# Patient Record
Sex: MALE | Race: WHITE | NOT HISPANIC OR LATINO | Employment: FULL TIME | URBAN - METROPOLITAN AREA
[De-identification: names, ages, dates, MRNs, and addresses within clinical notes are randomized per-mention and may not be internally consistent; named-entity substitution may affect disease eponyms.]

---

## 2018-03-08 ENCOUNTER — HOSPITAL ENCOUNTER (INPATIENT)
Facility: HOSPITAL | Age: 58
LOS: 18 days | Discharge: HOME HEALTH | End: 2018-03-26
Attending: EMERGENCY MEDICINE | Admitting: HOSPITALIST

## 2018-03-08 ENCOUNTER — APPOINTMENT (OUTPATIENT)
Dept: GENERAL RADIOLOGY | Facility: HOSPITAL | Age: 58
End: 2018-03-08

## 2018-03-08 DIAGNOSIS — E10.10 DIABETIC KETOACIDOSIS WITHOUT COMA ASSOCIATED WITH TYPE 1 DIABETES MELLITUS (HCC): Primary | ICD-10-CM

## 2018-03-08 DIAGNOSIS — I35.0 NONRHEUMATIC AORTIC VALVE STENOSIS: ICD-10-CM

## 2018-03-08 DIAGNOSIS — Z74.09 IMPAIRED FUNCTIONAL MOBILITY AND ACTIVITY TOLERANCE: ICD-10-CM

## 2018-03-08 PROBLEM — E11.9 DIABETES MELLITUS (HCC): Status: ACTIVE | Noted: 2018-03-08

## 2018-03-08 LAB
ALBUMIN SERPL-MCNC: 4.3 G/DL (ref 3.5–5.2)
ALBUMIN/GLOB SERPL: 1.5 G/DL
ALP SERPL-CCNC: 84 U/L (ref 39–117)
ALT SERPL W P-5'-P-CCNC: 18 U/L (ref 1–41)
AMPHET+METHAMPHET UR QL: NEGATIVE
ANION GAP SERPL CALCULATED.3IONS-SCNC: 41.4 MMOL/L
ANION GAP SERPL CALCULATED.3IONS-SCNC: 43 MMOL/L
ARTERIAL PATENCY WRIST A: POSITIVE
AST SERPL-CCNC: 15 U/L (ref 1–40)
ATMOSPHERIC PRESS: 752.8 MMHG
BACTERIA UR QL AUTO: ABNORMAL /HPF
BARBITURATES UR QL SCN: NEGATIVE
BASE EXCESS BLDA CALC-SCNC: -13 MMOL/L (ref 0–2)
BASOPHILS # BLD AUTO: 0.02 10*3/MM3 (ref 0–0.2)
BASOPHILS NFR BLD AUTO: 0.2 % (ref 0–1.5)
BDY SITE: ABNORMAL
BENZODIAZ UR QL SCN: NEGATIVE
BILIRUB SERPL-MCNC: 1.7 MG/DL (ref 0.1–1.2)
BILIRUB UR QL STRIP: NEGATIVE
BUN BLD-MCNC: 19 MG/DL (ref 6–20)
BUN BLD-MCNC: 19 MG/DL (ref 6–20)
BUN/CREAT SERPL: 13.7 (ref 7–25)
BUN/CREAT SERPL: 14.1 (ref 7–25)
BURR CELLS BLD QL SMEAR: NORMAL
C3 FRG RBC-MCNC: NORMAL
CA-I BLD-MCNC: 5.2 MG/DL (ref 4.6–5.4)
CA-I SERPL ISE-MCNC: 1.29 MMOL/L (ref 1.15–1.35)
CALCIUM SPEC-SCNC: 9.7 MG/DL (ref 8.6–10.5)
CALCIUM SPEC-SCNC: 9.8 MG/DL (ref 8.6–10.5)
CANNABINOIDS SERPL QL: NEGATIVE
CHLORIDE SERPL-SCNC: 82 MMOL/L (ref 98–107)
CHLORIDE SERPL-SCNC: 82 MMOL/L (ref 98–107)
CLARITY UR: ABNORMAL
CO2 SERPL-SCNC: 12.6 MMOL/L (ref 22–29)
CO2 SERPL-SCNC: 13 MMOL/L (ref 22–29)
COCAINE UR QL: NEGATIVE
COLOR UR: YELLOW
CREAT BLD-MCNC: 1.35 MG/DL (ref 0.76–1.27)
CREAT BLD-MCNC: 1.39 MG/DL (ref 0.76–1.27)
D-LACTATE SERPL-SCNC: 1.5 MMOL/L (ref 0.5–2)
DEPRECATED RDW RBC AUTO: 52.2 FL (ref 37–54)
EOSINOPHIL # BLD AUTO: 0 10*3/MM3 (ref 0–0.7)
EOSINOPHIL NFR BLD AUTO: 0 % (ref 0.3–6.2)
ERYTHROCYTE [DISTWIDTH] IN BLOOD BY AUTOMATED COUNT: 13.1 % (ref 11.5–14.5)
ETHANOL BLD-MCNC: <10 MG/DL (ref 0–10)
ETHANOL UR QL: <0.01 %
GFR SERPL CREATININE-BSD FRML MDRD: 52 ML/MIN/1.73
GFR SERPL CREATININE-BSD FRML MDRD: 54 ML/MIN/1.73
GLOBULIN UR ELPH-MCNC: 2.8 GM/DL
GLUCOSE BLD-MCNC: 325 MG/DL (ref 65–99)
GLUCOSE BLD-MCNC: 328 MG/DL (ref 65–99)
GLUCOSE BLDC GLUCOMTR-MCNC: 187 MG/DL (ref 70–130)
GLUCOSE BLDC GLUCOMTR-MCNC: 266 MG/DL (ref 70–130)
GLUCOSE BLDC GLUCOMTR-MCNC: 360 MG/DL (ref 70–130)
GLUCOSE BLDC GLUCOMTR-MCNC: 405 MG/DL (ref 70–130)
GLUCOSE UR STRIP-MCNC: ABNORMAL MG/DL
HCO3 BLDA-SCNC: 10.2 MMOL/L (ref 22–28)
HCT VFR BLD AUTO: 40.1 % (ref 40.4–52.2)
HGB BLD-MCNC: 13.5 G/DL (ref 13.7–17.6)
HGB UR QL STRIP.AUTO: NEGATIVE
HOLD SPECIMEN: NORMAL
HOLD SPECIMEN: NORMAL
HYALINE CASTS UR QL AUTO: ABNORMAL /LPF
IMM GRANULOCYTES # BLD: 0.05 10*3/MM3 (ref 0–0.03)
IMM GRANULOCYTES NFR BLD: 0.5 % (ref 0–0.5)
KETONES UR QL STRIP: ABNORMAL
LEUKOCYTE ESTERASE UR QL STRIP.AUTO: NEGATIVE
LIPASE SERPL-CCNC: 64 U/L (ref 13–60)
LYMPHOCYTES # BLD AUTO: 1.85 10*3/MM3 (ref 0.9–4.8)
LYMPHOCYTES NFR BLD AUTO: 17.1 % (ref 19.6–45.3)
MAGNESIUM SERPL-MCNC: 2.6 MG/DL (ref 1.6–2.6)
MCH RBC QN AUTO: 36.8 PG (ref 27–32.7)
MCHC RBC AUTO-ENTMCNC: 33.7 G/DL (ref 32.6–36.4)
MCV RBC AUTO: 109.3 FL (ref 79.8–96.2)
METHADONE UR QL SCN: NEGATIVE
MICROCYTES BLD QL: NORMAL
MODALITY: ABNORMAL
MONOCYTES # BLD AUTO: 1.08 10*3/MM3 (ref 0.2–1.2)
MONOCYTES NFR BLD AUTO: 10 % (ref 5–12)
NEUTROPHILS # BLD AUTO: 7.85 10*3/MM3 (ref 1.9–8.1)
NEUTROPHILS NFR BLD AUTO: 72.2 % (ref 42.7–76)
NITRITE UR QL STRIP: NEGATIVE
OPIATES UR QL: NEGATIVE
OXYCODONE UR QL SCN: NEGATIVE
PCO2 BLDA: 18.2 MM HG (ref 35–45)
PH BLDA: 7.36 PH UNITS (ref 7.35–7.45)
PH UR STRIP.AUTO: 5.5 [PH] (ref 5–8)
PHOSPHATE SERPL-MCNC: 4 MG/DL (ref 2.5–4.5)
PLAT MORPH BLD: NORMAL
PLATELET # BLD AUTO: 304 10*3/MM3 (ref 140–500)
PMV BLD AUTO: 11.2 FL (ref 6–12)
PO2 BLDA: 102.9 MM HG (ref 80–100)
POTASSIUM BLD-SCNC: 3.3 MMOL/L (ref 3.5–5.2)
POTASSIUM BLD-SCNC: 3.8 MMOL/L (ref 3.5–5.2)
PROT SERPL-MCNC: 7.1 G/DL (ref 6–8.5)
PROT UR QL STRIP: ABNORMAL
RBC # BLD AUTO: 3.67 10*6/MM3 (ref 4.6–6)
RBC # UR: ABNORMAL /HPF
REF LAB TEST METHOD: ABNORMAL
SAO2 % BLDCOA: 97.9 % (ref 92–99)
SODIUM BLD-SCNC: 136 MMOL/L (ref 136–145)
SODIUM BLD-SCNC: 138 MMOL/L (ref 136–145)
SP GR UR STRIP: 1.02 (ref 1–1.03)
SQUAMOUS #/AREA URNS HPF: ABNORMAL /HPF
TOTAL RATE: 16 BREATHS/MINUTE
TROPONIN T SERPL-MCNC: 0.02 NG/ML (ref 0–0.03)
UROBILINOGEN UR QL STRIP: ABNORMAL
WBC MORPH BLD: NORMAL
WBC NRBC COR # BLD: 10.85 10*3/MM3 (ref 4.5–10.7)
WBC UR QL AUTO: ABNORMAL /HPF
WHOLE BLOOD HOLD SPECIMEN: NORMAL
WHOLE BLOOD HOLD SPECIMEN: NORMAL

## 2018-03-08 PROCEDURE — 93010 ELECTROCARDIOGRAM REPORT: CPT | Performed by: INTERNAL MEDICINE

## 2018-03-08 PROCEDURE — 36600 WITHDRAWAL OF ARTERIAL BLOOD: CPT

## 2018-03-08 PROCEDURE — 85025 COMPLETE CBC W/AUTO DIFF WBC: CPT | Performed by: EMERGENCY MEDICINE

## 2018-03-08 PROCEDURE — 80307 DRUG TEST PRSMV CHEM ANLYZR: CPT | Performed by: EMERGENCY MEDICINE

## 2018-03-08 PROCEDURE — 99285 EMERGENCY DEPT VISIT HI MDM: CPT

## 2018-03-08 PROCEDURE — 81001 URINALYSIS AUTO W/SCOPE: CPT | Performed by: EMERGENCY MEDICINE

## 2018-03-08 PROCEDURE — 83605 ASSAY OF LACTIC ACID: CPT | Performed by: EMERGENCY MEDICINE

## 2018-03-08 PROCEDURE — 80048 BASIC METABOLIC PNL TOTAL CA: CPT | Performed by: PHYSICIAN ASSISTANT

## 2018-03-08 PROCEDURE — 82803 BLOOD GASES ANY COMBINATION: CPT

## 2018-03-08 PROCEDURE — 82330 ASSAY OF CALCIUM: CPT | Performed by: PHYSICIAN ASSISTANT

## 2018-03-08 PROCEDURE — 81003 URINALYSIS AUTO W/O SCOPE: CPT | Performed by: EMERGENCY MEDICINE

## 2018-03-08 PROCEDURE — 82043 UR ALBUMIN QUANTITATIVE: CPT | Performed by: INTERNAL MEDICINE

## 2018-03-08 PROCEDURE — 83735 ASSAY OF MAGNESIUM: CPT | Performed by: PHYSICIAN ASSISTANT

## 2018-03-08 PROCEDURE — 63710000001 INSULIN REGULAR HUMAN PER 5 UNITS: Performed by: PHYSICIAN ASSISTANT

## 2018-03-08 PROCEDURE — 85007 BL SMEAR W/DIFF WBC COUNT: CPT | Performed by: EMERGENCY MEDICINE

## 2018-03-08 PROCEDURE — 71045 X-RAY EXAM CHEST 1 VIEW: CPT

## 2018-03-08 PROCEDURE — 80307 DRUG TEST PRSMV CHEM ANLYZR: CPT | Performed by: PHYSICIAN ASSISTANT

## 2018-03-08 PROCEDURE — 82962 GLUCOSE BLOOD TEST: CPT

## 2018-03-08 PROCEDURE — 84100 ASSAY OF PHOSPHORUS: CPT | Performed by: PHYSICIAN ASSISTANT

## 2018-03-08 PROCEDURE — 80053 COMPREHEN METABOLIC PANEL: CPT | Performed by: EMERGENCY MEDICINE

## 2018-03-08 PROCEDURE — 93005 ELECTROCARDIOGRAM TRACING: CPT | Performed by: PHYSICIAN ASSISTANT

## 2018-03-08 PROCEDURE — 83690 ASSAY OF LIPASE: CPT | Performed by: EMERGENCY MEDICINE

## 2018-03-08 PROCEDURE — 36415 COLL VENOUS BLD VENIPUNCTURE: CPT | Performed by: EMERGENCY MEDICINE

## 2018-03-08 PROCEDURE — 82570 ASSAY OF URINE CREATININE: CPT | Performed by: INTERNAL MEDICINE

## 2018-03-08 PROCEDURE — 84484 ASSAY OF TROPONIN QUANT: CPT | Performed by: PHYSICIAN ASSISTANT

## 2018-03-08 RX ORDER — POTASSIUM CHLORIDE 1.5 G/1.77G
40 POWDER, FOR SOLUTION ORAL AS NEEDED
Status: DISCONTINUED | OUTPATIENT
Start: 2018-03-08 | End: 2018-03-11

## 2018-03-08 RX ORDER — SODIUM CHLORIDE 450 MG/100ML
250 INJECTION, SOLUTION INTRAVENOUS CONTINUOUS
Status: DISCONTINUED | OUTPATIENT
Start: 2018-03-08 | End: 2018-03-11

## 2018-03-08 RX ORDER — POTASSIUM CHLORIDE 750 MG/1
10 CAPSULE, EXTENDED RELEASE ORAL AS NEEDED
Status: DISCONTINUED | OUTPATIENT
Start: 2018-03-08 | End: 2018-03-11

## 2018-03-08 RX ORDER — SODIUM CHLORIDE 0.9 % (FLUSH) 0.9 %
1-10 SYRINGE (ML) INJECTION AS NEEDED
Status: DISCONTINUED | OUTPATIENT
Start: 2018-03-08 | End: 2018-03-21

## 2018-03-08 RX ORDER — ONDANSETRON 4 MG/1
4 TABLET, FILM COATED ORAL EVERY 6 HOURS PRN
Status: DISCONTINUED | OUTPATIENT
Start: 2018-03-08 | End: 2018-03-21

## 2018-03-08 RX ORDER — POTASSIUM CHLORIDE 7.46 G/1000ML
10 INJECTION, SOLUTION INTRAVENOUS
Status: DISCONTINUED | OUTPATIENT
Start: 2018-03-08 | End: 2018-03-11

## 2018-03-08 RX ORDER — DEXTROSE AND SODIUM CHLORIDE 5; .45 G/100ML; G/100ML
150 INJECTION, SOLUTION INTRAVENOUS CONTINUOUS PRN
Status: DISCONTINUED | OUTPATIENT
Start: 2018-03-08 | End: 2018-03-11

## 2018-03-08 RX ORDER — SODIUM CHLORIDE 0.9 % (FLUSH) 0.9 %
10 SYRINGE (ML) INJECTION AS NEEDED
Status: DISCONTINUED | OUTPATIENT
Start: 2018-03-08 | End: 2018-03-21

## 2018-03-08 RX ORDER — ACETAMINOPHEN 325 MG/1
650 TABLET ORAL EVERY 4 HOURS PRN
Status: DISCONTINUED | OUTPATIENT
Start: 2018-03-08 | End: 2018-03-21

## 2018-03-08 RX ORDER — ONDANSETRON 2 MG/ML
4 INJECTION INTRAMUSCULAR; INTRAVENOUS EVERY 6 HOURS PRN
Status: DISCONTINUED | OUTPATIENT
Start: 2018-03-08 | End: 2018-03-21

## 2018-03-08 RX ORDER — ONDANSETRON 4 MG/1
4 TABLET, ORALLY DISINTEGRATING ORAL EVERY 6 HOURS PRN
Status: DISCONTINUED | OUTPATIENT
Start: 2018-03-08 | End: 2018-03-21

## 2018-03-08 RX ORDER — DEXTROSE, SODIUM CHLORIDE, AND POTASSIUM CHLORIDE 5; .45; .15 G/100ML; G/100ML; G/100ML
150 INJECTION INTRAVENOUS CONTINUOUS PRN
Status: DISCONTINUED | OUTPATIENT
Start: 2018-03-08 | End: 2018-03-11

## 2018-03-08 RX ORDER — DEXTROSE MONOHYDRATE 25 G/50ML
12.5 INJECTION, SOLUTION INTRAVENOUS
Status: DISCONTINUED | OUTPATIENT
Start: 2018-03-08 | End: 2018-03-11

## 2018-03-08 RX ORDER — POTASSIUM CHLORIDE 1.5 G/1.77G
20 POWDER, FOR SOLUTION ORAL AS NEEDED
Status: DISCONTINUED | OUTPATIENT
Start: 2018-03-08 | End: 2018-03-11

## 2018-03-08 RX ORDER — POTASSIUM CHLORIDE 1.5 G/1.77G
10 POWDER, FOR SOLUTION ORAL AS NEEDED
Status: DISCONTINUED | OUTPATIENT
Start: 2018-03-08 | End: 2018-03-11

## 2018-03-08 RX ORDER — INSULIN GLARGINE 100 [IU]/ML
15 INJECTION, SOLUTION SUBCUTANEOUS 2 TIMES DAILY
COMMUNITY
End: 2018-03-26 | Stop reason: HOSPADM

## 2018-03-08 RX ORDER — POTASSIUM CHLORIDE 750 MG/1
40 CAPSULE, EXTENDED RELEASE ORAL AS NEEDED
Status: DISCONTINUED | OUTPATIENT
Start: 2018-03-08 | End: 2018-03-11

## 2018-03-08 RX ORDER — SODIUM CHLORIDE AND POTASSIUM CHLORIDE 150; 450 MG/100ML; MG/100ML
250 INJECTION, SOLUTION INTRAVENOUS CONTINUOUS PRN
Status: DISCONTINUED | OUTPATIENT
Start: 2018-03-08 | End: 2018-03-11

## 2018-03-08 RX ORDER — POTASSIUM CHLORIDE 750 MG/1
20 CAPSULE, EXTENDED RELEASE ORAL AS NEEDED
Status: DISCONTINUED | OUTPATIENT
Start: 2018-03-08 | End: 2018-03-11

## 2018-03-08 RX ADMIN — HUMAN INSULIN 5 UNITS: 100 INJECTION, SOLUTION SUBCUTANEOUS at 19:44

## 2018-03-08 RX ADMIN — SODIUM CHLORIDE 1000 ML: 9 INJECTION, SOLUTION INTRAVENOUS at 19:13

## 2018-03-08 RX ADMIN — SODIUM CHLORIDE 0.1 UNITS/KG/HR: 9 INJECTION, SOLUTION INTRAVENOUS at 19:46

## 2018-03-09 PROBLEM — Z59.89 INSURANCE COVERAGE PROBLEMS: Status: ACTIVE | Noted: 2018-03-09

## 2018-03-09 PROBLEM — F10.10 ALCOHOL ABUSE: Status: ACTIVE | Noted: 2018-03-09

## 2018-03-09 PROBLEM — E10.9 TYPE 1 DIABETES MELLITUS (HCC): Status: ACTIVE | Noted: 2018-03-08

## 2018-03-09 PROBLEM — Z72.0 TOBACCO ABUSE: Status: ACTIVE | Noted: 2018-03-09

## 2018-03-09 PROBLEM — G56.03 BILATERAL CARPAL TUNNEL SYNDROME: Status: ACTIVE | Noted: 2018-03-09

## 2018-03-09 LAB
ALBUMIN UR-MCNC: 6.2 MG/L
ANION GAP SERPL CALCULATED.3IONS-SCNC: 13.8 MMOL/L
ANION GAP SERPL CALCULATED.3IONS-SCNC: 16.6 MMOL/L
ANION GAP SERPL CALCULATED.3IONS-SCNC: 26.7 MMOL/L
BASOPHILS # BLD AUTO: 0.01 10*3/MM3 (ref 0–0.2)
BASOPHILS NFR BLD AUTO: 0.1 % (ref 0–1.5)
BUN BLD-MCNC: 19 MG/DL (ref 6–20)
BUN BLD-MCNC: 20 MG/DL (ref 6–20)
BUN BLD-MCNC: 22 MG/DL (ref 6–20)
BUN/CREAT SERPL: 15.5 (ref 7–25)
BUN/CREAT SERPL: 15.5 (ref 7–25)
BUN/CREAT SERPL: 17.3 (ref 7–25)
CA-I BLD-MCNC: 5.1 MG/DL (ref 4.6–5.4)
CA-I BLD-MCNC: 5.2 MG/DL (ref 4.6–5.4)
CA-I BLD-MCNC: 5.3 MG/DL (ref 4.6–5.4)
CA-I SERPL ISE-MCNC: 1.27 MMOL/L (ref 1.15–1.35)
CA-I SERPL ISE-MCNC: 1.29 MMOL/L (ref 1.15–1.35)
CA-I SERPL ISE-MCNC: 1.32 MMOL/L (ref 1.15–1.35)
CALCIUM SPEC-SCNC: 8.6 MG/DL (ref 8.6–10.5)
CALCIUM SPEC-SCNC: 8.9 MG/DL (ref 8.6–10.5)
CALCIUM SPEC-SCNC: 9.1 MG/DL (ref 8.6–10.5)
CHLORIDE SERPL-SCNC: 90 MMOL/L (ref 98–107)
CHLORIDE SERPL-SCNC: 92 MMOL/L (ref 98–107)
CHLORIDE SERPL-SCNC: 96 MMOL/L (ref 98–107)
CO2 SERPL-SCNC: 19.3 MMOL/L (ref 22–29)
CO2 SERPL-SCNC: 25.4 MMOL/L (ref 22–29)
CO2 SERPL-SCNC: 26.2 MMOL/L (ref 22–29)
CREAT BLD-MCNC: 1.1 MG/DL (ref 0.76–1.27)
CREAT BLD-MCNC: 1.29 MG/DL (ref 0.76–1.27)
CREAT BLD-MCNC: 1.42 MG/DL (ref 0.76–1.27)
CREAT UR-MCNC: 57.7 MG/DL
DEPRECATED RDW RBC AUTO: 49.9 FL (ref 37–54)
EOSINOPHIL # BLD AUTO: 0 10*3/MM3 (ref 0–0.7)
EOSINOPHIL NFR BLD AUTO: 0 % (ref 0.3–6.2)
ERYTHROCYTE [DISTWIDTH] IN BLOOD BY AUTOMATED COUNT: 12.8 % (ref 11.5–14.5)
GFR SERPL CREATININE-BSD FRML MDRD: 51 ML/MIN/1.73
GFR SERPL CREATININE-BSD FRML MDRD: 57 ML/MIN/1.73
GFR SERPL CREATININE-BSD FRML MDRD: 69 ML/MIN/1.73
GLUCOSE BLD-MCNC: 110 MG/DL (ref 65–99)
GLUCOSE BLD-MCNC: 159 MG/DL (ref 65–99)
GLUCOSE BLD-MCNC: 161 MG/DL (ref 65–99)
GLUCOSE BLDC GLUCOMTR-MCNC: 104 MG/DL (ref 70–130)
GLUCOSE BLDC GLUCOMTR-MCNC: 115 MG/DL (ref 70–130)
GLUCOSE BLDC GLUCOMTR-MCNC: 116 MG/DL (ref 70–130)
GLUCOSE BLDC GLUCOMTR-MCNC: 128 MG/DL (ref 70–130)
GLUCOSE BLDC GLUCOMTR-MCNC: 129 MG/DL (ref 70–130)
GLUCOSE BLDC GLUCOMTR-MCNC: 138 MG/DL (ref 70–130)
GLUCOSE BLDC GLUCOMTR-MCNC: 145 MG/DL (ref 70–130)
GLUCOSE BLDC GLUCOMTR-MCNC: 148 MG/DL (ref 70–130)
GLUCOSE BLDC GLUCOMTR-MCNC: 154 MG/DL (ref 70–130)
GLUCOSE BLDC GLUCOMTR-MCNC: 155 MG/DL (ref 70–130)
GLUCOSE BLDC GLUCOMTR-MCNC: 157 MG/DL (ref 70–130)
GLUCOSE BLDC GLUCOMTR-MCNC: 158 MG/DL (ref 70–130)
GLUCOSE BLDC GLUCOMTR-MCNC: 161 MG/DL (ref 70–130)
GLUCOSE BLDC GLUCOMTR-MCNC: 208 MG/DL (ref 70–130)
GLUCOSE BLDC GLUCOMTR-MCNC: 216 MG/DL (ref 70–130)
GLUCOSE BLDC GLUCOMTR-MCNC: 249 MG/DL (ref 70–130)
GLUCOSE BLDC GLUCOMTR-MCNC: 263 MG/DL (ref 70–130)
GLUCOSE BLDC GLUCOMTR-MCNC: 280 MG/DL (ref 70–130)
GLUCOSE BLDC GLUCOMTR-MCNC: 317 MG/DL (ref 70–130)
GLUCOSE BLDC GLUCOMTR-MCNC: 380 MG/DL (ref 70–130)
HBA1C MFR BLD: 8.02 % (ref 4.8–5.6)
HCT VFR BLD AUTO: 35.1 % (ref 40.4–52.2)
HGB BLD-MCNC: 11.7 G/DL (ref 13.7–17.6)
IMM GRANULOCYTES # BLD: 0.04 10*3/MM3 (ref 0–0.03)
IMM GRANULOCYTES NFR BLD: 0.4 % (ref 0–0.5)
LYMPHOCYTES # BLD AUTO: 1.64 10*3/MM3 (ref 0.9–4.8)
LYMPHOCYTES NFR BLD AUTO: 17.3 % (ref 19.6–45.3)
MAGNESIUM SERPL-MCNC: 2 MG/DL (ref 1.6–2.6)
MAGNESIUM SERPL-MCNC: 2 MG/DL (ref 1.6–2.6)
MAGNESIUM SERPL-MCNC: 2.1 MG/DL (ref 1.6–2.6)
MCH RBC QN AUTO: 35.9 PG (ref 27–32.7)
MCHC RBC AUTO-ENTMCNC: 33.3 G/DL (ref 32.6–36.4)
MCV RBC AUTO: 107.7 FL (ref 79.8–96.2)
MICROALBUMIN/CREAT UR: 107.5 MG/G
MONOCYTES # BLD AUTO: 1.3 10*3/MM3 (ref 0.2–1.2)
MONOCYTES NFR BLD AUTO: 13.7 % (ref 5–12)
NEUTROPHILS # BLD AUTO: 6.48 10*3/MM3 (ref 1.9–8.1)
NEUTROPHILS NFR BLD AUTO: 68.5 % (ref 42.7–76)
PHOSPHATE SERPL-MCNC: 1.1 MG/DL (ref 2.5–4.5)
PHOSPHATE SERPL-MCNC: 1.2 MG/DL (ref 2.5–4.5)
PHOSPHATE SERPL-MCNC: 1.4 MG/DL (ref 2.5–4.5)
PHOSPHATE SERPL-MCNC: 1.6 MG/DL (ref 2.5–4.5)
PHOSPHATE SERPL-MCNC: 1.8 MG/DL (ref 2.5–4.5)
PLATELET # BLD AUTO: 266 10*3/MM3 (ref 140–500)
PMV BLD AUTO: 10.1 FL (ref 6–12)
POTASSIUM BLD-SCNC: 2.7 MMOL/L (ref 3.5–5.2)
POTASSIUM BLD-SCNC: 2.9 MMOL/L (ref 3.5–5.2)
POTASSIUM BLD-SCNC: 2.9 MMOL/L (ref 3.5–5.2)
POTASSIUM BLD-SCNC: 3.4 MMOL/L (ref 3.5–5.2)
POTASSIUM BLD-SCNC: 3.4 MMOL/L (ref 3.5–5.2)
POTASSIUM BLD-SCNC: 4 MMOL/L (ref 3.5–5.2)
POTASSIUM BLD-SCNC: 4.3 MMOL/L (ref 3.5–5.2)
RBC # BLD AUTO: 3.26 10*6/MM3 (ref 4.6–6)
SODIUM BLD-SCNC: 134 MMOL/L (ref 136–145)
SODIUM BLD-SCNC: 136 MMOL/L (ref 136–145)
SODIUM BLD-SCNC: 136 MMOL/L (ref 136–145)
T4 FREE SERPL-MCNC: 1.03 NG/DL (ref 0.93–1.7)
TSH SERPL DL<=0.05 MIU/L-ACNC: 0.53 MIU/ML (ref 0.27–4.2)
WBC NRBC COR # BLD: 9.47 10*3/MM3 (ref 4.5–10.7)

## 2018-03-09 PROCEDURE — 63710000001 INSULIN ASPART PER 5 UNITS: Performed by: INTERNAL MEDICINE

## 2018-03-09 PROCEDURE — 25010000002 ENOXAPARIN PER 10 MG: Performed by: INTERNAL MEDICINE

## 2018-03-09 PROCEDURE — 82330 ASSAY OF CALCIUM: CPT | Performed by: PHYSICIAN ASSISTANT

## 2018-03-09 PROCEDURE — 84439 ASSAY OF FREE THYROXINE: CPT | Performed by: INTERNAL MEDICINE

## 2018-03-09 PROCEDURE — 84443 ASSAY THYROID STIM HORMONE: CPT | Performed by: INTERNAL MEDICINE

## 2018-03-09 PROCEDURE — 84100 ASSAY OF PHOSPHORUS: CPT | Performed by: PHYSICIAN ASSISTANT

## 2018-03-09 PROCEDURE — 84132 ASSAY OF SERUM POTASSIUM: CPT | Performed by: HOSPITALIST

## 2018-03-09 PROCEDURE — 82962 GLUCOSE BLOOD TEST: CPT

## 2018-03-09 PROCEDURE — 84100 ASSAY OF PHOSPHORUS: CPT | Performed by: INTERNAL MEDICINE

## 2018-03-09 PROCEDURE — 83036 HEMOGLOBIN GLYCOSYLATED A1C: CPT | Performed by: HOSPITALIST

## 2018-03-09 PROCEDURE — 63710000001 INSULIN DETEMER PER 5 UNITS: Performed by: INTERNAL MEDICINE

## 2018-03-09 PROCEDURE — 80048 BASIC METABOLIC PNL TOTAL CA: CPT | Performed by: PHYSICIAN ASSISTANT

## 2018-03-09 PROCEDURE — 85025 COMPLETE CBC W/AUTO DIFF WBC: CPT | Performed by: HOSPITALIST

## 2018-03-09 PROCEDURE — 83735 ASSAY OF MAGNESIUM: CPT | Performed by: PHYSICIAN ASSISTANT

## 2018-03-09 PROCEDURE — 25010000002 ONDANSETRON PER 1 MG: Performed by: HOSPITALIST

## 2018-03-09 PROCEDURE — 99254 IP/OBS CNSLTJ NEW/EST MOD 60: CPT | Performed by: INTERNAL MEDICINE

## 2018-03-09 PROCEDURE — 63710000001 INSULIN REGULAR HUMAN PER 5 UNITS: Performed by: PHYSICIAN ASSISTANT

## 2018-03-09 PROCEDURE — 25010000002 THIAMINE PER 100 MG: Performed by: HOSPITALIST

## 2018-03-09 PROCEDURE — 84132 ASSAY OF SERUM POTASSIUM: CPT | Performed by: INTERNAL MEDICINE

## 2018-03-09 RX ORDER — LORAZEPAM 1 MG/1
1 TABLET ORAL EVERY 6 HOURS PRN
Status: ACTIVE | OUTPATIENT
Start: 2018-03-09 | End: 2018-03-19

## 2018-03-09 RX ORDER — DIPHENOXYLATE HYDROCHLORIDE AND ATROPINE SULFATE 2.5; .025 MG/1; MG/1
1 TABLET ORAL DAILY
Status: DISCONTINUED | OUTPATIENT
Start: 2018-03-09 | End: 2018-03-21

## 2018-03-09 RX ORDER — FOLIC ACID 1 MG/1
1 TABLET ORAL DAILY
Status: COMPLETED | OUTPATIENT
Start: 2018-03-09 | End: 2018-03-13

## 2018-03-09 RX ORDER — LORAZEPAM 2 MG/ML
1 INJECTION INTRAMUSCULAR EVERY 6 HOURS PRN
Status: ACTIVE | OUTPATIENT
Start: 2018-03-09 | End: 2018-03-19

## 2018-03-09 RX ADMIN — POTASSIUM CHLORIDE, DEXTROSE MONOHYDRATE AND SODIUM CHLORIDE 150 ML/HR: 150; 5; 450 INJECTION, SOLUTION INTRAVENOUS at 01:05

## 2018-03-09 RX ADMIN — FOLIC ACID 1 MG: 1 TABLET ORAL at 15:45

## 2018-03-09 RX ADMIN — POTASSIUM & SODIUM PHOSPHATES POWDER PACK 280-160-250 MG 2 PACKET: 280-160-250 PACK at 12:32

## 2018-03-09 RX ADMIN — SODIUM CHLORIDE 0.01 UNITS/KG/HR: 9 INJECTION, SOLUTION INTRAVENOUS at 08:34

## 2018-03-09 RX ADMIN — POTASSIUM CHLORIDE, DEXTROSE MONOHYDRATE AND SODIUM CHLORIDE 150 ML/HR: 150; 5; 450 INJECTION, SOLUTION INTRAVENOUS at 07:52

## 2018-03-09 RX ADMIN — POTASSIUM CHLORIDE, DEXTROSE MONOHYDRATE AND SODIUM CHLORIDE 150 ML/HR: 150; 5; 450 INJECTION, SOLUTION INTRAVENOUS at 15:45

## 2018-03-09 RX ADMIN — POTASSIUM CHLORIDE 20 MEQ: 750 CAPSULE, EXTENDED RELEASE ORAL at 07:30

## 2018-03-09 RX ADMIN — INSULIN ASPART 4 UNITS: 100 INJECTION, SOLUTION INTRAVENOUS; SUBCUTANEOUS at 17:33

## 2018-03-09 RX ADMIN — ONDANSETRON 4 MG: 2 INJECTION INTRAMUSCULAR; INTRAVENOUS at 22:14

## 2018-03-09 RX ADMIN — POTASSIUM CHLORIDE 20 MEQ: 750 CAPSULE, EXTENDED RELEASE ORAL at 09:07

## 2018-03-09 RX ADMIN — INSULIN DETEMIR 12 UNITS: 100 INJECTION, SOLUTION SUBCUTANEOUS at 11:22

## 2018-03-09 RX ADMIN — POTASSIUM CHLORIDE 20 MEQ: 750 CAPSULE, EXTENDED RELEASE ORAL at 09:43

## 2018-03-09 RX ADMIN — ENOXAPARIN SODIUM 40 MG: 40 INJECTION SUBCUTANEOUS at 20:10

## 2018-03-09 RX ADMIN — POTASSIUM CHLORIDE 40 MEQ: 750 CAPSULE, EXTENDED RELEASE ORAL at 04:51

## 2018-03-09 RX ADMIN — INSULIN DETEMIR 10 UNITS: 100 INJECTION, SOLUTION SUBCUTANEOUS at 13:39

## 2018-03-09 RX ADMIN — Medication 1 TABLET: at 10:59

## 2018-03-09 RX ADMIN — POTASSIUM CHLORIDE 40 MEQ: 750 CAPSULE, EXTENDED RELEASE ORAL at 01:30

## 2018-03-09 RX ADMIN — THIAMINE HYDROCHLORIDE 100 MG: 100 INJECTION, SOLUTION INTRAMUSCULAR; INTRAVENOUS at 10:58

## 2018-03-09 RX ADMIN — SODIUM CHLORIDE 0.03 UNITS/KG/HR: 9 INJECTION, SOLUTION INTRAVENOUS at 09:41

## 2018-03-09 NOTE — PROGRESS NOTES
Name: Juan Payne ADMIT: 3/8/2018   : 1960  PCP: No Known Provider    MRN: [id number] LOS: 1 days   AGE/SEX: 57 y.o. male  ROOM: [facility]/   Subjective   Subjective  Feeling improved. Reports that had insurance issues so has been out of long acting insulin for about 1 week. Had nausea and vomiting which have improved. Abdominal pain with vomiting but none since (BLQ). No fevers or chills. No dysuria. No rash or swelling.    No CP palpitations or syncopal episodes. Aware of murmur.    Discussed with nursing. AG and acidosis improved but BG was increasing so just increased his insulin gtt rate.    Objective   Vital Signs  Temp:  [98.7 °F (37.1 °C)-99 °F (37.2 °C)] 99 °F (37.2 °C)  Heart Rate:  [] 80  Resp:  [16-18] 18  BP: (111-139)/(68-81) 111/81  SpO2:  [97 %-100 %] 97 %  on   ;   O2 Device: room air  Body mass index is 21.29 kg/(m^2).    Physical Exam   Constitutional: He appears well-developed. No distress.   HENT:   Head: Normocephalic and atraumatic.   Eyes: EOM are normal. Pupils are equal, round, and reactive to light.   Neck: Normal range of motion. Neck supple.   Cardiovascular: Normal rate, regular rhythm and intact distal pulses.    Murmur (4/6 LIZANDRO) heard.  Pulmonary/Chest: Effort normal and breath sounds normal. He has no wheezes.   Abdominal: Soft. He exhibits no distension. There is no tenderness. There is no rebound and no guarding.   Musculoskeletal: Normal range of motion. He exhibits no edema.   Neurological: He is alert. No cranial nerve deficit.   Skin: Skin is warm and dry. He is not diaphoretic.   Psychiatric: He has a normal mood and affect. His behavior is normal.   Nursing note and vitals reviewed.      Results Review:       I reviewed the patient's new clinical results. Reviewed imaging, agree with interpretation. Reviewed EKG, lateral st depression, LVH, sinus rhythm, no prior EKG to compare. Reviewed prior records.    Results from last 7 days  Lab Units 18  0028  03/08/18  1646   WBC 10*3/mm3 9.47 10.85*   HEMOGLOBIN g/dL 11.7* 13.5*   PLATELETS 10*3/mm3 266 304     Results from last 7 days  Lab Units 03/09/18  0801 03/09/18  0553 03/09/18 0333 03/09/18 0028 03/08/18 2013   SODIUM mmol/L 136  --  134* 136 138   POTASSIUM mmol/L 3.4* 3.4* 2.9*  2.9* 2.7* 3.8   CHLORIDE mmol/L 96*  --  92* 90* 82*   CO2 mmol/L 26.2  --  25.4 19.3* 13.0*   BUN mg/dL 19  --  20 22* 19   CREATININE mg/dL 1.10  --  1.29* 1.42* 1.35*   GLUCOSE mg/dL 159*  --  110* 161* 325*   Estimated Creatinine Clearance: 64.9 mL/min (by C-G formula based on Cr of 1.1).  Results from last 7 days  Lab Units 03/09/18  0801 03/09/18 0333 03/09/18 0028 03/08/18 2013 03/08/18  1646   CALCIUM mg/dL 8.6 8.9 9.1 9.8 9.7   ALBUMIN g/dL  --   --   --   --  4.30   MAGNESIUM mg/dL 2.0 2.0 2.1 2.6  --    PHOSPHORUS mg/dL 1.2* 1.1* 1.4* 4.0  --          insulin detemir 12 Units Subcutaneous QAM   multivitamin 1 tablet Oral Daily   thiamine (VITAMIN B1) IVPB 100 mg Intravenous Daily       dextrose 5 % and sodium chloride 0.45 % 150 mL/hr    dextrose 5 % and sodium chloride 0.45 % with KCl 20 mEq/L 150 mL/hr Last Rate: 150 mL/hr (03/09/18 0752)   insulin regular infusion 1 unit/mL 0.1 Units/kg/hr Last Rate: 0.031 Units/kg/hr (03/09/18 0941)   sodium chloride 250 mL/hr Last Rate: Stopped (03/08/18 2221)   sodium chloride 0.45 % with KCl 20 mEq 250 mL/hr    Diet Regular; Consistent Carbohydrate      Assessment/Plan   Active Hospital Problems (** Indicates Principal Problem)    Diagnosis Date Noted   • **Diabetic ketoacidosis without coma associated with type 1 diabetes mellitus [E10.10] 03/08/2018   • Tobacco abuse [Z72.0] 03/09/2018   • Alcohol abuse [F10.10] 03/09/2018   • Insurance coverage problems [Z59.8] 03/09/2018   • Type 1 diabetes mellitus [E10.9] 03/08/2018      Resolved Hospital Problems    Diagnosis Date Noted Date Resolved   No resolved problems to display.     - DKA: 2/2 running out of insulin. Continue gtt  and lab monitoring. Replace potassium and phosphorus. Continue IVF. Acidosis resolved and BG improved. Hopefully can get off of drip today. Endocrinology consulted.  - DM1: A1c 8.0. Consult DM educator and CCP.  - Hypokalemia  - Hypophosphatemia  - Abnormal EKG: No CP and troponin negative. Will repeat EKG to monitor.  - Alcohol Abuse: MVI folate thiamine. Ativan PRN. No active WD currently.  - Disposition: TBD  Rodney Diaz MD  [facility]  03/09/18  10:52 AM

## 2018-03-09 NOTE — ED PROVIDER NOTES
EMERGENCY DEPARTMENT ENCOUNTER    CHIEF COMPLAINT  Chief Complaint: generalized weakness   History given by: pt, family   History limited by: none  Room Number: 18/18  PMD: No Known Provider      HPI:  Pt is a 57 y.o. male who presents complaining of increasing generalized weakness for the past week. Pt also c/o N/V, lowered PO intake for several days, and productive cough. Pt's family reports slurred speech. Pt denies diarrhea. Pt states a Hx of diabetes, but denies a prior Hx of DKA. Per the pt's family, the pt's blood sugar has been high recently. Pt's family also state a Hx of EtOH use, but that the pt has not had a drink in 6 days.     Duration:  1 week   Onset: gradual   Timing: constant   Location: generalized   Quality: weakness  Intensity/Severity: moderate   Progression: increasing   Associated Symptoms: N/V, lowered PO intake, productive cough, slurred speech   Aggravating Factors: none stated   Alleviating Factors: none stated  Previous Episodes: none   Treatment before arrival: none    PAST MEDICAL HISTORY  Active Ambulatory Problems     Diagnosis Date Noted   • No Active Ambulatory Problems     Resolved Ambulatory Problems     Diagnosis Date Noted   • No Resolved Ambulatory Problems     Past Medical History:   Diagnosis Date   • Diabetes mellitus        PAST SURGICAL HISTORY  Past Surgical History:   Procedure Laterality Date   • TESTICLE SURGERY         FAMILY HISTORY  History reviewed. No pertinent family history.    SOCIAL HISTORY  Social History     Social History   • Marital status: Single     Spouse name: N/A   • Number of children: N/A   • Years of education: N/A     Occupational History   • Not on file.     Social History Main Topics   • Smoking status: Current Every Day Smoker     Packs/day: 1.50   • Smokeless tobacco: Not on file   • Alcohol use 3.6 oz/week     6 Shots of liquor per week      Comment: per DAY    • Drug use: No   • Sexual activity: Not on file     Other Topics Concern   • Not  on file     Social History Narrative   • No narrative on file       ALLERGIES  Review of patient's allergies indicates no known allergies.    REVIEW OF SYSTEMS  Review of Systems   Constitutional: Positive for appetite change (decreased). Negative for activity change and fever.   HENT: Negative for congestion and sore throat.    Respiratory: Positive for cough (productive). Negative for shortness of breath.    Cardiovascular: Negative for chest pain and leg swelling.   Gastrointestinal: Positive for nausea and vomiting. Negative for abdominal pain and diarrhea.   Genitourinary: Negative for decreased urine volume and dysuria.   Musculoskeletal: Negative for neck pain.   Skin: Negative for rash and wound.   Neurological: Positive for speech difficulty (slurred) and weakness (generalized). Negative for numbness and headaches.   Psychiatric/Behavioral: Negative.    All other systems reviewed and are negative.      PHYSICAL EXAM  ED Triage Vitals   Temp Heart Rate Resp BP SpO2   03/08/18 1637 03/08/18 1628 03/08/18 1628 03/08/18 1628 03/08/18 1628   98.7 °F (37.1 °C) 90 16 118/70 100 %      Temp src Heart Rate Source Patient Position BP Location FiO2 (%)   03/08/18 1637 -- -- -- --   Tympanic           Physical Exam   Constitutional: He is oriented to person, place, and time and well-developed, well-nourished, and in no distress.   HENT:   Head: Normocephalic and atraumatic.   Eyes: EOM are normal. Pupils are equal, round, and reactive to light.   Neck: Normal range of motion. Neck supple.   Cardiovascular: Regular rhythm.  Tachycardia present.    Murmur heard.   Systolic murmur is present with a grade of 3/6   Pulmonary/Chest: Effort normal and breath sounds normal. No respiratory distress.   Abdominal: Soft. There is no tenderness. There is no rebound and no guarding.   Musculoskeletal: Normal range of motion. He exhibits no edema.   Neurological: He is alert and oriented to person, place, and time. He has normal  sensation and normal strength. He displays abnormal speech (slurred, slow to respond).   Skin: Skin is warm and dry.   Psychiatric: Mood and affect normal.   Nursing note and vitals reviewed.      LAB RESULTS  Lab Results (last 24 hours)     Procedure Component Value Units Date/Time    CBC & Differential [[id number]] Collected:  03/08/18 1646    Specimen:  Blood Updated:  03/08/18 1750    Narrative:       The following orders were created for panel order CBC & Differential.  Procedure                               Abnormality         Status                     ---------                               -----------         ------                     Scan Slide[[id number]]                                       Final result               CBC Auto Differential[[id number]]        Abnormal            Final result                 Please view results for these tests on the individual orders.    Comprehensive Metabolic Panel [[id number]]  (Abnormal) Collected:  03/08/18 1646    Specimen:  Blood Updated:  03/08/18 1730     Glucose 328 (H) mg/dL      BUN 19 mg/dL      Creatinine 1.39 (H) mg/dL      Sodium 136 mmol/L      Potassium 3.3 (L) mmol/L      Chloride 82 (L) mmol/L      CO2 12.6 (L) mmol/L      Calcium 9.7 mg/dL      Total Protein 7.1 g/dL      Albumin 4.30 g/dL      ALT (SGPT) 18 U/L      AST (SGOT) 15 U/L      Alkaline Phosphatase 84 U/L      Total Bilirubin 1.7 (H) mg/dL      eGFR Non African Amer 52 (L) mL/min/1.73      Globulin 2.8 gm/dL      A/G Ratio 1.5 g/dL      BUN/Creatinine Ratio 13.7     Anion Gap 41.4 mmol/L     Lipase [[id number]]  (Abnormal) Collected:  03/08/18 1646    Specimen:  Blood Updated:  03/08/18 1730     Lipase 64 (H) U/L     Lactic Acid, Plasma [[id number]]  (Normal) Collected:  03/08/18 1646    Specimen:  Blood Updated:  03/08/18 1709     Lactate 1.5 mmol/L     CBC Auto Differential [[id number]]  (Abnormal) Collected:  03/08/18 1646    Specimen:  Blood Updated:  03/08/18 1750     WBC 10.85 (H)  10*3/mm3      RBC 3.67 (L) 10*6/mm3      Hemoglobin 13.5 (L) g/dL      Hematocrit 40.1 (L) %      .3 (H) fL      MCH 36.8 (H) pg      MCHC 33.7 g/dL      RDW 13.1 %      RDW-SD 52.2 fl      MPV 11.2 fL      Platelets 304 10*3/mm3      Neutrophil % 72.2 %      Lymphocyte % 17.1 (L) %      Monocyte % 10.0 %      Eosinophil % 0.0 (L) %      Basophil % 0.2 %      Immature Grans % 0.5 %      Neutrophils, Absolute 7.85 10*3/mm3      Lymphocytes, Absolute 1.85 10*3/mm3      Monocytes, Absolute 1.08 10*3/mm3      Eosinophils, Absolute 0.00 10*3/mm3      Basophils, Absolute 0.02 10*3/mm3      Immature Grans, Absolute 0.05 (H) 10*3/mm3     Scan Slide [[id number]] Collected:  03/08/18 1646    Specimen:  Blood Updated:  03/08/18 1750     Crenated RBC's Slight/1+     Microcytes Mod/2+     RBC Fragments Slight/1+     WBC Morphology Normal     Platelet Morphology Normal    Ethanol [[id number]] Collected:  03/08/18 1646    Specimen:  Blood Updated:  03/08/18 1920     Ethanol <10 mg/dL      Ethanol % <0.010 %     POC Glucose Once [[id number]]  (Abnormal) Collected:  03/08/18 1922    Specimen:  Blood Updated:  03/08/18 1925     Glucose 405 (H) mg/dL     Narrative:       Treated Patient Meter: [id number] : [id number] Vasquez Pate RN    Phosphorus [[id number]]  (Normal) Collected:  03/08/18 2013    Specimen:  Blood from Arm, Left Updated:  03/08/18 2042     Phosphorus 4.0 mg/dL     Basic Metabolic Panel [[id number]]  (Abnormal) Collected:  03/08/18 2013    Specimen:  Blood from Arm, Left Updated:  03/08/18 2042     Glucose 325 (H) mg/dL      BUN 19 mg/dL      Creatinine 1.35 (H) mg/dL      Sodium 138 mmol/L      Potassium 3.8 mmol/L      Chloride 82 (L) mmol/L      CO2 13.0 (L) mmol/L      Calcium 9.8 mg/dL      eGFR Non African Amer 54 (L) mL/min/1.73      BUN/Creatinine Ratio 14.1     Anion Gap 43.0 mmol/L     Narrative:       GFR Normal >60  Chronic Kidney Disease <60  Kidney Failure <15    Magnesium [[id number]]  (Normal)  Collected:  03/08/18 2013    Specimen:  Blood from Arm, Left Updated:  03/08/18 2042     Magnesium 2.6 mg/dL     Calcium, Ionized [[id number]]  (Normal) Collected:  03/08/18 2013    Specimen:  Blood from Arm, Left Updated:  03/08/18 2044     Ionized Calcium 1.29 mmol/L      Ionized Calcium 5.2 mg/dL     Troponin [[id number]]  (Normal) Collected:  03/08/18 2013    Specimen:  Blood from Arm, Left Updated:  03/08/18 2042     Troponin T 0.016 ng/mL     Narrative:       Troponin T Reference Ranges:  Less than 0.03 ng/mL:    Negative for AMI  0.03 to 0.09 ng/mL:      Indeterminant for AMI  Greater than 0.09 ng/mL: Positive for AMI    POC Glucose Once [[id number]]  (Abnormal) Collected:  03/08/18 2057    Specimen:  Blood Updated:  03/08/18 2059     Glucose 360 (H) mg/dL     Narrative:       Meter: [id number] : [id number] Fredy Ventura    Urinalysis With / Culture If Indicated - Urine, Clean Catch [[id number]]  (Abnormal) Collected:  03/08/18 2107    Specimen:  Urine from Urine, Clean Catch Updated:  03/08/18 2123     Color, UA Yellow     Appearance, UA Cloudy (A)     pH, UA 5.5     Specific Gravity, UA 1.021     Glucose, UA >=1000 mg/dL (3+) (A)     Ketones, UA 80 mg/dL (3+) (A)     Bilirubin, UA Negative     Blood, UA Negative     Protein, UA 30 mg/dL (1+) (A)     Leuk Esterase, UA Negative     Nitrite, UA Negative     Urobilinogen, UA 1.0 E.U./dL    Urinalysis, Microscopic Only - Urine, Clean Catch [[id number]]  (Abnormal) Collected:  03/08/18 2107    Specimen:  Urine from Urine, Clean Catch Updated:  03/08/18 2123     RBC, UA 3-5 (A) /HPF      WBC, UA 0-2 /HPF      Bacteria, UA None Seen /HPF      Squamous Epithelial Cells, UA 0-2 /HPF      Hyaline Casts, UA 7-12 /LPF      Methodology Automated Microscopy    Urine Drug Screen - Urine, Clean Catch [[id number]]  (Normal) Collected:  03/08/18 2107    Specimen:  Urine from Urine, Clean Catch Updated:  03/08/18 2209     Amphet/Methamphet, Screen Negative      Barbiturates Screen, Urine Negative     Benzodiazepine Screen, Urine Negative     Cocaine Screen, Urine Negative     Opiate Screen Negative     THC, Screen, Urine Negative     Methadone Screen, Urine Negative     Oxycodone Screen, Urine Negative    Narrative:       Negative Thresholds For Drugs Screened:     Amphetamines               500 ng/ml   Barbiturates               200 ng/ml   Benzodiazepines            100 ng/ml   Cocaine                    300 ng/ml   Methadone                  300 ng/ml   Opiates                    300 ng/ml   Oxycodone                  100 ng/ml   THC                        50 ng/ml    The Normal Value for all drugs tested is negative. This report includes final unconfirmed screening results to be used for medical treatment purposes only. Unconfirmed results must not be used for non-medical purposes such as employment or legal testing. Clinical consideration should be applied to any drug of abuse test, particulary when unconfirmed results are used.    Blood Gas, Arterial [[id number]]  (Abnormal) Collected:  03/08/18 2145    Specimen:  Arterial Blood Updated:  03/08/18 2149     Site Arterial: right radial     Howard's Test Positive     pH, Arterial 7.357 pH units      pCO2, Arterial 18.2 (C) mm Hg       Critical:Notify Dr SHANNAN TINSLEY MD (08-Mar-18 21:48:23)Read back ok        pO2, Arterial 102.9 (H) mm Hg      HCO3, Arterial 10.2 (L) mmol/L      Base Excess, Arterial -13.0 (L) mmol/L      O2 Saturation Calculated 97.9 %      Barometric Pressure for Blood Gas 752.8 mmHg      Modality Room Air     Rate 16 Breaths/minute     Narrative:        Meter: [id number] : [id number] Miladys Corona    POC Glucose Once [[id number]]  (Abnormal) Collected:  03/08/18 2202    Specimen:  Blood Updated:  03/08/18 2203     Glucose 266 (H) mg/dL     Narrative:       Meter: [id number] : [id number] Fredy Ventura I ordered the above labs and reviewed the results    RADIOLOGY  XR  Chest 1 View   Final Result       1. Negative acute portable chest, no evidence of an active intrathoracic  process nor other significant abnormality.    I ordered the above noted radiological studies. Interpreted by radiologist. Reviewed by me in PACS.        PROCEDURES  Critical Care  Performed by: GAURANG MERRILL  Authorized by: SHANNAN TINSLEY     Critical care provider statement:     Critical care time (minutes):  30    Critical care was necessary to treat or prevent imminent or life-threatening deterioration of the following conditions:  Endocrine crisis    Critical care was time spent personally by me on the following activities:  Blood draw for specimens, development of treatment plan with patient or surrogate, evaluation of patient's response to treatment, examination of patient, obtaining history from patient or surrogate, re-evaluation of patient's condition, pulse oximetry, ordering and review of radiographic studies, ordering and review of laboratory studies and ordering and performing treatments and interventions        EKG           EKG time: 1935  Rhythm/Rate: sinus tachycardia 101  P waves and RI: normal   QRS, axis: LVH   ST and T waves: ST depression in lateral leads     Interpreted Contemporaneously by me, independently viewed  No prior EKG      PROGRESS AND CONSULTS  ED Course   1904  Ordered DKA protocol.  1911  Ordered labs and CXR for further evaluation.   1914  Ordered EKG for further evaluation.   1915  Ordered ABG for further evaluation.   1949  Ordered labs for further evaluation.   2250  Received a call from Dr. Tolbert and discussed pt's case. Dr. Tolbert agreed to admit the pt.      MEDICAL DECISION MAKING  Results were reviewed/discussed with the patient and they were also made aware of online access. Pt also made aware that some labs, such as cultures, will not be resulted during ER visit and follow up with PMD is necessary.     MDM  Number of Diagnoses or Management Options  Diabetic  ketoacidosis without coma associated with type 1 diabetes mellitus:      Amount and/or Complexity of Data Reviewed  Clinical lab tests: reviewed and ordered (PCO2 ART 18.2, Ketones U 3+, Bacteria U: none, Creatinine 1.35, Anion Gap 43.0, Lactate 1.5, WBC 10.85)  Tests in the radiology section of CPT®: ordered and reviewed (CXR: negative acute)  Tests in the medicine section of CPT®: reviewed and ordered (See procedures section for EKG.)  Obtain history from someone other than the patient: yes (Pt's family)  Discuss the patient with other providers: yes (Dr. Tolbert)    Critical Care  Total time providing critical care: 30-74 minutes    Patient Progress  Patient progress: stable         DIAGNOSIS  Final diagnoses:   Diabetic ketoacidosis without coma associated with type 1 diabetes mellitus       DISPOSITION  ADMISSION by Dr. Tolbert    Discussed treatment plan and reason for admission with pt/family and admitting physician.  Pt/family voiced understanding of the plan for admission for further testing/treatment as needed.     Latest Documented Vital Signs:  As of 10:58 PM  BP- 139/72 HR- 94 Temp- 98.7 °F (37.1 °C) (Tympanic) O2 sat- 100%    --  Documentation assistance provided by norma Calderon for CLAUDIA Barrera.  Information recorded by the norma was done at my direction and has been verified and validated by me.       Cedrick Calderon  03/08/18 6400       CLAUDIA Armenta  03/09/18 0040

## 2018-03-09 NOTE — PLAN OF CARE
Problem: Patient Care Overview (Adult)  Goal: Plan of Care Review  Outcome: Ongoing (interventions implemented as appropriate)   03/09/18 0606   Coping/Psychosocial Response Interventions   Plan Of Care Reviewed With patient   Patient Care Overview   Progress improving   Outcome Evaluation   Outcome Summary/Follow up Plan pt on insulin drip, Q1 Accu checks. Potassium protocol. up with standby assist. no complaints of pain, resting well. will continue to monitor.      Goal: Adult Individualization and Mutuality  Outcome: Ongoing (interventions implemented as appropriate)    Goal: Discharge Needs Assessment  Outcome: Ongoing (interventions implemented as appropriate)      Problem: Fall Risk (Adult)  Goal: Identify Related Risk Factors and Signs and Symptoms  Outcome: Outcome(s) achieved Date Met: 03/09/18    Goal: Absence of Falls  Outcome: Ongoing (interventions implemented as appropriate)      Problem: Diabetes, Type 1 (Adult)  Goal: Signs and Symptoms of Listed Potential Problems Will be Absent or Manageable (Diabetes, Type 1)  Outcome: Ongoing (interventions implemented as appropriate)      Problem: Acute Alcohol Withdrawal Syndrome, Risk For/Actual (Adult)  Goal: Signs and Symptoms of Listed Potential Problems Will be Absent or Manageable (Acute Alcohol Withdrawal Syndrome, Risk For/Actual)  Outcome: Ongoing (interventions implemented as appropriate)

## 2018-03-09 NOTE — H&P
HISTORY AND PHYSICAL   [facility]        Patient Identification:  Name: Juan Payne  Age: 57 y.o.  Sex: male  :  1960  MRN: [id number]                     Primary Care Physician: No Known Provider    Chief Complaint:  Weakness with nausea and vomiting    History of Present Illness:           The patient is a 57-year-old white male with history of diabetes, alcohol abuse and tobacco abuse who is admitted with history of feeling bad for the last week with some nausea and vomiting.  His sugars been running 3 and 400 and he did not been able to eat anything for several days.  The patient was evaluated in the ER and appeared to be in DKA and was started on DKA protocol with some IV fluid hydration and insulin drip.  The patient denied having any fevers or chills.  He's not had any chest pain or shortness of air.  He been very weak and was admitted for further evaluation treatment of DKA and uncontrolled diabetes.    Past Medical History:  Past Medical History:   Diagnosis Date   • Diabetes mellitus      Past Surgical History:  Past Surgical History:   Procedure Laterality Date   • TESTICLE SURGERY        Home Meds:  No current facility-administered medications on file prior to encounter.      No current outpatient prescriptions on file prior to encounter.     Prescriptions Prior to Admission   Medication Sig Dispense Refill Last Dose   • insulin glargine (LANTUS) 100 UNIT/ML injection Inject 15 Units under the skin 2 (Two) Times a Day. Patient states he is taking 18-21 units TID      • insulin lispro (humaLOG) 100 UNIT/ML injection Inject 18-21 Units under the skin 3 (Three) Times a Day Before Meals.      • insulin regular (humuLIN R,novoLIN R) 100 UNIT/ML injection Inject  under the skin 2 (Two) Times a Day Before Meals.          Allergies:  No Known Allergies  Immunizations:    There is no immunization history on file for this patient.  Social History:   Social History     Social History Narrative  "    Social History     Social History   • Marital status: Single     Spouse name: N/A   • Number of children: N/A   • Years of education: N/A     Occupational History   • Not on file.     Social History Main Topics   • Smoking status: Current Every Day Smoker     Packs/day: 1.50   • Smokeless tobacco: Never Used   • Alcohol use 3.6 oz/week     6 Shots of liquor per week      Comment: pt states 1 pint/day   • Drug use: No   • Sexual activity: Defer     Other Topics Concern   • Not on file     Social History Narrative       Family History:  History reviewed. No pertinent family history.     Review of Systems  See history of present illness and past medical history.  Patient denies headache, dizziness, syncope, falls, trauma, change in vision, change in hearing, change in taste, changes in weight, changes in appetite, focal weakness, numbness, or paresthesia.  Patient denies chest pain, palpitations, dyspnea, orthopnea, PND, cough, sinus pressure, rhinorrhea, epistaxis, hemoptysis, hematemesis, diarrhea, constipation or hematchezia.  Denies cold or heat intolerance, polydipsia, polyuria, polyphagia. Denies hematuria, pyuria, dysuria, hesitancy, frequency or urgency.  Denies fever, chills, sweats, night sweats.   Remainder of ROS is negative.    Objective:  tMax 24 hrs: Temp (24hrs), Av.8 °F (37.1 °C), Min:98.7 °F (37.1 °C), Max:98.9 °F (37.2 °C)    Vitals Ranges:   Temp:  [98.7 °F (37.1 °C)-98.9 °F (37.2 °C)] 98.9 °F (37.2 °C)  Heart Rate:  [] 82  Resp:  [16-18] 16  BP: (114-139)/(68-75) 114/68      Exam:  /68 (BP Location: Left arm, Patient Position: Lying)  Pulse 82  Temp 98.9 °F (37.2 °C) (Oral)   Resp 16  Ht 172.7 cm (68\")  Wt 63.5 kg (140 lb)  SpO2 98%  BMI 21.29 kg/m2    General Appearance:    Alert, cooperative, no distress, appears stated age   Head:    Normocephalic, without obvious abnormality, atraumatic   Eyes:    PERRL, conjunctiva/corneas clear, EOM's intact, both eyes   Ears:    " Normal external ear canals, both ears   Nose:   Nares normal, septum midline, mucosa normal, no drainage    or sinus tenderness   Throat:   Lips, mucosa, and tongue normal   Neck:   Supple, symmetrical, trachea midline, no adenopathy;     thyroid:  no enlargement/tenderness/nodules; no carotid    bruit or JVD   Back:     Symmetric, no curvature, ROM normal, no CVA tenderness   Lungs:     Clear to auscultation bilaterally, respirations unlabored   Chest Wall:    No tenderness or deformity    Heart:    Regular rate and rhythm, S1 and S2 normal, no murmur, rub   or gallop   Abdomen:     Soft, non-tender, bowel sounds active all four quadrants,     no masses, no hepatomegaly, no splenomegaly   Extremities:   Extremities normal, atraumatic, no cyanosis or edema   Pulses:   2+ and symmetric all extremities   Skin:   Skin color, texture, turgor normal, no rashes or lesions   Lymph nodes:   Cervical, supraclavicular, and axillary nodes normal   Neurologic:   CNII-XII intact, normal strength, sensation intact throughout      .    Data Review:  Lab Results (last 72 hours)     Procedure Component Value Units Date/Time    Lactic Acid, Plasma [[id number]]  (Normal) Collected:  03/08/18 1646    Specimen:  Blood Updated:  03/08/18 1709     Lactate 1.5 mmol/L     Comprehensive Metabolic Panel [[id number]]  (Abnormal) Collected:  03/08/18 1646    Specimen:  Blood Updated:  03/08/18 1730     Glucose 328 (H) mg/dL      BUN 19 mg/dL      Creatinine 1.39 (H) mg/dL      Sodium 136 mmol/L      Potassium 3.3 (L) mmol/L      Chloride 82 (L) mmol/L      CO2 12.6 (L) mmol/L      Calcium 9.7 mg/dL      Total Protein 7.1 g/dL      Albumin 4.30 g/dL      ALT (SGPT) 18 U/L      AST (SGOT) 15 U/L      Alkaline Phosphatase 84 U/L      Total Bilirubin 1.7 (H) mg/dL      eGFR Non African Amer 52 (L) mL/min/1.73      Globulin 2.8 gm/dL      A/G Ratio 1.5 g/dL      BUN/Creatinine Ratio 13.7     Anion Gap 41.4 mmol/L     Lipase [[id number]]  (Abnormal)  Collected:  03/08/18 1646    Specimen:  Blood Updated:  03/08/18 1730     Lipase 64 (H) U/L     [place] Draw [[id number]] Collected:  03/08/18 1646    Specimen:  Blood Updated:  03/08/18 1746    Narrative:       The following orders were created for panel order [place] Draw.  Procedure                               Abnormality         Status                     ---------                               -----------         ------                     Light Blue Top[[id number]]                                   Final result               Green Top (Gel)[[id number]]                                  Final result               Lavender Top[[id number]]                                     Final result               Gold Top - SST[[id number]]                                   Final result                 Please view results for these tests on the individual orders.    Light Blue Top [[id number]] Collected:  03/08/18 1646    Specimen:  Blood Updated:  03/08/18 1746     Extra Tube hold for add-on      Auto resulted       Green Top (Gel) [[id number]] Collected:  03/08/18 1646    Specimen:  Blood Updated:  03/08/18 1746     Extra Tube Hold for add-ons.      Auto resulted.       Lavender Top [[id number]] Collected:  03/08/18 1646    Specimen:  Blood Updated:  03/08/18 1746     Extra Tube hold for add-on      Auto resulted       Gold Top - SST [[id number]] Collected:  03/08/18 1646    Specimen:  Blood Updated:  03/08/18 1746     Extra Tube Hold for add-ons.      Auto resulted.       CBC Auto Differential [[id number]]  (Abnormal) Collected:  03/08/18 1646    Specimen:  Blood Updated:  03/08/18 1750     WBC 10.85 (H) 10*3/mm3      RBC 3.67 (L) 10*6/mm3      Hemoglobin 13.5 (L) g/dL      Hematocrit 40.1 (L) %      .3 (H) fL      MCH 36.8 (H) pg      MCHC 33.7 g/dL      RDW 13.1 %      RDW-SD 52.2 fl      MPV 11.2 fL      Platelets 304 10*3/mm3      Neutrophil % 72.2 %      Lymphocyte % 17.1 (L) %      Monocyte % 10.0 %      Eosinophil %  0.0 (L) %      Basophil % 0.2 %      Immature Grans % 0.5 %      Neutrophils, Absolute 7.85 10*3/mm3      Lymphocytes, Absolute 1.85 10*3/mm3      Monocytes, Absolute 1.08 10*3/mm3      Eosinophils, Absolute 0.00 10*3/mm3      Basophils, Absolute 0.02 10*3/mm3      Immature Grans, Absolute 0.05 (H) 10*3/mm3     CBC & Differential [[id number]] Collected:  03/08/18 1646    Specimen:  Blood Updated:  03/08/18 1750    Narrative:       The following orders were created for panel order CBC & Differential.  Procedure                               Abnormality         Status                     ---------                               -----------         ------                     Scan Slide[[id number]]                                       Final result               CBC Auto Differential[[id number]]        Abnormal            Final result                 Please view results for these tests on the individual orders.    Scan Slide [[id number]] Collected:  03/08/18 1646    Specimen:  Blood Updated:  03/08/18 1750     Crenated RBC's Slight/1+     Microcytes Mod/2+     RBC Fragments Slight/1+     WBC Morphology Normal     Platelet Morphology Normal    Ethanol [[id number]] Collected:  03/08/18 1646    Specimen:  Blood Updated:  03/08/18 1920     Ethanol <10 mg/dL      Ethanol % <0.010 %     POC Glucose Once [[id number]]  (Abnormal) Collected:  03/08/18 1922    Specimen:  Blood Updated:  03/08/18 1925     Glucose 405 (H) mg/dL     Narrative:       Treated Patient Meter: [id number] : [id number] Vasquez Pate RN    Phosphorus [[id number]]  (Normal) Collected:  03/08/18 2013    Specimen:  Blood from Arm, Left Updated:  03/08/18 2042     Phosphorus 4.0 mg/dL     Magnesium [[id number]]  (Normal) Collected:  03/08/18 2013    Specimen:  Blood from Arm, Left Updated:  03/08/18 2042     Magnesium 2.6 mg/dL     Troponin [[id number]]  (Normal) Collected:  03/08/18 2013    Specimen:  Blood from Arm, Left Updated:  03/08/18 2042     Troponin T  0.016 ng/mL     Narrative:       Troponin T Reference Ranges:  Less than 0.03 ng/mL:    Negative for AMI  0.03 to 0.09 ng/mL:      Indeterminant for AMI  Greater than 0.09 ng/mL: Positive for AMI    Basic Metabolic Panel [[id number]]  (Abnormal) Collected:  03/08/18 2013    Specimen:  Blood from Arm, Left Updated:  03/08/18 2042     Glucose 325 (H) mg/dL      BUN 19 mg/dL      Creatinine 1.35 (H) mg/dL      Sodium 138 mmol/L      Potassium 3.8 mmol/L      Chloride 82 (L) mmol/L      CO2 13.0 (L) mmol/L      Calcium 9.8 mg/dL      eGFR Non African Amer 54 (L) mL/min/1.73      BUN/Creatinine Ratio 14.1     Anion Gap 43.0 mmol/L     Narrative:       GFR Normal >60  Chronic Kidney Disease <60  Kidney Failure <15    Calcium, Ionized [[id number]]  (Normal) Collected:  03/08/18 2013    Specimen:  Blood from Arm, Left Updated:  03/08/18 2044     Ionized Calcium 1.29 mmol/L      Ionized Calcium 5.2 mg/dL     POC Glucose Once [[id number]]  (Abnormal) Collected:  03/08/18 2057    Specimen:  Blood Updated:  03/08/18 2059     Glucose 360 (H) mg/dL     Narrative:       Meter: [id number] : [id number] Fredy Ventura    Urinalysis With / Culture If Indicated - Urine, Clean Catch [[id number]]  (Abnormal) Collected:  03/08/18 2107    Specimen:  Urine from Urine, Clean Catch Updated:  03/08/18 2123     Color, UA Yellow     Appearance, UA Cloudy (A)     pH, UA 5.5     Specific Gravity, UA 1.021     Glucose, UA >=1000 mg/dL (3+) (A)     Ketones, UA 80 mg/dL (3+) (A)     Bilirubin, UA Negative     Blood, UA Negative     Protein, UA 30 mg/dL (1+) (A)     Leuk Esterase, UA Negative     Nitrite, UA Negative     Urobilinogen, UA 1.0 E.U./dL    Urinalysis, Microscopic Only - Urine, Clean Catch [[id number]]  (Abnormal) Collected:  03/08/18 2107    Specimen:  Urine from Urine, Clean Catch Updated:  03/08/18 2123     RBC, UA 3-5 (A) /HPF      WBC, UA 0-2 /HPF      Bacteria, UA None Seen /HPF      Squamous Epithelial Cells, UA 0-2 /HPF       Hyaline Casts, UA 7-12 /LPF      Methodology Automated Microscopy    Blood Gas, Arterial [[id number]]  (Abnormal) Collected:  03/08/18 2145    Specimen:  Arterial Blood Updated:  03/08/18 2149     Site Arterial: right radial     Howard's Test Positive     pH, Arterial 7.357 pH units      pCO2, Arterial 18.2 (C) mm Hg       Critical:Notify Dr SHANNAN TINSLEY MD (08-Mar-18 21:48:23)Read back ok        pO2, Arterial 102.9 (H) mm Hg      HCO3, Arterial 10.2 (L) mmol/L      Base Excess, Arterial -13.0 (L) mmol/L      O2 Saturation Calculated 97.9 %      Barometric Pressure for Blood Gas 752.8 mmHg      Modality Room Air     Rate 16 Breaths/minute     Narrative:        Meter: [id number] : [id number] Miladys Corona    POC Glucose Once [[id number]]  (Abnormal) Collected:  03/08/18 2202    Specimen:  Blood Updated:  03/08/18 2203     Glucose 266 (H) mg/dL     Narrative:       Meter: [id number] : [id number] Fredy Ventura    Urine Drug Screen - Urine, Clean Catch [[id number]]  (Normal) Collected:  03/08/18 2107    Specimen:  Urine from Urine, Clean Catch Updated:  03/08/18 2209     Amphet/Methamphet, Screen Negative     Barbiturates Screen, Urine Negative     Benzodiazepine Screen, Urine Negative     Cocaine Screen, Urine Negative     Opiate Screen Negative     THC, Screen, Urine Negative     Methadone Screen, Urine Negative     Oxycodone Screen, Urine Negative    Narrative:       Negative Thresholds For Drugs Screened:     Amphetamines               500 ng/ml   Barbiturates               200 ng/ml   Benzodiazepines            100 ng/ml   Cocaine                    300 ng/ml   Methadone                  300 ng/ml   Opiates                    300 ng/ml   Oxycodone                  100 ng/ml   THC                        50 ng/ml    The Normal Value for all drugs tested is negative. This report includes final unconfirmed screening results to be used for medical treatment purposes only. Unconfirmed results must  not be used for non-medical purposes such as employment or legal testing. Clinical consideration should be applied to any drug of abuse test, particulary when unconfirmed results are used.    POC Glucose Once [[id number]]  (Abnormal) Collected:  03/08/18 2315    Specimen:  Blood Updated:  03/08/18 2317     Glucose 187 (H) mg/dL     Narrative:       Meter: [id number] : [id number] Vasquez Pate RN                   Imaging Results (all)     Procedure Component Value Units Date/Time    XR Chest 1 View [[id number]] Collected:  03/08/18 1938     Updated:  03/08/18 1942    Narrative:       EMERGENCY PORTABLE CHEST SINGLE VIEW 19:45     HISTORY: 57-year-old male with shortness of breath, tobacco abuse and  diabetes     COMPARISON: None available     FINDINGS:  1. Negative acute portable chest, no evidence of an active intrathoracic  process nor other significant abnormality.     This report was finalized on 3/8/2018 7:39 PM by Dr. Zeb Davis MD.           Patient Active Problem List   Diagnosis Code   • Diabetic ketoacidosis without coma associated with type 1 diabetes mellitus E10.10   • Diabetes mellitus E11.9   • Tobacco abuse Z72.0   • Alcohol abuse F10.10       Assessment:  Active Hospital Problems (** Indicates Principal Problem)    Diagnosis Date Noted   • **Diabetic ketoacidosis without coma associated with type 1 diabetes mellitus [E10.10] 03/08/2018   • Tobacco abuse [Z72.0] 03/09/2018   • Alcohol abuse [F10.10] 03/09/2018   • Diabetes mellitus [E11.9] 03/08/2018      Resolved Hospital Problems    Diagnosis Date Noted Date Resolved   No resolved problems to display.       Plan:  The patient's admitted to hospital will continue with DKA protocol with IV fluid hydration and insulin drip.  We'll consult endocrinology to help with his diabetes management.    Mahamed Tolbert MD  3/9/2018  3:19 AM

## 2018-03-09 NOTE — PROGRESS NOTES
"Adult Nutrition  Assessment/PES    Patient Name:  Juan Payne  YOB: 1960  MRN: [id number]  Admit Date:  3/8/2018    Assessment Date:  3/9/2018    Comments:  Nutrition assessment completed. Diet just advanced and patient getting ready to eat lunch at time of visit. Per patient's partner, he does not eat regularly at home, often skips meals and drinks ETOH instead. She reports a weight loss of about 40#. MSA completed for malnutrition.   Will follow for po intake and tolerance.           Reason for Assessment       03/09/18 1315    Reason for Assessment    Reason For Assessment/Visit identified at risk by screening criteria    Identified At Risk By Screening Criteria MST SCORE 2+    Diagnosis Diagnosis    Endocrine DM   DKA    Substance Use ETOH;Tobacco    Factors Affecting Nutrition Factors    Reported GI Symptoms N & V              Nutrition/Diet History       03/09/18 1315    Nutrition/Diet History    Typical Food/Fluid Intake per patients partner, the patient will eat if she cooks for him but if she is not there he will drink instead. He will often take his insulin without eating.  She states \"I have saved his life about 100 times\"  referring to low blood sugars.  Reports patient has lost about 40# in the last year            Anthropometrics       03/09/18 1317    Anthropometrics    Height 172.7 cm (67.99\")    RD Documented Current Weight  63.5 kg (140 lb)    Anthropometrics (Special Considerations)    RD Calculated     RD Calculated % IBW 90    RD Calculated BMI (kg/m2) 21.29    Ideal Body Weight (IBW)    Ideal Body Weight (IBW), Male (kg) 70.87    % Ideal Body Weight Malnutrition 80-90% - mild deficit    Usual Body Weight (UBW)    Usual Body Weight 74.8 kg (165 lb)    % Usual Body Weight Malnutrition 80-90% - mild deficit   85%    Weight Loss 11.3 kg (25 lb)    % Weight Loss  15 %    Weight Loss Time Frame 1 year    Body Mass Index (BMI)    BMI Grade 19.1 - 24.9 - normal          " "  Labs/Tests/Procedures/Meds       03/09/18 1324    Labs/Tests/Procedures/Meds    Diagnostic Test/Procedure Review reviewed    Labs/Tests Review Reviewed    Medication Review Insulin;Multivitamin;Reviewed, pertinent   thiamine, folate, insulin drip    Significant Vitals reviewed            Physical Findings       03/09/18 1325    Physical Findings/Assessment    Additional Documentation Physical Appearance (Group)    Physical Appearance    Overall Physical Appearance underweight;loss of muscle mass;loss of subcutaneous fat    Skin --   intact            Estimated/Assessed Needs       03/09/18 1325    Calculation Measurements    Weight Used For Calculations 63.5 kg (140 lb)    Height Used for Calculations 1.727 m (5' 7.99\")    Estimated/Assessed Energy Needs    Energy Need Method Kcal/kg    kcal/kg 30    30 Kcal/Kg (kcal) 1905.12    Estimated/Assessed Protein Needs    Weight Used for Protein Calculation 63.5 kg (140 lb)    Protein (gm/kg) 1.2    1.2 Gm Protein (gm) 76.2    Estimated/Assessed Fluid Needs    Fluid Need Method RDA method    RDA Method (mL)  1900            Nutrition Prescription Ordered       03/09/18 1326    Nutrition Prescription PO    Current PO Diet Regular    Common Modifiers Consistent Carbohydrate            Evaluation of Received Nutrient/Fluid Intake       03/09/18 1326    PO Evaluation    Number of Days PO Intake Evaluated Insufficient Data              Malnutrition Severity Assessment       03/09/18 1327    Malnutrition Severity Assessment    Malnutrition Type Social/Environmental Circumstance Malnutrition    Physical Signs of Malnutrition (Social/Environmental)    Muscle Wasting Severe   protrusing clavicle bone, shoulders with squared off appearance, acromion process prominent, patellar bone prominent, thin calves and thighs,    Fat Loss Severe   ribs apparent, iliac crest somewhat prominent    Weight Status (Social/Environmental)    %IBW MIld (<90%)    %UBW Mod (75-85%)    Weight Loss Mild " (>10% / 1 yr)    Energy Intake Status (Social/Environmental)    Energy Intake Severe (< or equal to 50% / > or equal to 1 mo)    Criteria Met (Must meet criteria for severity in at least 2 of these categories: M Wasting, Fat Loss, Fluid, Secondary Signs, Wt. Status, Intake)    Patient meets criteria for  Severe malnutrition        Problem/Interventions:        Problem 1       03/09/18 1332    Nutrition Diagnoses Problem 1    Problem 1 Malnutrition    Etiology (related to) Medical Diagnosis    Endocrine DM    Substance Use ETOH    Signs/Symptoms (evidenced by) % UBW;Unintended Weight Change;Report of Mnimal PO Intake    Percent (%) UBW 8 %    Unintended Weight Change Loss    Number of Pounds Lost 25    Weight loss time period 1 year                    Intervention Goal       03/09/18 1333    Intervention Goal    General Disease management/therapy    PO Tolerate PO;PO intake (%)    PO Intake % 75 %    Weight Maintain weight            Nutrition Intervention       03/09/18 1333    Nutrition Intervention    RD/Tech Action Follow Tx progress;Care plan reviewd;Encourage intake              Education/Evaluation       03/09/18 1334    Education    Education Will Instruct as appropriate    Monitor/Evaluation    Monitor Per protocol        Electronically signed by:  Maya Mancilla RD  03/09/18 1:37 PM

## 2018-03-09 NOTE — PROGRESS NOTES
Clinical Pharmacy Services: Medication History    Juan Payne is a 57 y.o. male presenting to [facility] for   Chief Complaint   Patient presents with   • Fatigue   • Vomiting   • Nausea       He  has a past medical history of Diabetes mellitus.    Allergies as of 03/08/2018   • (No Known Allergies)       Medication information was obtained from: Patient, pharmacy  Pharmacy and Phone Number: [facility] [phone]    Prior to Admission Medications     Prescriptions Last Dose Informant Patient Reported? Taking?    insulin lispro (humaLOG) 100 UNIT/ML injection  Self Yes Yes    Inject 18-21 Units under the skin 3 (Three) Times a Day Before Meals.    insulin glargine (LANTUS) 100 UNIT/ML injection  Pharmacy Yes Yes    Inject 15 Units under the skin 2 (Two) Times a Day. Patient states he is taking 18-21 units TID    insulin regular (humuLIN R,novoLIN R) 100 UNIT/ML injection   Yes Yes    Inject  under the skin 2 (Two) Times a Day Before Meals.            Medication notes: Dose for Lantus provided by the pharmacy is 15 units BID, patient states he is taking 18-21 units TID, same as Humalog    This medication list is complete to the best of my knowledge as of 3/8/2018    Please call if questions.    Carri Gomez, Medication History Technician  3/8/2018 8:41 PM

## 2018-03-09 NOTE — ED PROVIDER NOTES
Discussed pt's case with Bruce HUNG.  Pt presents to the ER with generalized weakness. Pt states he first felt ill approximately 5-6 days ago. Pt also c/o N/V, but denies diarrhea, cough, cold, fever, chills, urinary changes, or any other pertinent symptoms. Pt states he was only in DKA once, and that was when pt was first diagnosed with diabetes. Pt states he is compliant with his medications. Pt states he is a smoker and family states that pt is a heavy drinker. Family states that pt has been unable to consume alcohol in the past 6 days due to the N/V.  Pt has a hx of diabetes.     Exam: Appears older than stated age and smells of alcohol.  Tachycardiac -120  Lungs: Clear to auscultation bilaterally   Dry oral mucosa  Abdomen: soft and non-tender   Ill appearing, but not toxic appearing   Intact distal pulses     Reviewed all tests.  I believe the patient is suffering from some alcoholic ketoacidosis as well as some potentially mild diabetic ketoacidosis.  Vision is an alcoholic and drinks a pint a day and has had decreased by mouth intake with vomiting the past 5-6 days.  Will admit him for IV fluids and some insulin.  He does not need the intensive care unit this time we'll admit him to a monitor bed.  I do not suspect an acute bacterial infectious etiology as he has had no fever and does not have any cough or respiratory symptoms.    MDM:  Plan to admit pt for DKA protocol     I supervised care provided by the midlevel provider.    We have discussed this patient's history, physical exam, and treatment plan.   I have reviewed the note and personally saw and examined the patient and agree with the plan of care.    Documentation assistance provided by norma Lara for Dr. Vásquez.  Information recorded by the norma was done at my direction and has been verified and validated by me.       Tom Lara  03/08/18 2020       Jose Vásquez MD  03/09/18 0009

## 2018-03-09 NOTE — ED NOTES
Patient started feeling bad last Thursday with weakness, increased fatigue. On Saturday started nausea, vomiting, slurred speech, no appetite.      Herlinda Ovalle RN  03/08/18 1907       Herlinda Ovalle RN  03/08/18 1908

## 2018-03-09 NOTE — PAYOR COMM NOTE
"Tiffanie Gutierrez (57 y.o. Male)     ATTN: NURSE REVIEW   REF#[id number]  PLEASE CALL BACK TO PONCHO [email or web address]@[phone] OR [phone]  THANKS!   PONCHO      Date of Birth Social Security Number Address Home Phone MRN    1960  [id number] [facility] [address]  [id number]    Jehovah's witness Marital Status          None Single       Admission Date Admission Type Admitting Provider Attending Provider Department, Room/Bed    3/8/18 Emergency Mahamed Tolbert MD Baumann, Rodney CHAPPELL MD [address], 406/1    Discharge Date Discharge Disposition Discharge Destination                      Attending Provider: Rodney Diaz MD     Allergies:  No Known Allergies    Isolation:  None   Infection:  None   Code Status:  FULL    Ht:  172.7 cm (68\")   Wt:  63.5 kg (140 lb)    Admission Cmt:  None   Principal Problem:  Diabetic ketoacidosis without coma associated with type 1 diabetes mellitus [E10.10]                 Active Insurance as of 3/8/2018     Primary Coverage     Payor Plan Insurance Group Employer/Plan Group    [employer]HE[employer] BLUE CROSS AN[employer] BLUE CROSS BLUE [facility] PPO [id number]     Payor Plan Address Payor Plan Phone Number Effective From Effective To    [address] [phone] 2018     [place] [address]       Subscriber Name Subscriber Birth Date Member ID       TIFFANIE GUTIERREZ 1960 [id number]                 Emergency Contacts      (Rel.) Home Phone Work Phone Mobile Phone    Aurelia Briones (Significant Other) -- -- [phone]    Leonor Galindo (Daughter) -- -- [phone]               History & Physical      Mahamed Tolbert MD at 3/8/2018 11:34 PM          HISTORY AND PHYSICAL   [facility]        Patient Identification:  Name: Tiffanie Gutierrez  Age: 57 y.o.  Sex: male  :  1960  MRN: [id number]                     Primary Care Physician: No Known Provider    Chief Complaint:  Weakness with nausea and vomiting    History of Present Illness:          "  The patient is a 57-year-old white male with history of diabetes, alcohol abuse and tobacco abuse who is admitted with history of feeling bad for the last week with some nausea and vomiting.  His sugars been running 3 and 400 and he did not been able to eat anything for several days.  The patient was evaluated in the ER and appeared to be in DKA and was started on DKA protocol with some IV fluid hydration and insulin drip.  The patient denied having any fevers or chills.  He's not had any chest pain or shortness of air.  He been very weak and was admitted for further evaluation treatment of DKA and uncontrolled diabetes.    Past Medical History:  Past Medical History:   Diagnosis Date   • Diabetes mellitus      Past Surgical History:  Past Surgical History:   Procedure Laterality Date   • TESTICLE SURGERY        Home Meds:  No current facility-administered medications on file prior to encounter.      No current outpatient prescriptions on file prior to encounter.     Prescriptions Prior to Admission   Medication Sig Dispense Refill Last Dose   • insulin glargine (LANTUS) 100 UNIT/ML injection Inject 15 Units under the skin 2 (Two) Times a Day. Patient states he is taking 18-21 units TID      • insulin lispro (humaLOG) 100 UNIT/ML injection Inject 18-21 Units under the skin 3 (Three) Times a Day Before Meals.      • insulin regular (humuLIN R,novoLIN R) 100 UNIT/ML injection Inject  under the skin 2 (Two) Times a Day Before Meals.          Allergies:  No Known Allergies  Immunizations:    There is no immunization history on file for this patient.  Social History:   Social History     Social History Narrative     Social History     Social History   • Marital status: Single     Spouse name: N/A   • Number of children: N/A   • Years of education: N/A     Occupational History   • Not on file.     Social History Main Topics   • Smoking status: Current Every Day Smoker     Packs/day: 1.50   • Smokeless tobacco: Never Used  "  • Alcohol use 3.6 oz/week     6 Shots of liquor per week      Comment: pt states 1 pint/day   • Drug use: No   • Sexual activity: Defer     Other Topics Concern   • Not on file     Social History Narrative       Family History:  History reviewed. No pertinent family history.     Review of Systems  See history of present illness and past medical history.  Patient denies headache, dizziness, syncope, falls, trauma, change in vision, change in hearing, change in taste, changes in weight, changes in appetite, focal weakness, numbness, or paresthesia.  Patient denies chest pain, palpitations, dyspnea, orthopnea, PND, cough, sinus pressure, rhinorrhea, epistaxis, hemoptysis, hematemesis, diarrhea, constipation or hematchezia.  Denies cold or heat intolerance, polydipsia, polyuria, polyphagia. Denies hematuria, pyuria, dysuria, hesitancy, frequency or urgency.  Denies fever, chills, sweats, night sweats.   Remainder of ROS is negative.    Objective:  tMax 24 hrs: Temp (24hrs), Av.8 °F (37.1 °C), Min:98.7 °F (37.1 °C), Max:98.9 °F (37.2 °C)    Vitals Ranges:   Temp:  [98.7 °F (37.1 °C)-98.9 °F (37.2 °C)] 98.9 °F (37.2 °C)  Heart Rate:  [] 82  Resp:  [16-18] 16  BP: (114-139)/(68-75) 114/68      Exam:  /68 (BP Location: Left arm, Patient Position: Lying)  Pulse 82  Temp 98.9 °F (37.2 °C) (Oral)   Resp 16  Ht 172.7 cm (68\")  Wt 63.5 kg (140 lb)  SpO2 98%  BMI 21.29 kg/m2    General Appearance:    Alert, cooperative, no distress, appears stated age   Head:    Normocephalic, without obvious abnormality, atraumatic   Eyes:    PERRL, conjunctiva/corneas clear, EOM's intact, both eyes   Ears:    Normal external ear canals, both ears   Nose:   Nares normal, septum midline, mucosa normal, no drainage    or sinus tenderness   Throat:   Lips, mucosa, and tongue normal   Neck:   Supple, symmetrical, trachea midline, no adenopathy;     thyroid:  no enlargement/tenderness/nodules; no carotid    bruit or JVD "   Back:     Symmetric, no curvature, ROM normal, no CVA tenderness   Lungs:     Clear to auscultation bilaterally, respirations unlabored   Chest Wall:    No tenderness or deformity    Heart:    Regular rate and rhythm, S1 and S2 normal, no murmur, rub   or gallop   Abdomen:     Soft, non-tender, bowel sounds active all four quadrants,     no masses, no hepatomegaly, no splenomegaly   Extremities:   Extremities normal, atraumatic, no cyanosis or edema   Pulses:   2+ and symmetric all extremities   Skin:   Skin color, texture, turgor normal, no rashes or lesions   Lymph nodes:   Cervical, supraclavicular, and axillary nodes normal   Neurologic:   CNII-XII intact, normal strength, sensation intact throughout      .    Data Review:  Lab Results (last 72 hours)     Procedure Component Value Units Date/Time    Lactic Acid, Plasma [[id number]]  (Normal) Collected:  03/08/18 1646    Specimen:  Blood Updated:  03/08/18 1709     Lactate 1.5 mmol/L     Comprehensive Metabolic Panel [[id number]]  (Abnormal) Collected:  03/08/18 1646    Specimen:  Blood Updated:  03/08/18 1730     Glucose 328 (H) mg/dL      BUN 19 mg/dL      Creatinine 1.39 (H) mg/dL      Sodium 136 mmol/L      Potassium 3.3 (L) mmol/L      Chloride 82 (L) mmol/L      CO2 12.6 (L) mmol/L      Calcium 9.7 mg/dL      Total Protein 7.1 g/dL      Albumin 4.30 g/dL      ALT (SGPT) 18 U/L      AST (SGOT) 15 U/L      Alkaline Phosphatase 84 U/L      Total Bilirubin 1.7 (H) mg/dL      eGFR Non African Amer 52 (L) mL/min/1.73      Globulin 2.8 gm/dL      A/G Ratio 1.5 g/dL      BUN/Creatinine Ratio 13.7     Anion Gap 41.4 mmol/L     Lipase [[id number]]  (Abnormal) Collected:  03/08/18 1646    Specimen:  Blood Updated:  03/08/18 1730     Lipase 64 (H) U/L     [place] Draw [[id number]] Collected:  03/08/18 1646    Specimen:  Blood Updated:  03/08/18 1746    Narrative:       The following orders were created for panel order [place] Draw.  Procedure                                Abnormality         Status                     ---------                               -----------         ------                     Light Blue Top[[id number]]                                   Final result               Green Top (Gel)[[id number]]                                  Final result               Lavender Top[[id number]]                                     Final result               Gold Top - SST[[id number]]                                   Final result                 Please view results for these tests on the individual orders.    Light Blue Top [[id number]] Collected:  03/08/18 1646    Specimen:  Blood Updated:  03/08/18 1746     Extra Tube hold for add-on      Auto resulted       Green Top (Gel) [[id number]] Collected:  03/08/18 1646    Specimen:  Blood Updated:  03/08/18 1746     Extra Tube Hold for add-ons.      Auto resulted.       Lavender Top [[id number]] Collected:  03/08/18 1646    Specimen:  Blood Updated:  03/08/18 1746     Extra Tube hold for add-on      Auto resulted       Gold Top - SST [[id number]] Collected:  03/08/18 1646    Specimen:  Blood Updated:  03/08/18 1746     Extra Tube Hold for add-ons.      Auto resulted.       CBC Auto Differential [[id number]]  (Abnormal) Collected:  03/08/18 1646    Specimen:  Blood Updated:  03/08/18 1750     WBC 10.85 (H) 10*3/mm3      RBC 3.67 (L) 10*6/mm3      Hemoglobin 13.5 (L) g/dL      Hematocrit 40.1 (L) %      .3 (H) fL      MCH 36.8 (H) pg      MCHC 33.7 g/dL      RDW 13.1 %      RDW-SD 52.2 fl      MPV 11.2 fL      Platelets 304 10*3/mm3      Neutrophil % 72.2 %      Lymphocyte % 17.1 (L) %      Monocyte % 10.0 %      Eosinophil % 0.0 (L) %      Basophil % 0.2 %      Immature Grans % 0.5 %      Neutrophils, Absolute 7.85 10*3/mm3      Lymphocytes, Absolute 1.85 10*3/mm3      Monocytes, Absolute 1.08 10*3/mm3      Eosinophils, Absolute 0.00 10*3/mm3      Basophils, Absolute 0.02 10*3/mm3      Immature Grans, Absolute 0.05 (H) 10*3/mm3     CBC  & Differential [[id number]] Collected:  03/08/18 1646    Specimen:  Blood Updated:  03/08/18 1750    Narrative:       The following orders were created for panel order CBC & Differential.  Procedure                               Abnormality         Status                     ---------                               -----------         ------                     Scan Slide[[id number]]                                       Final result               CBC Auto Differential[[id number]]        Abnormal            Final result                 Please view results for these tests on the individual orders.    Scan Slide [[id number]] Collected:  03/08/18 1646    Specimen:  Blood Updated:  03/08/18 1750     Crenated RBC's Slight/1+     Microcytes Mod/2+     RBC Fragments Slight/1+     WBC Morphology Normal     Platelet Morphology Normal    Ethanol [[id number]] Collected:  03/08/18 1646    Specimen:  Blood Updated:  03/08/18 1920     Ethanol <10 mg/dL      Ethanol % <0.010 %     POC Glucose Once [[id number]]  (Abnormal) Collected:  03/08/18 1922    Specimen:  Blood Updated:  03/08/18 1925     Glucose 405 (H) mg/dL     Narrative:       Treated Patient Meter: [id number] : [id number] Vasquez Pate RN    Phosphorus [[id number]]  (Normal) Collected:  03/08/18 2013    Specimen:  Blood from Arm, Left Updated:  03/08/18 2042     Phosphorus 4.0 mg/dL     Magnesium [[id number]]  (Normal) Collected:  03/08/18 2013    Specimen:  Blood from Arm, Left Updated:  03/08/18 2042     Magnesium 2.6 mg/dL     Troponin [[id number]]  (Normal) Collected:  03/08/18 2013    Specimen:  Blood from Arm, Left Updated:  03/08/18 2042     Troponin T 0.016 ng/mL     Narrative:       Troponin T Reference Ranges:  Less than 0.03 ng/mL:    Negative for AMI  0.03 to 0.09 ng/mL:      Indeterminant for AMI  Greater than 0.09 ng/mL: Positive for AMI    Basic Metabolic Panel [[id number]]  (Abnormal) Collected:  03/08/18 2013    Specimen:  Blood from Arm, Left Updated:   03/08/18 2042     Glucose 325 (H) mg/dL      BUN 19 mg/dL      Creatinine 1.35 (H) mg/dL      Sodium 138 mmol/L      Potassium 3.8 mmol/L      Chloride 82 (L) mmol/L      CO2 13.0 (L) mmol/L      Calcium 9.8 mg/dL      eGFR Non African Amer 54 (L) mL/min/1.73      BUN/Creatinine Ratio 14.1     Anion Gap 43.0 mmol/L     Narrative:       GFR Normal >60  Chronic Kidney Disease <60  Kidney Failure <15    Calcium, Ionized [[id number]]  (Normal) Collected:  03/08/18 2013    Specimen:  Blood from Arm, Left Updated:  03/08/18 2044     Ionized Calcium 1.29 mmol/L      Ionized Calcium 5.2 mg/dL     POC Glucose Once [[id number]]  (Abnormal) Collected:  03/08/18 2057    Specimen:  Blood Updated:  03/08/18 2059     Glucose 360 (H) mg/dL     Narrative:       Meter: [id number] : [id number] Fredy Ventura    Urinalysis With / Culture If Indicated - Urine, Clean Catch [[id number]]  (Abnormal) Collected:  03/08/18 2107    Specimen:  Urine from Urine, Clean Catch Updated:  03/08/18 2123     Color, UA Yellow     Appearance, UA Cloudy (A)     pH, UA 5.5     Specific Gravity, UA 1.021     Glucose, UA >=1000 mg/dL (3+) (A)     Ketones, UA 80 mg/dL (3+) (A)     Bilirubin, UA Negative     Blood, UA Negative     Protein, UA 30 mg/dL (1+) (A)     Leuk Esterase, UA Negative     Nitrite, UA Negative     Urobilinogen, UA 1.0 E.U./dL    Urinalysis, Microscopic Only - Urine, Clean Catch [[id number]]  (Abnormal) Collected:  03/08/18 2107    Specimen:  Urine from Urine, Clean Catch Updated:  03/08/18 2123     RBC, UA 3-5 (A) /HPF      WBC, UA 0-2 /HPF      Bacteria, UA None Seen /HPF      Squamous Epithelial Cells, UA 0-2 /HPF      Hyaline Casts, UA 7-12 /LPF      Methodology Automated Microscopy    Blood Gas, Arterial [[id number]]  (Abnormal) Collected:  03/08/18 2145    Specimen:  Arterial Blood Updated:  03/08/18 2149     Site Arterial: right radial     Howard's Test Positive     pH, Arterial 7.357 pH units      pCO2, Arterial 18.2 (C) mm Hg        Critical:Notify Dr SHANNAN TINSLEY MD (08-Mar-18 21:48:23)Read back ok        pO2, Arterial 102.9 (H) mm Hg      HCO3, Arterial 10.2 (L) mmol/L      Base Excess, Arterial -13.0 (L) mmol/L      O2 Saturation Calculated 97.9 %      Barometric Pressure for Blood Gas 752.8 mmHg      Modality Room Air     Rate 16 Breaths/minute     Narrative:        Meter: [id number] : [id number] Miladys Cj    POC Glucose Once [[id number]]  (Abnormal) Collected:  03/08/18 2202    Specimen:  Blood Updated:  03/08/18 2203     Glucose 266 (H) mg/dL     Narrative:       Meter: [id number] : [id number] Fredy Ventura    Urine Drug Screen - Urine, Clean Catch [[id number]]  (Normal) Collected:  03/08/18 2107    Specimen:  Urine from Urine, Clean Catch Updated:  03/08/18 2209     Amphet/Methamphet, Screen Negative     Barbiturates Screen, Urine Negative     Benzodiazepine Screen, Urine Negative     Cocaine Screen, Urine Negative     Opiate Screen Negative     THC, Screen, Urine Negative     Methadone Screen, Urine Negative     Oxycodone Screen, Urine Negative    Narrative:       Negative Thresholds For Drugs Screened:     Amphetamines               500 ng/ml   Barbiturates               200 ng/ml   Benzodiazepines            100 ng/ml   Cocaine                    300 ng/ml   Methadone                  300 ng/ml   Opiates                    300 ng/ml   Oxycodone                  100 ng/ml   THC                        50 ng/ml    The Normal Value for all drugs tested is negative. This report includes final unconfirmed screening results to be used for medical treatment purposes only. Unconfirmed results must not be used for non-medical purposes such as employment or legal testing. Clinical consideration should be applied to any drug of abuse test, particulary when unconfirmed results are used.    POC Glucose Once [[id number]]  (Abnormal) Collected:  03/08/18 2315    Specimen:  Blood Updated:  03/08/18 2317     Glucose 187  (H) mg/dL     Narrative:       Meter: [id number] : [id number] Vasquez Pate RN                   Imaging Results (all)     Procedure Component Value Units Date/Time    XR Chest 1 View [[id number]] Collected:  03/08/18 1938     Updated:  03/08/18 1942    Narrative:       EMERGENCY PORTABLE CHEST SINGLE VIEW 19:45     HISTORY: 57-year-old male with shortness of breath, tobacco abuse and  diabetes     COMPARISON: None available     FINDINGS:  1. Negative acute portable chest, no evidence of an active intrathoracic  process nor other significant abnormality.     This report was finalized on 3/8/2018 7:39 PM by Dr. Zeb Davis MD.           Patient Active Problem List   Diagnosis Code   • Diabetic ketoacidosis without coma associated with type 1 diabetes mellitus E10.10   • Diabetes mellitus E11.9   • Tobacco abuse Z72.0   • Alcohol abuse F10.10       Assessment:  Active Hospital Problems (** Indicates Principal Problem)    Diagnosis Date Noted   • **Diabetic ketoacidosis without coma associated with type 1 diabetes mellitus [E10.10] 03/08/2018   • Tobacco abuse [Z72.0] 03/09/2018   • Alcohol abuse [F10.10] 03/09/2018   • Diabetes mellitus [E11.9] 03/08/2018      Resolved Hospital Problems    Diagnosis Date Noted Date Resolved   No resolved problems to display.       Plan:  The patient's admitted to hospital will continue with DKA protocol with IV fluid hydration and insulin drip.  We'll consult endocrinology to help with his diabetes management.    Mahamed Tolbert MD  3/9/2018  3:19 AM       Electronically signed by Mahamed Tolbert MD at 3/9/2018  3:20 AM           Emergency Department Notes      Ciro Blunt RN at 3/8/2018  4:49 PM          Patient to er with c/o nausea/ vomiting and weakness for over a week.      Electronically signed by Ciro Blunt RN at 3/8/2018  4:50 PM      CLAUDIA Armenta at 3/8/2018  7:00 PM      Procedure Orders:    1. Critical Care [[id number]] ordered by CLAUDIA Armenta at  03/08/18 2254                 EMERGENCY DEPARTMENT ENCOUNTER    CHIEF COMPLAINT  Chief Complaint: generalized weakness   History given by: pt, family   History limited by: none  Room Number: 18/18  PMD: No Known Provider      HPI:  Pt is a 57 y.o. male who presents complaining of increasing generalized weakness for the past week. Pt also c/o N/V, lowered PO intake for several days, and productive cough. Pt's family reports slurred speech. Pt denies diarrhea. Pt states a Hx of diabetes, but denies a prior Hx of DKA. Per the pt's family, the pt's blood sugar has been high recently. Pt's family also state a Hx of EtOH use, but that the pt has not had a drink in 6 days.     Duration:  1 week   Onset: gradual   Timing: constant   Location: generalized   Quality: weakness  Intensity/Severity: moderate   Progression: increasing   Associated Symptoms: N/V, lowered PO intake, productive cough, slurred speech   Aggravating Factors: none stated   Alleviating Factors: none stated  Previous Episodes: none   Treatment before arrival: none    PAST MEDICAL HISTORY  Active Ambulatory Problems     Diagnosis Date Noted   • No Active Ambulatory Problems     Resolved Ambulatory Problems     Diagnosis Date Noted   • No Resolved Ambulatory Problems     Past Medical History:   Diagnosis Date   • Diabetes mellitus        PAST SURGICAL HISTORY  Past Surgical History:   Procedure Laterality Date   • TESTICLE SURGERY         FAMILY HISTORY  History reviewed. No pertinent family history.    SOCIAL HISTORY  Social History     Social History   • Marital status: Single     Spouse name: N/A   • Number of children: N/A   • Years of education: N/A     Occupational History   • Not on file.     Social History Main Topics   • Smoking status: Current Every Day Smoker     Packs/day: 1.50   • Smokeless tobacco: Not on file   • Alcohol use 3.6 oz/week     6 Shots of liquor per week      Comment: per DAY    • Drug use: No   • Sexual activity: Not on file      Other Topics Concern   • Not on file     Social History Narrative   • No narrative on file       ALLERGIES  Review of patient's allergies indicates no known allergies.    REVIEW OF SYSTEMS  Review of Systems   Constitutional: Positive for appetite change (decreased). Negative for activity change and fever.   HENT: Negative for congestion and sore throat.    Respiratory: Positive for cough (productive). Negative for shortness of breath.    Cardiovascular: Negative for chest pain and leg swelling.   Gastrointestinal: Positive for nausea and vomiting. Negative for abdominal pain and diarrhea.   Genitourinary: Negative for decreased urine volume and dysuria.   Musculoskeletal: Negative for neck pain.   Skin: Negative for rash and wound.   Neurological: Positive for speech difficulty (slurred) and weakness (generalized). Negative for numbness and headaches.   Psychiatric/Behavioral: Negative.    All other systems reviewed and are negative.      PHYSICAL EXAM  ED Triage Vitals   Temp Heart Rate Resp BP SpO2   03/08/18 1637 03/08/18 1628 03/08/18 1628 03/08/18 1628 03/08/18 1628   98.7 °F (37.1 °C) 90 16 118/70 100 %      Temp src Heart Rate Source Patient Position BP Location FiO2 (%)   03/08/18 1637 -- -- -- --   Tympanic           Physical Exam   Constitutional: He is oriented to person, place, and time and well-developed, well-nourished, and in no distress.   HENT:   Head: Normocephalic and atraumatic.   Eyes: EOM are normal. Pupils are equal, round, and reactive to light.   Neck: Normal range of motion. Neck supple.   Cardiovascular: Regular rhythm.  Tachycardia present.    Murmur heard.   Systolic murmur is present with a grade of 3/6   Pulmonary/Chest: Effort normal and breath sounds normal. No respiratory distress.   Abdominal: Soft. There is no tenderness. There is no rebound and no guarding.   Musculoskeletal: Normal range of motion. He exhibits no edema.   Neurological: He is alert and oriented to person,  place, and time. He has normal sensation and normal strength. He displays abnormal speech (slurred, slow to respond).   Skin: Skin is warm and dry.   Psychiatric: Mood and affect normal.   Nursing note and vitals reviewed.      LAB RESULTS  Lab Results (last 24 hours)     Procedure Component Value Units Date/Time    CBC & Differential [[id number]] Collected:  03/08/18 1646    Specimen:  Blood Updated:  03/08/18 1750    Narrative:       The following orders were created for panel order CBC & Differential.  Procedure                               Abnormality         Status                     ---------                               -----------         ------                     Scan Slide[[id number]]                                       Final result               CBC Auto Differential[[id number]]        Abnormal            Final result                 Please view results for these tests on the individual orders.    Comprehensive Metabolic Panel [[id number]]  (Abnormal) Collected:  03/08/18 1646    Specimen:  Blood Updated:  03/08/18 1730     Glucose 328 (H) mg/dL      BUN 19 mg/dL      Creatinine 1.39 (H) mg/dL      Sodium 136 mmol/L      Potassium 3.3 (L) mmol/L      Chloride 82 (L) mmol/L      CO2 12.6 (L) mmol/L      Calcium 9.7 mg/dL      Total Protein 7.1 g/dL      Albumin 4.30 g/dL      ALT (SGPT) 18 U/L      AST (SGOT) 15 U/L      Alkaline Phosphatase 84 U/L      Total Bilirubin 1.7 (H) mg/dL      eGFR Non African Amer 52 (L) mL/min/1.73      Globulin 2.8 gm/dL      A/G Ratio 1.5 g/dL      BUN/Creatinine Ratio 13.7     Anion Gap 41.4 mmol/L     Lipase [[id number]]  (Abnormal) Collected:  03/08/18 1646    Specimen:  Blood Updated:  03/08/18 1730     Lipase 64 (H) U/L     Lactic Acid, Plasma [[id number]]  (Normal) Collected:  03/08/18 1646    Specimen:  Blood Updated:  03/08/18 1709     Lactate 1.5 mmol/L     CBC Auto Differential [[id number]]  (Abnormal) Collected:  03/08/18 1646    Specimen:  Blood Updated:  03/08/18  1750     WBC 10.85 (H) 10*3/mm3      RBC 3.67 (L) 10*6/mm3      Hemoglobin 13.5 (L) g/dL      Hematocrit 40.1 (L) %      .3 (H) fL      MCH 36.8 (H) pg      MCHC 33.7 g/dL      RDW 13.1 %      RDW-SD 52.2 fl      MPV 11.2 fL      Platelets 304 10*3/mm3      Neutrophil % 72.2 %      Lymphocyte % 17.1 (L) %      Monocyte % 10.0 %      Eosinophil % 0.0 (L) %      Basophil % 0.2 %      Immature Grans % 0.5 %      Neutrophils, Absolute 7.85 10*3/mm3      Lymphocytes, Absolute 1.85 10*3/mm3      Monocytes, Absolute 1.08 10*3/mm3      Eosinophils, Absolute 0.00 10*3/mm3      Basophils, Absolute 0.02 10*3/mm3      Immature Grans, Absolute 0.05 (H) 10*3/mm3     Scan Slide [[id number]] Collected:  03/08/18 1646    Specimen:  Blood Updated:  03/08/18 1750     Crenated RBC's Slight/1+     Microcytes Mod/2+     RBC Fragments Slight/1+     WBC Morphology Normal     Platelet Morphology Normal    Ethanol [[id number]] Collected:  03/08/18 1646    Specimen:  Blood Updated:  03/08/18 1920     Ethanol <10 mg/dL      Ethanol % <0.010 %     POC Glucose Once [[id number]]  (Abnormal) Collected:  03/08/18 1922    Specimen:  Blood Updated:  03/08/18 1925     Glucose 405 (H) mg/dL     Narrative:       Treated Patient Meter: [id number] : [id number] Vasquez Pate RN    Phosphorus [[id number]]  (Normal) Collected:  03/08/18 2013    Specimen:  Blood from Arm, Left Updated:  03/08/18 2042     Phosphorus 4.0 mg/dL     Basic Metabolic Panel [[id number]]  (Abnormal) Collected:  03/08/18 2013    Specimen:  Blood from Arm, Left Updated:  03/08/18 2042     Glucose 325 (H) mg/dL      BUN 19 mg/dL      Creatinine 1.35 (H) mg/dL      Sodium 138 mmol/L      Potassium 3.8 mmol/L      Chloride 82 (L) mmol/L      CO2 13.0 (L) mmol/L      Calcium 9.8 mg/dL      eGFR Non African Amer 54 (L) mL/min/1.73      BUN/Creatinine Ratio 14.1     Anion Gap 43.0 mmol/L     Narrative:       GFR Normal >60  Chronic Kidney Disease <60  Kidney Failure <15    Magnesium  [[id number]]  (Normal) Collected:  03/08/18 2013    Specimen:  Blood from Arm, Left Updated:  03/08/18 2042     Magnesium 2.6 mg/dL     Calcium, Ionized [[id number]]  (Normal) Collected:  03/08/18 2013    Specimen:  Blood from Arm, Left Updated:  03/08/18 2044     Ionized Calcium 1.29 mmol/L      Ionized Calcium 5.2 mg/dL     Troponin [[id number]]  (Normal) Collected:  03/08/18 2013    Specimen:  Blood from Arm, Left Updated:  03/08/18 2042     Troponin T 0.016 ng/mL     Narrative:       Troponin T Reference Ranges:  Less than 0.03 ng/mL:    Negative for AMI  0.03 to 0.09 ng/mL:      Indeterminant for AMI  Greater than 0.09 ng/mL: Positive for AMI    POC Glucose Once [[id number]]  (Abnormal) Collected:  03/08/18 2057    Specimen:  Blood Updated:  03/08/18 2059     Glucose 360 (H) mg/dL     Narrative:       Meter: [id number] : [id number] Fredy Ventura    Urinalysis With / Culture If Indicated - Urine, Clean Catch [[id number]]  (Abnormal) Collected:  03/08/18 2107    Specimen:  Urine from Urine, Clean Catch Updated:  03/08/18 2123     Color, UA Yellow     Appearance, UA Cloudy (A)     pH, UA 5.5     Specific Gravity, UA 1.021     Glucose, UA >=1000 mg/dL (3+) (A)     Ketones, UA 80 mg/dL (3+) (A)     Bilirubin, UA Negative     Blood, UA Negative     Protein, UA 30 mg/dL (1+) (A)     Leuk Esterase, UA Negative     Nitrite, UA Negative     Urobilinogen, UA 1.0 E.U./dL    Urinalysis, Microscopic Only - Urine, Clean Catch [[id number]]  (Abnormal) Collected:  03/08/18 2107    Specimen:  Urine from Urine, Clean Catch Updated:  03/08/18 2123     RBC, UA 3-5 (A) /HPF      WBC, UA 0-2 /HPF      Bacteria, UA None Seen /HPF      Squamous Epithelial Cells, UA 0-2 /HPF      Hyaline Casts, UA 7-12 /LPF      Methodology Automated Microscopy    Urine Drug Screen - Urine, Clean Catch [[id number]]  (Normal) Collected:  03/08/18 2107    Specimen:  Urine from Urine, Clean Catch Updated:  03/08/18 2209     Amphet/Methamphet, Screen  Negative     Barbiturates Screen, Urine Negative     Benzodiazepine Screen, Urine Negative     Cocaine Screen, Urine Negative     Opiate Screen Negative     THC, Screen, Urine Negative     Methadone Screen, Urine Negative     Oxycodone Screen, Urine Negative    Narrative:       Negative Thresholds For Drugs Screened:     Amphetamines               500 ng/ml   Barbiturates               200 ng/ml   Benzodiazepines            100 ng/ml   Cocaine                    300 ng/ml   Methadone                  300 ng/ml   Opiates                    300 ng/ml   Oxycodone                  100 ng/ml   THC                        50 ng/ml    The Normal Value for all drugs tested is negative. This report includes final unconfirmed screening results to be used for medical treatment purposes only. Unconfirmed results must not be used for non-medical purposes such as employment or legal testing. Clinical consideration should be applied to any drug of abuse test, particulary when unconfirmed results are used.    Blood Gas, Arterial [[id number]]  (Abnormal) Collected:  03/08/18 2145    Specimen:  Arterial Blood Updated:  03/08/18 2149     Site Arterial: right radial     Howard's Test Positive     pH, Arterial 7.357 pH units      pCO2, Arterial 18.2 (C) mm Hg       Critical:Notify Dr SHANNAN TINSLEY MD (08-Mar-18 21:48:23)Read back ok        pO2, Arterial 102.9 (H) mm Hg      HCO3, Arterial 10.2 (L) mmol/L      Base Excess, Arterial -13.0 (L) mmol/L      O2 Saturation Calculated 97.9 %      Barometric Pressure for Blood Gas 752.8 mmHg      Modality Room Air     Rate 16 Breaths/minute     Narrative:        Meter: [id number] : [id number] Miladys Corona    POC Glucose Once [[id number]]  (Abnormal) Collected:  03/08/18 2202    Specimen:  Blood Updated:  03/08/18 2203     Glucose 266 (H) mg/dL     Narrative:       Meter: [id number] : [id number] Fredy Ventura I ordered the above labs and reviewed the  results    RADIOLOGY  XR Chest 1 View   Final Result       1. Negative acute portable chest, no evidence of an active intrathoracic  process nor other significant abnormality.    I ordered the above noted radiological studies. Interpreted by radiologist. Reviewed by me in PACS.        PROCEDURES  Critical Care  Performed by: GAURANG MERRILL  Authorized by: SHANNAN TINSLEY     Critical care provider statement:     Critical care time (minutes):  30    Critical care was necessary to treat or prevent imminent or life-threatening deterioration of the following conditions:  Endocrine crisis    Critical care was time spent personally by me on the following activities:  Blood draw for specimens, development of treatment plan with patient or surrogate, evaluation of patient's response to treatment, examination of patient, obtaining history from patient or surrogate, re-evaluation of patient's condition, pulse oximetry, ordering and review of radiographic studies, ordering and review of laboratory studies and ordering and performing treatments and interventions        EKG           EKG time: 1935  Rhythm/Rate: sinus tachycardia 101  P waves and TN: normal   QRS, axis: LVH   ST and T waves: ST depression in lateral leads     Interpreted Contemporaneously by me, independently viewed  No prior EKG      PROGRESS AND CONSULTS  ED Course   1904  Ordered DKA protocol.  1911  Ordered labs and CXR for further evaluation.   1914  Ordered EKG for further evaluation.   1915  Ordered ABG for further evaluation.   1949  Ordered labs for further evaluation.   2250  Received a call from Dr. Tolbert and discussed pt's case. Dr. Tolbert agreed to admit the pt.      MEDICAL DECISION MAKING  Results were reviewed/discussed with the patient and they were also made aware of online access. Pt also made aware that some labs, such as cultures, will not be resulted during ER visit and follow up with PMD is necessary.     MDM  Number of Diagnoses or  Management Options  Diabetic ketoacidosis without coma associated with type 1 diabetes mellitus:      Amount and/or Complexity of Data Reviewed  Clinical lab tests: reviewed and ordered (PCO2 ART 18.2, Ketones U 3+, Bacteria U: none, Creatinine 1.35, Anion Gap 43.0, Lactate 1.5, WBC 10.85)  Tests in the radiology section of CPT®:  ordered and reviewed (CXR: negative acute)  Tests in the medicine section of CPT®:  reviewed and ordered (See procedures section for EKG.)  Obtain history from someone other than the patient: yes (Pt's family)  Discuss the patient with other providers: yes (Dr. Tolbert)    Critical Care  Total time providing critical care: 30-74 minutes    Patient Progress  Patient progress: stable         DIAGNOSIS  Final diagnoses:   Diabetic ketoacidosis without coma associated with type 1 diabetes mellitus       DISPOSITION  ADMISSION by Dr. Tolbert    Discussed treatment plan and reason for admission with pt/family and admitting physician.  Pt/family voiced understanding of the plan for admission for further testing/treatment as needed.     Latest Documented Vital Signs:  As of 10:58 PM  BP- 139/72 HR- 94 Temp- 98.7 °F (37.1 °C) (Tympanic) O2 sat- 100%    --  Documentation assistance provided by norma Calderon for CLAUDIA Barrera.  Information recorded by the scribaletha was done at my direction and has been verified and validated by me.       Cedrick Calderon  03/08/18 8474       CLAUDIA Armenta  03/09/18 0040       Electronically signed by CLAUDIA Armenta at 3/9/2018 12:40 AM      Herlinda Ovalle RN at 3/8/2018  7:06 PM          Patient started feeling bad last Thursday with weakness, increased fatigue. On Saturday started nausea, vomiting, slurred speech, no appetite.      Herlinda Ovalle RN  03/08/18 1907       Herlinda Ovalle RN  03/08/18 1908       Electronically signed by Herlinda Ovalle RN at 3/8/2018  7:08 PM      Herlinda Ovalle RN at 3/8/2018  7:17 PM          Last drink was on Saturday.       Herlinda Ovalle RN  03/08/18 1918       Electronically signed by Herlinda Ovalle RN at 3/8/2018  7:18 PM      Jose Vásquez MD at 3/8/2018  8:04 PM          Discussed pt's case with Bruce HUNG.  Pt presents to the ER with generalized weakness. Pt states he first felt ill approximately 5-6 days ago. Pt also c/o N/V, but denies diarrhea, cough, cold, fever, chills, urinary changes, or any other pertinent symptoms. Pt states he was only in DKA once, and that was when pt was first diagnosed with diabetes. Pt states he is compliant with his medications. Pt states he is a smoker and family states that pt is a heavy drinker. Family states that pt has been unable to consume alcohol in the past 6 days due to the N/V.  Pt has a hx of diabetes.     Exam: Appears older than stated age and smells of alcohol.  Tachycardiac -120  Lungs: Clear to auscultation bilaterally   Dry oral mucosa  Abdomen: soft and non-tender   Ill appearing, but not toxic appearing   Intact distal pulses     Reviewed all tests.  I believe the patient is suffering from some alcoholic ketoacidosis as well as some potentially mild diabetic ketoacidosis.  Vision is an alcoholic and drinks a pint a day and has had decreased by mouth intake with vomiting the past 5-6 days.  Will admit him for IV fluids and some insulin.  He does not need the intensive care unit this time we'll admit him to a monitor bed.  I do not suspect an acute bacterial infectious etiology as he has had no fever and does not have any cough or respiratory symptoms.    MDM:  Plan to admit pt for DKA protocol     I supervised care provided by the midlevel provider.    We have discussed this patient's history, physical exam, and treatment plan.   I have reviewed the note and personally saw and examined the patient and agree with the plan of care.    Documentation assistance provided by norma Lara for Dr. Vásquez.  Information recorded by the norma was done at my  direction and has been verified and validated by me.       Tom Lara  03/08/18 2020       Jose Vásquez MD  03/09/18 0009       Electronically signed by Jose Vásquez MD at 3/9/2018 12:09 AM      Jazmín Daniel at 3/8/2018  8:58 PM          Glucose fingerstick of 360 md/dL reported to RN.     Jazmín Daniel  03/08/18 2058       Electronically signed by Jazmín Daniel at 3/8/2018  8:58 PM      Jazmín Daniel at 3/8/2018 10:03 PM          Blood glucose of 266 mg/dL reported to RN.     Jazmín Daniel  03/08/18 2203       Electronically signed by Jazmín Daniel at 3/8/2018 10:03 PM        Hospital Medications (all)       Dose Frequency Start End    acetaminophen (TYLENOL) tablet 650 mg 650 mg Every 4 Hours PRN 3/8/2018     Sig - Route: Take 2 tablets by mouth Every 4 (Four) Hours As Needed for Mild Pain . - Oral    dextrose (D50W) solution 12.5 g 12.5 g Every 15 Minutes PRN 3/8/2018     Sig - Route: Infuse 25 mL into a venous catheter Every 15 (Fifteen) Minutes As Needed for Low Blood Sugar (for blood glucose < 100 mg/dL). - Intravenous    Cosign for Ordering: Accepted by Jose Vásquez MD on 3/9/2018 12:17 AM    dextrose 5 % and sodium chloride 0.45 % infusion 150 mL/hr Continuous PRN 3/8/2018     Sig - Route: Infuse 150 mL/hr into a venous catheter Continuous As Needed (Once Glucose Less Than or Equal to 200 mg/dL and Serum Potassium Greater Than 5). - Intravenous    Cosign for Ordering: Accepted by Jose Vásquez MD on 3/9/2018 12:17 AM    dextrose 5 % and sodium chloride 0.45 % with KCl 20 mEq/L infusion 150 mL/hr Continuous PRN 3/8/2018     Sig - Route: Infuse 150 mL/hr into a venous catheter Continuous As Needed (Once Glucose Less Than or Equal to 200 mg/dL and Serum Potassium Less Than or Equal to 5). - Intravenous    Cosign for Ordering: Accepted by Jose Vásquez MD on 3/9/2018 12:17 AM    enoxaparin (LOVENOX) syringe 40 mg 40 mg Nightly 3/9/2018     Sig - Route: Inject 0.4 mL under the skin Every Night. -  Subcutaneous    folic acid (FOLVITE) tablet 1 mg 1 mg Daily 3/9/2018 3/12/2018    Sig - Route: Take 1 tablet by mouth Daily. - Oral    insulin aspart (novoLOG) injection 4 Units 4 Units 3 Times Daily With Meals 3/9/2018     Sig - Route: Inject 4 Units under the skin 3 (Three) Times a Day With Meals. - Subcutaneous    insulin detemir (LEVEMIR) injection 12 Units 12 Units Every Morning 3/9/2018     Sig - Route: Inject 12 Units under the skin Every Morning. - Subcutaneous    insulin regular (humuLIN R,novoLIN R) 100 Units in sodium chloride 0.9 % 100 mL (1 Units/mL) infusion 0.1 Units/kg/hr × 63.5 kg Titrated 3/8/2018     Sig - Route: Infuse 6.4 Units/hr into a venous catheter Dose Adjusted By Provider As Needed. - Intravenous    Cosign for Ordering: Accepted by Jose Vásquez MD on 3/9/2018 12:17 AM    insulin regular (humuLIN R,novoLIN R) injection 5 Units 5 Units Once 3/8/2018 3/8/2018    Sig - Route: Infuse 5 Units into a venous catheter 1 (One) Time. - Intravenous    Cosign for Ordering: Accepted by Jose Vásquez MD on 3/9/2018 12:17 AM    insulin regular (humuLIN R,novoLIN R) injection 5 Units 5 Units Once As Needed 3/8/2018     Sig - Route: Infuse 5 Units into a venous catheter 1 (One) Time As Needed for High Blood Sugar (if the glucose does not decrease by 10% in the first hour after start of infusion). - Intravenous    Cosign for Ordering: Accepted by Jose Vásquez MD on 3/9/2018 12:17 AM    LORazepam (ATIVAN) injection 1 mg 1 mg Every 6 Hours PRN 3/9/2018 3/19/2018    Sig - Route: Infuse 0.5 mL into a venous catheter Every 6 (Six) Hours As Needed for Anxiety or Withdrawal. - Intravenous    LORazepam (ATIVAN) tablet 1 mg 1 mg Every 6 Hours PRN 3/9/2018 3/19/2018    Sig - Route: Take 1 tablet by mouth Every 6 (Six) Hours As Needed for Anxiety or Withdrawal. - Oral    multivitamin (THERAGRAN) tablet 1 tablet 1 tablet Daily 3/9/2018     Sig - Route: Take 1 tablet by mouth Daily. - Oral    ondansetron (ZOFRAN)  "injection 4 mg 4 mg Every 6 Hours PRN 3/8/2018     Sig - Route: Infuse 2 mL into a venous catheter Every 6 (Six) Hours As Needed for Nausea or Vomiting. - Intravenous    Linked Group 1:  \"Or\" Linked Group Details        ondansetron (ZOFRAN) tablet 4 mg 4 mg Every 6 Hours PRN 3/8/2018     Sig - Route: Take 1 tablet by mouth Every 6 (Six) Hours As Needed for Nausea or Vomiting. - Oral    Linked Group 1:  \"Or\" Linked Group Details        ondansetron ODT (ZOFRAN-ODT) disintegrating tablet 4 mg 4 mg Every 6 Hours PRN 3/8/2018     Sig - Route: Take 1 tablet by mouth Every 6 (Six) Hours As Needed for Nausea or Vomiting. - Oral    Linked Group 1:  \"Or\" Linked Group Details        potassium & sodium phosphates (PHOS-NAK) 280-160-250 MG packet - for Phosphorus 1.25 - 2.1 mg/dL 2 packet Once As Needed 3/9/2018     Sig - Route: Take 2 packets by mouth 1 (One) Time As Needed (Phosphorus 1.25 - 2.1 mg/dL). - Oral    Linked Group 2:  \"Or\" Linked Group Details        potassium & sodium phosphates (PHOS-NAK) 280-160-250 MG packet - for Phosphorus less than 1.25 mg/dL 2 packet Every 6 Hours PRN 3/9/2018     Sig - Route: Take 2 packets by mouth Every 6 (Six) Hours As Needed (Phosphorus less than 1.25 mg/dL). - Oral    Linked Group 2:  \"Or\" Linked Group Details        potassium chloride (KLOR-CON) packet 10 mEq 10 mEq As Needed 3/8/2018     Sig - Route: Take 10 mEq by mouth As Needed (Potassium replacement per admin instructions). - Oral    Cosign for Ordering: Accepted by Jose Vásquez MD on 3/9/2018 12:17 AM    Linked Group 3:  \"Or\" Linked Group Details        potassium chloride (KLOR-CON) packet 20 mEq 20 mEq As Needed 3/8/2018     Sig - Route: Take 20 mEq by mouth As Needed (Potassium replacement per admin instructions). - Oral    Cosign for Ordering: Accepted by Jose Vásquez MD on 3/9/2018 12:17 AM    Linked Group 4:  \"Or\" Linked Group Details        potassium chloride (KLOR-CON) packet 40 mEq 40 mEq As Needed 3/8/2018     Sig " "- Route: Take 40 mEq by mouth As Needed (Potassium replacement per admin instructions). - Oral    Cosign for Ordering: Accepted by Jose Vásquez MD on 3/9/2018 12:17 AM    Linked Group 5:  \"Or\" Linked Group Details        potassium chloride (MICRO-K) CR capsule 10 mEq 10 mEq As Needed 3/8/2018     Sig - Route: Take 1 capsule by mouth As Needed (Potassium replacement per admin instructions). - Oral    Cosign for Ordering: Accepted by Jose Vásquez MD on 3/9/2018 12:17 AM    Linked Group 3:  \"Or\" Linked Group Details        potassium chloride (MICRO-K) CR capsule 20 mEq 20 mEq As Needed 3/8/2018     Sig - Route: Take 2 capsules by mouth As Needed (Potassium replacement per admin instructions). - Oral    Cosign for Ordering: Accepted by Jose Vásquez MD on 3/9/2018 12:17 AM    Linked Group 4:  \"Or\" Linked Group Details        potassium chloride (MICRO-K) CR capsule 40 mEq 40 mEq As Needed 3/8/2018     Sig - Route: Take 4 capsules by mouth As Needed (Potassium replacement per admin instructions). - Oral    Cosign for Ordering: Accepted by Jose Vásquez MD on 3/9/2018 12:17 AM    Linked Group 5:  \"Or\" Linked Group Details        potassium chloride 10 mEq in 100 mL IVPB 10 mEq Every 1 Hour PRN 3/8/2018     Sig - Route: Infuse 100 mL into a venous catheter Every 1 (One) Hour As Needed (Potassium replacement per admin instructions). - Intravenous    Cosign for Ordering: Accepted by Jose Vásquez MD on 3/9/2018 12:17 AM    Linked Group 5:  \"Or\" Linked Group Details        potassium chloride 10 mEq in 100 mL IVPB 10 mEq Every 1 Hour PRN 3/8/2018     Sig - Route: Infuse 100 mL into a venous catheter Every 1 (One) Hour As Needed (Potassium replacement per admin instructions). - Intravenous    Cosign for Ordering: Accepted by Jose Vásquez MD on 3/9/2018 12:17 AM    Linked Group 4:  \"Or\" Linked Group Details        potassium chloride 10 mEq in 100 mL IVPB 10 mEq Every 1 Hour PRN 3/8/2018     Sig - Route: Infuse 100 mL into " "a venous catheter Every 1 (One) Hour As Needed (Potassium replacement per admin instructions). - Intravenous    Cosign for Ordering: Accepted by Jose Vásquez MD on 3/9/2018 12:17 AM    Linked Group 3:  \"Or\" Linked Group Details        sodium chloride 0.45 % infusion 250 mL/hr Continuous 3/8/2018     Sig - Route: Infuse 250 mL/hr into a venous catheter Continuous. - Intravenous    Cosign for Ordering: Accepted by Jose Vásquez MD on 3/9/2018 12:17 AM    sodium chloride 0.45 % with KCl 20 mEq/L infusion 250 mL/hr Continuous PRN 3/8/2018     Sig - Route: Infuse 250 mL/hr into a venous catheter Continuous As Needed (After Initial 2 Hours - If Potassium Level is Less Than or Equal to 5 (If Greater Than 5 use 0.45%)). - Intravenous    Cosign for Ordering: Accepted by Jose Vásquez MD on 3/9/2018 12:17 AM    sodium chloride 0.9 % bolus 1,000 mL 1,000 mL Once 3/8/2018 3/8/2018    Sig - Route: Infuse 1,000 mL into a venous catheter 1 (One) Time. - Intravenous    Cosign for Ordering: Accepted by Jose Vásquez MD on 3/9/2018 12:17 AM    sodium chloride 0.9 % flush 1-10 mL 1-10 mL As Needed 3/8/2018     Sig - Route: Infuse 1-10 mL into a venous catheter As Needed for Line Care. - Intravenous    sodium chloride 0.9 % flush 10 mL 10 mL As Needed 3/8/2018     Sig - Route: Infuse 10 mL into a venous catheter As Needed for Line Care. - Intravenous    Cosign for Ordering: Accepted by Jose Vásquez MD on 3/9/2018 12:17 AM    thiamine (B-1) 100 mg in sodium chloride 0.9 % 100 mL IVPB 100 mg Daily 3/9/2018 3/12/2018    Sig - Route: Infuse 100 mg into a venous catheter Daily. - Intravenous             Physician Progress Notes (last 24 hours) (Notes from 3/8/2018 12:26 PM through 3/9/2018 12:26 PM)      Rodney Diaz MD at 3/9/2018 10:52 AM  Version 1 of 1             Name: Juan Payne ADMIT: 3/8/2018   : 1960  PCP: No Known Provider    MRN: [id number] LOS: 1 days   AGE/SEX: 57 y.o. male  ROOM: [facility]   Subjective "   Subjective  Feeling improved. Reports that had insurance issues so has been out of long acting insulin for about 1 week. Had nausea and vomiting which have improved. Abdominal pain with vomiting but none since (BLQ). No fevers or chills. No dysuria. No rash or swelling.    No CP palpitations or syncopal episodes. Aware of murmur.    Discussed with nursing. AG and acidosis improved but BG was increasing so just increased his insulin gtt rate.    Objective   Vital Signs  Temp:  [98.7 °F (37.1 °C)-99 °F (37.2 °C)] 99 °F (37.2 °C)  Heart Rate:  [] 80  Resp:  [16-18] 18  BP: (111-139)/(68-81) 111/81  SpO2:  [97 %-100 %] 97 %  on   ;   O2 Device: room air  Body mass index is 21.29 kg/(m^2).    Physical Exam   Constitutional: He appears well-developed. No distress.   HENT:   Head: Normocephalic and atraumatic.   Eyes: EOM are normal. Pupils are equal, round, and reactive to light.   Neck: Normal range of motion. Neck supple.   Cardiovascular: Normal rate, regular rhythm and intact distal pulses.    Murmur (4/6 LIZANDRO) heard.  Pulmonary/Chest: Effort normal and breath sounds normal. He has no wheezes.   Abdominal: Soft. He exhibits no distension. There is no tenderness. There is no rebound and no guarding.   Musculoskeletal: Normal range of motion. He exhibits no edema.   Neurological: He is alert. No cranial nerve deficit.   Skin: Skin is warm and dry. He is not diaphoretic.   Psychiatric: He has a normal mood and affect. His behavior is normal.   Nursing note and vitals reviewed.      Results Review:       I reviewed the patient's new clinical results. Reviewed imaging, agree with interpretation. Reviewed EKG, lateral st depression, LVH, sinus rhythm, no prior EKG to compare. Reviewed prior records.    Results from last 7 days  Lab Units 03/09/18  0028 03/08/18  1646   WBC 10*3/mm3 9.47 10.85*   HEMOGLOBIN g/dL 11.7* 13.5*   PLATELETS 10*3/mm3 266 304     Results from last 7 days  Lab Units 03/09/18  0801  03/09/18  0553 03/09/18  0333 03/09/18  0028 03/08/18 2013   SODIUM mmol/L 136  --  134* 136 138   POTASSIUM mmol/L 3.4* 3.4* 2.9*  2.9* 2.7* 3.8   CHLORIDE mmol/L 96*  --  92* 90* 82*   CO2 mmol/L 26.2  --  25.4 19.3* 13.0*   BUN mg/dL 19  --  20 22* 19   CREATININE mg/dL 1.10  --  1.29* 1.42* 1.35*   GLUCOSE mg/dL 159*  --  110* 161* 325*   Estimated Creatinine Clearance: 64.9 mL/min (by C-G formula based on Cr of 1.1).  Results from last 7 days  Lab Units 03/09/18  0801 03/09/18  0333 03/09/18 0028 03/08/18 2013 03/08/18  1646   CALCIUM mg/dL 8.6 8.9 9.1 9.8 9.7   ALBUMIN g/dL  --   --   --   --  4.30   MAGNESIUM mg/dL 2.0 2.0 2.1 2.6  --    PHOSPHORUS mg/dL 1.2* 1.1* 1.4* 4.0  --          insulin detemir 12 Units Subcutaneous QAM   multivitamin 1 tablet Oral Daily   thiamine (VITAMIN B1) IVPB 100 mg Intravenous Daily       dextrose 5 % and sodium chloride 0.45 % 150 mL/hr    dextrose 5 % and sodium chloride 0.45 % with KCl 20 mEq/L 150 mL/hr Last Rate: 150 mL/hr (03/09/18 0752)   insulin regular infusion 1 unit/mL 0.1 Units/kg/hr Last Rate: 0.031 Units/kg/hr (03/09/18 0941)   sodium chloride 250 mL/hr Last Rate: Stopped (03/08/18 2221)   sodium chloride 0.45 % with KCl 20 mEq 250 mL/hr    Diet Regular; Consistent Carbohydrate      Assessment/Plan   Active Hospital Problems (** Indicates Principal Problem)    Diagnosis Date Noted   • **Diabetic ketoacidosis without coma associated with type 1 diabetes mellitus [E10.10] 03/08/2018   • Tobacco abuse [Z72.0] 03/09/2018   • Alcohol abuse [F10.10] 03/09/2018   • Insurance coverage problems [Z59.8] 03/09/2018   • Type 1 diabetes mellitus [E10.9] 03/08/2018      Resolved Hospital Problems    Diagnosis Date Noted Date Resolved   No resolved problems to display.     - DKA: 2/2 running out of insulin. Continue gtt and lab monitoring. Replace potassium and phosphorus. Continue IVF. Acidosis resolved and BG improved. Hopefully can get off of drip today. Endocrinology  consulted.  - DM1: A1c 8.0. Consult DM educator and CCP.  - Hypokalemia  - Hypophosphatemia  - Abnormal EKG: No CP and troponin negative. Will repeat EKG to monitor.  - Alcohol Abuse: MVI folate thiamine. Ativan PRN. No active WD currently.  - Disposition: TBD  Rodney Diaz MD  [facility]  03/09/18  10:52 AM     Electronically signed by Rodney Diaz MD at 3/9/2018 11:05 AM           Consult Notes (last 24 hours) (Notes from 3/8/2018 12:27 PM through 3/9/2018 12:27 PM)      Ranjit Huang MD at 3/9/2018 10:52 AM  Version 1 of 1     Consult Orders:    1. Inpatient Consult to Endocrinology [[id number]] ordered by Mahamed Tolbert MD at 03/08/18 6130                   Dictation on: 03/09/2018 10:58 AM by: RANJIT HUANG [[id number]]          Electronically signed by Ranjit Huang MD at 3/9/2018 10:58 AM

## 2018-03-09 NOTE — CONSULTS
ENDOCRINOLOGY CONSULTATION     CONSULTING PHYSICIAN: Salvador Fernandez MD    REFERRING PHYSICIAN: Mahamed Tolbert MD    REASON FOR CONSULTATION: Treatment of diabetes mellitus.     HISTORY OF PRESENT ILLNESS: The patient is a 57-year-old male who was admitted to the hospital on 03/08/2018 because of a 4-day history of nausea and vomiting. He has had known type 1 diabetes mellitus for 27 years and has been on insulin since. His insurance coverage changed this year and he was unable to get his Lantus insulin. He ran out of Lantus about 11 days before this admission.    He is supposed to be on Lantus 18 to 20 units every evening and Humalog 18 to 21 units with each meal. He is noncompliant with his diet. Hemoglobin A1c on admission was 8.02%. He denies any associated retinopathy. He denies blurry vision. His last eye examination was more than 2 years ago. He denies any associated nephropathy. He has intermittent numbness in both hands.     PAST MEDICAL HISTORY: He denies any history of hypertension or hyperlipidemia. He denies any alcohol or tobacco-related illness.    PAST SURGICAL HISTORY: Previous surgery on the right testicle for infection.     SOCIAL HISTORY: The patient smokes 1-1/2 packs of cigarettes a day for 40 years. He drinks 1 pint of bourbon a day. He denies drug use. He works for a [employer].     ALLERGIES: NO KNOWN DRUG ALLERGIES.     FAMILY HISTORY: No history of diabetes mellitus in the family. His father had lung cancer. His mother had cancer of the ear.     REVIEW OF SYSTEMS: He denies any fever or chills. He denies any significant weight change. He denies chest pain or palpitations. He denies cough or sputum production. He denies shortness of breath. He denies hematemesis, melena, hematochezia, hematuria, dysuria, polyuria, polydipsia or nocturia. He denies seizures or loss of consciousness.     PHYSICAL EXAMINATION:   VITAL SIGNS: Blood pressure 111/81, respiratory rate 18, heart rate 80,  temperature 99 degrees Fahrenheit.   HEENT: Pink conjunctivae. Sclerae anicteric. No xanthelasma. Full extraocular muscle movement. No facial asymmetry. Speech is fluent. There is no pharyngeal congestion. There is nicotine staining of his beard.   NECK: There are no carotid bruits. Thyroid is not enlarged. No cervical lymphadenopathy. Neck veins are not visible at 45 degrees.   CHEST: Equal chest expansion. No rales. No wheezes.   CARDIAC: Regular heart rate and rhythm. No gallop no murmur.  ABDOMEN: Soft and nontender. No palpable masses.  EXTREMITIES: Warm. No cyanosis, pedal edema or clubbing. Light touch sensation is grossly intact. Positive Tinel's sign bilaterally.     IMPRESSION:   1. Diabetic ketoacidosis.   2. Type 1 diabetes mellitus poorly controlled.   3. Tobacco abuse.   4. Alcohol abuse.     RECOMMENDATIONS: Start Levemir 12 units every morning and Novolog 4 units with each meal plus sliding scale. Discontinue insulin drip 2 hours after the 1st dose of Levemir. Check urine microalbumin, thyroid function tests and lipid profile. Will ask the dietician and diabetes educator to see the patient.     Agree with thiamine.     Thank you for the referral. I will follow the patient with your during his hospital stay.

## 2018-03-09 NOTE — CONSULTS
Malnutrition Severity Assessment    Patient Name:  Juan Payne  YOB: 1960  MRN: [id number]  Admit Date:  3/8/2018    Patient meets criteria for : Severe malnutrition    Comments:  Meets criteria for Severe social/enviromental circumstances malnutrition due to report of poor intake, weight loss, %UBW, and observed muscle and fat losses during nutrition focused physical exam.     Malnutrition Type: Social/Environmental Circumstance Malnutrition     Malnutrition Type (last 8 hours)      Malnutrition Severity Assessment       03/09/18 1327    Malnutrition Severity Assessment    Malnutrition Type Social/Environmental Circumstance Malnutrition      03/09/18 1327    Physical Signs of Malnutrition (Social/Environmental)    Muscle Wasting Severe   protrusing clavicle bone, shoulders with squared off appearance, acromion process prominent, patellar bone prominent, thin calves and thighs,    Fat Loss Severe   ribs apparent, iliac crest somewhat prominent      03/09/18 1327    Weight Status (Social/Environmental)    %IBW MIld (<90%)    %UBW Mod (75-85%)    Weight Loss Mild (>10% / 1 yr)      03/09/18 1327    Energy Intake Status (Social/Environmental)    Energy Intake Severe (< or equal to 50% / > or equal to 1 mo)      03/09/18 1327    Criteria Met (Must meet criteria for severity in at least 2 of these categories: M Wasting, Fat Loss, Fluid, Secondary Signs, Wt. Status, Intake)    Patient meets criteria for  Severe malnutrition          Electronically signed by:  Maya Mancilla RD  03/09/18 1:35 PM

## 2018-03-09 NOTE — PROGRESS NOTES
Discharge Planning Assessment  [facility]     Patient Name: Juan Payne  MRN: [id number]  Today's Date: 3/9/2018    Admit Date: 3/8/2018          Discharge Needs Assessment       03/09/18 1418    Living Environment    Lives With significant other   Aurelia Briones [phone]    Living Arrangements house    Home Accessibility bed and bath are not on the first floor    Stair Railings at Home outside, present on left side    Type of Financial/Environmental Concern none    Transportation Available car;family or friend will provide    Living Environment    Provides Primary Care For no one    Quality Of Family Relationships supportive;helpful;involved    Discharge Needs Assessment    Concerns To Be Addressed medication concerns;compliance issue concerns    Readmission Within The Last 30 Days no previous admission in last 30 days    Anticipated Changes Related to Illness none    Equipment Currently Used at Home none   states he owns a glucometer    Equipment Needed After Discharge none            Discharge Plan       03/09/18 1420    Case Management/Social Work Plan    Plan Home with tony     Patient/Family In Agreement With Plan yes    Additional Comments Facesheet information verified with patient.  He does not have a primary care physician.  His fiance states that Dr Black would not continue his care due to noncompliance.  Patient and fiance were provided with MD liaison number and fiance states she will call to find a new PCP.  He states he owns a glucometer.  He reported that he needed precauthorization for his insulins at [facility].  Spoke with the pharmacy department at [facility] [phone].  They stated that the problem was not a preauthorization but that with patient's new insurance he is required to use a [facility] pharmacy or a mail in [facility] affiliated pharmacy and [facility] stated they informed him of that. His prescrptions can be transferred to a [facility] pharmacy.  Located [address]  [address] that is the closest to patient's address.  Assist patient to call the [facility] pharmacy [phone].  But they need his pharmacy card from his insurance and this is located at his home.  His fiance , Aurelia states she will take the pharmcy card and inurance card to the [facility] pharmacy tonight or in the am.  She states they should be able to afford the copay that they did before.  Aurelia can transport patient at DC.  Agrees with plan for [facility] to follow up and assist with DC needs..............................Yoli Minor RN        Discharge Placement     No information found                Demographic Summary       03/09/18 1414    Referral Information    Admission Type inpatient    Arrived From home or self-care    Referral Source admission list    Record Reviewed history and physical;medical record    Contact Information    Permission Granted to Share Information With family/designee    Comments Molina Moses Juanito [phone] or [phone]    Primary Care Physician Information    Name NONE   Dr Black would not see patient any longer due to noncompliance.  MD liaison nadine provided for patient and molina will call for information for a new doctor            Functional Status       03/09/18 1417    Functional Status Current    Ambulation 0-->independent    Transferring 0-->independent    Toileting 0-->independent    Bathing 0-->independent    Dressing 0-->independent    Eating 0-->independent    Communication 0-->understands/communicates without difficulty    Swallowing (if score 2 or more for any item, consult Rehab Services) 0-->swallows foods/liquids without difficulty    Functional Status Prior    Ambulation 0-->independent    Transferring 0-->independent    Toileting 0-->independent    Bathing 0-->independent    Dressing 0-->independent    Eating 0-->independent    Communication 0-->understands/communicates without difficulty    Swallowing 0-->swallows foods/liquids without difficulty    IADL    Medications  independent    Meal Preparation independent    Housekeeping independent    Laundry independent    Shopping independent    Oral Care independent    Activity Tolerance    Current Activity Limitations none    Cognitive/Perceptual/Developmental    Current Mental Status/Cognitive Functioning no deficits noted    Employment/Financial    Shift Worked --   [employer]     None            Abuse/Neglect     None            Legal     None            Substance Abuse     None            Patient Forms     None          Yoli Minor, RN

## 2018-03-10 LAB
ALBUMIN SERPL-MCNC: 3.7 G/DL (ref 3.5–5.2)
ALBUMIN/GLOB SERPL: 1.9 G/DL
ALP SERPL-CCNC: 62 U/L (ref 39–117)
ALT SERPL W P-5'-P-CCNC: 14 U/L (ref 1–41)
AMMONIA BLD-SCNC: 27 UMOL/L (ref 16–60)
ANION GAP SERPL CALCULATED.3IONS-SCNC: 10.4 MMOL/L
ANION GAP SERPL CALCULATED.3IONS-SCNC: 12.8 MMOL/L
ANION GAP SERPL CALCULATED.3IONS-SCNC: 12.9 MMOL/L
ANION GAP SERPL CALCULATED.3IONS-SCNC: 23.8 MMOL/L
ANION GAP SERPL CALCULATED.3IONS-SCNC: 39.9 MMOL/L
AST SERPL-CCNC: 14 U/L (ref 1–40)
BILIRUB SERPL-MCNC: 0.9 MG/DL (ref 0.1–1.2)
BUN BLD-MCNC: 11 MG/DL (ref 6–20)
BUN BLD-MCNC: 12 MG/DL (ref 6–20)
BUN BLD-MCNC: 14 MG/DL (ref 6–20)
BUN BLD-MCNC: 16 MG/DL (ref 6–20)
BUN BLD-MCNC: 17 MG/DL (ref 6–20)
BUN/CREAT SERPL: 13.5 (ref 7–25)
BUN/CREAT SERPL: 13.6 (ref 7–25)
BUN/CREAT SERPL: 14.9 (ref 7–25)
BUN/CREAT SERPL: 15.2 (ref 7–25)
BUN/CREAT SERPL: 16 (ref 7–25)
CALCIUM SPEC-SCNC: 7.6 MG/DL (ref 8.6–10.5)
CALCIUM SPEC-SCNC: 7.8 MG/DL (ref 8.6–10.5)
CALCIUM SPEC-SCNC: 8.3 MG/DL (ref 8.6–10.5)
CALCIUM SPEC-SCNC: 8.8 MG/DL (ref 8.6–10.5)
CALCIUM SPEC-SCNC: 8.9 MG/DL (ref 8.6–10.5)
CHLORIDE SERPL-SCNC: 100 MMOL/L (ref 98–107)
CHLORIDE SERPL-SCNC: 100 MMOL/L (ref 98–107)
CHLORIDE SERPL-SCNC: 85 MMOL/L (ref 98–107)
CHLORIDE SERPL-SCNC: 93 MMOL/L (ref 98–107)
CHLORIDE SERPL-SCNC: 98 MMOL/L (ref 98–107)
CHOLEST SERPL-MCNC: 192 MG/DL (ref 0–200)
CO2 SERPL-SCNC: 20.2 MMOL/L (ref 22–29)
CO2 SERPL-SCNC: 24.6 MMOL/L (ref 22–29)
CO2 SERPL-SCNC: 25.2 MMOL/L (ref 22–29)
CO2 SERPL-SCNC: 27.1 MMOL/L (ref 22–29)
CO2 SERPL-SCNC: 8.1 MMOL/L (ref 22–29)
CREAT BLD-MCNC: 0.74 MG/DL (ref 0.76–1.27)
CREAT BLD-MCNC: 0.75 MG/DL (ref 0.76–1.27)
CREAT BLD-MCNC: 0.92 MG/DL (ref 0.76–1.27)
CREAT BLD-MCNC: 1.18 MG/DL (ref 0.76–1.27)
CREAT BLD-MCNC: 1.26 MG/DL (ref 0.76–1.27)
D-LACTATE SERPL-SCNC: 1 MMOL/L (ref 0.5–2)
D-LACTATE SERPL-SCNC: 2.6 MMOL/L (ref 0.5–2)
DEPRECATED RDW RBC AUTO: 54.3 FL (ref 37–54)
ERYTHROCYTE [DISTWIDTH] IN BLOOD BY AUTOMATED COUNT: 13.1 % (ref 11.5–14.5)
GFR SERPL CREATININE-BSD FRML MDRD: 107 ML/MIN/1.73
GFR SERPL CREATININE-BSD FRML MDRD: 108 ML/MIN/1.73
GFR SERPL CREATININE-BSD FRML MDRD: 59 ML/MIN/1.73
GFR SERPL CREATININE-BSD FRML MDRD: 63 ML/MIN/1.73
GFR SERPL CREATININE-BSD FRML MDRD: 84 ML/MIN/1.73
GLOBULIN UR ELPH-MCNC: 2 GM/DL
GLUCOSE BLD-MCNC: 152 MG/DL (ref 65–99)
GLUCOSE BLD-MCNC: 160 MG/DL (ref 65–99)
GLUCOSE BLD-MCNC: 189 MG/DL (ref 65–99)
GLUCOSE BLD-MCNC: 309 MG/DL (ref 65–99)
GLUCOSE BLD-MCNC: 662 MG/DL (ref 65–99)
GLUCOSE BLDC GLUCOMTR-MCNC: 133 MG/DL (ref 70–130)
GLUCOSE BLDC GLUCOMTR-MCNC: 136 MG/DL (ref 70–130)
GLUCOSE BLDC GLUCOMTR-MCNC: 137 MG/DL (ref 70–130)
GLUCOSE BLDC GLUCOMTR-MCNC: 147 MG/DL (ref 70–130)
GLUCOSE BLDC GLUCOMTR-MCNC: 148 MG/DL (ref 70–130)
GLUCOSE BLDC GLUCOMTR-MCNC: 148 MG/DL (ref 70–130)
GLUCOSE BLDC GLUCOMTR-MCNC: 153 MG/DL (ref 70–130)
GLUCOSE BLDC GLUCOMTR-MCNC: 153 MG/DL (ref 70–130)
GLUCOSE BLDC GLUCOMTR-MCNC: 157 MG/DL (ref 70–130)
GLUCOSE BLDC GLUCOMTR-MCNC: 167 MG/DL (ref 70–130)
GLUCOSE BLDC GLUCOMTR-MCNC: 188 MG/DL (ref 70–130)
GLUCOSE BLDC GLUCOMTR-MCNC: 194 MG/DL (ref 70–130)
GLUCOSE BLDC GLUCOMTR-MCNC: 201 MG/DL (ref 70–130)
GLUCOSE BLDC GLUCOMTR-MCNC: 297 MG/DL (ref 70–130)
GLUCOSE BLDC GLUCOMTR-MCNC: 415 MG/DL (ref 70–130)
GLUCOSE BLDC GLUCOMTR-MCNC: 441 MG/DL (ref 70–130)
GLUCOSE BLDC GLUCOMTR-MCNC: 459 MG/DL (ref 70–130)
GLUCOSE BLDC GLUCOMTR-MCNC: 482 MG/DL (ref 70–130)
GLUCOSE BLDC GLUCOMTR-MCNC: 500 MG/DL (ref 70–130)
GLUCOSE BLDC GLUCOMTR-MCNC: 51 MG/DL (ref 70–130)
GLUCOSE BLDC GLUCOMTR-MCNC: 90 MG/DL (ref 70–130)
GLUCOSE BLDC GLUCOMTR-MCNC: >599 MG/DL (ref 70–130)
GLUCOSE BLDC GLUCOMTR-MCNC: >599 MG/DL (ref 70–130)
HCT VFR BLD AUTO: 35.4 % (ref 40.4–52.2)
HDLC SERPL-MCNC: 60 MG/DL (ref 40–60)
HGB BLD-MCNC: 11.2 G/DL (ref 13.7–17.6)
HOLD SPECIMEN: NORMAL
IRON 24H UR-MRATE: 86 MCG/DL (ref 59–158)
IRON SATN MFR SERPL: 36 % (ref 20–50)
LDLC SERPL CALC-MCNC: 91 MG/DL (ref 0–100)
LDLC/HDLC SERPL: 1.52 {RATIO}
LYMPHOCYTES # BLD MANUAL: 0.21 10*3/MM3 (ref 0.9–4.8)
LYMPHOCYTES NFR BLD MANUAL: 10 % (ref 5–12)
LYMPHOCYTES NFR BLD MANUAL: 3 % (ref 19.6–45.3)
MAGNESIUM SERPL-MCNC: 2 MG/DL (ref 1.6–2.6)
MAGNESIUM SERPL-MCNC: 2.1 MG/DL (ref 1.6–2.6)
MCH RBC QN AUTO: 36.1 PG (ref 27–32.7)
MCHC RBC AUTO-ENTMCNC: 31.6 G/DL (ref 32.6–36.4)
MCV RBC AUTO: 114.2 FL (ref 79.8–96.2)
MONOCYTES # BLD AUTO: 0.69 10*3/MM3 (ref 0.2–1.2)
NEUTROPHILS # BLD AUTO: 5.99 10*3/MM3 (ref 1.9–8.1)
NEUTROPHILS NFR BLD MANUAL: 87 % (ref 42.7–76)
PHOSPHATE SERPL-MCNC: 1.2 MG/DL (ref 2.5–4.5)
PHOSPHATE SERPL-MCNC: 2.5 MG/DL (ref 2.5–4.5)
PHOSPHATE SERPL-MCNC: 3.4 MG/DL (ref 2.5–4.5)
PLAT MORPH BLD: NORMAL
PLATELET # BLD AUTO: 259 10*3/MM3 (ref 140–500)
PMV BLD AUTO: 10.3 FL (ref 6–12)
POTASSIUM BLD-SCNC: 3.3 MMOL/L (ref 3.5–5.2)
POTASSIUM BLD-SCNC: 3.7 MMOL/L (ref 3.5–5.2)
POTASSIUM BLD-SCNC: 3.9 MMOL/L (ref 3.5–5.2)
POTASSIUM BLD-SCNC: 4.3 MMOL/L (ref 3.5–5.2)
POTASSIUM BLD-SCNC: 4.4 MMOL/L (ref 3.5–5.2)
PROCALCITONIN SERPL-MCNC: 0.63 NG/ML (ref 0.1–0.25)
PROT SERPL-MCNC: 5.7 G/DL (ref 6–8.5)
RBC # BLD AUTO: 3.1 10*6/MM3 (ref 4.6–6)
SCAN SLIDE: NORMAL
SODIUM BLD-SCNC: 133 MMOL/L (ref 136–145)
SODIUM BLD-SCNC: 135 MMOL/L (ref 136–145)
SODIUM BLD-SCNC: 137 MMOL/L (ref 136–145)
SODIUM BLD-SCNC: 138 MMOL/L (ref 136–145)
SODIUM BLD-SCNC: 138 MMOL/L (ref 136–145)
STOMATOCYTES BLD QL SMEAR: ABNORMAL
TIBC SERPL-MCNC: 237 MCG/DL (ref 298–536)
TRANSFERRIN SERPL-MCNC: 159 MG/DL (ref 200–360)
TRIGL SERPL-MCNC: 204 MG/DL (ref 0–150)
TROPONIN T SERPL-MCNC: 0.01 NG/ML (ref 0–0.03)
VLDLC SERPL-MCNC: 40.8 MG/DL (ref 5–40)
WBC MORPH BLD: NORMAL
WBC NRBC COR # BLD: 6.88 10*3/MM3 (ref 4.5–10.7)

## 2018-03-10 PROCEDURE — 80048 BASIC METABOLIC PNL TOTAL CA: CPT | Performed by: HOSPITALIST

## 2018-03-10 PROCEDURE — 83735 ASSAY OF MAGNESIUM: CPT | Performed by: INTERNAL MEDICINE

## 2018-03-10 PROCEDURE — 84484 ASSAY OF TROPONIN QUANT: CPT | Performed by: INTERNAL MEDICINE

## 2018-03-10 PROCEDURE — 63710000001 INSULIN ASPART PER 5 UNITS: Performed by: INTERNAL MEDICINE

## 2018-03-10 PROCEDURE — 94799 UNLISTED PULMONARY SVC/PX: CPT

## 2018-03-10 PROCEDURE — 93005 ELECTROCARDIOGRAM TRACING: CPT | Performed by: INTERNAL MEDICINE

## 2018-03-10 PROCEDURE — 63710000001 INSULIN DETEMER PER 5 UNITS: Performed by: INTERNAL MEDICINE

## 2018-03-10 PROCEDURE — 25010000002 ENOXAPARIN PER 10 MG: Performed by: INTERNAL MEDICINE

## 2018-03-10 PROCEDURE — 85025 COMPLETE CBC W/AUTO DIFF WBC: CPT | Performed by: HOSPITALIST

## 2018-03-10 PROCEDURE — 84145 PROCALCITONIN (PCT): CPT | Performed by: INTERNAL MEDICINE

## 2018-03-10 PROCEDURE — 84466 ASSAY OF TRANSFERRIN: CPT | Performed by: INTERNAL MEDICINE

## 2018-03-10 PROCEDURE — 80061 LIPID PANEL: CPT | Performed by: INTERNAL MEDICINE

## 2018-03-10 PROCEDURE — 80048 BASIC METABOLIC PNL TOTAL CA: CPT | Performed by: INTERNAL MEDICINE

## 2018-03-10 PROCEDURE — 99233 SBSQ HOSP IP/OBS HIGH 50: CPT | Performed by: INTERNAL MEDICINE

## 2018-03-10 PROCEDURE — 82962 GLUCOSE BLOOD TEST: CPT

## 2018-03-10 PROCEDURE — 63710000001 INSULIN REGULAR HUMAN PER 5 UNITS: Performed by: PHYSICIAN ASSISTANT

## 2018-03-10 PROCEDURE — 85007 BL SMEAR W/DIFF WBC COUNT: CPT | Performed by: HOSPITALIST

## 2018-03-10 PROCEDURE — 84100 ASSAY OF PHOSPHORUS: CPT | Performed by: INTERNAL MEDICINE

## 2018-03-10 PROCEDURE — 87040 BLOOD CULTURE FOR BACTERIA: CPT | Performed by: INTERNAL MEDICINE

## 2018-03-10 PROCEDURE — 93010 ELECTROCARDIOGRAM REPORT: CPT | Performed by: INTERNAL MEDICINE

## 2018-03-10 PROCEDURE — 83540 ASSAY OF IRON: CPT | Performed by: INTERNAL MEDICINE

## 2018-03-10 PROCEDURE — 80053 COMPREHEN METABOLIC PANEL: CPT | Performed by: INTERNAL MEDICINE

## 2018-03-10 PROCEDURE — 25010000002 THIAMINE PER 100 MG: Performed by: HOSPITALIST

## 2018-03-10 PROCEDURE — 82140 ASSAY OF AMMONIA: CPT | Performed by: INTERNAL MEDICINE

## 2018-03-10 PROCEDURE — 25010000002 PROMETHAZINE PER 50 MG: Performed by: INTERNAL MEDICINE

## 2018-03-10 PROCEDURE — 83605 ASSAY OF LACTIC ACID: CPT | Performed by: INTERNAL MEDICINE

## 2018-03-10 RX ORDER — MAGNESIUM SULFATE HEPTAHYDRATE 40 MG/ML
2 INJECTION, SOLUTION INTRAVENOUS AS NEEDED
Status: DISCONTINUED | OUTPATIENT
Start: 2018-03-10 | End: 2018-03-21

## 2018-03-10 RX ORDER — MAGNESIUM SULFATE HEPTAHYDRATE 40 MG/ML
4 INJECTION, SOLUTION INTRAVENOUS AS NEEDED
Status: DISCONTINUED | OUTPATIENT
Start: 2018-03-10 | End: 2018-03-21

## 2018-03-10 RX ORDER — SCOLOPAMINE TRANSDERMAL SYSTEM 1 MG/1
1 PATCH, EXTENDED RELEASE TRANSDERMAL
Status: DISCONTINUED | OUTPATIENT
Start: 2018-03-10 | End: 2018-03-10

## 2018-03-10 RX ORDER — NICOTINE POLACRILEX 4 MG
15 LOZENGE BUCCAL
Status: DISCONTINUED | OUTPATIENT
Start: 2018-03-10 | End: 2018-03-21

## 2018-03-10 RX ORDER — PROMETHAZINE HYDROCHLORIDE 25 MG/ML
12.5 INJECTION, SOLUTION INTRAMUSCULAR; INTRAVENOUS EVERY 6 HOURS PRN
Status: DISCONTINUED | OUTPATIENT
Start: 2018-03-10 | End: 2018-03-21

## 2018-03-10 RX ORDER — DEXTROSE MONOHYDRATE 25 G/50ML
25 INJECTION, SOLUTION INTRAVENOUS
Status: DISCONTINUED | OUTPATIENT
Start: 2018-03-10 | End: 2018-03-21

## 2018-03-10 RX ADMIN — SODIUM CHLORIDE 0.55 UNITS/KG/HR: 9 INJECTION, SOLUTION INTRAVENOUS at 10:21

## 2018-03-10 RX ADMIN — POTASSIUM CHLORIDE, DEXTROSE MONOHYDRATE AND SODIUM CHLORIDE 150 ML/HR: 150; 5; 450 INJECTION, SOLUTION INTRAVENOUS at 00:13

## 2018-03-10 RX ADMIN — INSULIN DETEMIR 15 UNITS: 100 INJECTION, SOLUTION SUBCUTANEOUS at 16:22

## 2018-03-10 RX ADMIN — INSULIN ASPART 5 UNITS: 100 INJECTION, SOLUTION INTRAVENOUS; SUBCUTANEOUS at 17:41

## 2018-03-10 RX ADMIN — ENOXAPARIN SODIUM 40 MG: 40 INJECTION SUBCUTANEOUS at 21:00

## 2018-03-10 RX ADMIN — POTASSIUM & SODIUM PHOSPHATES POWDER PACK 280-160-250 MG 2 PACKET: 280-160-250 PACK at 01:18

## 2018-03-10 RX ADMIN — SODIUM CHLORIDE 0.2 UNITS/KG/HR: 9 INJECTION, SOLUTION INTRAVENOUS at 12:28

## 2018-03-10 RX ADMIN — POTASSIUM CHLORIDE, DEXTROSE MONOHYDRATE AND SODIUM CHLORIDE 150 ML/HR: 150; 5; 450 INJECTION, SOLUTION INTRAVENOUS at 12:08

## 2018-03-10 RX ADMIN — THIAMINE HYDROCHLORIDE 100 MG: 100 INJECTION, SOLUTION INTRAMUSCULAR; INTRAVENOUS at 09:36

## 2018-03-10 RX ADMIN — INSULIN DETEMIR 20 UNITS: 100 INJECTION, SOLUTION SUBCUTANEOUS at 01:13

## 2018-03-10 RX ADMIN — INSULIN DETEMIR 10 UNITS: 100 INJECTION, SOLUTION SUBCUTANEOUS at 22:00

## 2018-03-10 RX ADMIN — SODIUM CHLORIDE 0.14 UNITS/KG/HR: 9 INJECTION, SOLUTION INTRAVENOUS at 02:30

## 2018-03-10 RX ADMIN — SODIUM CHLORIDE 250 ML/HR: 4.5 INJECTION, SOLUTION INTRAVENOUS at 04:55

## 2018-03-10 RX ADMIN — FOLIC ACID 1 MG: 1 TABLET ORAL at 09:36

## 2018-03-10 RX ADMIN — Medication 1 TABLET: at 09:35

## 2018-03-10 RX ADMIN — POTASSIUM CHLORIDE, DEXTROSE MONOHYDRATE AND SODIUM CHLORIDE 150 ML/HR: 150; 5; 450 INJECTION, SOLUTION INTRAVENOUS at 07:58

## 2018-03-10 RX ADMIN — PROMETHAZINE HYDROCHLORIDE 12.5 MG: 25 INJECTION INTRAMUSCULAR; INTRAVENOUS at 02:45

## 2018-03-10 RX ADMIN — POTASSIUM CHLORIDE, DEXTROSE MONOHYDRATE AND SODIUM CHLORIDE 150 ML/HR: 150; 5; 450 INJECTION, SOLUTION INTRAVENOUS at 16:20

## 2018-03-10 RX ADMIN — SODIUM CHLORIDE 0.1 UNITS/KG/HR: 9 INJECTION, SOLUTION INTRAVENOUS at 13:35

## 2018-03-10 RX ADMIN — POTASSIUM PHOSPHATE, MONOBASIC AND POTASSIUM PHOSPHATE, DIBASIC 45 MMOL: 224; 236 INJECTION, SOLUTION INTRAVENOUS at 09:36

## 2018-03-10 RX ADMIN — DEXTROSE MONOHYDRATE 25 G: 25 INJECTION, SOLUTION INTRAVENOUS at 23:31

## 2018-03-10 NOTE — PROGRESS NOTES
57 y.o.   LOS: 2 days   Patient Care Team:  No Known Provider as PCP - General    Chief Complaint:  Hyperglycemia    Chief Complaint   Patient presents with   • Fatigue   • Vomiting   • Nausea       Subjective patient was seen by Dr. Fernandez yesterday as a new consult when the patient was on insulin drip.  Patient has been transitioned with levemir 12 units in the morning and NovoLog 4 units with each meal plus the sliding scale.  However at around 12 AM I was called by the nurse reported that at bedtime his blood sugar was 238 but however at later and midnight his blood sugars have increased to 500 mg/dL.  I recommended to give levemir 20 units ×1, along with novolog 5 units Subq X 1 for the elevated blood sugar and to repeat the blood sugar in about 2 hours along with BMP.  However  the repeat blood sugar was still elevated and the BMP was consistent with DKA and so the patient was started back on the insulin drip.    Patient was transferred to the ICU as he can be started on insulin drip.       Interval History:    Review of Systems:   Review of Systems   Constitutional: Positive for appetite change and fatigue.   Gastrointestinal: Negative for abdominal pain, nausea and vomiting.   Endocrine: Negative for polydipsia and polyuria.   Musculoskeletal: Positive for back pain and myalgias.   Skin: Negative for pallor.   Neurological: Positive for weakness and numbness.     Objective     Vital Signs   Temp:  [97.9 °F (36.6 °C)-99.3 °F (37.4 °C)] 99.3 °F (37.4 °C)  Heart Rate:  [] 75  Resp:  [16-20] 16  BP: (115-148)/(60-90) 123/77    Physical Exam:  Gen exam - alert and oriented x 3,not in distress.   HEENT - Acanthosis nigricans. Thyroid palpable.   Resp - Clear to auscultation.   CVS - S1,S2 heard and no murmurs.   Abd - Non tender, BS heard.   Ext - No edema   Physical ExamResults Review:     I reviewed the patient's new clinical results.      Glucose   Date/Time Value Ref Range Status   03/10/2018 1041 152 (H)  65 - 99 mg/dL Final   03/10/2018 0541 309 (H) 65 - 99 mg/dL Final   03/10/2018 0230 662 (C) 65 - 99 mg/dL Final   03/09/2018 0801 159 (H) 65 - 99 mg/dL Final   03/09/2018 0333 110 (H) 65 - 99 mg/dL Final   03/09/2018 0028 161 (H) 65 - 99 mg/dL Final   03/08/2018 2013 325 (H) 65 - 99 mg/dL Final   03/08/2018 1646 328 (H) 65 - 99 mg/dL Final     Lab Results (last 72 hours)     Procedure Component Value Units Date/Time    POC Glucose Once [[id number]]  (Abnormal) Collected:  03/10/18 1227    Specimen:  Blood Updated:  03/10/18 1236     Glucose 137 (H) mg/dL     Narrative:       Meter: [id number] : [id number] VICKEY HIGUERA RN    Basic Metabolic Panel [[id number]]  (Abnormal) Collected:  03/10/18 1041    Specimen:  Blood Updated:  03/10/18 1127     Glucose 152 (H) mg/dL      BUN 14 mg/dL      Creatinine 0.92 mg/dL      Sodium 138 mmol/L      Potassium 3.7 mmol/L      Chloride 98 mmol/L      CO2 27.1 mmol/L      Calcium 8.3 (L) mg/dL      eGFR Non African Amer 84 mL/min/1.73      BUN/Creatinine Ratio 15.2     Anion Gap 12.9 mmol/L     Narrative:       GFR Normal >60  Chronic Kidney Disease <60  Kidney Failure <15    POC Glucose Once [[id number]]  (Abnormal) Collected:  03/10/18 1116    Specimen:  Blood Updated:  03/10/18 1117     Glucose 148 (H) mg/dL     Narrative:       Meter: [id number] : [id number] Deyanira CAGE    Lactic Acid, Reflex [[id number]]  (Normal) Collected:  03/10/18 1041    Specimen:  Blood Updated:  03/10/18 1107     Lactate 1.0 mmol/L     POC Glucose Once [[id number]]  (Abnormal) Collected:  03/10/18 1018    Specimen:  Blood Updated:  03/10/18 1036     Glucose 133 (H) mg/dL     Narrative:       Meter: [id number] : [id number] VICKEY HIGUERA RN    POC Glucose Once [[id number]]  (Abnormal) Collected:  03/10/18 0938    Specimen:  Blood Updated:  03/10/18 0939     Glucose 167 (H) mg/dL     Narrative:       Meter: [id number] : [id number] VICKEY HIGUERA RN    Lactic Acid, Reflex Timer (This will reflex a  repeat order 3-3:15 hours after ordered.) [[id number]] Collected:  03/10/18 0541    Specimen:  Blood Updated:  03/10/18 0931     Extra Tube Hold for add-ons.     Comment: Auto resulted.       Phosphorus [[id number]]  (Abnormal) Collected:  03/10/18 0757    Specimen:  Blood Updated:  03/10/18 0858     Phosphorus 1.2 (L) mg/dL     Iron Profile [[id number]]  (Abnormal) Collected:  03/10/18 0541    Specimen:  Blood Updated:  03/10/18 0825     Iron 86 mcg/dL      Iron Saturation 36 %      Transferrin 159 (L) mg/dL      TIBC 237 mcg/dL     POC Glucose Once [[id number]]  (Abnormal) Collected:  03/10/18 0754    Specimen:  Blood Updated:  03/10/18 0804     Glucose 136 (H) mg/dL     Narrative:       Meter: [id number] : [id number] Josefina Urena    Blood Culture - Blood, Blood, Venous Line [[id number]] Collected:  03/10/18 0707    Specimen:  Blood from Blood, Venous Line Updated:  03/10/18 0729    Blood Culture - Blood, Blood, Venous Line [[id number]] Collected:  03/10/18 0707    Specimen:  Blood from Blood, Venous Line Updated:  03/10/18 0729    POC Glucose Once [[id number]]  (Abnormal) Collected:  03/10/18 0704    Specimen:  Blood Updated:  03/10/18 0725     Glucose 188 (H) mg/dL     Narrative:       Meter: [id number] : [id number] Josefina Urena    Procalcitonin [[id number]]  (Abnormal) Collected:  03/10/18 0541    Specimen:  Blood Updated:  03/10/18 0720     Procalcitonin 0.63 (C) ng/mL     Narrative:       As a Marker for Sepsis (Non-Neonates):   1. <0.5 ng/mL represents a low risk of severe sepsis and/or septic shock.  1. >2 ng/mL represents a high risk of severe sepsis and/or septic shock.    As a Marker for Lower Respiratory Tract Infections that require antibiotic therapy:  PCT on Admission     Antibiotic Therapy             6-12 Hrs later  > 0.5                Strongly Recommended            >0.25 - <0.5         Recommended  0.1 - 0.25           Discouraged                   Remeasure/reassess PCT  <0.1                 " Strongly Discouraged          Remeasure/reassess PCT      As 28 day mortality risk marker: \"Change in Procalcitonin Result\" (> 80 % or <=80 %) if Day 0 (or Day 1) and Day 4 values are available. Refer to http://[email or web address]-pct-calculator.com/   Change in PCT <=80 %   A decrease of PCT levels below or equal to 80 % defines a positive change in PCT test result representing a higher risk for 28-day all-cause mortality of patients diagnosed with severe sepsis or septic shock.  Change in PCT > 80 %   A decrease of PCT levels of more than 80 % defines a negative change in PCT result representing a lower risk for 28-day all-cause mortality of patients diagnosed with severe sepsis or septic shock.                POC Glucose Once [[id number]]  (Abnormal) Collected:  03/10/18 0640    Specimen:  Blood Updated:  03/10/18 0700     Glucose 194 (H) mg/dL     Narrative:       Meter: [id number] : [id number] Aldo Hayden    Comprehensive Metabolic Panel [[id number]]  (Abnormal) Collected:  03/10/18 0541    Specimen:  Blood Updated:  03/10/18 0643     Glucose 309 (H) mg/dL      BUN 16 mg/dL      Creatinine 1.18 mg/dL      Sodium 137 mmol/L      Potassium 3.3 (L) mmol/L      Chloride 93 (L) mmol/L      CO2 20.2 (L) mmol/L      Calcium 8.9 mg/dL      Total Protein 5.7 (L) g/dL      Albumin 3.70 g/dL      ALT (SGPT) 14 U/L      AST (SGOT) 14 U/L      Alkaline Phosphatase 62 U/L      Total Bilirubin 0.9 mg/dL      eGFR Non African Amer 63 mL/min/1.73      Globulin 2.0 gm/dL      A/G Ratio 1.9 g/dL      BUN/Creatinine Ratio 13.6     Anion Gap 23.8 mmol/L     Magnesium [[id number]]  (Normal) Collected:  03/10/18 0541    Specimen:  Blood Updated:  03/10/18 0641     Magnesium 2.1 mg/dL     Troponin [[id number]]  (Normal) Collected:  03/10/18 0541    Specimen:  Blood Updated:  03/10/18 0637     Troponin T 0.014 ng/mL     Narrative:       Troponin T Reference Ranges:  Less than 0.03 ng/mL:    Negative for AMI  0.03 to 0.09 ng/mL:      " Indeterminant for AMI  Greater than 0.09 ng/mL: Positive for AMI    Ammonia [[id number]]  (Normal) Collected:  03/10/18 0541    Specimen:  Blood Updated:  03/10/18 0626     Ammonia 27 umol/L     Lactic Acid, Plasma [[id number]]  (Abnormal) Collected:  03/10/18 0541    Specimen:  Blood Updated:  03/10/18 0624     Lactate 2.6 (C) mmol/L     POC Glucose Once [[id number]]  (Abnormal) Collected:  03/10/18 0539    Specimen:  Blood Updated:  03/10/18 0545     Glucose 297 (H) mg/dL     Narrative:       Meter: [id number] : [id number] Phi Garza    CBC & Differential [[id number]] Collected:  03/10/18 0230    Specimen:  Blood Updated:  03/10/18 0532    Narrative:       The following orders were created for panel order CBC & Differential.  Procedure                               Abnormality         Status                     ---------                               -----------         ------                     Manual Differential[[id number]]          Abnormal            Final result               Scan Slide[[id number]]                                       Final result               CBC Auto Differential[[id number]]        Abnormal            Final result                 Please view results for these tests on the individual orders.    CBC Auto Differential [[id number]]  (Abnormal) Collected:  03/10/18 0230    Specimen:  Blood Updated:  03/10/18 0532     WBC 6.88 10*3/mm3      RBC 3.10 (L) 10*6/mm3      Hemoglobin 11.2 (L) g/dL      Hematocrit 35.4 (L) %      .2 (H) fL      MCH 36.1 (H) pg      MCHC 31.6 (L) g/dL      RDW 13.1 %      RDW-SD 54.3 (H) fl      MPV 10.3 fL      Platelets 259 10*3/mm3     Scan Slide [[id number]] Collected:  03/10/18 0230    Specimen:  Blood Updated:  03/10/18 0532     Scan Slide --     Comment: See Manual Differential Results       Manual Differential [[id number]]  (Abnormal) Collected:  03/10/18 0230    Specimen:  Blood Updated:  03/10/18 0532     Neutrophil % 87.0 (H) %      Lymphocyte % 3.0  (L) %      Monocyte % 10.0 %      Neutrophils Absolute 5.99 10*3/mm3      Lymphocytes Absolute 0.21 (L) 10*3/mm3      Monocytes Absolute 0.69 10*3/mm3      Stomatocytes Slight/1+     WBC Morphology Normal     Platelet Morphology Normal    Basic Metabolic Panel [[id number]]  (Abnormal) Collected:  03/10/18 0230    Specimen:  Blood Updated:  03/10/18 0520     Glucose 662 (C) mg/dL      BUN 17 mg/dL      Creatinine 1.26 mg/dL      Sodium 133 (L) mmol/L      Potassium 4.3 mmol/L      Chloride 85 (L) mmol/L      CO2 8.1 (L) mmol/L      Calcium 8.8 mg/dL      eGFR Non African Amer 59 (L) mL/min/1.73      BUN/Creatinine Ratio 13.5     Anion Gap 39.9 mmol/L     Narrative:       GFR Normal >60  Chronic Kidney Disease <60  Kidney Failure <15    Phosphorus [[id number]]  (Normal) Collected:  03/10/18 0230    Specimen:  Blood Updated:  03/10/18 0519     Phosphorus 3.4 mg/dL     Lipid Panel [[id number]]  (Abnormal) Collected:  03/10/18 0230    Specimen:  Blood Updated:  03/10/18 0519     Total Cholesterol 192 mg/dL      Triglycerides 204 (H) mg/dL      HDL Cholesterol 60 mg/dL      LDL Cholesterol  91 mg/dL      VLDL Cholesterol 40.8 (H) mg/dL      LDL/HDL Ratio 1.52    Narrative:       Cholesterol Reference Ranges  (U.S. Department of Health and Human Services ATP III Classifications)    Desirable          <200 mg/dL  Borderline High    200-239 mg/dL  High Risk          >240 mg/dL      Triglyceride Reference Ranges  (U.S. Department of Health and Human Services ATP III Classifications)    Normal           <150 mg/dL  Borderline High  150-199 mg/dL  High             200-499 mg/dL  Very High        >500 mg/dL    HDL Reference Ranges  (U.S. Department of Health and Human Services ATP III Classifcations)    Low     <40 mg/dl (major risk factor for CHD)  High    >60 mg/dl ('negative' risk factor for CHD)        LDL Reference Ranges  (U.S. Department of Health and Human Services ATP III Classifcations)    Optimal          <100  mg/dL  Near Optimal     100-129 mg/dL  Borderline High  130-159 mg/dL  High             160-189 mg/dL  Very High        >189 mg/dL    Magnesium [[id number]]  (Normal) Collected:  03/10/18 0230    Specimen:  Blood Updated:  03/10/18 0518     Magnesium 2.0 mg/dL     POC Glucose Once [[id number]]  (Abnormal) Collected:  03/10/18 0449    Specimen:  Blood Updated:  03/10/18 0451     Glucose 459 (C) mg/dL     Narrative:       Treated Patient Meter: [id number] : [id number] Convertroic    POC Glucose Once [[id number]]  (Abnormal) Collected:  03/10/18 0427    Specimen:  Blood Updated:  03/10/18 0448     Glucose 415 (H) mg/dL     Narrative:       GUTIERREZ Meter: [id number] : [id number] Goodknight Abena NA    POC Glucose Once [[id number]]  (Abnormal) Collected:  03/10/18 0425    Specimen:  Blood Updated:  03/10/18 0448     Glucose 441 (H) mg/dL     Narrative:       Repeat Test Meter: [id number] : [id number] Goodknight Abena NA    POC Glucose Once [[id number]]  (Abnormal) Collected:  03/10/18 0224    Specimen:  Blood Updated:  03/10/18 0437     Glucose >599 (C) mg/dL     Narrative:       NOTIFIED RN Meter: [id number] : [id number] Goodknight Abena NA    POC Glucose Once [[id number]]  (Abnormal) Collected:  03/10/18 0222    Specimen:  Blood Updated:  03/10/18 0436     Glucose >599 (C) mg/dL     Narrative:       Repeat Test Meter: [id number] : [id number] Goodknight Abena NA    POC Glucose Once [[id number]]  (Abnormal) Collected:  03/10/18 0009    Specimen:  Blood Updated:  03/10/18 0014     Glucose 500 (C) mg/dL     Narrative:       NOTIFIED  RN Meter: [id number] : [id number] Goodknight Abena NA    POC Glucose Once [[id number]]  (Abnormal) Collected:  03/10/18 0007    Specimen:  Blood Updated:  03/10/18 0013     Glucose 482 (C) mg/dL     Narrative:       Repeat Test Meter: [id number] : [id number] Pacheco Kraft NA    Potassium [[id number]]  (Normal) Collected:  03/09/18 2217    Specimen:  Blood Updated:  03/09/18  2247     Potassium 4.3 mmol/L     Phosphorus [[id number]]  (Abnormal) Collected:  03/09/18 2217    Specimen:  Blood Updated:  03/09/18 2247     Phosphorus 1.8 (L) mg/dL     POC Glucose Once [[id number]]  (Abnormal) Collected:  03/09/18 2201    Specimen:  Blood Updated:  03/09/18 2202     Glucose 380 (H) mg/dL     Narrative:       Meter: [id number] : [id number] Goodknight Abena NA    POC Glucose Once [[id number]]  (Abnormal) Collected:  03/09/18 1942    Specimen:  Blood Updated:  03/09/18 1943     Glucose 280 (H) mg/dL     Narrative:       Meter: [id number] : [id number] Goodknight Abena NA    POC Glucose Once [[id number]]  (Abnormal) Collected:  03/09/18 1833    Specimen:  Blood Updated:  03/09/18 1834     Glucose 317 (H) mg/dL     Narrative:       Meter: [id number] : [id number] Ramona Alonso NA    POC Glucose Once [[id number]]  (Abnormal) Collected:  03/09/18 1727    Specimen:  Blood Updated:  03/09/18 1751     Glucose 263 (H) mg/dL     Narrative:       Meter: [id number] : [id number] Ramona Alonso NA    POC Glucose Once [[id number]]  (Abnormal) Collected:  03/09/18 1636    Specimen:  Blood Updated:  03/09/18 1637     Glucose 249 (H) mg/dL     Narrative:       Meter: [id number] : [id number] Ramona Acostah NA    POC Glucose Once [[id number]]  (Abnormal) Collected:  03/09/18 1538    Specimen:  Blood Updated:  03/09/18 1539     Glucose 216 (H) mg/dL     Narrative:       Meter: [id number] : [id number] Ramona DÍAZ    Phosphorus [[id number]]  (Abnormal) Collected:  03/09/18 1455    Specimen:  Blood Updated:  03/09/18 1536     Phosphorus 1.6 (L) mg/dL     Potassium [[id number]]  (Normal) Collected:  03/09/18 1455    Specimen:  Blood Updated:  03/09/18 1532     Potassium 4.0 mmol/L     POC Glucose Once [[id number]]  (Abnormal) Collected:  03/09/18 1457    Specimen:  Blood Updated:  03/09/18 1458     Glucose 208 (H) mg/dL     Narrative:       Meter: [id number] : [id number] Ramona DÍAZ    POC Glucose Once  [[id number]]  (Abnormal) Collected:  03/09/18 1336    Specimen:  Blood Updated:  03/09/18 1337     Glucose 158 (H) mg/dL     Narrative:       Meter: [id number] : [id number] Ramona DÍAZ    POC Glucose Once [[id number]]  (Abnormal) Collected:  03/09/18 1248    Specimen:  Blood Updated:  03/09/18 1249     Glucose 155 (H) mg/dL     Narrative:       Meter: [id number] : [id number] Ramona DÍAZ    Microalbumin / Creatinine Urine Ratio - [[id number]] Collected:  03/08/18 2107    Specimen:  Urine from Urine, Clean Catch Updated:  03/09/18 1138     Microalbumin/Creatinine Ratio 107.5 mg/g      Creatinine, Urine 57.7 mg/dL      Microalbumin, Urine 6.2 mg/L     POC Glucose Once [[id number]]  (Abnormal) Collected:  03/09/18 1133    Specimen:  Blood Updated:  03/09/18 1134     Glucose 157 (H) mg/dL     Narrative:       Meter: [id number] : [id number] Ramona DÍAZ    T4, Free [[id number]]  (Normal) Collected:  03/09/18 0801    Specimen:  Blood Updated:  03/09/18 1128     Free T4 1.03 ng/dL     TSH [[id number]]  (Normal) Collected:  03/09/18 0801    Specimen:  Blood Updated:  03/09/18 1128     TSH 0.528 mIU/mL     POC Glucose Once [[id number]]  (Abnormal) Collected:  03/09/18 1028    Specimen:  Blood Updated:  03/09/18 1029     Glucose 154 (H) mg/dL     Narrative:       Meter: [id number] : [id number] DÍAZ    POC Glucose Once [[id number]]  (Abnormal) Collected:  03/09/18 0927    Specimen:  Blood Updated:  03/09/18 0928     Glucose 161 (H) mg/dL     Narrative:       Meter: [id number] : [id number] Ramona DÍAZ    Calcium, Ionized [[id number]]  (Normal) Collected:  03/09/18 0801    Specimen:  Blood Updated:  03/09/18 0858     Ionized Calcium 1.29 mmol/L      Ionized Calcium 5.2 mg/dL     Phosphorus [[id number]]  (Abnormal) Collected:  03/09/18 0801    Specimen:  Blood Updated:  03/09/18 0842     Phosphorus 1.2 (L) mg/dL     Basic Metabolic Panel [[id number]]  (Abnormal) Collected:  03/09/18 0801     Specimen:  Blood Updated:  03/09/18 0842     Glucose 159 (H) mg/dL      BUN 19 mg/dL      Creatinine 1.10 mg/dL      Sodium 136 mmol/L      Potassium 3.4 (L) mmol/L      Chloride 96 (L) mmol/L      CO2 26.2 mmol/L      Calcium 8.6 mg/dL      eGFR Non African Amer 69 mL/min/1.73      BUN/Creatinine Ratio 17.3     Anion Gap 13.8 mmol/L     Narrative:       GFR Normal >60  Chronic Kidney Disease <60  Kidney Failure <15    Magnesium [[id number]]  (Normal) Collected:  03/09/18 0801    Specimen:  Blood Updated:  03/09/18 0842     Magnesium 2.0 mg/dL     POC Glucose Once [[id number]]  (Abnormal) Collected:  03/09/18 0830    Specimen:  Blood Updated:  03/09/18 0831     Glucose 145 (H) mg/dL     Narrative:       Meter: [id number] : [id number] Ramona Gavin NA    POC Glucose Once [[id number]]  (Abnormal) Collected:  03/09/18 0727    Specimen:  Blood Updated:  03/09/18 0728     Glucose 138 (H) mg/dL     Narrative:       Meter: [id number] : [id number] Ramona Alonso NA    Potassium [[id number]]  (Abnormal) Collected:  03/09/18 0553    Specimen:  Blood Updated:  03/09/18 0645     Potassium 3.4 (L) mmol/L     POC Glucose Once [[id number]]  (Normal) Collected:  03/09/18 0613    Specimen:  Blood Updated:  03/09/18 0615     Glucose 128 mg/dL     Narrative:       Meter: [id number] : [id number] Nedra Tinn NA    POC Glucose Once [[id number]]  (Normal) Collected:  03/09/18 0454    Specimen:  Blood Updated:  03/09/18 0455     Glucose 116 mg/dL     Narrative:       Meter: [id number] : [id number] Channakhone Tinn NA    Basic Metabolic Panel [[id number]]  (Abnormal) Collected:  03/09/18 0333    Specimen:  Blood Updated:  03/09/18 0449     Glucose 110 (H) mg/dL      BUN 20 mg/dL      Creatinine 1.29 (H) mg/dL      Sodium 134 (L) mmol/L      Potassium 2.9 (L) mmol/L      Chloride 92 (L) mmol/L      CO2 25.4 mmol/L      Calcium 8.9 mg/dL      eGFR Non African Amer 57 (L) mL/min/1.73      BUN/Creatinine Ratio 15.5     Anion Gap 16.6  mmol/L     Narrative:       GFR Normal >60  Chronic Kidney Disease <60  Kidney Failure <15    Phosphorus [[id number]]  (Abnormal) Collected:  03/09/18 0333    Specimen:  Blood Updated:  03/09/18 0443     Phosphorus 1.1 (L) mg/dL     Magnesium [[id number]]  (Normal) Collected:  03/09/18 0333    Specimen:  Blood Updated:  03/09/18 0443     Magnesium 2.0 mg/dL     Potassium [[id number]]  (Abnormal) Collected:  03/09/18 0333    Specimen:  Blood Updated:  03/09/18 0443     Potassium 2.9 (L) mmol/L     Calcium, Ionized [[id number]]  (Normal) Collected:  03/09/18 0333    Specimen:  Blood Updated:  03/09/18 0440     Ionized Calcium 1.27 mmol/L      Ionized Calcium 5.1 mg/dL     Hemoglobin A1c [[id number]]  (Abnormal) Collected:  03/09/18 0333    Specimen:  Blood Updated:  03/09/18 0425     Hemoglobin A1C 8.02 (H) %     Narrative:       Hemoglobin A1C Ranges:    Increased Risk for Diabetes  5.7% to 6.4%  Diabetes                     >= 6.5%  Diabetic Goal                < 7.0%    POC Glucose Once [[id number]]  (Normal) Collected:  03/09/18 0353    Specimen:  Blood Updated:  03/09/18 0355     Glucose 104 mg/dL     Narrative:       Meter: [id number] : [id number] Channakhone Tinn NA    POC Glucose Once [[id number]]  (Normal) Collected:  03/09/18 0239    Specimen:  Blood Updated:  03/09/18 0241     Glucose 115 mg/dL     Narrative:       Meter: [id number] : [id number] Channakhone Tinn NA    POC Glucose Once [[id number]]  (Normal) Collected:  03/09/18 0143    Specimen:  Blood Updated:  03/09/18 0144     Glucose 129 mg/dL     Narrative:       Meter: [id number] : [id number] Channakhone Tinn NA    Calcium, Ionized [[id number]]  (Normal) Collected:  03/09/18 0028    Specimen:  Blood Updated:  03/09/18 0114     Ionized Calcium 1.32 mmol/L      Ionized Calcium 5.3 mg/dL     Phosphorus [[id number]]  (Abnormal) Collected:  03/09/18 0028    Specimen:  Blood Updated:  03/09/18 0105     Phosphorus 1.4 (L) mg/dL     Basic Metabolic Panel  [[id number]]  (Abnormal) Collected:  03/09/18 0028    Specimen:  Blood Updated:  03/09/18 0105     Glucose 161 (H) mg/dL      BUN 22 (H) mg/dL      Creatinine 1.42 (H) mg/dL      Sodium 136 mmol/L      Potassium 2.7 (L) mmol/L      Chloride 90 (L) mmol/L      CO2 19.3 (L) mmol/L      Calcium 9.1 mg/dL      eGFR Non African Amer 51 (L) mL/min/1.73      BUN/Creatinine Ratio 15.5     Anion Gap 26.7 mmol/L     Narrative:       GFR Normal >60  Chronic Kidney Disease <60  Kidney Failure <15    Magnesium [[id number]]  (Normal) Collected:  03/09/18 0028    Specimen:  Blood Updated:  03/09/18 0100     Magnesium 2.1 mg/dL     CBC & Differential [[id number]] Collected:  03/09/18 0028    Specimen:  Blood Updated:  03/09/18 0049    Narrative:       The following orders were created for panel order CBC & Differential.  Procedure                               Abnormality         Status                     ---------                               -----------         ------                     Scan Slide[[id number]]                                                                  CBC Auto Differential[[id number]]        Abnormal            Final result                 Please view results for these tests on the individual orders.    CBC Auto Differential [[id number]]  (Abnormal) Collected:  03/09/18 0028    Specimen:  Blood Updated:  03/09/18 0049     WBC 9.47 10*3/mm3      RBC 3.26 (L) 10*6/mm3      Hemoglobin 11.7 (L) g/dL      Hematocrit 35.1 (L) %      .7 (H) fL      MCH 35.9 (H) pg      MCHC 33.3 g/dL      RDW 12.8 %      RDW-SD 49.9 fl      MPV 10.1 fL      Platelets 266 10*3/mm3      Neutrophil % 68.5 %      Lymphocyte % 17.3 (L) %      Monocyte % 13.7 (H) %      Eosinophil % 0.0 (L) %      Basophil % 0.1 %      Immature Grans % 0.4 %      Neutrophils, Absolute 6.48 10*3/mm3      Lymphocytes, Absolute 1.64 10*3/mm3      Monocytes, Absolute 1.30 (H) 10*3/mm3      Eosinophils, Absolute 0.00 10*3/mm3      Basophils, Absolute 0.01  10*3/mm3      Immature Grans, Absolute 0.04 (H) 10*3/mm3     POC Glucose Once [[id number]]  (Abnormal) Collected:  03/09/18 0031    Specimen:  Blood Updated:  03/09/18 0033     Glucose 148 (H) mg/dL     Narrative:       Meter: [id number] : [id number] Nedra DÍAZ    POC Glucose Once [[id number]]  (Abnormal) Collected:  03/08/18 2315    Specimen:  Blood Updated:  03/08/18 2317     Glucose 187 (H) mg/dL     Narrative:       Meter: [id number] : [id number] Vasquez Pate RN    Urine Drug Screen - Urine, Clean Catch [[id number]]  (Normal) Collected:  03/08/18 2107    Specimen:  Urine from Urine, Clean Catch Updated:  03/08/18 2209     Amphet/Methamphet, Screen Negative     Barbiturates Screen, Urine Negative     Benzodiazepine Screen, Urine Negative     Cocaine Screen, Urine Negative     Opiate Screen Negative     THC, Screen, Urine Negative     Methadone Screen, Urine Negative     Oxycodone Screen, Urine Negative    Narrative:       Negative Thresholds For Drugs Screened:     Amphetamines               500 ng/ml   Barbiturates               200 ng/ml   Benzodiazepines            100 ng/ml   Cocaine                    300 ng/ml   Methadone                  300 ng/ml   Opiates                    300 ng/ml   Oxycodone                  100 ng/ml   THC                        50 ng/ml    The Normal Value for all drugs tested is negative. This report includes final unconfirmed screening results to be used for medical treatment purposes only. Unconfirmed results must not be used for non-medical purposes such as employment or legal testing. Clinical consideration should be applied to any drug of abuse test, particulary when unconfirmed results are used.    POC Glucose Once [[id number]]  (Abnormal) Collected:  03/08/18 2202    Specimen:  Blood Updated:  03/08/18 2203     Glucose 266 (H) mg/dL     Narrative:       Meter: [id number] : [id number] Fredy Ventura    Blood Gas, Arterial [[id number]]  (Abnormal)  Collected:  03/08/18 2145    Specimen:  Arterial Blood Updated:  03/08/18 2149     Site Arterial: right radial     Howard's Test Positive     pH, Arterial 7.357 pH units      pCO2, Arterial 18.2 (C) mm Hg      Comment: Critical:Notify Dr SHANNAN TINSLEY MD (08-Mar-18 21:48:23)Read back ok        pO2, Arterial 102.9 (H) mm Hg      HCO3, Arterial 10.2 (L) mmol/L      Base Excess, Arterial -13.0 (L) mmol/L      O2 Saturation Calculated 97.9 %      Barometric Pressure for Blood Gas 752.8 mmHg      Modality Room Air     Rate 16 Breaths/minute     Narrative:        Meter: [id number] : [id number] Miladys Corona    Urinalysis With / Culture If Indicated - Urine, Clean Catch [[id number]]  (Abnormal) Collected:  03/08/18 2107    Specimen:  Urine from Urine, Clean Catch Updated:  03/08/18 2123     Color, UA Yellow     Appearance, UA Cloudy (A)     pH, UA 5.5     Specific Gravity, UA 1.021     Glucose, UA >=1000 mg/dL (3+) (A)     Ketones, UA 80 mg/dL (3+) (A)     Bilirubin, UA Negative     Blood, UA Negative     Protein, UA 30 mg/dL (1+) (A)     Leuk Esterase, UA Negative     Nitrite, UA Negative     Urobilinogen, UA 1.0 E.U./dL    Urinalysis, Microscopic Only - Urine, Clean Catch [[id number]]  (Abnormal) Collected:  03/08/18 2107    Specimen:  Urine from Urine, Clean Catch Updated:  03/08/18 2123     RBC, UA 3-5 (A) /HPF      WBC, UA 0-2 /HPF      Bacteria, UA None Seen /HPF      Squamous Epithelial Cells, UA 0-2 /HPF      Hyaline Casts, UA 7-12 /LPF      Methodology Automated Microscopy    POC Glucose Once [[id number]]  (Abnormal) Collected:  03/08/18 2057    Specimen:  Blood Updated:  03/08/18 2059     Glucose 360 (H) mg/dL     Narrative:       Meter: [id number] : [id number] Fredy Ventura    Calcium, Ionized [[id number]]  (Normal) Collected:  03/08/18 2013    Specimen:  Blood from Arm, Left Updated:  03/08/18 2044     Ionized Calcium 1.29 mmol/L      Ionized Calcium 5.2 mg/dL     Basic Metabolic Panel  [[id number]]  (Abnormal) Collected:  03/08/18 2013    Specimen:  Blood from Arm, Left Updated:  03/08/18 2042     Glucose 325 (H) mg/dL      BUN 19 mg/dL      Creatinine 1.35 (H) mg/dL      Sodium 138 mmol/L      Potassium 3.8 mmol/L      Chloride 82 (L) mmol/L      CO2 13.0 (L) mmol/L      Calcium 9.8 mg/dL      eGFR Non African Amer 54 (L) mL/min/1.73      BUN/Creatinine Ratio 14.1     Anion Gap 43.0 mmol/L     Narrative:       GFR Normal >60  Chronic Kidney Disease <60  Kidney Failure <15    Phosphorus [[id number]]  (Normal) Collected:  03/08/18 2013    Specimen:  Blood from Arm, Left Updated:  03/08/18 2042     Phosphorus 4.0 mg/dL     Magnesium [[id number]]  (Normal) Collected:  03/08/18 2013    Specimen:  Blood from Arm, Left Updated:  03/08/18 2042     Magnesium 2.6 mg/dL     Troponin [[id number]]  (Normal) Collected:  03/08/18 2013    Specimen:  Blood from Arm, Left Updated:  03/08/18 2042     Troponin T 0.016 ng/mL     Narrative:       Troponin T Reference Ranges:  Less than 0.03 ng/mL:    Negative for AMI  0.03 to 0.09 ng/mL:      Indeterminant for AMI  Greater than 0.09 ng/mL: Positive for AMI    POC Glucose Once [[id number]]  (Abnormal) Collected:  03/08/18 1922    Specimen:  Blood Updated:  03/08/18 1925     Glucose 405 (H) mg/dL     Narrative:       Treated Patient Meter: [id number] : [id number] Vasquez Pate RN    Ethanol [[id number]] Collected:  03/08/18 1646    Specimen:  Blood Updated:  03/08/18 1920     Ethanol <10 mg/dL      Ethanol % <0.010 %     CBC & Differential [[id number]] Collected:  03/08/18 1646    Specimen:  Blood Updated:  03/08/18 1750    Narrative:       The following orders were created for panel order CBC & Differential.  Procedure                               Abnormality         Status                     ---------                               -----------         ------                     Scan Slide[[id number]]                                       Final result               CBC  Auto Differential[[id number]]        Abnormal            Final result                 Please view results for these tests on the individual orders.    Scan Slide [[id number]] Collected:  03/08/18 1646    Specimen:  Blood Updated:  03/08/18 1750     Crenated RBC's Slight/1+     Microcytes Mod/2+     RBC Fragments Slight/1+     WBC Morphology Normal     Platelet Morphology Normal    CBC Auto Differential [[id number]]  (Abnormal) Collected:  03/08/18 1646    Specimen:  Blood Updated:  03/08/18 1750     WBC 10.85 (H) 10*3/mm3      RBC 3.67 (L) 10*6/mm3      Hemoglobin 13.5 (L) g/dL      Hematocrit 40.1 (L) %      .3 (H) fL      MCH 36.8 (H) pg      MCHC 33.7 g/dL      RDW 13.1 %      RDW-SD 52.2 fl      MPV 11.2 fL      Platelets 304 10*3/mm3      Neutrophil % 72.2 %      Lymphocyte % 17.1 (L) %      Monocyte % 10.0 %      Eosinophil % 0.0 (L) %      Basophil % 0.2 %      Immature Grans % 0.5 %      Neutrophils, Absolute 7.85 10*3/mm3      Lymphocytes, Absolute 1.85 10*3/mm3      Monocytes, Absolute 1.08 10*3/mm3      Eosinophils, Absolute 0.00 10*3/mm3      Basophils, Absolute 0.02 10*3/mm3      Immature Grans, Absolute 0.05 (H) 10*3/mm3     [place] Draw [[id number]] Collected:  03/08/18 1646    Specimen:  Blood Updated:  03/08/18 3436    Narrative:       The following orders were created for panel order [place] Draw.  Procedure                               Abnormality         Status                     ---------                               -----------         ------                     Light Blue Top[[id number]]                                   Final result               Green Top (Gel)[[id number]]                                  Final result               Lavender Top[[id number]]                                     Final result               Gold Top - SST[[id number]]                                   Final result                 Please view results for these tests on the individual orders.    Light Blue Top  [[id number]] Collected:  03/08/18 1646    Specimen:  Blood Updated:  03/08/18 1746     Extra Tube hold for add-on     Comment: Auto resulted       Green Top (Gel) [[id number]] Collected:  03/08/18 1646    Specimen:  Blood Updated:  03/08/18 1746     Extra Tube Hold for add-ons.     Comment: Auto resulted.       Lavender Top [[id number]] Collected:  03/08/18 1646    Specimen:  Blood Updated:  03/08/18 1746     Extra Tube hold for add-on     Comment: Auto resulted       Gold Top - SST [[id number]] Collected:  03/08/18 1646    Specimen:  Blood Updated:  03/08/18 1746     Extra Tube Hold for add-ons.     Comment: Auto resulted.       Comprehensive Metabolic Panel [[id number]]  (Abnormal) Collected:  03/08/18 1646    Specimen:  Blood Updated:  03/08/18 1730     Glucose 328 (H) mg/dL      BUN 19 mg/dL      Creatinine 1.39 (H) mg/dL      Sodium 136 mmol/L      Potassium 3.3 (L) mmol/L      Chloride 82 (L) mmol/L      CO2 12.6 (L) mmol/L      Calcium 9.7 mg/dL      Total Protein 7.1 g/dL      Albumin 4.30 g/dL      ALT (SGPT) 18 U/L      AST (SGOT) 15 U/L      Alkaline Phosphatase 84 U/L      Total Bilirubin 1.7 (H) mg/dL      eGFR Non African Amer 52 (L) mL/min/1.73      Globulin 2.8 gm/dL      A/G Ratio 1.5 g/dL      BUN/Creatinine Ratio 13.7     Anion Gap 41.4 mmol/L     Lipase [[id number]]  (Abnormal) Collected:  03/08/18 1646    Specimen:  Blood Updated:  03/08/18 1730     Lipase 64 (H) U/L     Lactic Acid, Plasma [[id number]]  (Normal) Collected:  03/08/18 1646    Specimen:  Blood Updated:  03/08/18 1709     Lactate 1.5 mmol/L         Imaging Results (last 72 hours)     Procedure Component Value Units Date/Time    XR Chest 1 View [[id number]] Collected:  03/08/18 1938     Updated:  03/08/18 1942    Narrative:       EMERGENCY PORTABLE CHEST SINGLE VIEW 19:45     HISTORY: 57-year-old male with shortness of breath, tobacco abuse and  diabetes     COMPARISON: None available     FINDINGS:  1. Negative acute portable chest, no  evidence of an active intrathoracic  process nor other significant abnormality.     This report was finalized on 3/8/2018 7:39 PM by Dr. Zeb Davis MD.             Medication Review: done      Current Facility-Administered Medications:   •  acetaminophen (TYLENOL) tablet 650 mg, 650 mg, Oral, Q4H PRN, Mahamed Tolbert MD  •  dextrose (D50W) solution 12.5 g, 12.5 g, Intravenous, Q15 Min PRN, CLAUDIA Armenta  •  dextrose (D50W) solution 25 g, 25 g, Intravenous, Q15 Min PRN, Hammad Roman MD  •  dextrose (GLUTOSE) oral gel 15 g, 15 g, Oral, Q15 Min PRN, Hammad Roman MD  •  dextrose 5 % and sodium chloride 0.45 % infusion, 150 mL/hr, Intravenous, Continuous PRN, CLAUDIA Armenta  •  dextrose 5 % and sodium chloride 0.45 % with KCl 20 mEq/L infusion, 150 mL/hr, Intravenous, Continuous PRN, CLAUDIA Armenta, Last Rate: 150 mL/hr at 03/10/18 1208, 150 mL/hr at 03/10/18 1208  •  enoxaparin (LOVENOX) syringe 40 mg, 40 mg, Subcutaneous, Nightly, Rodney Diaz MD, 40 mg at 03/09/18 2010  •  folic acid (FOLVITE) tablet 1 mg, 1 mg, Oral, Daily, Mack Huertas MD, 1 mg at 03/10/18 0936  •  glucagon (human recombinant) (GLUCAGEN DIAGNOSTIC) injection 1 mg, 1 mg, Subcutaneous, PRN, Hammad Roman MD  •  insulin regular (humuLIN R,novoLIN R) 100 Units in sodium chloride 0.9 % 100 mL (1 Units/mL) infusion, 0.1 Units/kg/hr, Intravenous, Titrated, CLAUDIA Armenta, Last Rate: 12.7 mL/hr at 03/10/18 1228, 0.2 Units/kg/hr at 03/10/18 1228  •  insulin regular (humuLIN R,novoLIN R) injection 5 Units, 5 Units, Intravenous, Once PRN, CLAUDIA Armenta, Stopped at 03/08/18 5684  •  LORazepam (ATIVAN) injection 1 mg, 1 mg, Intravenous, Q6H PRN, Mahamed Tolbert MD  •  LORazepam (ATIVAN) tablet 1 mg, 1 mg, Oral, Q6H PRN, Mahamed Tolbert MD  •  Magnesium Sulfate 2 gram Bolus, followed by 8 gram infusion (total Mg dose 10 grams)- Mg less than or equal to 1mg/dL, 2 g, Intravenous, PRN **OR** Magnesium Sulfate 6 gram Infusion (2 gm x  3) -Mg 1.1 -1.5 mg/dL, 2 g, Intravenous, PRN **OR** magnesium sulfate 4 gram infusion- Mg 1.6-1.9 mg/dL, 4 g, Intravenous, PRN, Mack Huertas MD  •  multivitamin (THERAGRAN) tablet 1 tablet, 1 tablet, Oral, Daily, Mahamed Tolbert MD, 1 tablet at 03/10/18 0935  •  ondansetron (ZOFRAN) tablet 4 mg, 4 mg, Oral, Q6H PRN **OR** ondansetron ODT (ZOFRAN-ODT) disintegrating tablet 4 mg, 4 mg, Oral, Q6H PRN **OR** ondansetron (ZOFRAN) injection 4 mg, 4 mg, Intravenous, Q6H PRN, Mahamed Tolbert MD, 4 mg at 03/09/18 2214  •  potassium & sodium phosphates (PHOS-NAK) 280-160-250 MG packet - for Phosphorus less than 1.25 mg/dL, 2 packet, Oral, Q6H PRN, 2 packet at 03/10/18 0118 **OR** potassium & sodium phosphates (PHOS-NAK) 280-160-250 MG packet - for Phosphorus 1.25 - 2.1 mg/dL, 2 packet, Oral, Once PRN, Rodney Diaz MD, 2 packet at 03/09/18 1232  •  potassium chloride (MICRO-K) CR capsule 10 mEq, 10 mEq, Oral, PRN **OR** potassium chloride (KLOR-CON) packet 10 mEq, 10 mEq, Oral, PRN **OR** potassium chloride 10 mEq in 100 mL IVPB, 10 mEq, Intravenous, Q1H PRN, CLAUDIA Armenta  •  potassium chloride (MICRO-K) CR capsule 20 mEq, 20 mEq, Oral, PRN, 20 mEq at 03/09/18 0943 **OR** potassium chloride (KLOR-CON) packet 20 mEq, 20 mEq, Oral, PRN **OR** potassium chloride 10 mEq in 100 mL IVPB, 10 mEq, Intravenous, Q1H PRN, CLAUDIA Armenta  •  potassium chloride (MICRO-K) CR capsule 40 mEq, 40 mEq, Oral, PRN, 40 mEq at 03/09/18 0451 **OR** potassium chloride (KLOR-CON) packet 40 mEq, 40 mEq, Oral, PRN **OR** potassium chloride 10 mEq in 100 mL IVPB, 10 mEq, Intravenous, Q1H PRN, CLAUDIA Armenta  •  potassium phosphate 45 mmol in sodium chloride 0.9 % 500 mL infusion, 45 mmol, Intravenous, PRN, Last Rate: 62.5 mL/hr at 03/10/18 0936, 45 mmol at 03/10/18 0936 **OR** potassium phosphate 30 mmol in sodium chloride 0.9 % 250 mL infusion, 30 mmol, Intravenous, PRN **OR** potassium phosphate 15 mmol in sodium chloride 0.9 % 100  mL infusion, 15 mmol, Intravenous, PRN **OR** sodium phosphates 45 mmol in sodium chloride 0.9 % 500 mL IVPB, 45 mmol, Intravenous, PRN **OR** sodium phosphates 30 mmol in sodium chloride 0.9 % 250 mL IVPB, 30 mmol, Intravenous, PRN **OR** sodium phosphates 15 mmol in sodium chloride 0.9 % 250 mL IVPB, 15 mmol, Intravenous, PRN, Mack Huertas MD  •  promethazine (PHENERGAN) injection 12.5 mg, 12.5 mg, Intravenous, Q6H PRN, Bandar Choudhary MD, 12.5 mg at 03/10/18 0245  •  sodium chloride 0.45 % infusion, 250 mL/hr, Intravenous, Continuous, CLAUDIA Armenta, Stopped at 03/10/18 0759  •  sodium chloride 0.45 % with KCl 20 mEq/L infusion, 250 mL/hr, Intravenous, Continuous PRN, CLAUDIA Armenta  •  sodium chloride 0.9 % flush 1-10 mL, 1-10 mL, Intravenous, PRN, Mahamed Tolbert MD  •  sodium chloride 0.9 % flush 10 mL, 10 mL, Intravenous, PRN, Jose Vásquez MD  •  thiamine (B-1) 100 mg in sodium chloride 0.9 % 100 mL IVPB, 100 mg, Intravenous, Daily, Mack Huertas MD, Last Rate: 200 mL/hr at 03/10/18 0936, 100 mg at 03/10/18 0936    Assessment/Plan     Active Hospital Problems (** Indicates Principal Problem)    Diagnosis Date Noted   • **Diabetic ketoacidosis without coma associated with type 1 diabetes mellitus [E10.10] 03/08/2018   • Tobacco abuse [Z72.0] 03/09/2018   • Alcohol abuse [F10.10] 03/09/2018   • Insurance coverage problems [Z59.8] 03/09/2018   • Bilateral carpal tunnel syndrome [G56.03] 03/09/2018   • Type 1 diabetes mellitus [E10.9] 03/08/2018      Resolved Hospital Problems    Diagnosis Date Noted Date Resolved   No resolved problems to display.     Type 1 diabetes mellitus  Will transition the pt off insulin drip.   Will give levemir 15 units x 1 now and stop drip in 2 hrs.   Will cover with levemir 15 units Q am and 10 units Q pm.   Will have holding parameters for the pm dose of levemir   Will start novolog 5 units tid ac  Will cover with novolog ssi tid ac and hs.   Discussed  "the plan with RN.       TSH - wnl.     Hyperlipidemia   LDL - mildly elevated, would consider lipitor 20 mg po daily.       19 minutes out of 35 minutes face to face spent on floor managing care and counseling patient/family on starting pt on insulin drip.     Hammad Roman MD.  03/10/18  1:33 PM      EMR Dragon / transcription disclaimer:    \"Dictated utilizing Dragon dictation\".   "

## 2018-03-10 NOTE — CONSULTS
[facility]  Phone: [phone]  Naeem Rodgers MD      Subjective   LOS: 2 days     Thank you for this consultation.  57-year-old male with diabetes for the past 27 years.  Came into the hospital until with nausea vomiting.  4 to be in DKA and admitted to the floors.  Despite insulin therapy on the floors his sugars have been extremely high and he was transferred here for further care.  Patient states that he he could not get refills for the last 1 week off his Lantus insulin.  It appears that he was let go by his primary care physician recently.  Besides this episode he has had only one other episode of DKA.  He states his diabetes is usually well managed.  He does have a referral neuropathy from his diabetes.    Patient states that he has some peripheral neuropathy from his diabetes but otherwise denies any significant known other medical problems.  He has been very weak for the last 3 months and states he finds it difficult to get out of bed.  He gets very sleepy and tired at the early morning and wants to go back to bed.  He is unaware of significant snoring.    He is a heavy drinker of a pint of bourbon every day.  He has been doing so for a long time.  His last drink was one week ago.  He also smokes one to one and a half packs of cigarettes a day.  Despite all this he denies any history of stroke or coronary artery disease.  He has no lung disease and denies shortness of breath.  He denies any leg edema.  He lives at home with his fiancée and is otherwise active.     I have reviewed and edited the Past Medical History, Past Surgical History, Home Medications, Social History and Family History as of 5:27 AM on 03/10/18.    Prescriptions Prior to Admission   Medication Sig Dispense Refill Last Dose   • insulin glargine (LANTUS) 100 UNIT/ML injection Inject 15 Units under the skin 2 (Two) Times a Day. Patient states he is taking 18-21 units TID      • insulin lispro (humaLOG) 100 UNIT/ML  injection Inject 18-21 Units under the skin 3 (Three) Times a Day Before Meals.      • insulin regular (humuLIN R,novoLIN R) 100 UNIT/ML injection Inject  under the skin 2 (Two) Times a Day Before Meals.        No Known Allergies    Review of Systems   Constitutional: Positive for fatigue. Negative for fever.   HENT: Negative for congestion and trouble swallowing.    Respiratory: Negative for shortness of breath and wheezing.    Cardiovascular: Negative for chest pain and leg swelling.   Gastrointestinal: Positive for nausea and vomiting.   Genitourinary: Negative for dysuria and hematuria.   Musculoskeletal: Negative for arthralgias and myalgias.   Skin: Negative for pallor and rash.   Neurological: Positive for weakness. Negative for headaches.   Psychiatric/Behavioral: Negative for agitation and hallucinations.       Vital Signs past 24hrs  BP range: BP: (111-140)/(60-90) 133/67  Pulse range: Heart Rate:  [] 102  Resp rate range: Resp:  [16-20] 16  Temp range: Temp (24hrs), Av.6 °F (37 °C), Min:97.9 °F (36.6 °C), Max:99 °F (37.2 °C)    Oxygen range: SpO2:  [97 %-98 %] 98 %;  ;      63.5 kg (140 lb); Body mass index is 21.29 kg/m².  I/O this shift:  In: -   Out: 750 [Urine:750]    Adult male who is in no distress.  Pupils equal and reactive to light.  Oropharynx is somewhat dry.  Class II Mallampati airway.  No posterior pharyngeal discharge.  Nasopharynx without discharge septum midline.  JVP not elevated trachea midline.  Thyroid not enlarged.  Lungs reveal bilateral air entry.  Perhaps somewhat diminished but equal.  No wheezing.  Percussion note resonant.  Chest expansion equal with no chest wall deformities or tenderness.  Heart examination S1-S2 present rhythm regular systolic murmur of aortic stenosis is present.  No edema in the lower extremities.  Abdomen is soft, nontender.  Bowel sounds present with no liver spleen enlargement.  No peripheral cyanosis clubbing.  Moves all 4 extremities and  sensory motor intact.  No cervical, axillary, inguinal adenopathy.    Results Review:    I have reviewed the laboratory and imaging data from current admission. My annotations are as noted in assessment and plan.    Medication Review:  I have reviewed the current MAR. My annotations are as noted in assessment and plan.    Plan   PCCM Problems  Diabetic ketoacidosis  Generalized weakness  Current heavy alcohol drinker  Current heavy smoker  Known diabetes  Cardiac murmur, AS  Hypertriglyceridemia  Anemia, likely chronic disease    Plan of Treatment  Patient has been started on an insulin drip.  He is currently on half normal saline at 2 50 cc an hour.  I will discontinue all of his insulin orders.  We will await for resolution of DKA.  Endocrine is following the patient and we'll review his management and make further adjustments.  Repeat labs were sent.    Patient is a heavy drinker but quit drinking a week ago.  It appears that he may not develop alcohol withdrawal.  He is a current smoker and was advised to quit smoking as well as quit drinking.    Generalized weakness reported by the patient for last 3 months.  He appears lethargic and weak.  Etiology unclear.  Further evaluation may be warranted as an outpatient.    Systolic murmur of aortic stenosis present though I doubt it is playing a role in this current admission.  His anemia is also likely of chronic disease though I will check an iron profile.    Check EKG, troponin, ammonia levels also.    Part of this note may be an electronic transcription/translation of spoken language to printed text using the Dragon Dictation System.

## 2018-03-10 NOTE — PLAN OF CARE
Problem: Patient Care Overview  Goal: Plan of Care Review  Outcome: Ongoing (interventions implemented as appropriate)   03/10/18 1643   Coping/Psychosocial   Plan of Care Reviewed With patient;spouse;daughter   OTHER   Outcome Summary Patient from floor overnight with flash DKA. Insulin gtt and all labs established. Able to titrate the insulin gtt down to initial rate and reinstate levemir and novolog per endocrinology MD. Patient's labs have normalized over the course of the shift. Contiue to monitor and assess pateint statuses.     Goal: Individualization and Mutuality  Outcome: Ongoing (interventions implemented as appropriate)    Goal: Discharge Needs Assessment   03/10/18 1643   Discharge Needs Assessment   Readmission Within the Last 30 Days no previous admission in last 30 days   Concerns to be Addressed denies needs/concerns at this time   Patient/Family Anticipates Transition to home   Transportation Concerns car, none   Anticipated Changes Related to Illness none   Equipment Needed After Discharge none   Disability   Equipment Currently Used at Home none     Goal: Interprofessional Rounds/Family Conf  Outcome: Ongoing (interventions implemented as appropriate)   03/10/18 1643   Interdisciplinary Rounds/Family Conf   Participants nursing;patient       Problem: Diabetes, Type 1 (Adult)  Goal: Signs and Symptoms of Listed Potential Problems Will be Absent, Minimized or Managed (Diabetes, Type 1)  Outcome: Ongoing (interventions implemented as appropriate)   03/10/18 1643   Goal/Outcome Evaluation   Problems Assessed (Type 1 Diabetes) all   Problems Present (Type 1 Diabetes) hyperglycemia;situational response       Problem: Alcohol Withdrawal Acute, Risk/Actual (Adult)  Goal: Signs and Symptoms of Listed Potential Problems Will be Absent, Minimized or Managed (Alcohol Withdrawal Acute, Risk/Actual)  Outcome: Ongoing (interventions implemented as appropriate)   03/10/18 1643   Goal/Outcome Evaluation    Problems Assessed (Alcohol Withdrawal Syndrome) all   Problems Present (Alcohol W/D Syndrome) none       Problem: Nutrition, Imbalanced: Inadequate Oral Intake (Adult)  Goal: Identify Related Risk Factors and Signs and Symptoms  Outcome: Ongoing (interventions implemented as appropriate)   03/10/18 1643   Nutrition, Imbalanced: Inadequate Oral Intake (Adult)   Related Risk Factors (Nutrition Imbalance, Inadequate Oral Intake) NPO status prolonged   Signs and Symptoms (Nutrition Imbalance, Inadequate Oral Intake: Signs and Symptoms) weakness/lethargy     Goal: Improved Oral Intake  Outcome: Ongoing (interventions implemented as appropriate)   03/10/18 1643   Nutrition, Imbalanced: Inadequate Oral Intake (Adult)   Improved Oral Intake making progress toward outcome     Goal: Prevent Further Weight Loss  Outcome: Ongoing (interventions implemented as appropriate)   03/10/18 1643   Nutrition, Imbalanced: Inadequate Oral Intake (Adult)   Prevent Further Weight Loss making progress toward outcome       Problem: Skin Injury Risk (Adult)  Goal: Identify Related Risk Factors and Signs and Symptoms  Outcome: Ongoing (interventions implemented as appropriate)   03/10/18 1643   Skin Injury Risk (Adult)   Related Risk Factors (Skin Injury Risk) mechanical forces     Goal: Skin Health and Integrity  Outcome: Ongoing (interventions implemented as appropriate)   03/10/18 1643   Skin Injury Risk (Adult)   Skin Health and Integrity making progress toward outcome

## 2018-03-11 LAB
ANION GAP SERPL CALCULATED.3IONS-SCNC: 13.3 MMOL/L
ANION GAP SERPL CALCULATED.3IONS-SCNC: 8.3 MMOL/L
ANION GAP SERPL CALCULATED.3IONS-SCNC: 9.5 MMOL/L
BASOPHILS # BLD AUTO: 0.01 10*3/MM3 (ref 0–0.2)
BASOPHILS NFR BLD AUTO: 0.2 % (ref 0–1.5)
BUN BLD-MCNC: 7 MG/DL (ref 6–20)
BUN BLD-MCNC: 8 MG/DL (ref 6–20)
BUN BLD-MCNC: 9 MG/DL (ref 6–20)
BUN/CREAT SERPL: 10.9 (ref 7–25)
BUN/CREAT SERPL: 11.9 (ref 7–25)
BUN/CREAT SERPL: 13.4 (ref 7–25)
CALCIUM SPEC-SCNC: 8.3 MG/DL (ref 8.6–10.5)
CALCIUM SPEC-SCNC: 8.7 MG/DL (ref 8.6–10.5)
CALCIUM SPEC-SCNC: 9 MG/DL (ref 8.6–10.5)
CHLORIDE SERPL-SCNC: 100 MMOL/L (ref 98–107)
CHLORIDE SERPL-SCNC: 100 MMOL/L (ref 98–107)
CHLORIDE SERPL-SCNC: 98 MMOL/L (ref 98–107)
CO2 SERPL-SCNC: 26.7 MMOL/L (ref 22–29)
CO2 SERPL-SCNC: 28.5 MMOL/L (ref 22–29)
CO2 SERPL-SCNC: 30.7 MMOL/L (ref 22–29)
CREAT BLD-MCNC: 0.64 MG/DL (ref 0.76–1.27)
CREAT BLD-MCNC: 0.67 MG/DL (ref 0.76–1.27)
CREAT BLD-MCNC: 0.67 MG/DL (ref 0.76–1.27)
DEPRECATED RDW RBC AUTO: 50.1 FL (ref 37–54)
EOSINOPHIL # BLD AUTO: 0.04 10*3/MM3 (ref 0–0.7)
EOSINOPHIL NFR BLD AUTO: 0.7 % (ref 0.3–6.2)
ERYTHROCYTE [DISTWIDTH] IN BLOOD BY AUTOMATED COUNT: 12.7 % (ref 11.5–14.5)
GFR SERPL CREATININE-BSD FRML MDRD: 121 ML/MIN/1.73
GFR SERPL CREATININE-BSD FRML MDRD: 121 ML/MIN/1.73
GFR SERPL CREATININE-BSD FRML MDRD: 128 ML/MIN/1.73
GLUCOSE BLD-MCNC: 122 MG/DL (ref 65–99)
GLUCOSE BLD-MCNC: 125 MG/DL (ref 65–99)
GLUCOSE BLD-MCNC: 62 MG/DL (ref 65–99)
GLUCOSE BLDC GLUCOMTR-MCNC: 107 MG/DL (ref 70–130)
GLUCOSE BLDC GLUCOMTR-MCNC: 118 MG/DL (ref 70–130)
GLUCOSE BLDC GLUCOMTR-MCNC: 120 MG/DL (ref 70–130)
GLUCOSE BLDC GLUCOMTR-MCNC: 156 MG/DL (ref 70–130)
GLUCOSE BLDC GLUCOMTR-MCNC: 34 MG/DL (ref 70–130)
GLUCOSE BLDC GLUCOMTR-MCNC: 48 MG/DL (ref 70–130)
GLUCOSE BLDC GLUCOMTR-MCNC: 48 MG/DL (ref 70–130)
GLUCOSE BLDC GLUCOMTR-MCNC: 58 MG/DL (ref 70–130)
GLUCOSE BLDC GLUCOMTR-MCNC: 60 MG/DL (ref 70–130)
GLUCOSE BLDC GLUCOMTR-MCNC: 66 MG/DL (ref 70–130)
HCT VFR BLD AUTO: 32.1 % (ref 40.4–52.2)
HGB BLD-MCNC: 10.6 G/DL (ref 13.7–17.6)
IMM GRANULOCYTES # BLD: 0.02 10*3/MM3 (ref 0–0.03)
IMM GRANULOCYTES NFR BLD: 0.3 % (ref 0–0.5)
LYMPHOCYTES # BLD AUTO: 1.97 10*3/MM3 (ref 0.9–4.8)
LYMPHOCYTES NFR BLD AUTO: 32.1 % (ref 19.6–45.3)
MCH RBC QN AUTO: 36.1 PG (ref 27–32.7)
MCHC RBC AUTO-ENTMCNC: 33 G/DL (ref 32.6–36.4)
MCV RBC AUTO: 109.2 FL (ref 79.8–96.2)
MONOCYTES # BLD AUTO: 0.99 10*3/MM3 (ref 0.2–1.2)
MONOCYTES NFR BLD AUTO: 16.2 % (ref 5–12)
NEUTROPHILS # BLD AUTO: 3.1 10*3/MM3 (ref 1.9–8.1)
NEUTROPHILS NFR BLD AUTO: 50.5 % (ref 42.7–76)
PLATELET # BLD AUTO: 251 10*3/MM3 (ref 140–500)
PMV BLD AUTO: 10.6 FL (ref 6–12)
POTASSIUM BLD-SCNC: 3.5 MMOL/L (ref 3.5–5.2)
POTASSIUM BLD-SCNC: 3.5 MMOL/L (ref 3.5–5.2)
POTASSIUM BLD-SCNC: 3.6 MMOL/L (ref 3.5–5.2)
RBC # BLD AUTO: 2.94 10*6/MM3 (ref 4.6–6)
SODIUM BLD-SCNC: 138 MMOL/L (ref 136–145)
SODIUM BLD-SCNC: 138 MMOL/L (ref 136–145)
SODIUM BLD-SCNC: 139 MMOL/L (ref 136–145)
WBC NRBC COR # BLD: 6.13 10*3/MM3 (ref 4.5–10.7)

## 2018-03-11 PROCEDURE — 94799 UNLISTED PULMONARY SVC/PX: CPT

## 2018-03-11 PROCEDURE — 63710000001 INSULIN ASPART PER 5 UNITS: Performed by: INTERNAL MEDICINE

## 2018-03-11 PROCEDURE — 80048 BASIC METABOLIC PNL TOTAL CA: CPT | Performed by: INTERNAL MEDICINE

## 2018-03-11 PROCEDURE — 99233 SBSQ HOSP IP/OBS HIGH 50: CPT | Performed by: INTERNAL MEDICINE

## 2018-03-11 PROCEDURE — 82962 GLUCOSE BLOOD TEST: CPT

## 2018-03-11 PROCEDURE — 85025 COMPLETE CBC W/AUTO DIFF WBC: CPT | Performed by: HOSPITALIST

## 2018-03-11 PROCEDURE — 63710000001 INSULIN DETEMER PER 5 UNITS: Performed by: INTERNAL MEDICINE

## 2018-03-11 PROCEDURE — 25010000002 THIAMINE PER 100 MG: Performed by: INTERNAL MEDICINE

## 2018-03-11 PROCEDURE — 25010000002 ENOXAPARIN PER 10 MG: Performed by: INTERNAL MEDICINE

## 2018-03-11 RX ORDER — DEXTROSE MONOHYDRATE 50 MG/ML
30 INJECTION, SOLUTION INTRAVENOUS CONTINUOUS
Status: DISCONTINUED | OUTPATIENT
Start: 2018-03-11 | End: 2018-03-11

## 2018-03-11 RX ORDER — DEXTROSE MONOHYDRATE 50 MG/ML
30 INJECTION, SOLUTION INTRAVENOUS AS NEEDED
Status: DISCONTINUED | OUTPATIENT
Start: 2018-03-11 | End: 2018-03-21

## 2018-03-11 RX ADMIN — FOLIC ACID 1 MG: 1 TABLET ORAL at 08:50

## 2018-03-11 RX ADMIN — DEXTROSE MONOHYDRATE 30 ML: 50 INJECTION, SOLUTION INTRAVENOUS at 07:16

## 2018-03-11 RX ADMIN — ENOXAPARIN SODIUM 40 MG: 40 INJECTION SUBCUTANEOUS at 20:00

## 2018-03-11 RX ADMIN — Medication 15 G: at 11:26

## 2018-03-11 RX ADMIN — INSULIN DETEMIR 15 UNITS: 100 INJECTION, SOLUTION SUBCUTANEOUS at 08:54

## 2018-03-11 RX ADMIN — INSULIN DETEMIR 15 UNITS: 100 INJECTION, SOLUTION SUBCUTANEOUS at 06:58

## 2018-03-11 RX ADMIN — Medication 1 TABLET: at 08:50

## 2018-03-11 RX ADMIN — INSULIN ASPART 5 UNITS: 100 INJECTION, SOLUTION INTRAVENOUS; SUBCUTANEOUS at 12:39

## 2018-03-11 RX ADMIN — Medication 15 G: at 17:08

## 2018-03-11 RX ADMIN — POTASSIUM CHLORIDE 20 MEQ: 750 CAPSULE, EXTENDED RELEASE ORAL at 11:22

## 2018-03-11 RX ADMIN — POTASSIUM CHLORIDE 20 MEQ: 750 CAPSULE, EXTENDED RELEASE ORAL at 07:18

## 2018-03-11 RX ADMIN — DEXTROSE MONOHYDRATE 25 G: 25 INJECTION, SOLUTION INTRAVENOUS at 01:55

## 2018-03-11 RX ADMIN — THIAMINE HYDROCHLORIDE 100 MG: 100 INJECTION, SOLUTION INTRAMUSCULAR; INTRAVENOUS at 08:50

## 2018-03-11 RX ADMIN — INSULIN ASPART 5 UNITS: 100 INJECTION, SOLUTION INTRAVENOUS; SUBCUTANEOUS at 08:51

## 2018-03-11 NOTE — PROGRESS NOTES
57 y.o.   LOS: 3 days   Patient Care Team:  No Known Provider as PCP - General    Chief Complaint:  Hyperglycemia    Chief Complaint   Patient presents with   • Fatigue   • Vomiting   • Nausea       Subjective  Pt's concern now is low BG, he is eating at least 50% of his meals, but has been having low BG.   He did get his levemir this morning and is also on D5 for low bg.   Pt got 2 amps of D50 last night for the low BG.       Interval History:    Review of Systems:   Review of Systems   Constitutional: Positive for appetite change, diaphoresis and fatigue.   Musculoskeletal: Positive for back pain and myalgias.   Skin: Negative for pallor.   Neurological: Positive for weakness and numbness.     Objective     Vital Signs   Temp:  [98.8 °F (37.1 °C)-99.1 °F (37.3 °C)] 98.8 °F (37.1 °C)  Heart Rate:  [73-86] 85  Resp:  [16] 16  BP: ()/(61-95) 132/78    Physical Exam:  Gen exam - alert and oriented x 3,  HEENT - Acanthosis nigricans. Thyroid palpable.   Resp - Clear to auscultation.   CVS - S1,S2 heard and no murmurs.   Abd - Non tender, BS heard.   Ext - No edema and intact pin prick and proprioception.     Physical ExamResults Review:     I reviewed the patient's new clinical results.      Glucose   Date/Time Value Ref Range Status   03/11/2018 0447 125 (H) 65 - 99 mg/dL Final   03/10/2018 1829 189 (H) 65 - 99 mg/dL Final   03/10/2018 1440 160 (H) 65 - 99 mg/dL Final   03/10/2018 1041 152 (H) 65 - 99 mg/dL Final   03/10/2018 0541 309 (H) 65 - 99 mg/dL Final   03/10/2018 0230 662 (C) 65 - 99 mg/dL Final   03/09/2018 0801 159 (H) 65 - 99 mg/dL Final   03/09/2018 0333 110 (H) 65 - 99 mg/dL Final   03/09/2018 0028 161 (H) 65 - 99 mg/dL Final   03/08/2018 2013 325 (H) 65 - 99 mg/dL Final   03/08/2018 1646 328 (H) 65 - 99 mg/dL Final     Lab Results (last 72 hours)     Procedure Component Value Units Date/Time    POC Glucose Once [[id number]]  (Abnormal) Collected:  03/10/18 1227    Specimen:  Blood Updated:  03/10/18  1236     Glucose 137 (H) mg/dL     Narrative:       Meter: [id number] : [id number] VICKEY HIGUERA RN    Basic Metabolic Panel [[id number]]  (Abnormal) Collected:  03/10/18 1041    Specimen:  Blood Updated:  03/10/18 1127     Glucose 152 (H) mg/dL      BUN 14 mg/dL      Creatinine 0.92 mg/dL      Sodium 138 mmol/L      Potassium 3.7 mmol/L      Chloride 98 mmol/L      CO2 27.1 mmol/L      Calcium 8.3 (L) mg/dL      eGFR Non African Amer 84 mL/min/1.73      BUN/Creatinine Ratio 15.2     Anion Gap 12.9 mmol/L     Narrative:       GFR Normal >60  Chronic Kidney Disease <60  Kidney Failure <15    POC Glucose Once [[id number]]  (Abnormal) Collected:  03/10/18 1116    Specimen:  Blood Updated:  03/10/18 1117     Glucose 148 (H) mg/dL     Narrative:       Meter: [id number] : [id number] Deyanira CAGE    Lactic Acid, Reflex [[id number]]  (Normal) Collected:  03/10/18 1041    Specimen:  Blood Updated:  03/10/18 1107     Lactate 1.0 mmol/L     POC Glucose Once [[id number]]  (Abnormal) Collected:  03/10/18 1018    Specimen:  Blood Updated:  03/10/18 1036     Glucose 133 (H) mg/dL     Narrative:       Meter: [id number] : [id number] VICKEY HIGUERA RN    POC Glucose Once [[id number]]  (Abnormal) Collected:  03/10/18 0938    Specimen:  Blood Updated:  03/10/18 0939     Glucose 167 (H) mg/dL     Narrative:       Meter: [id number] : [id number] VICKEY HIGUERA RN    Lactic Acid, Reflex Timer (This will reflex a repeat order 3-3:15 hours after ordered.) [[id number]] Collected:  03/10/18 0541    Specimen:  Blood Updated:  03/10/18 0931     Extra Tube Hold for add-ons.     Comment: Auto resulted.       Phosphorus [[id number]]  (Abnormal) Collected:  03/10/18 0757    Specimen:  Blood Updated:  03/10/18 0858     Phosphorus 1.2 (L) mg/dL     Iron Profile [[id number]]  (Abnormal) Collected:  03/10/18 0541    Specimen:  Blood Updated:  03/10/18 0825     Iron 86 mcg/dL      Iron Saturation 36 %      Transferrin 159 (L) mg/dL      TIBC 237  "mcg/dL     POC Glucose Once [[id number]]  (Abnormal) Collected:  03/10/18 0754    Specimen:  Blood Updated:  03/10/18 0804     Glucose 136 (H) mg/dL     Narrative:       Meter: [id number] : [id number] Josefina Urena    Blood Culture - Blood, Blood, Venous Line [[id number]] Collected:  03/10/18 0707    Specimen:  Blood from Blood, Venous Line Updated:  03/10/18 0729    Blood Culture - Blood, Blood, Venous Line [[id number]] Collected:  03/10/18 0707    Specimen:  Blood from Blood, Venous Line Updated:  03/10/18 0729    POC Glucose Once [[id number]]  (Abnormal) Collected:  03/10/18 0704    Specimen:  Blood Updated:  03/10/18 0725     Glucose 188 (H) mg/dL     Narrative:       Meter: [id number] : [id number] Akirajordaniveth Urena    Procalcitonin [[id number]]  (Abnormal) Collected:  03/10/18 0541    Specimen:  Blood Updated:  03/10/18 0720     Procalcitonin 0.63 (C) ng/mL     Narrative:       As a Marker for Sepsis (Non-Neonates):   1. <0.5 ng/mL represents a low risk of severe sepsis and/or septic shock.  1. >2 ng/mL represents a high risk of severe sepsis and/or septic shock.    As a Marker for Lower Respiratory Tract Infections that require antibiotic therapy:  PCT on Admission     Antibiotic Therapy             6-12 Hrs later  > 0.5                Strongly Recommended            >0.25 - <0.5         Recommended  0.1 - 0.25           Discouraged                   Remeasure/reassess PCT  <0.1                 Strongly Discouraged          Remeasure/reassess PCT      As 28 day mortality risk marker: \"Change in Procalcitonin Result\" (> 80 % or <=80 %) if Day 0 (or Day 1) and Day 4 values are available. Refer to http://www.[employer]-pct-calculator.com/   Change in PCT <=80 %   A decrease of PCT levels below or equal to 80 % defines a positive change in PCT test result representing a higher risk for 28-day all-cause mortality of patients diagnosed with severe sepsis or septic shock.  Change in PCT > 80 %   A decrease of PCT levels " of more than 80 % defines a negative change in PCT result representing a lower risk for 28-day all-cause mortality of patients diagnosed with severe sepsis or septic shock.                POC Glucose Once [[id number]]  (Abnormal) Collected:  03/10/18 0640    Specimen:  Blood Updated:  03/10/18 0700     Glucose 194 (H) mg/dL     Narrative:       Meter: [id number] : [id number] Aldo Hayden    Comprehensive Metabolic Panel [[id number]]  (Abnormal) Collected:  03/10/18 0541    Specimen:  Blood Updated:  03/10/18 0643     Glucose 309 (H) mg/dL      BUN 16 mg/dL      Creatinine 1.18 mg/dL      Sodium 137 mmol/L      Potassium 3.3 (L) mmol/L      Chloride 93 (L) mmol/L      CO2 20.2 (L) mmol/L      Calcium 8.9 mg/dL      Total Protein 5.7 (L) g/dL      Albumin 3.70 g/dL      ALT (SGPT) 14 U/L      AST (SGOT) 14 U/L      Alkaline Phosphatase 62 U/L      Total Bilirubin 0.9 mg/dL      eGFR Non African Amer 63 mL/min/1.73      Globulin 2.0 gm/dL      A/G Ratio 1.9 g/dL      BUN/Creatinine Ratio 13.6     Anion Gap 23.8 mmol/L     Magnesium [[id number]]  (Normal) Collected:  03/10/18 0541    Specimen:  Blood Updated:  03/10/18 0641     Magnesium 2.1 mg/dL     Troponin [[id number]]  (Normal) Collected:  03/10/18 0541    Specimen:  Blood Updated:  03/10/18 0637     Troponin T 0.014 ng/mL     Narrative:       Troponin T Reference Ranges:  Less than 0.03 ng/mL:    Negative for AMI  0.03 to 0.09 ng/mL:      Indeterminant for AMI  Greater than 0.09 ng/mL: Positive for AMI    Ammonia [[id number]]  (Normal) Collected:  03/10/18 0541    Specimen:  Blood Updated:  03/10/18 0626     Ammonia 27 umol/L     Lactic Acid, Plasma [[id number]]  (Abnormal) Collected:  03/10/18 0541    Specimen:  Blood Updated:  03/10/18 0624     Lactate 2.6 (C) mmol/L     POC Glucose Once [[id number]]  (Abnormal) Collected:  03/10/18 0539    Specimen:  Blood Updated:  03/10/18 0545     Glucose 297 (H) mg/dL     Narrative:       Meter: [id number] : [id number]  Phi Kayla    CBC & Differential [[id number]] Collected:  03/10/18 0230    Specimen:  Blood Updated:  03/10/18 0532    Narrative:       The following orders were created for panel order CBC & Differential.  Procedure                               Abnormality         Status                     ---------                               -----------         ------                     Manual Differential[[id number]]          Abnormal            Final result               Scan Slide[[id number]]                                       Final result               CBC Auto Differential[[id number]]        Abnormal            Final result                 Please view results for these tests on the individual orders.    CBC Auto Differential [[id number]]  (Abnormal) Collected:  03/10/18 0230    Specimen:  Blood Updated:  03/10/18 0532     WBC 6.88 10*3/mm3      RBC 3.10 (L) 10*6/mm3      Hemoglobin 11.2 (L) g/dL      Hematocrit 35.4 (L) %      .2 (H) fL      MCH 36.1 (H) pg      MCHC 31.6 (L) g/dL      RDW 13.1 %      RDW-SD 54.3 (H) fl      MPV 10.3 fL      Platelets 259 10*3/mm3     Scan Slide [[id number]] Collected:  03/10/18 0230    Specimen:  Blood Updated:  03/10/18 0532     Scan Slide --     Comment: See Manual Differential Results       Manual Differential [[id number]]  (Abnormal) Collected:  03/10/18 0230    Specimen:  Blood Updated:  03/10/18 0532     Neutrophil % 87.0 (H) %      Lymphocyte % 3.0 (L) %      Monocyte % 10.0 %      Neutrophils Absolute 5.99 10*3/mm3      Lymphocytes Absolute 0.21 (L) 10*3/mm3      Monocytes Absolute 0.69 10*3/mm3      Stomatocytes Slight/1+     WBC Morphology Normal     Platelet Morphology Normal    Basic Metabolic Panel [[id number]]  (Abnormal) Collected:  03/10/18 0230    Specimen:  Blood Updated:  03/10/18 0520     Glucose 662 (C) mg/dL      BUN 17 mg/dL      Creatinine 1.26 mg/dL      Sodium 133 (L) mmol/L      Potassium 4.3 mmol/L      Chloride 85 (L) mmol/L      CO2 8.1 (L) mmol/L       Calcium 8.8 mg/dL      eGFR Non African Amer 59 (L) mL/min/1.73      BUN/Creatinine Ratio 13.5     Anion Gap 39.9 mmol/L     Narrative:       GFR Normal >60  Chronic Kidney Disease <60  Kidney Failure <15    Phosphorus [[id number]]  (Normal) Collected:  03/10/18 0230    Specimen:  Blood Updated:  03/10/18 0519     Phosphorus 3.4 mg/dL     Lipid Panel [[id number]]  (Abnormal) Collected:  03/10/18 0230    Specimen:  Blood Updated:  03/10/18 0519     Total Cholesterol 192 mg/dL      Triglycerides 204 (H) mg/dL      HDL Cholesterol 60 mg/dL      LDL Cholesterol  91 mg/dL      VLDL Cholesterol 40.8 (H) mg/dL      LDL/HDL Ratio 1.52    Narrative:       Cholesterol Reference Ranges  (U.S. Department of Health and Human Services ATP III Classifications)    Desirable          <200 mg/dL  Borderline High    200-239 mg/dL  High Risk          >240 mg/dL      Triglyceride Reference Ranges  (U.S. Department of Health and Human Services ATP III Classifications)    Normal           <150 mg/dL  Borderline High  150-199 mg/dL  High             200-499 mg/dL  Very High        >500 mg/dL    HDL Reference Ranges  (U.S. Department of Health and Human Services ATP III Classifcations)    Low     <40 mg/dl (major risk factor for CHD)  High    >60 mg/dl ('negative' risk factor for CHD)        LDL Reference Ranges  (U.S. Department of Health and Human Services ATP III Classifcations)    Optimal          <100 mg/dL  Near Optimal     100-129 mg/dL  Borderline High  130-159 mg/dL  High             160-189 mg/dL  Very High        >189 mg/dL    Magnesium [[id number]]  (Normal) Collected:  03/10/18 0230    Specimen:  Blood Updated:  03/10/18 0518     Magnesium 2.0 mg/dL     POC Glucose Once [[id number]]  (Abnormal) Collected:  03/10/18 0449    Specimen:  Blood Updated:  03/10/18 0451     Glucose 459 (C) mg/dL     Narrative:       Treated Patient Meter: [id number] : [id number] Josefina Urena    POC Glucose Once [[id number]]  (Abnormal) Collected:   03/10/18 0427    Specimen:  Blood Updated:  03/10/18 0448     Glucose 415 (H) mg/dL     Narrative:       GUTIERREZ Meter: [id number] : [id number] Goodknight Abena NA    POC Glucose Once [[id number]]  (Abnormal) Collected:  03/10/18 0425    Specimen:  Blood Updated:  03/10/18 0448     Glucose 441 (H) mg/dL     Narrative:       Repeat Test Meter: [id number] : [id number] Goodknight Abena NA    POC Glucose Once [[id number]]  (Abnormal) Collected:  03/10/18 0224    Specimen:  Blood Updated:  03/10/18 0437     Glucose >599 (C) mg/dL     Narrative:       NOTIFIED RN Meter: [id number] : [id number] Goodknight Abena NA    POC Glucose Once [[id number]]  (Abnormal) Collected:  03/10/18 0222    Specimen:  Blood Updated:  03/10/18 0436     Glucose >599 (C) mg/dL     Narrative:       Repeat Test Meter: [id number] : [id number] Goodknight Abena NA    POC Glucose Once [[id number]]  (Abnormal) Collected:  03/10/18 0009    Specimen:  Blood Updated:  03/10/18 0014     Glucose 500 (C) mg/dL     Narrative:       NOTIFIED  RN Meter: [id number] : [id number] Goodknight Abena NA    POC Glucose Once [[id number]]  (Abnormal) Collected:  03/10/18 0007    Specimen:  Blood Updated:  03/10/18 0013     Glucose 482 (C) mg/dL     Narrative:       Repeat Test Meter: [id number] : [id number] Goodknight Abena NA    Potassium [[id number]]  (Normal) Collected:  03/09/18 2217    Specimen:  Blood Updated:  03/09/18 2247     Potassium 4.3 mmol/L     Phosphorus [[id number]]  (Abnormal) Collected:  03/09/18 2217    Specimen:  Blood Updated:  03/09/18 2247     Phosphorus 1.8 (L) mg/dL     POC Glucose Once [[id number]]  (Abnormal) Collected:  03/09/18 2201    Specimen:  Blood Updated:  03/09/18 2202     Glucose 380 (H) mg/dL     Narrative:       Meter: [id number] : [id number] Pacheco DÍAZ    POC Glucose Once [[id number]]  (Abnormal) Collected:  03/09/18 1942    Specimen:  Blood Updated:  03/09/18 1943     Glucose 280 (H) mg/dL     Narrative:        Meter: [id number] : [id number] Pacheco Kraft NA    POC Glucose Once [[id number]]  (Abnormal) Collected:  03/09/18 1833    Specimen:  Blood Updated:  03/09/18 1834     Glucose 317 (H) mg/dL     Narrative:       Meter: [id number] : [id number] Ramona Gavin NA    POC Glucose Once [[id number]]  (Abnormal) Collected:  03/09/18 1727    Specimen:  Blood Updated:  03/09/18 1751     Glucose 263 (H) mg/dL     Narrative:       Meter: [id number] : [id number] Ramona Gavin NA    POC Glucose Once [[id number]]  (Abnormal) Collected:  03/09/18 1636    Specimen:  Blood Updated:  03/09/18 1637     Glucose 249 (H) mg/dL     Narrative:       Meter: [id number] : [id number] Ramona Gavin NA    POC Glucose Once [[id number]]  (Abnormal) Collected:  03/09/18 1538    Specimen:  Blood Updated:  03/09/18 1539     Glucose 216 (H) mg/dL     Narrative:       Meter: [id number] : [id number] Ramona Gavin NA    Phosphorus [[id number]]  (Abnormal) Collected:  03/09/18 1455    Specimen:  Blood Updated:  03/09/18 1536     Phosphorus 1.6 (L) mg/dL     Potassium [[id number]]  (Normal) Collected:  03/09/18 1455    Specimen:  Blood Updated:  03/09/18 1532     Potassium 4.0 mmol/L     POC Glucose Once [[id number]]  (Abnormal) Collected:  03/09/18 1457    Specimen:  Blood Updated:  03/09/18 1458     Glucose 208 (H) mg/dL     Narrative:       Meter: [id number] : [id number] Ramona Gavin NA    POC Glucose Once [[id number]]  (Abnormal) Collected:  03/09/18 1336    Specimen:  Blood Updated:  03/09/18 1337     Glucose 158 (H) mg/dL     Narrative:       Meter: [id number] : [id number] Ramona Gavin NA    POC Glucose Once [[id number]]  (Abnormal) Collected:  03/09/18 1248    Specimen:  Blood Updated:  03/09/18 1249     Glucose 155 (H) mg/dL     Narrative:       Meter: [id number] : [id number] Ramona DÍAZ    Microalbumin / Creatinine Urine Ratio - [[id number]] Collected:  03/08/18 2107    Specimen:  Urine from Urine, Clean Catch Updated:   03/09/18 1138     Microalbumin/Creatinine Ratio 107.5 mg/g      Creatinine, Urine 57.7 mg/dL      Microalbumin, Urine 6.2 mg/L     POC Glucose Once [[id number]]  (Abnormal) Collected:  03/09/18 1133    Specimen:  Blood Updated:  03/09/18 1134     Glucose 157 (H) mg/dL     Narrative:       Meter: [id number] : [id number] Ramona DÍAZ    T4, Free [[id number]]  (Normal) Collected:  03/09/18 0801    Specimen:  Blood Updated:  03/09/18 1128     Free T4 1.03 ng/dL     TSH [[id number]]  (Normal) Collected:  03/09/18 0801    Specimen:  Blood Updated:  03/09/18 1128     TSH 0.528 mIU/mL     POC Glucose Once [[id number]]  (Abnormal) Collected:  03/09/18 1028    Specimen:  Blood Updated:  03/09/18 1029     Glucose 154 (H) mg/dL     Narrative:       Meter: [id number] : [id number] Ramona DÍAZ    POC Glucose Once [[id number]]  (Abnormal) Collected:  03/09/18 0927    Specimen:  Blood Updated:  03/09/18 0928     Glucose 161 (H) mg/dL     Narrative:       Meter: [id number] : [id number] Ramona DÍAZ    Calcium, Ionized [[id number]]  (Normal) Collected:  03/09/18 0801    Specimen:  Blood Updated:  03/09/18 0858     Ionized Calcium 1.29 mmol/L      Ionized Calcium 5.2 mg/dL     Phosphorus [[id number]]  (Abnormal) Collected:  03/09/18 0801    Specimen:  Blood Updated:  03/09/18 0842     Phosphorus 1.2 (L) mg/dL     Basic Metabolic Panel [[id number]]  (Abnormal) Collected:  03/09/18 0801    Specimen:  Blood Updated:  03/09/18 0842     Glucose 159 (H) mg/dL      BUN 19 mg/dL      Creatinine 1.10 mg/dL      Sodium 136 mmol/L      Potassium 3.4 (L) mmol/L      Chloride 96 (L) mmol/L      CO2 26.2 mmol/L      Calcium 8.6 mg/dL      eGFR Non African Amer 69 mL/min/1.73      BUN/Creatinine Ratio 17.3     Anion Gap 13.8 mmol/L     Narrative:       GFR Normal >60  Chronic Kidney Disease <60  Kidney Failure <15    Magnesium [[id number]]  (Normal) Collected:  03/09/18 0801    Specimen:  Blood Updated:  03/09/18 0842     Magnesium 2.0  mg/dL     POC Glucose Once [[id number]]  (Abnormal) Collected:  03/09/18 0830    Specimen:  Blood Updated:  03/09/18 0831     Glucose 145 (H) mg/dL     Narrative:       Meter: [id number] : [id number] Ramona DÍAZ    POC Glucose Once [[id number]]  (Abnormal) Collected:  03/09/18 0727    Specimen:  Blood Updated:  03/09/18 0728     Glucose 138 (H) mg/dL     Narrative:       Meter: [id number] : [id number] Ramona DÍAZ    Potassium [[id number]]  (Abnormal) Collected:  03/09/18 0553    Specimen:  Blood Updated:  03/09/18 0645     Potassium 3.4 (L) mmol/L     POC Glucose Once [[id number]]  (Normal) Collected:  03/09/18 0613    Specimen:  Blood Updated:  03/09/18 0615     Glucose 128 mg/dL     Narrative:       Meter: [id number] : [id number] Nedra Damon NA    POC Glucose Once [[id number]]  (Normal) Collected:  03/09/18 0454    Specimen:  Blood Updated:  03/09/18 0455     Glucose 116 mg/dL     Narrative:       Meter: [id number] : [id number] Dorothyakhone Tinn NA    Basic Metabolic Panel [[id number]]  (Abnormal) Collected:  03/09/18 0333    Specimen:  Blood Updated:  03/09/18 0449     Glucose 110 (H) mg/dL      BUN 20 mg/dL      Creatinine 1.29 (H) mg/dL      Sodium 134 (L) mmol/L      Potassium 2.9 (L) mmol/L      Chloride 92 (L) mmol/L      CO2 25.4 mmol/L      Calcium 8.9 mg/dL      eGFR Non African Amer 57 (L) mL/min/1.73      BUN/Creatinine Ratio 15.5     Anion Gap 16.6 mmol/L     Narrative:       GFR Normal >60  Chronic Kidney Disease <60  Kidney Failure <15    Phosphorus [[id number]]  (Abnormal) Collected:  03/09/18 0333    Specimen:  Blood Updated:  03/09/18 0443     Phosphorus 1.1 (L) mg/dL     Magnesium [[id number]]  (Normal) Collected:  03/09/18 0333    Specimen:  Blood Updated:  03/09/18 0443     Magnesium 2.0 mg/dL     Potassium [[id number]]  (Abnormal) Collected:  03/09/18 0333    Specimen:  Blood Updated:  03/09/18 0443     Potassium 2.9 (L) mmol/L     Calcium, Ionized [[id number]]  (Normal)  Collected:  03/09/18 0333    Specimen:  Blood Updated:  03/09/18 0440     Ionized Calcium 1.27 mmol/L      Ionized Calcium 5.1 mg/dL     Hemoglobin A1c [[id number]]  (Abnormal) Collected:  03/09/18 0333    Specimen:  Blood Updated:  03/09/18 0425     Hemoglobin A1C 8.02 (H) %     Narrative:       Hemoglobin A1C Ranges:    Increased Risk for Diabetes  5.7% to 6.4%  Diabetes                     >= 6.5%  Diabetic Goal                < 7.0%    POC Glucose Once [[id number]]  (Normal) Collected:  03/09/18 0353    Specimen:  Blood Updated:  03/09/18 0355     Glucose 104 mg/dL     Narrative:       Meter: [id number] : [id number] Channakhone Tinn NA    POC Glucose Once [[id number]]  (Normal) Collected:  03/09/18 0239    Specimen:  Blood Updated:  03/09/18 0241     Glucose 115 mg/dL     Narrative:       Meter: [id number] : [id number] Channakhone Tinn NA    POC Glucose Once [[id number]]  (Normal) Collected:  03/09/18 0143    Specimen:  Blood Updated:  03/09/18 0144     Glucose 129 mg/dL     Narrative:       Meter: [id number] : [id number] Channakhone Tinn NA    Calcium, Ionized [[id number]]  (Normal) Collected:  03/09/18 0028    Specimen:  Blood Updated:  03/09/18 0114     Ionized Calcium 1.32 mmol/L      Ionized Calcium 5.3 mg/dL     Phosphorus [[id number]]  (Abnormal) Collected:  03/09/18 0028    Specimen:  Blood Updated:  03/09/18 0105     Phosphorus 1.4 (L) mg/dL     Basic Metabolic Panel [[id number]]  (Abnormal) Collected:  03/09/18 0028    Specimen:  Blood Updated:  03/09/18 0105     Glucose 161 (H) mg/dL      BUN 22 (H) mg/dL      Creatinine 1.42 (H) mg/dL      Sodium 136 mmol/L      Potassium 2.7 (L) mmol/L      Chloride 90 (L) mmol/L      CO2 19.3 (L) mmol/L      Calcium 9.1 mg/dL      eGFR Non African Amer 51 (L) mL/min/1.73      BUN/Creatinine Ratio 15.5     Anion Gap 26.7 mmol/L     Narrative:       GFR Normal >60  Chronic Kidney Disease <60  Kidney Failure <15    Magnesium [[id number]]  (Normal) Collected:   03/09/18 0028    Specimen:  Blood Updated:  03/09/18 0100     Magnesium 2.1 mg/dL     CBC & Differential [[id number]] Collected:  03/09/18 0028    Specimen:  Blood Updated:  03/09/18 0049    Narrative:       The following orders were created for panel order CBC & Differential.  Procedure                               Abnormality         Status                     ---------                               -----------         ------                     Scan Slide[[id number]]                                                                  CBC Auto Differential[[id number]]        Abnormal            Final result                 Please view results for these tests on the individual orders.    CBC Auto Differential [[id number]]  (Abnormal) Collected:  03/09/18 0028    Specimen:  Blood Updated:  03/09/18 0049     WBC 9.47 10*3/mm3      RBC 3.26 (L) 10*6/mm3      Hemoglobin 11.7 (L) g/dL      Hematocrit 35.1 (L) %      .7 (H) fL      MCH 35.9 (H) pg      MCHC 33.3 g/dL      RDW 12.8 %      RDW-SD 49.9 fl      MPV 10.1 fL      Platelets 266 10*3/mm3      Neutrophil % 68.5 %      Lymphocyte % 17.3 (L) %      Monocyte % 13.7 (H) %      Eosinophil % 0.0 (L) %      Basophil % 0.1 %      Immature Grans % 0.4 %      Neutrophils, Absolute 6.48 10*3/mm3      Lymphocytes, Absolute 1.64 10*3/mm3      Monocytes, Absolute 1.30 (H) 10*3/mm3      Eosinophils, Absolute 0.00 10*3/mm3      Basophils, Absolute 0.01 10*3/mm3      Immature Grans, Absolute 0.04 (H) 10*3/mm3     POC Glucose Once [[id number]]  (Abnormal) Collected:  03/09/18 0031    Specimen:  Blood Updated:  03/09/18 0033     Glucose 148 (H) mg/dL     Narrative:       Meter: [id number] : [id number] Nedra DÍAZ    POC Glucose Once [[id number]]  (Abnormal) Collected:  03/08/18 2315    Specimen:  Blood Updated:  03/08/18 2317     Glucose 187 (H) mg/dL     Narrative:       Meter: [id number] : [id number] Vasquez Pate RN    Urine Drug Screen - Urine, Clean Catch  [[id number]]  (Normal) Collected:  03/08/18 2107    Specimen:  Urine from Urine, Clean Catch Updated:  03/08/18 2209     Amphet/Methamphet, Screen Negative     Barbiturates Screen, Urine Negative     Benzodiazepine Screen, Urine Negative     Cocaine Screen, Urine Negative     Opiate Screen Negative     THC, Screen, Urine Negative     Methadone Screen, Urine Negative     Oxycodone Screen, Urine Negative    Narrative:       Negative Thresholds For Drugs Screened:     Amphetamines               500 ng/ml   Barbiturates               200 ng/ml   Benzodiazepines            100 ng/ml   Cocaine                    300 ng/ml   Methadone                  300 ng/ml   Opiates                    300 ng/ml   Oxycodone                  100 ng/ml   THC                        50 ng/ml    The Normal Value for all drugs tested is negative. This report includes final unconfirmed screening results to be used for medical treatment purposes only. Unconfirmed results must not be used for non-medical purposes such as employment or legal testing. Clinical consideration should be applied to any drug of abuse test, particulary when unconfirmed results are used.    POC Glucose Once [[id number]]  (Abnormal) Collected:  03/08/18 2202    Specimen:  Blood Updated:  03/08/18 2203     Glucose 266 (H) mg/dL     Narrative:       M[facility]: [id number] : [id number] [facility]    Blood Gas, Arterial [[id number]]  (Abnormal) Collected:  03/08/18 2145    Specimen:  Arterial Blood Updated:  03/08/18 2149     Site Arterial: right radial     Howard's Test Positive     pH, Arterial 7.357 pH units      pCO2, Arterial 18.2 (C) mm Hg      Comment: Critical:Notify Dr SHANNAN TINSLEY MD (08-Mar-18 21:48:23)Read back ok        pO2, Arterial 102.9 (H) mm Hg      HCO3, Arterial 10.2 (L) mmol/L      Base Excess, Arterial -13.0 (L) mmol/L      O2 Saturation Calculated 97.9 %      Barometric Pressure for Blood Gas 752.8 mmHg      Modality Room Air     Rate 16  Breaths/minute     Narrative:        Meter: [id number] : [id number] Miladys Corona    Urinalysis With / Culture If Indicated - Urine, Clean Catch [[id number]]  (Abnormal) Collected:  03/08/18 2107    Specimen:  Urine from Urine, Clean Catch Updated:  03/08/18 2123     Color, UA Yellow     Appearance, UA Cloudy (A)     pH, UA 5.5     Specific Gravity, UA 1.021     Glucose, UA >=1000 mg/dL (3+) (A)     Ketones, UA 80 mg/dL (3+) (A)     Bilirubin, UA Negative     Blood, UA Negative     Protein, UA 30 mg/dL (1+) (A)     Leuk Esterase, UA Negative     Nitrite, UA Negative     Urobilinogen, UA 1.0 E.U./dL    Urinalysis, Microscopic Only - Urine, Clean Catch [[id number]]  (Abnormal) Collected:  03/08/18 2107    Specimen:  Urine from Urine, Clean Catch Updated:  03/08/18 2123     RBC, UA 3-5 (A) /HPF      WBC, UA 0-2 /HPF      Bacteria, UA None Seen /HPF      Squamous Epithelial Cells, UA 0-2 /HPF      Hyaline Casts, UA 7-12 /LPF      Methodology Automated Microscopy    POC Glucose Once [[id number]]  (Abnormal) Collected:  03/08/18 2057    Specimen:  Blood Updated:  03/08/18 2059     Glucose 360 (H) mg/dL     Narrative:       Meter: [id number] : [id number] Fredy DOWNEYkristy    Calcium, Ionized [[id number]]  (Normal) Collected:  03/08/18 2013    Specimen:  Blood from Arm, Left Updated:  03/08/18 2044     Ionized Calcium 1.29 mmol/L      Ionized Calcium 5.2 mg/dL     Basic Metabolic Panel [[id number]]  (Abnormal) Collected:  03/08/18 2013    Specimen:  Blood from Arm, Left Updated:  03/08/18 2042     Glucose 325 (H) mg/dL      BUN 19 mg/dL      Creatinine 1.35 (H) mg/dL      Sodium 138 mmol/L      Potassium 3.8 mmol/L      Chloride 82 (L) mmol/L      CO2 13.0 (L) mmol/L      Calcium 9.8 mg/dL      eGFR Non African Amer 54 (L) mL/min/1.73      BUN/Creatinine Ratio 14.1     Anion Gap 43.0 mmol/L     Narrative:       GFR Normal >60  Chronic Kidney Disease <60  Kidney Failure <15    Phosphorus [[id number]]  (Normal)  Collected:  03/08/18 2013    Specimen:  Blood from Arm, Left Updated:  03/08/18 2042     Phosphorus 4.0 mg/dL     Magnesium [[id number]]  (Normal) Collected:  03/08/18 2013    Specimen:  Blood from Arm, Left Updated:  03/08/18 2042     Magnesium 2.6 mg/dL     Troponin [[id number]]  (Normal) Collected:  03/08/18 2013    Specimen:  Blood from Arm, Left Updated:  03/08/18 2042     Troponin T 0.016 ng/mL     Narrative:       Troponin T Reference Ranges:  Less than 0.03 ng/mL:    Negative for AMI  0.03 to 0.09 ng/mL:      Indeterminant for AMI  Greater than 0.09 ng/mL: Positive for AMI    POC Glucose Once [[id number]]  (Abnormal) Collected:  03/08/18 1922    Specimen:  Blood Updated:  03/08/18 1925     Glucose 405 (H) mg/dL     Narrative:       Treated Patient Meter: [id number] : [id number] Vasquez Pate RN    Ethanol [[id number]] Collected:  03/08/18 1646    Specimen:  Blood Updated:  03/08/18 1920     Ethanol <10 mg/dL      Ethanol % <0.010 %     CBC & Differential [[id number]] Collected:  03/08/18 1646    Specimen:  Blood Updated:  03/08/18 1750    Narrative:       The following orders were created for panel order CBC & Differential.  Procedure                               Abnormality         Status                     ---------                               -----------         ------                     Scan Slide[[id number]]                                       Final result               CBC Auto Differential[[id number]]        Abnormal            Final result                 Please view results for these tests on the individual orders.    Scan Slide [[id number]] Collected:  03/08/18 1646    Specimen:  Blood Updated:  03/08/18 1750     Crenated RBC's Slight/1+     Microcytes Mod/2+     RBC Fragments Slight/1+     WBC Morphology Normal     Platelet Morphology Normal    CBC Auto Differential [[id number]]  (Abnormal) Collected:  03/08/18 1646    Specimen:  Blood Updated:  03/08/18 1750     WBC 10.85 (H) 10*3/mm3       RBC 3.67 (L) 10*6/mm3      Hemoglobin 13.5 (L) g/dL      Hematocrit 40.1 (L) %      .3 (H) fL      MCH 36.8 (H) pg      MCHC 33.7 g/dL      RDW 13.1 %      RDW-SD 52.2 fl      MPV 11.2 fL      Platelets 304 10*3/mm3      Neutrophil % 72.2 %      Lymphocyte % 17.1 (L) %      Monocyte % 10.0 %      Eosinophil % 0.0 (L) %      Basophil % 0.2 %      Immature Grans % 0.5 %      Neutrophils, Absolute 7.85 10*3/mm3      Lymphocytes, Absolute 1.85 10*3/mm3      Monocytes, Absolute 1.08 10*3/mm3      Eosinophils, Absolute 0.00 10*3/mm3      Basophils, Absolute 0.02 10*3/mm3      Immature Grans, Absolute 0.05 (H) 10*3/mm3     [place] Draw [[id number]] Collected:  03/08/18 1646    Specimen:  Blood Updated:  03/08/18 1746    Narrative:       The following orders were created for panel order [place] [facility].  Procedure                               Abnormality         Status                     ---------                               -----------         ------                     Light Blue Top[[id number]]                                   Final result               Green Top (Gel)[[id number]]                                  Final result               Lavender Top[[id number]]                                     Final result               Gold Top - SST[[id number]]                                   Final result                 Please view results for these tests on the individual orders.    Light Blue Top [[id number]] Collected:  03/08/18 1646    Specimen:  Blood Updated:  03/08/18 1746     Extra Tube hold for add-on     Comment: Auto resulted       Green Top (Gel) [[id number]] Collected:  03/08/18 1646    Specimen:  Blood Updated:  03/08/18 1746     Extra Tube Hold for add-ons.     Comment: Auto resulted.       Lavender Top [[id number]] Collected:  03/08/18 1646    Specimen:  Blood Updated:  03/08/18 1746     Extra Tube hold for add-on     Comment: Auto resulted       Gold Top - SST [[id number]] Collected:  03/08/18 1646     Specimen:  Blood Updated:  03/08/18 1746     Extra Tube Hold for add-ons.     Comment: Auto resulted.       Comprehensive Metabolic Panel [[id number]]  (Abnormal) Collected:  03/08/18 1646    Specimen:  Blood Updated:  03/08/18 1730     Glucose 328 (H) mg/dL      BUN 19 mg/dL      Creatinine 1.39 (H) mg/dL      Sodium 136 mmol/L      Potassium 3.3 (L) mmol/L      Chloride 82 (L) mmol/L      CO2 12.6 (L) mmol/L      Calcium 9.7 mg/dL      Total Protein 7.1 g/dL      Albumin 4.30 g/dL      ALT (SGPT) 18 U/L      AST (SGOT) 15 U/L      Alkaline Phosphatase 84 U/L      Total Bilirubin 1.7 (H) mg/dL      eGFR Non African Amer 52 (L) mL/min/1.73      Globulin 2.8 gm/dL      A/G Ratio 1.5 g/dL      BUN/Creatinine Ratio 13.7     Anion Gap 41.4 mmol/L     Lipase [[id number]]  (Abnormal) Collected:  03/08/18 1646    Specimen:  Blood Updated:  03/08/18 1730     Lipase 64 (H) U/L     Lactic Acid, Plasma [[id number]]  (Normal) Collected:  03/08/18 1646    Specimen:  Blood Updated:  03/08/18 1709     Lactate 1.5 mmol/L         Imaging Results (last 72 hours)     Procedure Component Value Units Date/Time    XR Chest 1 View [[id number]] Collected:  03/08/18 1938     Updated:  03/08/18 1942    Narrative:       EMERGENCY PORTABLE CHEST SINGLE VIEW 19:45     HISTORY: 57-year-old male with shortness of breath, tobacco abuse and  diabetes     COMPARISON: None available     FINDINGS:  1. Negative acute portable chest, no evidence of an active intrathoracic  process nor other significant abnormality.     This report was finalized on 3/8/2018 7:39 PM by Dr. Zeb Davis MD.             Medication Review: done      Current Facility-Administered Medications:   •  acetaminophen (TYLENOL) tablet 650 mg, 650 mg, Oral, Q4H PRN, Mahamed Tolbert MD  •  dextrose (D50W) solution 25 g, 25 g, Intravenous, Q15 Min PRN, Hammad Roman MD, Stopped at 03/11/18 1158  •  dextrose (D5W) 5 % infusion 30 mL, 30 mL, Intravenous, PRN, Hammad Roman MD  •  dextrose  (GLUTOSE) oral gel 15 g, 15 g, Oral, Q15 Min PRN, Hammad Roman MD, 15 g at 03/11/18 1126  •  enoxaparin (LOVENOX) syringe 40 mg, 40 mg, Subcutaneous, Nightly, Rodney Diaz MD, 40 mg at 03/10/18 2100  •  folic acid (FOLVITE) tablet 1 mg, 1 mg, Oral, Daily, Mack Huertas MD, 1 mg at 03/11/18 0850  •  glucagon (human recombinant) (GLUCAGEN DIAGNOSTIC) injection 1 mg, 1 mg, Subcutaneous, PRN, Hammad Roman MD  •  insulin aspart (novoLOG) injection 0-4 Units, 0-4 Units, Subcutaneous, 4x Daily AC & at Bedtime, Hammad Roman MD  •  insulin aspart (novoLOG) injection 4 Units, 4 Units, Subcutaneous, TID With Meals, Hammad Roman MD  •  insulin detemir (LEVEMIR) injection 15 Units, 15 Units, Subcutaneous, QAM, Hammad Roman MD, 15 Units at 03/11/18 0854  •  insulin detemir (LEVEMIR) injection 8 Units, 8 Units, Subcutaneous, Nightly, Hammad Roman MD  •  LORazepam (ATIVAN) injection 1 mg, 1 mg, Intravenous, Q6H PRN, Mahamed Tolbert MD  •  LORazepam (ATIVAN) tablet 1 mg, 1 mg, Oral, Q6H PRN, Mahamed Tolbert MD  •  Magnesium Sulfate 2 gram Bolus, followed by 8 gram infusion (total Mg dose 10 grams)- Mg less than or equal to 1mg/dL, 2 g, Intravenous, PRN **OR** Magnesium Sulfate 6 gram Infusion (2 gm x 3) -Mg 1.1 -1.5 mg/dL, 2 g, Intravenous, PRN **OR** magnesium sulfate 4 gram infusion- Mg 1.6-1.9 mg/dL, 4 g, Intravenous, PRN, Mack Huertas MD  •  multivitamin (THERAGRAN) tablet 1 tablet, 1 tablet, Oral, Daily, Mahamed Tolbert MD, 1 tablet at 03/11/18 0850  •  ondansetron (ZOFRAN) tablet 4 mg, 4 mg, Oral, Q6H PRN **OR** ondansetron ODT (ZOFRAN-ODT) disintegrating tablet 4 mg, 4 mg, Oral, Q6H PRN **OR** ondansetron (ZOFRAN) injection 4 mg, 4 mg, Intravenous, Q6H PRN, Mahamed Tolbert MD, 4 mg at 03/09/18 5525  •  potassium & sodium phosphates (PHOS-NAK) 280-160-250 MG packet - for Phosphorus less than 1.25 mg/dL, 2 packet, Oral, Q6H PRN, 2 packet at 03/10/18 0118 **OR** potassium & sodium phosphates  (PHOS-NAK) 280-160-250 MG packet - for Phosphorus 1.25 - 2.1 mg/dL, 2 packet, Oral, Once PRN, Rodney Diaz MD, 2 packet at 03/09/18 1232  •  potassium phosphate 45 mmol in sodium chloride 0.9 % 500 mL infusion, 45 mmol, Intravenous, PRN, Last Rate: 62.5 mL/hr at 03/10/18 0936, 45 mmol at 03/10/18 0936 **OR** potassium phosphate 30 mmol in sodium chloride 0.9 % 250 mL infusion, 30 mmol, Intravenous, PRN **OR** potassium phosphate 15 mmol in sodium chloride 0.9 % 100 mL infusion, 15 mmol, Intravenous, PRN **OR** sodium phosphates 45 mmol in sodium chloride 0.9 % 500 mL IVPB, 45 mmol, Intravenous, PRN **OR** sodium phosphates 30 mmol in sodium chloride 0.9 % 250 mL IVPB, 30 mmol, Intravenous, PRN **OR** sodium phosphates 15 mmol in sodium chloride 0.9 % 250 mL IVPB, 15 mmol, Intravenous, PRN, Mack Huertas MD  •  promethazine (PHENERGAN) injection 12.5 mg, 12.5 mg, Intravenous, Q6H PRN, Bandar Choudhary MD, 12.5 mg at 03/10/18 0245  •  sodium chloride 0.9 % flush 1-10 mL, 1-10 mL, Intravenous, PRN, Mahamed Tolbert MD  •  sodium chloride 0.9 % flush 10 mL, 10 mL, Intravenous, PRN, Jose Vásquez MD  •  thiamine (B-1) 100 mg in sodium chloride 0.9 % 100 mL IVPB, 100 mg, Intravenous, Daily, Mack Huertas MD, Last Rate: 200 mL/hr at 03/11/18 0850, 100 mg at 03/11/18 0850    Assessment/Plan     Active Hospital Problems (** Indicates Principal Problem)    Diagnosis Date Noted   • **Diabetic ketoacidosis without coma associated with type 1 diabetes mellitus [E10.10] 03/08/2018   • Tobacco abuse [Z72.0] 03/09/2018   • Alcohol abuse [F10.10] 03/09/2018   • Insurance coverage problems [Z59.8] 03/09/2018   • Bilateral carpal tunnel syndrome [G56.03] 03/09/2018   • Type 1 diabetes mellitus [E10.9] 03/08/2018      Resolved Hospital Problems    Diagnosis Date Noted Date Resolved   No resolved problems to display.     Type 1 diabetes mellitus  Decrease levemir to 12 units Q am  Decrease levemir to 6 units Q  "pm.   Decrease novolog 4 units tid ac  Change ssi to custom low ssi tid ac and hs.   Continue D5 as needed for low BG.  Discussed the plan with RN.     TSH - wnl.     Hyperlipidemia   LDL - mildly elevated, would consider lipitor 20 mg po daily.     I am covering the pt over the week end and pt will be seen by   on Monday.     19 minutes out of 35 minutes face to face spent on floor managing care, discussing plan with nursing and counseling patient/family on treatment options, side effects of the medications.        Hammad Roman MD.  03/11/18  1:33 PM      EMR Dragon / transcription disclaimer:    \"Dictated utilizing Dragon dictation\".   "

## 2018-03-11 NOTE — PLAN OF CARE
Problem: Patient Care Overview  Goal: Plan of Care Review  Outcome: Ongoing (interventions implemented as appropriate)   03/11/18 1450   Coping/Psychosocial   Plan of Care Reviewed With patient;spouse   OTHER   Outcome Summary Patient had hypoglycemic episodes overnight and in the AM. Glutose given with orange juice. Endocrinology changed insulin orders and laboratory monitoring. Patient remarkably assymptomatic throught the hypoglycemic episode which bottomed out with a Blood Sugar at 34. COnitnue to monitor and assess patient's endocrinological, metabolic, and situaitonal response statuses     Goal: Discharge Needs Assessment   03/10/18 1643   Discharge Needs Assessment   Readmission Within the Last 30 Days no previous admission in last 30 days   Concerns to be Addressed denies needs/concerns at this time   Patient/Family Anticipates Transition to home   Transportation Concerns car, none   Anticipated Changes Related to Illness none   Equipment Needed After Discharge none   Disability   Equipment Currently Used at Home none     Goal: Interprofessional Rounds/Family Conf  Outcome: Ongoing (interventions implemented as appropriate)   03/11/18 1450   Interdisciplinary Rounds/Family Conf   Participants nursing;patient       Problem: Diabetes, Type 1 (Adult)  Goal: Signs and Symptoms of Listed Potential Problems Will be Absent, Minimized or Managed (Diabetes, Type 1)  Outcome: Ongoing (interventions implemented as appropriate)   03/11/18 1450   Goal/Outcome Evaluation   Problems Assessed (Type 1 Diabetes) all   Problems Present (Type 1 Diabetes) situational response;hypoglycemia

## 2018-03-11 NOTE — PLAN OF CARE
Problem: Nutrition, Imbalanced: Inadequate Oral Intake (Adult)  Goal: Identify Related Risk Factors and Signs and Symptoms  Outcome: Ongoing (interventions implemented as appropriate)   03/10/18 1643 03/11/18 1449   Nutrition, Imbalanced: Inadequate Oral Intake (Adult)   Related Risk Factors (Nutrition Imbalance, Inadequate Oral Intake) --  appetite decreased   Signs and Symptoms (Nutrition Imbalance, Inadequate Oral Intake: Signs and Symptoms) weakness/lethargy --      Goal: Improved Oral Intake  Outcome: Ongoing (interventions implemented as appropriate)   03/11/18 1449   Nutrition, Imbalanced: Inadequate Oral Intake (Adult)   Improved Oral Intake making progress toward outcome     Goal: Prevent Further Weight Loss  Outcome: Ongoing (interventions implemented as appropriate)   03/11/18 1449   Nutrition, Imbalanced: Inadequate Oral Intake (Adult)   Prevent Further Weight Loss making progress toward outcome       Problem: Skin Injury Risk (Adult)  Goal: Identify Related Risk Factors and Signs and Symptoms  Outcome: Ongoing (interventions implemented as appropriate)   03/11/18 1449   Skin Injury Risk (Adult)   Related Risk Factors (Skin Injury Risk) critical care admission;mechanical forces     Goal: Skin Health and Integrity  Outcome: Ongoing (interventions implemented as appropriate)   03/11/18 1449   Skin Injury Risk (Adult)   Skin Health and Integrity making progress toward outcome

## 2018-03-11 NOTE — PROGRESS NOTES
Name: Juan Payne ADMIT: 3/8/2018   : 1960  PCP: No Known Provider    MRN: [id number] LOS: 3 days   AGE/SEX: 57 y.o. male  ROOM: [facility]/   Subjective   Did not eat much hand had hypoglycemia this morning. Improved now. No CP SOA NVD reported.    Objective   Vital Signs  Temp:  [98.8 °F (37.1 °C)-99.1 °F (37.3 °C)] 98.8 °F (37.1 °C)  Heart Rate:  [73-86] 79  Resp:  [16] 16  BP: ()/(61-95) 125/87  SpO2:  [94 %-98 %] 96 %  on   ;   Device (Oxygen Therapy): room air  Body mass index is 21.29 kg/m².    Physical Exam   Constitutional: He appears well-developed. No distress.   HENT:   Head: Normocephalic and atraumatic.   Eyes: EOM are normal. Pupils are equal, round, and reactive to light.   Neck: Normal range of motion. Neck supple.   Cardiovascular: Normal rate, regular rhythm and intact distal pulses.    Murmur (4/6 LIZANDRO) heard.  Pulmonary/Chest: Effort normal and breath sounds normal. He has no wheezes.   Abdominal: Soft. He exhibits no distension. There is no tenderness. There is no rebound and no guarding.   Musculoskeletal: Normal range of motion. He exhibits no edema.   Neurological: He is alert. No cranial nerve deficit.   Skin: Skin is warm and dry. He is not diaphoretic.   jaundice   Psychiatric: He has a normal mood and affect. His behavior is normal.   Nursing note and vitals reviewed.      Results Review:       I reviewed the patient's new clinical results. Reviewed telemetry, sinus rhythm.    Results from last 7 days  Lab Units 18  0447 03/10/18  0230 18  0028 18  1646   WBC 10*3/mm3 6.13 6.88 9.47 10.85*   HEMOGLOBIN g/dL 10.6* 11.2* 11.7* 13.5*   PLATELETS 10*3/mm3 251 259 266 304     Results from last 7 days  Lab Units 18  1247 18  0447 03/10/18  1829 03/10/18  1440   SODIUM mmol/L 138 138 135* 138   POTASSIUM mmol/L 3.5 3.5 3.9 4.4   CHLORIDE mmol/L 98 100 100 100   CO2 mmol/L 26.7 28.5 24.6 25.2   BUN mg/dL 8 9 11 12   CREATININE mg/dL 0.67* 0.67* 0.74* 0.75*    GLUCOSE mg/dL 122* 125* 189* 160*   Estimated Creatinine Clearance: 106.6 mL/min (by C-G formula based on SCr of 0.67 mg/dL (L)).  Results from last 7 days  Lab Units 03/11/18  1247 03/11/18  0447 03/10/18  1829 03/10/18  1440  03/10/18  0757 03/10/18  0541 03/10/18  0230 03/09/18  2217  03/09/18  0801 03/09/18  0333  03/08/18  1646   CALCIUM mg/dL 9.0 8.3* 7.8* 7.6*  < >  --  8.9 8.8  --   --  8.6 8.9  < > 9.7   ALBUMIN g/dL  --   --   --   --   --   --  3.70  --   --   --   --   --   --  4.30   MAGNESIUM mg/dL  --   --   --   --   --   --  2.1 2.0  --   --  2.0 2.0  < >  --    PHOSPHORUS mg/dL  --   --  2.5  --   --  1.2*  --  3.4 1.8*  < > 1.2* 1.1*  < >  --    < > = values in this interval not displayed.      enoxaparin 40 mg Subcutaneous Nightly   folic acid 1 mg Oral Daily   insulin aspart 0-4 Units Subcutaneous 4x Daily AC & at Bedtime   insulin aspart 4 Units Subcutaneous TID With Meals   [START ON 3/12/2018] insulin detemir 12 Units Subcutaneous QAM   insulin detemir 6 Units Subcutaneous Nightly   multivitamin 1 tablet Oral Daily   thiamine (VITAMIN B1) IVPB 100 mg Intravenous Daily      Diet Regular; Consistent Carbohydrate      Assessment/Plan      Active Hospital Problems (** Indicates Principal Problem)    Diagnosis Date Noted   • **Diabetic ketoacidosis without coma associated with type 1 diabetes mellitus [E10.10] 03/08/2018   • Tobacco abuse [Z72.0] 03/09/2018   • Alcohol abuse [F10.10] 03/09/2018   • Insurance coverage problems [Z59.8] 03/09/2018   • Bilateral carpal tunnel syndrome [G56.03] 03/09/2018   • Type 1 diabetes mellitus [E10.9] 03/08/2018      Resolved Hospital Problems    Diagnosis Date Noted Date Resolved   No resolved problems to display.     - DKA: A1c 8.0. Off gtt now. Levemir, Novolog, SSI. D5 IVF PRN. Endocrinology and Pulmonology following.  - Alcohol Abuse: MVI Folate Thiamine. Ativan PRN. A bit more jaundiced than when he presented. Will repeat CMP.  - Macrocytosis: Likely  from Etoh. TSH ok. Will check b12 and folate level.  - Severe Malnutrition: Phos replaced. Nutrition following.  - Poor Compliance: CCP following with insurance issues.  - Disposition: TBD    Appreciate help from all.    Rodney Diaz MD  [facility]  03/11/18  2:12 PM

## 2018-03-12 PROBLEM — R80.9 MICROALBUMINURIA: Status: ACTIVE | Noted: 2018-03-12

## 2018-03-12 LAB
ALBUMIN SERPL-MCNC: 3 G/DL (ref 3.5–5.2)
ALBUMIN UR-MCNC: 3.6 MG/L
ALBUMIN/GLOB SERPL: 1.3 G/DL
ALP SERPL-CCNC: 57 U/L (ref 39–117)
ALT SERPL W P-5'-P-CCNC: 16 U/L (ref 1–41)
ANION GAP SERPL CALCULATED.3IONS-SCNC: 10.9 MMOL/L
ANION GAP SERPL CALCULATED.3IONS-SCNC: 6.8 MMOL/L
AST SERPL-CCNC: 39 U/L (ref 1–40)
BASOPHILS # BLD AUTO: 0.02 10*3/MM3 (ref 0–0.2)
BASOPHILS NFR BLD AUTO: 0.4 % (ref 0–1.5)
BILIRUB SERPL-MCNC: 0.6 MG/DL (ref 0.1–1.2)
BUN BLD-MCNC: 5 MG/DL (ref 6–20)
BUN BLD-MCNC: 7 MG/DL (ref 6–20)
BUN/CREAT SERPL: 8.8 (ref 7–25)
BUN/CREAT SERPL: 9.3 (ref 7–25)
CALCIUM SPEC-SCNC: 8.5 MG/DL (ref 8.6–10.5)
CALCIUM SPEC-SCNC: 8.5 MG/DL (ref 8.6–10.5)
CHLORIDE SERPL-SCNC: 97 MMOL/L (ref 98–107)
CHLORIDE SERPL-SCNC: 98 MMOL/L (ref 98–107)
CO2 SERPL-SCNC: 28.1 MMOL/L (ref 22–29)
CO2 SERPL-SCNC: 32.2 MMOL/L (ref 22–29)
CREAT BLD-MCNC: 0.57 MG/DL (ref 0.76–1.27)
CREAT BLD-MCNC: 0.75 MG/DL (ref 0.76–1.27)
CREAT UR-MCNC: 55.3 MG/DL
DEPRECATED RDW RBC AUTO: 49.8 FL (ref 37–54)
EOSINOPHIL # BLD AUTO: 0.13 10*3/MM3 (ref 0–0.7)
EOSINOPHIL NFR BLD AUTO: 2.6 % (ref 0.3–6.2)
ERYTHROCYTE [DISTWIDTH] IN BLOOD BY AUTOMATED COUNT: 12.6 % (ref 11.5–14.5)
FOLATE SERPL-MCNC: 9.53 NG/ML (ref 4.78–24.2)
GFR SERPL CREATININE-BSD FRML MDRD: 107 ML/MIN/1.73
GFR SERPL CREATININE-BSD FRML MDRD: 146 ML/MIN/1.73
GLOBULIN UR ELPH-MCNC: 2.4 GM/DL
GLUCOSE BLD-MCNC: 142 MG/DL (ref 65–99)
GLUCOSE BLD-MCNC: 212 MG/DL (ref 65–99)
GLUCOSE BLDC GLUCOMTR-MCNC: 139 MG/DL (ref 70–130)
GLUCOSE BLDC GLUCOMTR-MCNC: 158 MG/DL (ref 70–130)
GLUCOSE BLDC GLUCOMTR-MCNC: 172 MG/DL (ref 70–130)
GLUCOSE BLDC GLUCOMTR-MCNC: 204 MG/DL (ref 70–130)
GLUCOSE BLDC GLUCOMTR-MCNC: 270 MG/DL (ref 70–130)
GLUCOSE BLDC GLUCOMTR-MCNC: 60 MG/DL (ref 70–130)
GLUCOSE BLDC GLUCOMTR-MCNC: 84 MG/DL (ref 70–130)
HCT VFR BLD AUTO: 33.1 % (ref 40.4–52.2)
HGB BLD-MCNC: 10.9 G/DL (ref 13.7–17.6)
IMM GRANULOCYTES # BLD: 0.02 10*3/MM3 (ref 0–0.03)
IMM GRANULOCYTES NFR BLD: 0.4 % (ref 0–0.5)
LYMPHOCYTES # BLD AUTO: 2.13 10*3/MM3 (ref 0.9–4.8)
LYMPHOCYTES NFR BLD AUTO: 42.9 % (ref 19.6–45.3)
MCH RBC QN AUTO: 36 PG (ref 27–32.7)
MCHC RBC AUTO-ENTMCNC: 32.9 G/DL (ref 32.6–36.4)
MCV RBC AUTO: 109.2 FL (ref 79.8–96.2)
MICROALBUMIN/CREAT UR: 65.1 MG/G
MONOCYTES # BLD AUTO: 0.86 10*3/MM3 (ref 0.2–1.2)
MONOCYTES NFR BLD AUTO: 17.3 % (ref 5–12)
NEUTROPHILS # BLD AUTO: 1.81 10*3/MM3 (ref 1.9–8.1)
NEUTROPHILS NFR BLD AUTO: 36.4 % (ref 42.7–76)
PHOSPHATE SERPL-MCNC: 2.7 MG/DL (ref 2.5–4.5)
PLATELET # BLD AUTO: 266 10*3/MM3 (ref 140–500)
PMV BLD AUTO: 10.4 FL (ref 6–12)
POTASSIUM BLD-SCNC: 3.8 MMOL/L (ref 3.5–5.2)
POTASSIUM BLD-SCNC: 4 MMOL/L (ref 3.5–5.2)
PROT SERPL-MCNC: 5.4 G/DL (ref 6–8.5)
RBC # BLD AUTO: 3.03 10*6/MM3 (ref 4.6–6)
SODIUM BLD-SCNC: 136 MMOL/L (ref 136–145)
SODIUM BLD-SCNC: 137 MMOL/L (ref 136–145)
VIT B12 BLD-MCNC: 834 PG/ML (ref 211–946)
WBC NRBC COR # BLD: 4.97 10*3/MM3 (ref 4.5–10.7)

## 2018-03-12 PROCEDURE — 82962 GLUCOSE BLOOD TEST: CPT

## 2018-03-12 PROCEDURE — 25010000002 ENOXAPARIN PER 10 MG: Performed by: INTERNAL MEDICINE

## 2018-03-12 PROCEDURE — 25010000002 THIAMINE PER 100 MG: Performed by: INTERNAL MEDICINE

## 2018-03-12 PROCEDURE — 80048 BASIC METABOLIC PNL TOTAL CA: CPT | Performed by: INTERNAL MEDICINE

## 2018-03-12 PROCEDURE — 82746 ASSAY OF FOLIC ACID SERUM: CPT | Performed by: INTERNAL MEDICINE

## 2018-03-12 PROCEDURE — 80053 COMPREHEN METABOLIC PANEL: CPT | Performed by: INTERNAL MEDICINE

## 2018-03-12 PROCEDURE — 97110 THERAPEUTIC EXERCISES: CPT

## 2018-03-12 PROCEDURE — 84100 ASSAY OF PHOSPHORUS: CPT | Performed by: INTERNAL MEDICINE

## 2018-03-12 PROCEDURE — 82570 ASSAY OF URINE CREATININE: CPT | Performed by: INTERNAL MEDICINE

## 2018-03-12 PROCEDURE — 63710000001 INSULIN ASPART PER 5 UNITS: Performed by: INTERNAL MEDICINE

## 2018-03-12 PROCEDURE — 85025 COMPLETE CBC W/AUTO DIFF WBC: CPT | Performed by: HOSPITALIST

## 2018-03-12 PROCEDURE — 97161 PT EVAL LOW COMPLEX 20 MIN: CPT

## 2018-03-12 PROCEDURE — 99232 SBSQ HOSP IP/OBS MODERATE 35: CPT | Performed by: INTERNAL MEDICINE

## 2018-03-12 PROCEDURE — 82043 UR ALBUMIN QUANTITATIVE: CPT | Performed by: INTERNAL MEDICINE

## 2018-03-12 PROCEDURE — 82607 VITAMIN B-12: CPT | Performed by: INTERNAL MEDICINE

## 2018-03-12 RX ORDER — THIAMINE MONONITRATE (VIT B1) 100 MG
100 TABLET ORAL DAILY
Status: COMPLETED | OUTPATIENT
Start: 2018-03-13 | End: 2018-03-13

## 2018-03-12 RX ADMIN — INSULIN ASPART 1 UNITS: 100 INJECTION, SOLUTION INTRAVENOUS; SUBCUTANEOUS at 09:08

## 2018-03-12 RX ADMIN — Medication 1 TABLET: at 09:08

## 2018-03-12 RX ADMIN — INSULIN ASPART 1 UNITS: 100 INJECTION, SOLUTION INTRAVENOUS; SUBCUTANEOUS at 17:33

## 2018-03-12 RX ADMIN — INSULIN ASPART 4 UNITS: 100 INJECTION, SOLUTION INTRAVENOUS; SUBCUTANEOUS at 17:33

## 2018-03-12 RX ADMIN — INSULIN ASPART 4 UNITS: 100 INJECTION, SOLUTION INTRAVENOUS; SUBCUTANEOUS at 09:08

## 2018-03-12 RX ADMIN — FOLIC ACID 1 MG: 1 TABLET ORAL at 09:08

## 2018-03-12 RX ADMIN — ENOXAPARIN SODIUM 40 MG: 40 INJECTION SUBCUTANEOUS at 22:03

## 2018-03-12 RX ADMIN — THIAMINE HYDROCHLORIDE 100 MG: 100 INJECTION, SOLUTION INTRAMUSCULAR; INTRAVENOUS at 09:08

## 2018-03-12 RX ADMIN — INSULIN ASPART 2 UNITS: 100 INJECTION, SOLUTION INTRAVENOUS; SUBCUTANEOUS at 22:03

## 2018-03-12 NOTE — PLAN OF CARE
Problem: Patient Care Overview (Adult)  Goal: Plan of Care Review  Outcome: Ongoing (interventions implemented as appropriate)   03/12/18 1346   Outcome Evaluation   Outcome Summary/Follow up Plan BS dropped again this early afternoon. Trended back up throughout late afternoon. OOB to chair. Mobilized well. Working with PT.        Problem: Fall Risk (Adult)  Goal: Identify Related Risk Factors and Signs and Symptoms  Outcome: Ongoing (interventions implemented as appropriate)    Goal: Absence of Fall  Outcome: Ongoing (interventions implemented as appropriate)      Problem: Diabetes, Type 1 (Adult)  Goal: Signs and Symptoms of Listed Potential Problems Will be Absent, Minimized or Managed (Diabetes, Type 1)  Outcome: Ongoing (interventions implemented as appropriate)      Problem: Skin Injury Risk (Adult)  Goal: Identify Related Risk Factors and Signs and Symptoms  Outcome: Ongoing (interventions implemented as appropriate)    Goal: Skin Health and Integrity  Outcome: Ongoing (interventions implemented as appropriate)

## 2018-03-12 NOTE — PROGRESS NOTES
[facility]     LOS: 4 days     Subjective:  He denies nausea or vomiting    No cp  No soa  Eating some      Objective:    Vital Signs:  Temp:  [98.8 °F (37.1 °C)-98.9 °F (37.2 °C)] 98.9 °F (37.2 °C)  Heart Rate:  [72-85] 73  Resp:  [16] 16  BP: (105-159)/() 133/80    SpO2:  [90 %-96 %] 90 %  on   ;   Device (Oxygen Therapy): room air  Body mass index is 21.29 kg/m².    Physical Exam   Constitutional: He appears well-developed and well-nourished. No distress.   HENT:   Head: Normocephalic and atraumatic.   Nose: Nose normal.   Mouth/Throat: Oropharynx is clear and moist.   Eyes: Conjunctivae and EOM are normal. No scleral icterus.   Neck: No JVD present. No tracheal deviation present. No thyromegaly present.   Cardiovascular: Normal rate, regular rhythm and normal heart sounds.  Exam reveals no gallop and no friction rub.    No murmur heard.  Pulmonary/Chest: Effort normal and breath sounds normal. No stridor. No respiratory distress. He has no wheezes. He has no rales.   Abdominal: Soft. Bowel sounds are normal. He exhibits no distension and no mass. There is no tenderness. There is no rebound and no guarding.   Musculoskeletal: He exhibits no edema, tenderness or deformity.   Lymphadenopathy:     He has no cervical adenopathy.   Neurological: He is alert. No cranial nerve deficit.   Skin: Skin is warm and dry. No rash noted. He is not diaphoretic.   Psychiatric: He has a normal mood and affect. His behavior is normal.       Results Review:    Glucose   Date Value Ref Range Status   03/12/2018 212 (H) 65 - 99 mg/dL Final   03/11/2018 62 (L) 65 - 99 mg/dL Final   03/11/2018 122 (H) 65 - 99 mg/dL Final   03/11/2018 125 (H) 65 - 99 mg/dL Final   03/10/2018 189 (H) 65 - 99 mg/dL Final   03/10/2018 160 (H) 65 - 99 mg/dL Final       Results from last 7 days  Lab Units 03/12/18  0603   WBC 10*3/mm3 4.97   HEMOGLOBIN g/dL 10.9*   HEMATOCRIT % 33.1*   PLATELETS 10*3/mm3 266       Results  from last 7 days  Lab Units 03/12/18  0603   SODIUM mmol/L 136   POTASSIUM mmol/L 4.0   CHLORIDE mmol/L 97*   CO2 mmol/L 28.1   BUN mg/dL 5*   CREATININE mg/dL 0.57*   CALCIUM mg/dL 8.5*   BILIRUBIN mg/dL 0.6   ALK PHOS U/L 57   ALT (SGPT) U/L 16   AST (SGOT) U/L 39   GLUCOSE mg/dL 212*           Results from last 7 days  Lab Units 03/10/18  0541   MAGNESIUM mg/dL 2.1       Results from last 7 days  Lab Units 03/10/18  0541 03/08/18 2013   TROPONIN T ng/mL 0.014 0.016     Cultures:  Blood Culture   Date Value Ref Range Status   03/10/2018 No growth at 24 hours  Preliminary   03/10/2018 No growth at 24 hours  Preliminary       I have reviewed daily medications and changes in CPOE    Scheduled meds    enoxaparin 40 mg Subcutaneous Nightly   folic acid 1 mg Oral Daily   insulin aspart 0-4 Units Subcutaneous 4x Daily AC & at Bedtime   insulin aspart 4 Units Subcutaneous TID With Meals   insulin detemir 12 Units Subcutaneous QAM   insulin detemir 6 Units Subcutaneous Nightly   multivitamin 1 tablet Oral Daily   thiamine (VITAMIN B1) IVPB 100 mg Intravenous Daily          PRN meds  •  acetaminophen  •  dextrose  •  dextrose  •  dextrose  •  glucagon (human recombinant)  •  LORazepam  •  LORazepam  •  magnesium sulfate **OR** magnesium sulfate **OR** magnesium sulfate  •  ondansetron **OR** ondansetron ODT **OR** ondansetron  •  potassium & sodium phosphates **OR** potassium & sodium phosphates  •  potassium phosphate infusion greater than 15 mMoles **OR** potassium phosphate infusion greater than 15 mMoles **OR** potassium phosphate **OR** sodium phosphate IVPB **OR** sodium phosphate IVPB **OR** sodium phosphate IVPB  •  promethazine  •  sodium chloride  •  sodium chloride      Principal Problem:    Diabetic ketoacidosis without coma associated with type 1 diabetes mellitus  Active Problems:    Type 1 diabetes mellitus    Tobacco abuse    Alcohol abuse    Insurance coverage problems    Bilateral carpal tunnel  syndrome        Assessment/Plan:  - DKA: A1c 8.0. Off gtt now. Levemir, Novolog, SSI. Endocrinology and Pulmonology following.     - Macrocytosis: Likely from Etoh. TSH 0.538; b12 834 and folate 9.5    - Alcohol Abuse: MVI Folate Thiamine. Ativan PRN.     - Severe Malnutrition: Phos replaced. Nutrition following.     - Poor Compliance: CCP following with insurance issues.     - Disposition: TBD      Westley Barker MD  03/12/18  7:37 AM

## 2018-03-12 NOTE — PROGRESS NOTES
Adult Nutrition  Assessment/PES    Patient Name:  Juan Payne  YOB: 1960  MRN: [id number]  Admit Date:  3/8/2018    Assessment Date:  3/12/2018    Comments:  Nutrition follow up. Eating better. % today. Encouraged po. Reviewed DM diet with pt.          Adult Nutrition Assessment     Row Name 03/12/18 1207       PO Evaluation    Number of Meals 2    % PO Intake  (today)    Row Name 03/12/18 1206       Physical Findings    Skin other (see comments)   skin tear       Nutrition Prescription PO    Common Modifiers Consistent Carbohydrate    Row Name 03/12/18 1205       Reason for Assessment    Reason For Assessment follow-up protocol    Diagnosis endocrine conditions       Nutrition/Diet History    Food Preferences appetite improving       Labs/Procedures/Meds    Lab Results Reviewed reviewed, pertinent    Lab Results Comments Glu high and low      Problem/Interventions:        Intervention Goal     Row Name 03/12/18 1207       Intervention Goal    General Maintain nutrition;Improved nutrition related lab(s);Disease management/therapy;Meet nutritional needs for age/condition    PO Tolerate PO;PO intake (%)    PO Intake % 80 %    Weight Maintain weight            Nutrition Intervention     Row Name 03/12/18 1207       Nutrition Intervention    RD/Tech Action Follow Tx progress;Care plan reviewd;Encourage intake;Interview for preference              Education/Evaluation     Row Name 03/12/18 1207       Education    Education Provided education regarding;Education topics    Education Topics Diabetes       Monitor/Evaluation    Monitor Per protocol;Skin status;PO intake;Pertinent labs;Weight    Education Follow-up Reinforce PRN        Electronically signed by:  Mar Mckeon RD  03/12/18 12:09 PM

## 2018-03-12 NOTE — THERAPY EVALUATION
Acute Care - Physical Therapy Initial Evaluation  [facility]     Patient Name: Juan Payne  : 1960  MRN: [id number]  Today's Date: 3/12/2018   Onset of Illness/Injury or Date of Surgery: 18  Date of Referral to PT: 18  Referring Physician: Dr Fernandez      Admit Date: 3/8/2018    Visit Dx:     ICD-10-CM ICD-9-CM   1. Diabetic ketoacidosis without coma associated with type 1 diabetes mellitus E10.10 250.11   2. Impaired functional mobility and activity tolerance Z74.09 V49.89     Patient Active Problem List   Diagnosis   • Diabetic ketoacidosis without coma associated with type 1 diabetes mellitus   • Type 1 diabetes mellitus   • Tobacco abuse   • Alcohol abuse   • Insurance coverage problems   • Bilateral carpal tunnel syndrome   • Microalbuminuria     Past Medical History:   Diagnosis Date   • Diabetes mellitus      Past Surgical History:   Procedure Laterality Date   • TESTICLE SURGERY          PT ASSESSMENT (last 72 hours)      Physical Therapy Evaluation     Row Name 18 1425          PT Evaluation Time/Intention    Subjective Information complains of;weakness  -PC     Document Type evaluation  -PC     Mode of Treatment physical therapy  -PC     Patient Effort good  -PC     Symptoms Noted During/After Treatment none  -PC     Row Name 18 1425          General Information    Patient Profile Reviewed? yes  -PC     Onset of Illness/Injury or Date of Surgery 18  -PC     Referring Physician Dr Fernandez  -PC     Patient Observations alert;cooperative  -PC     General Observations of Patient pt is in bed asleep but easily aroused  -PC     Prior Level of Function independent:  -PC     Pertinent History of Current Functional Problem DKA, weakness, ETOH abuse  -PC     Existing Precautions/Restrictions fall  -PC     Row Name 18 1425          Cognitive Assessment/Intervention- PT/OT    Orientation Status (Cognition) oriented x 4  -PC     Follows Commands (Cognition) WNL  -PC     Personal  Safety Interventions fall prevention program maintained;gait belt  -PC     Row Name 03/12/18 1425          Bed Mobility Assessment/Treatment    Supine-Sit [place] (Bed Mobility) supervision  -PC     Sit-Supine [place] (Bed Mobility) supervision  -PC     Row Name 03/12/18 1425          Transfer Assessment/Treatment    Sit-Stand [place] (Transfers) contact guard  -PC     Row Name 03/12/18 1425          Gait/Stairs Assessment/Training    [place] Level (Gait) contact guard  -PC     Distance in Feet (Gait) 150  -PC     Pattern (Gait) swing-through  -PC     Deviations/Abnormal Patterns (Gait) base of support, wide;kathi decreased  -PC     Comment (Gait/Stairs) mild unsteadiness with lateral sway, no gross loss of balance  -PC     Row Name 03/12/18 1425          General ROM    GENERAL ROM COMMENTS WFL x B decreased shoulder flexion, chronic issue  -[facility] Name 03/12/18 1425          General Assessment (Manual Muscle Testing)    Comment, General Manual Muscle Testing (MMT) Assessment no focal deficits >3+/5   -     Row Name 03/12/18 1425          Pain Assessment    Additional Documentation Pain Scale: Numbers Pre/Post-Treatment (Group)  -[facility] Name 03/12/18 1425          Pain Scale: Numbers Pre/Post-Treatment    Pain Scale: Numbers, Pretreatment 0/10 - no pain  -     Row Name 03/12/18 1425          Physical Therapy Clinical Impression    Date of Referral to PT 03/12/18  -PC     Patient/Family Goals Statement (PT Clinical Impression) return to OF  -     Criteria for Skilled Interventions Met (PT Clinical Impression) yes;treatment indicated  -PC     Impairments Found (describe specific impairments) gait, locomotion, and balance  -     Row Name 03/12/18 1425          Physical Therapy Goals    Bed Mobility Goal Selection (PT) --  -PC     Transfer Goal Selection (PT) transfer, PT goal 1  -PC     Gait Training Goal Selection (PT) gait training, PT goal 1  -     Row Name 03/12/18 1429           Transfer Goal 1 (PT)    Activity/Assistive Device (Transfer Goal 1, PT) sit-to-stand/stand-to-sit  -PC     [place] Level/Cues Needed (Transfer Goal 1, PT) supervision required  -PC     Time Frame (Transfer Goal 1, PT) 1 week  -PC     Row Name 03/12/18 1422          Gait Training Goal 1 (PT)    Activity/Assistive Device (Gait Training Goal 1, PT) gait (walking locomotion)  -PC     [place] Level (Gait Training Goal 1, PT) supervision required  -PC     Distance (Gait Goal 1, PT) 300 ft  -PC     Time Frame (Gait Training Goal 1, PT) 1 week  -PC     Row Name 03/12/18 1424          Positioning and Restraints    Pre-Treatment Position in bed  -PC     Post Treatment Position bed  -PC     In Bed supine;call light within reach;encouraged to call for assist  -PC       User Key  (r) = Recorded By, (t) = Taken By, (c) = Cosigned By    Initials Name Provider Type    PC Renetta Morris PT Physical Therapist          Physical Therapy Education     Title: PT OT SLP Therapies (Done)     Topic: Physical Therapy (Done)     Point: Mobility training (Done)    Learning Progress Summary     Learner Status Readiness Method Response Comment Documented by    Patient Done Acceptance TA BONNER DU,NR  PC 03/12/18 1435          Point: Home exercise program (Done)    Learning Progress Summary     Learner Status Readiness Method Response Comment Documented by    Patient Done Acceptance TA BONNER DU,NR  PC 03/12/18 1435          Point: Body mechanics (Done)    Learning Progress Summary     Learner Status Readiness Method Response Comment Documented by    Patient Done Acceptance TA BONNER DU,NR  PC 03/12/18 1435          Point: Precautions (Done)    Learning Progress Summary     Learner Status Readiness Method Response Comment Documented by    Patient Done Acceptance TA BONNER DU,NR   03/12/18 1435                      User Key     Initials Effective Dates Name Provider Type [facility] 12/01/15 -  Renetta Morris PT Physical Therapist PT                 PT Recommendation and Plan  Planned Therapy Interventions (PT Eval): gait training, balance training, transfer training  Therapy Frequency (PT Clinical Impression): daily  Plan of Care Reviewed With: patient  Outcome Summary: pt presents with impaired functional mobility and balance deficit, he will benefit from PT to address, pt moving fairly well with mild gait impaiment, rec 1-2 more PT visits to work on high level gait activities  Plan of Care Reviewed With: patient          Outcome Measures     Row Name 03/12/18 1400             How much help from another person do you currently need...    Turning from your back to your side while in flat bed without using bedrails? 4  -PC      Moving from lying on back to sitting on the side of a flat bed without bedrails? 4  -PC      Moving to and from a bed to a chair (including a wheelchair)? 3  -PC      Standing up from a chair using your arms (e.g., wheelchair, bedside chair)? 3  -PC      Climbing 3-5 steps with a railing? 3  -PC      To walk in hospital room? 3  -PC      AM-PAC 6 Clicks Score 20  -PC         Functional Assessment    Outcome Measure Options AM-PAC 6 Clicks Basic Mobility (PT)  -PC        User Key  (r) = Recorded By, (t) = Taken By, (c) = Cosigned By    Initials Name Provider Type    PC Renetta Morris PT Physical Therapist           Time Calculation:         PT Charges     Row Name 03/12/18 1438             Time Calculation    Start Time 1350  -PC      Stop Time 1410  -PC      Time Calculation (min) 20 min  -PC      PT Received On 03/12/18  -PC      PT - Next Appointment 03/13/18  -PC      PT Goal Re-Cert Due Date 03/19/18  -PC        User Key  (r) = Recorded By, (t) = Taken By, (c) = Cosigned By    Initials Name Provider Type    PC Renetta Morris PT Physical Therapist          Therapy Charges for Today     Code Description Service Date Service Provider Modifiers Qty    37393835238 HC PT EVAL LOW COMPLEXITY 1 3/12/2018 Renetta Morris PT  GP 1    66951608931 HC PT THER PROC EA 15 MIN 3/12/2018 Renetta Morris, PT GP 1          PT G-Codes  Outcome Measure Options: AM-PAC 6 Clicks Basic Mobility (PT)      Renetta Morris, PT  3/12/2018

## 2018-03-12 NOTE — PROGRESS NOTES
Continued Stay Note  [facility]     Patient Name: Juan aPyne  MRN: [id number]  Today's Date: 3/12/2018    Admit Date: 3/8/2018          Discharge Plan     Row Name 03/12/18 1999       Plan    Plan Home with fiance    Patient/Family in Agreement with Plan yes    Plan Comments Spoke with pt and his fiance Aurelia at bedside.  Pt's prescriptions need to be switched to [facility] so that his insurance will cover them.  Call placed to [facility] at [phone] with pt's permission and gave them his [facility] [employer] card information.  Also, as directed by Mr. Payne,  requested that they call his current [facility] pharmacy at [phone] to get his prescriptions transferred.  Spoke with Caren at [facility].  CCP will follow up to determine that prescriptions have been transferred.                Discharge Codes    No documentation.           Melissa Garcia RN

## 2018-03-12 NOTE — PROGRESS NOTES
Mack Huertas MD                          [phone]      Patient ID:    Name:  Juan Payne    MRN:  [id number]    1960   57 y.o.  male            Patient Care Team:  No Known Provider as PCP - General    LOS: 4    CC/Reason for visit:     Subjective: Pt seen and examined this AM. No acute overnight events noted. Doing better.       ROS:   Denies any fevers/ chills/ CP/ palpitations/ Nausea/ vomiting/ Diarrhoea  No Worsening cough or SOA.     Objective     Vital Signs past 24hrs    BP range: BP: (109-141)/(63-93) 121/79  Pulse range: Heart Rate:  [71-84] 73  Resp rate range: Resp:  [17-19] 18  Temp range: Temp (24hrs), Av.9 °F (37.2 °C), Min:98.9 °F (37.2 °C), Max:98.9 °F (37.2 °C)      Ventilator/Non-Invasive Ventilation Settings     None          Device (Oxygen Therapy): room air       63.5 kg (140 lb); Body mass index is 21.29 kg/m².      Intake/Output Summary (Last 24 hours) at 18 1736  Last data filed at 18 1600   Gross per 24 hour   Intake              960 ml   Output             1550 ml   Net             -590 ml       Exam:    GEN:  No respiratory distress, appears stated age  EYES:   EOM-i, anicteric sclera bilat  ENT:    External ears/nose normal, OP clear  NECK:  Supple, midline trachea, No JVD or cervical LApathy  LUNGS: Bilateral breath sounds heard, No adventitious sounds  CV:  Regular rate and rhythm, + PSM  ABD:  Non tender, no distended, no palpable liver or masses  EXT:  No cyanosis or clubbing, no peripheral/sacral edema    Scheduled meds:      enoxaparin 40 mg Subcutaneous Nightly   folic acid 1 mg Oral Daily   insulin aspart 0-4 Units Subcutaneous 4x Daily AC & at Bedtime   insulin aspart 4 Units Subcutaneous TID With Meals   insulin detemir 12 Units Subcutaneous QAM   insulin detemir 4 Units Subcutaneous Nightly   multivitamin 1 tablet Oral Daily   [START ON 3/13/2018] thiamine 100 mg Oral Daily       IV meds:                            Data Review:        Results from last 7 days  Lab Units 03/12/18  1510 03/12/18  0603 03/11/18  1725  03/11/18  0447  03/10/18  0541 03/10/18  0230  03/08/18  1646   SODIUM mmol/L 137 136 139  < > 138  < > 137 133*  < > 136   POTASSIUM mmol/L 3.8 4.0 3.6  < > 3.5  < > 3.3* 4.3  < > 3.3*   CHLORIDE mmol/L 98 97* 100  < > 100  < > 93* 85*  < > 82*   CO2 mmol/L 32.2* 28.1 30.7*  < > 28.5  < > 20.2* 8.1*  < > 12.6*   BUN mg/dL 7 5* 7  < > 9  < > 16 17  < > 19   CREATININE mg/dL 0.75* 0.57* 0.64*  < > 0.67*  < > 1.18 1.26  < > 1.39*   CALCIUM mg/dL 8.5* 8.5* 8.7  < > 8.3*  < > 8.9 8.8  < > 9.7   BILIRUBIN mg/dL  --  0.6  --   --   --   --  0.9  --   --  1.7*   ALK PHOS U/L  --  57  --   --   --   --  62  --   --  84   ALT (SGPT) U/L  --  16  --   --   --   --  14  --   --  18   AST (SGOT) U/L  --  39  --   --   --   --  14  --   --  15   GLUCOSE mg/dL 142* 212* 62*  < > 125*  < > 309* 662*  < > 328*   WBC 10*3/mm3  --  4.97  --   --  6.13  --   --  6.88  < > 10.85*   HEMOGLOBIN g/dL  --  10.9*  --   --  10.6*  --   --  11.2*  < > 13.5*   PLATELETS 10*3/mm3  --  266  --   --  251  --   --  259  < > 304   PROCALCITONIN ng/mL  --   --   --   --   --   --  0.63*  --   --   --    < > = values in this interval not displayed.    Lab Results   Component Value Date    CALCIUM 8.5 (L) 03/12/2018    PHOS 2.7 03/12/2018         Results from last 7 days  Lab Units 03/10/18  0707   BLOODCX  No growth at 2 days  No growth at 2 days         Results from last 7 days  Lab Units 03/10/18  0541 03/08/18 2013   TROPONIN T ng/mL 0.014 0.016       Results Review:    I have reviewed the available laboratory results and reviewed the chest imaging personally    Imaging Results (all)     Procedure Component Value Units Date/Time    XR Chest 1 View [[id number]] Collected:  03/08/18 1938     Updated:  03/08/18 1942    Narrative:       EMERGENCY PORTABLE CHEST SINGLE VIEW 19:45     HISTORY: 57-year-old male with shortness of breath, tobacco  abuse and  diabetes     COMPARISON: None available     FINDINGS:  1. Negative acute portable chest, no evidence of an active intrathoracic  process nor other significant abnormality.     This report was finalized on 3/8/2018 7:39 PM by Dr. Zeb Davis MD.             ASSESSMENT:   Diabetic ketoacidosis  Generalized weakness  Current heavy alcohol drinker  Current heavy smoker  Known diabetes  Cardiac murmur, AS  Hypertriglyceridemia  Anemia, likely chronic disease    PLAN:  Blood sugar better controlled now.  No more concerns for alcohol withdrawal.  Outpatient follow-up for cardiac murmur  Thiamine and folate for alcohol abuse  Ok to tx to floor.    I have discussed my findings and recommendations with patient, nursing staff and primary care team.     Mack Huertas MD  3/12/2018

## 2018-03-12 NOTE — PROGRESS NOTES
"  ENDOCRINE    Subjective   AND PLANS  Juan Payne is a 57 y.o. male.     Follow-up diabetes.    Events over weekend noted.  No nausea or vomiting.  Tolerating regular diet.  Patient states that he has been taking Lantus 18-2 units every evening and Humalog 18-21 units with each meal.  He ran out of Lantus 11 days before this admission.  Diabetes educator consult pending    Fasting glucose 158.  Random glucose .  Patient received Levemir 12 units yesterday morning but did not get the 6 units of Levemir last night.  Decrease Levemir to 12 units every morning and 4 units at bedtime.  Continue NovoLog 4 units with each meal plus as needed.    Denies hallucinations but feels weak.  Patient states that his last alcohol intake was on the day before admission.  Continue thiamine.  Will ask physical therapy to see patient.    Urine microalbumin elevated.  Recheck urine microalbumin and consider starting on an ACE inhibitor.    LDL 91.  HDL 60.  Thyroid function tests normal.  Consideration using Lipitor in the near future.    Objective   /80   Pulse 73   Temp 98.9 °F (37.2 °C) (Oral)   Resp 16   Ht 172.7 cm (67.99\")   Wt 63.5 kg (140 lb)   SpO2 90%   BMI 21.29 kg/m²   Physical Exam    Awake, alert, not in distress.  No rales or wheezes.  Regular heart rate and rhythm.  Abdomen soft, nontender.  No pedal edema.  No cyanosis.      Lab Results (last 24 hours)     Procedure Component Value Units Date/Time    Blood Culture - Blood, Blood, Venous Line [[id number]]  (Normal) Collected:  03/10/18 0707    Specimen:  Blood from Blood, Venous Line Updated:  03/12/18 0831     Blood Culture No growth at 2 days    Blood Culture - Blood, Blood, Venous Line [[id number]]  (Normal) Collected:  03/10/18 0707    Specimen:  Blood from Blood, Venous Line Updated:  03/12/18 0831     Blood Culture No growth at 2 days    POC Glucose Once [[id number]]  (Abnormal) Collected:  03/12/18 0805    Specimen:  Blood Updated:  03/12/18 0807     " Glucose 158 (H) mg/dL     Narrative:       Meter: [id number] : [id number] Chasity Bella    Vitamin B12 [[id number]]  (Normal) Collected:  03/12/18 0603    Specimen:  Blood Updated:  03/12/18 0657     Vitamin B-12 834 pg/mL     Folate [[id number]]  (Normal) Collected:  03/12/18 0603    Specimen:  Blood Updated:  03/12/18 0657     Folate 9.53 ng/mL     Comprehensive Metabolic Panel [[id number]]  (Abnormal) Collected:  03/12/18 0603    Specimen:  Blood Updated:  03/12/18 0639     Glucose 212 (H) mg/dL      BUN 5 (L) mg/dL      Creatinine 0.57 (L) mg/dL      Sodium 136 mmol/L      Potassium 4.0 mmol/L      Chloride 97 (L) mmol/L      CO2 28.1 mmol/L      Calcium 8.5 (L) mg/dL      Total Protein 5.4 (L) g/dL      Albumin 3.00 (L) g/dL      ALT (SGPT) 16 U/L      AST (SGOT) 39 U/L      Alkaline Phosphatase 57 U/L      Total Bilirubin 0.6 mg/dL      eGFR Non African Amer 146 mL/min/1.73      Globulin 2.4 gm/dL      A/G Ratio 1.3 g/dL      BUN/Creatinine Ratio 8.8     Anion Gap 10.9 mmol/L     Phosphorus [[id number]]  (Normal) Collected:  03/12/18 0603    Specimen:  Blood Updated:  03/12/18 0635     Phosphorus 2.7 mg/dL     CBC & Differential [[id number]] Collected:  03/12/18 0603    Specimen:  Blood Updated:  03/12/18 0626    Narrative:       The following orders were created for panel order CBC & Differential.  Procedure                               Abnormality         Status                     ---------                               -----------         ------                     Scan Slide[[id number]]                                                                  CBC Auto Differential[[id number]]        Abnormal            Final result                 Please view results for these tests on the individual orders.    CBC Auto Differential [[id number]]  (Abnormal) Collected:  03/12/18 0603    Specimen:  Blood Updated:  03/12/18 0626     WBC 4.97 10*3/mm3      RBC 3.03 (L) 10*6/mm3      Hemoglobin 10.9 (L) g/dL       Hematocrit 33.1 (L) %      .2 (H) fL      MCH 36.0 (H) pg      MCHC 32.9 g/dL      RDW 12.6 %      RDW-SD 49.8 fl      MPV 10.4 fL      Platelets 266 10*3/mm3      Neutrophil % 36.4 (L) %      Lymphocyte % 42.9 %      Monocyte % 17.3 (H) %      Eosinophil % 2.6 %      Basophil % 0.4 %      Immature Grans % 0.4 %      Neutrophils, Absolute 1.81 (L) 10*3/mm3      Lymphocytes, Absolute 2.13 10*3/mm3      Monocytes, Absolute 0.86 10*3/mm3      Eosinophils, Absolute 0.13 10*3/mm3      Basophils, Absolute 0.02 10*3/mm3      Immature Grans, Absolute 0.02 10*3/mm3     POC Glucose Once [[id number]]  (Abnormal) Collected:  03/12/18 0529    Specimen:  Blood Updated:  03/12/18 0546     Glucose 204 (H) mg/dL     Narrative:       Meter: [id number] : [id number] Josefina Urena    POC Glucose Once [[id number]]  (Abnormal) Collected:  03/11/18 2210    Specimen:  Blood Updated:  03/11/18 2220     Glucose 156 (H) mg/dL     Narrative:       Meter: [id number] : [id number] Josefina Urena    Basic Metabolic Panel [[id number]]  (Abnormal) Collected:  03/11/18 1725    Specimen:  Blood Updated:  03/11/18 1801     Glucose 62 (L) mg/dL      BUN 7 mg/dL      Creatinine 0.64 (L) mg/dL      Sodium 139 mmol/L      Potassium 3.6 mmol/L      Chloride 100 mmol/L      CO2 30.7 (H) mmol/L      Calcium 8.7 mg/dL      eGFR Non African Amer 128 mL/min/1.73      BUN/Creatinine Ratio 10.9     Anion Gap 8.3 mmol/L     Narrative:       GFR Normal >60  Chronic Kidney Disease <60  Kidney Failure <15    POC Glucose Once [[id number]]  (Abnormal) Collected:  03/11/18 1728    Specimen:  Blood Updated:  03/11/18 1748     Glucose 58 (L) mg/dL     Narrative:       Meter: [id number] : [id number] VICKEY HIGUERA RN    POC Glucose Once [[id number]]  (Abnormal) Collected:  03/11/18 1706    Specimen:  Blood Updated:  03/11/18 1715     Glucose 48 (C) mg/dL     Narrative:       Result Not Confirmed Meter: [id number] : [id number] VICKEY HIGUERA RN    POC Glucose  Once [[id number]]  (Abnormal) Collected:  03/11/18 1504    Specimen:  Blood Updated:  03/11/18 1506     Glucose 60 (L) mg/dL     Narrative:       Meter: [id number] : [id number] Baudilio Pegueroa FAUZIA    Basic Metabolic Panel [[id number]]  (Abnormal) Collected:  03/11/18 1247    Specimen:  Blood Updated:  03/11/18 1323     Glucose 122 (H) mg/dL      BUN 8 mg/dL      Creatinine 0.67 (L) mg/dL      Sodium 138 mmol/L      Potassium 3.5 mmol/L      Chloride 98 mmol/L      CO2 26.7 mmol/L      Calcium 9.0 mg/dL      eGFR Non African Amer 121 mL/min/1.73      BUN/Creatinine Ratio 11.9     Anion Gap 13.3 mmol/L     Narrative:       GFR Normal >60  Chronic Kidney Disease <60  Kidney Failure <15    POC Glucose Once [[id number]]  (Normal) Collected:  03/11/18 1221    Specimen:  Blood Updated:  03/11/18 1231     Glucose 120 mg/dL     Narrative:       Meter: [id number] : [id number] VICKEY HIGUERA RN    POC Glucose Once [[id number]]  (Abnormal) Collected:  03/11/18 1149    Specimen:  Blood Updated:  03/11/18 1208     Glucose 66 (L) mg/dL     Narrative:       Meter: [id number] : [id number] VICKEY HIGUERA RN    POC Glucose Once [[id number]]  (Abnormal) Collected:  03/11/18 1113    Specimen:  Blood Updated:  03/11/18 1123     Glucose 34 (C) mg/dL     Narrative:       Result Not Confirmed Meter: [id number] : [id number] VICKEY HIGUERA RN            Principal Problem:    Diabetic ketoacidosis without coma associated with type 1 diabetes mellitus  Active Problems:    Type 1 diabetes mellitus    Tobacco abuse    Alcohol abuse    Insurance coverage problems    Bilateral carpal tunnel syndrome    Decrease Levemir as discussed above.  Continue mealtime NovoLog plus as needed.  Recheck urine microalbumin.  Will ask diabetes educator physical therapist to see patient.  Continue thiamine.

## 2018-03-12 NOTE — PAYOR COMM NOTE
"Tiffanie Gutierrez (57 y.o. Male)     ATTN: NADINE JEFFREY  REF#[id number]  PLEASE CALL BACK TO PONCHO [phone] [place] [phone]  THANKS!   PONCHO      Date of Birth Social Security Number Address Home Phone MRN    [id number]  [id number] [facility] [address]  [id number]    Jainism Marital Status          None Single       Admission Date Admission Type Admitting Provider Attending Provider Department, Room/Bed    3/8/18 Emergency Mahamed Tolbert MD Edling, Westley ALVAREZ MD [facility] INTENSIVE CARE, 374/1    Discharge Date Discharge Disposition Discharge Destination                       Attending Provider:  Westley Barker MD    Allergies:  No Known Allergies    Isolation:  None   Infection:  None   Code Status:  FULL    Ht:  172.7 cm (67.99\")   Wt:  63.5 kg (140 lb)    Admission Cmt:  None   Principal Problem:  Diabetic ketoacidosis without coma associated with type 1 diabetes mellitus [E10.10]                 Active Insurance as of 3/8/2018     Primary Coverage     Payor Plan Insurance Group Employer/Plan Group    ANTHEM BLUE CROSS [facility] PPO [id number]     Payor Plan Address Payor Plan Phone Number Effective From Effective To    [address] [phone] 1/1/2018     [place] [address]       Subscriber Name Subscriber Birth Date Member ID       TIFFANIE GUTIERREZ 1960 [id number]                 Emergency Contacts      (Rel.) Home Phone Work Phone Mobile Phone    Aurelia Briones (Significant Other) -- -- [phone]    Leonor Galindo (Daughter) -- -- [phone]            [facility] Medications (all)       Dose Frequency Start End    acetaminophen (TYLENOL) tablet 650 mg 650 mg Every 4 Hours PRN 3/8/2018     Sig - Route: Take 2 tablets by mouth Every 4 (Four) Hours As Needed for Mild Pain . - Oral    dextrose (D50W) solution 25 g 25 g Every 15 Minutes PRN 3/10/2018     Sig - Route: Infuse 50 mL into a venous catheter Every 15 (Fifteen) Minutes As " Needed for Low Blood Sugar (Blood Sugar Less Than 70). - Intravenous    dextrose (D5W) 5 % infusion 30 mL 30 mL As Needed 3/11/2018     Sig - Route: Infuse 30 mL into a venous catheter As Needed (low BG). - Intravenous    dextrose (GLUTOSE) oral gel 15 g 15 g Every 15 Minutes PRN 3/10/2018     Sig - Route: Take 15 g by mouth Every 15 (Fifteen) Minutes As Needed for Low Blood Sugar (Blood sugar less than 70). - Oral    enoxaparin (LOVENOX) syringe 40 mg 40 mg Nightly 3/9/2018     Sig - Route: Inject 0.4 mL under the skin Every Night. - Subcutaneous    folic acid (FOLVITE) tablet 1 mg 1 mg Daily 3/9/2018 3/13/2018    Sig - Route: Take 1 tablet by mouth Daily. - Oral    glucagon (human recombinant) (GLUCAGEN DIAGNOSTIC) injection 1 mg 1 mg As Needed 3/10/2018     Sig - Route: Inject 1 mg under the skin As Needed (Blood Glucose Less Than 70). - Subcutaneous    insulin aspart (novoLOG) injection 0-4 Units 0-4 Units 4 Times Daily Before Meals & Nightly 3/11/2018     Sig - Route: Inject 0-4 Units under the skin 4 (Four) Times a Day Before Meals & at Bedtime. - Subcutaneous    insulin aspart (novoLOG) injection 4 Units 4 Units 3 Times Daily With Meals 3/11/2018     Sig - Route: Inject 4 Units under the skin 3 (Three) Times a Day With Meals. - Subcutaneous    insulin detemir (LEVEMIR) injection 10 Units 10 Units Once 3/9/2018 3/9/2018    Sig - Route: Inject 10 Units under the skin 1 (One) Time. - Subcutaneous    insulin detemir (LEVEMIR) injection 12 Units 12 Units Every Morning 3/12/2018     Sig - Route: Inject 12 Units under the skin Every Morning. - Subcutaneous    insulin detemir (LEVEMIR) injection 20 Units 20 Units Once 3/10/2018 3/10/2018    Sig - Route: Inject 20 Units under the skin 1 (One) Time. - Subcutaneous    insulin detemir (LEVEMIR) injection 4 Units 4 Units Nightly 3/12/2018     Sig - Route: Inject 4 Units under the skin Every Night. - Subcutaneous    insulin regular (humuLIN R,novoLIN R) injection 5 Units 5  "Units Once 3/8/2018 3/8/2018    Sig - Route: Infuse 5 Units into a venous catheter 1 (One) Time. - Intravenous    Cosign for Ordering: Accepted by Jose Vásquez MD on 3/9/2018 12:17 AM    LORazepam (ATIVAN) injection 1 mg 1 mg Every 6 Hours PRN 3/9/2018 3/19/2018    Sig - Route: Infuse 0.5 mL into a venous catheter Every 6 (Six) Hours As Needed for Anxiety or Withdrawal. - Intravenous    LORazepam (ATIVAN) tablet 1 mg 1 mg Every 6 Hours PRN 3/9/2018 3/19/2018    Sig - Route: Take 1 tablet by mouth Every 6 (Six) Hours As Needed for Anxiety or Withdrawal. - Oral    Magnesium Sulfate 2 gram Bolus, followed by 8 gram infusion (total Mg dose 10 grams)- Mg less than or equal to 1mg/dL 2 g As Needed 3/10/2018     Sig - Route: Infuse 50 mL into a venous catheter As Needed (Mg less than or equal to 1mg/dL). - Intravenous    Linked Group 1:  \"Or\" Linked Group Details        magnesium sulfate 4 gram infusion- Mg 1.6-1.9 mg/dL 4 g As Needed 3/10/2018     Sig - Route: Infuse 100 mL into a venous catheter As Needed (Mg 1.6-1.9 mg/dL). - Intravenous    Linked Group 1:  \"Or\" Linked Group Details        Magnesium Sulfate 6 gram Infusion (2 gm x 3) -Mg 1.1 -1.5 mg/dL 2 g As Needed 3/10/2018     Sig - Route: Infuse 50 mL into a venous catheter As Needed (Mg 1.1 -1.5 mg/dL). - Intravenous    Linked Group 1:  \"Or\" Linked Group Details        multivitamin (THERAGRAN) tablet 1 tablet 1 tablet Daily 3/9/2018     Sig - Route: Take 1 tablet by mouth Daily. - Oral    ondansetron (ZOFRAN) injection 4 mg 4 mg Every 6 Hours PRN 3/8/2018     Sig - Route: Infuse 2 mL into a venous catheter Every 6 (Six) Hours As Needed for Nausea or Vomiting. - Intravenous    Linked Group 2:  \"Or\" Linked Group Details        ondansetron (ZOFRAN) tablet 4 mg 4 mg Every 6 Hours PRN 3/8/2018     Sig - Route: Take 1 tablet by mouth Every 6 (Six) Hours As Needed for Nausea or Vomiting. - Oral    Linked Group 2:  \"Or\" Linked Group Details        ondansetron ODT " "(ZOFRAN-ODT) disintegrating tablet 4 mg 4 mg Every 6 Hours PRN 3/8/2018     Sig - Route: Take 1 tablet by mouth Every 6 (Six) Hours As Needed for Nausea or Vomiting. - Oral    Linked Group 2:  \"Or\" Linked Group Details        potassium & sodium phosphates (PHOS-NAK) 280-160-250 MG packet - for Phosphorus 1.25 - 2.1 mg/dL 2 packet Once As Needed 3/9/2018     Sig - Route: Take 2 packets by mouth 1 (One) Time As Needed (Phosphorus 1.25 - 2.1 mg/dL). - Oral    Linked Group 3:  \"Or\" Linked Group Details        potassium & sodium phosphates (PHOS-NAK) 280-160-250 MG packet - for Phosphorus less than 1.25 mg/dL 2 packet Every 6 Hours PRN 3/9/2018     Sig - Route: Take 2 packets by mouth Every 6 (Six) Hours As Needed (Phosphorus less than 1.25 mg/dL). - Oral    Linked Group 3:  \"Or\" Linked Group Details        potassium phosphate 15 mmol in sodium chloride 0.9 % 100 mL infusion 15 mmol As Needed 3/10/2018     Sig - Route: Infuse 15 mmol into a venous catheter As Needed (Peripheral IV - Phosphorus 1.8 - 2.5). - Intravenous    Linked Group 4:  \"Or\" Linked Group Details        potassium phosphate 30 mmol in sodium chloride 0.9 % 250 mL infusion 30 mmol As Needed 3/10/2018     Sig - Route: Infuse 30 mmol into a venous catheter As Needed (Peripheral IV - Phosphorus 1.3 - 1.7). - Intravenous    Linked Group 4:  \"Or\" Linked Group Details        potassium phosphate 45 mmol in sodium chloride 0.9 % 500 mL infusion 45 mmol As Needed 3/10/2018     Sig - Route: Infuse 45 mmol into a venous catheter As Needed (Peripheral IV - Phosphorus Less Than 1.3). - Intravenous    Linked Group 4:  \"Or\" Linked Group Details        promethazine (PHENERGAN) injection 12.5 mg 12.5 mg Every 6 Hours PRN 3/10/2018     Sig - Route: Infuse 0.5 mL into a venous catheter Every 6 (Six) Hours As Needed for Nausea or Vomiting. - Intravenous    sodium chloride 0.9 % bolus 1,000 mL 1,000 mL Once 3/8/2018 3/8/2018    Sig - Route: Infuse 1,000 mL into a venous " "catheter 1 (One) Time. - Intravenous    Cosign for Ordering: Accepted by Jose Vásquez MD on 3/9/2018 12:17 AM    sodium chloride 0.9 % flush 1-10 mL 1-10 mL As Needed 3/8/2018     Sig - Route: Infuse 1-10 mL into a venous catheter As Needed for Line Care. - Intravenous    sodium chloride 0.9 % flush 10 mL 10 mL As Needed 3/8/2018     Sig - Route: Infuse 10 mL into a venous catheter As Needed for Line Care. - Intravenous    Cosign for Ordering: Accepted by Jose Vásquez MD on 3/9/2018 12:17 AM    sodium phosphates 15 mmol in sodium chloride 0.9 % 250 mL IVPB 15 mmol As Needed 3/10/2018     Sig - Route: Infuse 15 mmol into a venous catheter As Needed (Peripheral IV - Phosphorus 1.8 - 2.5 & Potassium Greater Than 4). - Intravenous    Linked Group 4:  \"Or\" Linked Group Details        sodium phosphates 30 mmol in sodium chloride 0.9 % 250 mL IVPB 30 mmol As Needed 3/10/2018     Sig - Route: Infuse 30 mmol into a venous catheter As Needed (Peripheral IV - Phosphorus 1.3-1.7 & Potassium Greater Than 4). - Intravenous    Linked Group 4:  \"Or\" Linked Group Details        sodium phosphates 45 mmol in sodium chloride 0.9 % 500 mL IVPB 45 mmol As Needed 3/10/2018     Sig - Route: Infuse 45 mmol into a venous catheter As Needed (Peripheral IV - Phosphorus Less Than 1.3 & Potassium Greater Than 4). - Intravenous    Linked Group 4:  \"Or\" Linked Group Details        thiamine (VITAMIN B-1) tablet 100 mg 100 mg Daily 3/13/2018 3/14/2018    Sig - Route: Take 1 tablet by mouth Daily. - Oral    dextrose (D50W) solution 12.5 g (Discontinued) 12.5 g Every 15 Minutes PRN 3/8/2018 3/11/2018    Sig - Route: Infuse 25 mL into a venous catheter Every 15 (Fifteen) Minutes As Needed for Low Blood Sugar (for blood glucose < 100 mg/dL). - Intravenous    Cosign for Ordering: Accepted by Jose Vásquez MD on 3/9/2018 12:17 AM    dextrose (D5W) 5 % infusion 30 mL (Discontinued) 30 mL Continuous 3/11/2018 3/11/2018    Sig - Route: Infuse 30 mL into " a venous catheter Continuous. - Intravenous    dextrose 5 % and sodium chloride 0.45 % infusion (Discontinued) 150 mL/hr Continuous PRN 3/8/2018 3/11/2018    Sig - Route: Infuse 150 mL/hr into a venous catheter Continuous As Needed (Once Glucose Less Than or Equal to 200 mg/dL and Serum Potassium Greater Than 5). - Intravenous    Cosign for Ordering: Accepted by Jose Vásquez MD on 3/9/2018 12:17 AM    dextrose 5 % and sodium chloride 0.45 % with KCl 20 mEq/L infusion (Discontinued) 150 mL/hr Continuous PRN 3/8/2018 3/11/2018    Sig - Route: Infuse 150 mL/hr into a venous catheter Continuous As Needed (Once Glucose Less Than or Equal to 200 mg/dL and Serum Potassium Less Than or Equal to 5). - Intravenous    Cosign for Ordering: Accepted by Jose Vásquez MD on 3/9/2018 12:17 AM    insulin aspart (novoLOG) injection 0-5 Units (Discontinued) 0-5 Units 4 Times Daily Before Meals & Nightly 3/10/2018 3/11/2018    Sig - Route: Inject 0-5 Units under the skin 4 (Four) Times a Day Before Meals & at Bedtime. - Subcutaneous    insulin aspart (novoLOG) injection 0-9 Units (Discontinued) 0-9 Units 4 Times Daily With Meals & Nightly 3/10/2018 3/10/2018    Sig - Route: Inject 0-9 Units under the skin 4 (Four) Times a Day With Meals & at Bedtime. - Subcutaneous    insulin aspart (novoLOG) injection 10 Units (Discontinued) 10 Units Once 3/10/2018 3/10/2018    Sig - Route: Inject 10 Units under the skin 1 (One) Time. - Subcutaneous    insulin aspart (novoLOG) injection 4 Units (Discontinued) 4 Units 3 Times Daily With Meals 3/9/2018 3/10/2018    Sig - Route: Inject 4 Units under the skin 3 (Three) Times a Day With Meals. - Subcutaneous    insulin aspart (novoLOG) injection 5 Units (Discontinued) 5 Units 3 Times Daily With Meals 3/10/2018 3/11/2018    Sig - Route: Inject 5 Units under the skin 3 (Three) Times a Day With Meals. - Subcutaneous    insulin detemir (LEVEMIR) injection 10 Units (Discontinued) 10 Units Nightly 3/10/2018  "3/11/2018    Sig - Route: Inject 10 Units under the skin Every Night. - Subcutaneous    insulin detemir (LEVEMIR) injection 12 Units (Discontinued) 12 Units Every Morning 3/9/2018 3/10/2018    Sig - Route: Inject 12 Units under the skin Every Morning. - Subcutaneous    insulin detemir (LEVEMIR) injection 15 Units (Discontinued) 15 Units Every Morning 3/10/2018 3/11/2018    Sig - Route: Inject 15 Units under the skin Every Morning. - Subcutaneous    insulin detemir (LEVEMIR) injection 6 Units (Discontinued) 6 Units Nightly 3/11/2018 3/12/2018    Sig - Route: Inject 6 Units under the skin Every Night. - Subcutaneous    insulin detemir (LEVEMIR) injection 8 Units (Discontinued) 8 Units Nightly 3/11/2018 3/11/2018    Sig - Route: Inject 8 Units under the skin Every Night. - Subcutaneous    insulin regular (humuLIN R,novoLIN R) 100 Units in sodium chloride 0.9 % 100 mL (1 Units/mL) infusion (Discontinued) 0.1 Units/kg/hr × 63.5 kg Titrated 3/8/2018 3/11/2018    Sig - Route: Infuse 6.4 Units/hr into a venous catheter Dose Adjusted By Provider As Needed. - Intravenous    Cosign for Ordering: Accepted by Jose Vásquez MD on 3/9/2018 12:17 AM    insulin regular (humuLIN R,novoLIN R) injection 5 Units (Discontinued) 5 Units Once As Needed 3/8/2018 3/11/2018    Sig - Route: Infuse 5 Units into a venous catheter 1 (One) Time As Needed for High Blood Sugar (if the glucose does not decrease by 10% in the first hour after start of infusion). - Intravenous    Cosign for Ordering: Accepted by Jose Vásquez MD on 3/9/2018 12:17 AM    potassium chloride (KLOR-CON) packet 10 mEq (Discontinued) 10 mEq As Needed 3/8/2018 3/11/2018    Sig - Route: Take 10 mEq by mouth As Needed (Potassium replacement per admin instructions). - Oral    Cosign for Ordering: Accepted by Jose Vásquez MD on 3/9/2018 12:17 AM    Linked Group 5:  \"Or\" Linked Group Details        potassium chloride (KLOR-CON) packet 20 mEq (Discontinued) 20 mEq As Needed " "3/8/2018 3/11/2018    Sig - Route: Take 20 mEq by mouth As Needed (Potassium replacement per admin instructions). - Oral    Cosign for Ordering: Accepted by Jose Vásquez MD on 3/9/2018 12:17 AM    Linked Group 6:  \"Or\" Linked Group Details        potassium chloride (KLOR-CON) packet 40 mEq (Discontinued) 40 mEq As Needed 3/8/2018 3/11/2018    Sig - Route: Take 40 mEq by mouth As Needed (Potassium replacement per admin instructions). - Oral    Cosign for Ordering: Accepted by Jose Vásquez MD on 3/9/2018 12:17 AM    Linked Group 7:  \"Or\" Linked Group Details        potassium chloride (MICRO-K) CR capsule 10 mEq (Discontinued) 10 mEq As Needed 3/8/2018 3/11/2018    Sig - Route: Take 1 capsule by mouth As Needed (Potassium replacement per admin instructions). - Oral    Cosign for Ordering: Accepted by Jose Vásquez MD on 3/9/2018 12:17 AM    Linked Group 5:  \"Or\" Linked Group Details        potassium chloride (MICRO-K) CR capsule 20 mEq (Discontinued) 20 mEq As Needed 3/8/2018 3/11/2018    Sig - Route: Take 2 capsules by mouth As Needed (Potassium replacement per admin instructions). - Oral    Cosign for Ordering: Accepted by Jose Vásquez MD on 3/9/2018 12:17 AM    Linked Group 6:  \"Or\" Linked Group Details        potassium chloride (MICRO-K) CR capsule 40 mEq (Discontinued) 40 mEq As Needed 3/8/2018 3/11/2018    Sig - Route: Take 4 capsules by mouth As Needed (Potassium replacement per admin instructions). - Oral    Cosign for Ordering: Accepted by Jose Vásquez MD on 3/9/2018 12:17 AM    Linked Group 7:  \"Or\" Linked Group Details        potassium chloride 10 mEq in 100 mL IVPB (Discontinued) 10 mEq Every 1 Hour PRN 3/8/2018 3/11/2018    Sig - Route: Infuse 100 mL into a venous catheter Every 1 (One) Hour As Needed (Potassium replacement per admin instructions). - Intravenous    Cosign for Ordering: Accepted by Jose Vásquez MD on 3/9/2018 12:17 AM    Linked Group 7:  \"Or\" Linked Group Details        potassium " "chloride 10 mEq in 100 mL IVPB (Discontinued) 10 mEq Every 1 Hour PRN 3/8/2018 3/11/2018    Sig - Route: Infuse 100 mL into a venous catheter Every 1 (One) Hour As Needed (Potassium replacement per admin instructions). - Intravenous    Cosign for Ordering: Accepted by Jose Vásquez MD on 3/9/2018 12:17 AM    Linked Group 6:  \"Or\" Linked Group Details        potassium chloride 10 mEq in 100 mL IVPB (Discontinued) 10 mEq Every 1 Hour PRN 3/8/2018 3/11/2018    Sig - Route: Infuse 100 mL into a venous catheter Every 1 (One) Hour As Needed (Potassium replacement per admin instructions). - Intravenous    Cosign for Ordering: Accepted by Jose Vásquez MD on 3/9/2018 12:17 AM    Linked Group 5:  \"Or\" Linked Group Details        Scopolamine (TRANSDERM-SCOP) 1.5 MG/3DAYS patch 1 patch (Discontinued) 1 patch Every 72 Hours 3/10/2018 3/10/2018    Sig - Route: Place 1 patch on the skin Every 72 (Seventy-Two) Hours. - Transdermal    sodium chloride 0.45 % infusion (Discontinued) 250 mL/hr Continuous 3/8/2018 3/11/2018    Sig - Route: Infuse 250 mL/hr into a venous catheter Continuous. - Intravenous    Cosign for Ordering: Accepted by Jose Vásquez MD on 3/9/2018 12:17 AM    sodium chloride 0.45 % with KCl 20 mEq/L infusion (Discontinued) 250 mL/hr Continuous PRN 3/8/2018 3/11/2018    Sig - Route: Infuse 250 mL/hr into a venous catheter Continuous As Needed (After Initial 2 Hours - If Potassium Level is Less Than or Equal to 5 (If Greater Than 5 use 0.45%)). - Intravenous    Cosign for Ordering: Accepted by Jose Vásquez MD on 3/9/2018 12:17 AM    thiamine (B-1) 100 mg in sodium chloride 0.9 % 100 mL IVPB (Discontinued) 100 mg Daily 3/9/2018 3/12/2018    Sig - Route: Infuse 100 mg into a venous catheter Daily. - Intravenous             Physician Progress Notes (last 24 hours) (Notes from 3/11/2018 12:01 PM through 3/12/2018 12:01 PM)      Salvador Fernandez MD at 3/12/2018  9:22 AM            ENDOCRINE    Subjective   AND " "PLANS  Juan Payne is a 57 y.o. male.     Follow-up diabetes.    Events over weekend noted.  No nausea or vomiting.  Tolerating regular diet.  Patient states that he has been taking Lantus 18-2 units every evening and Humalog 18-21 units with each meal.  He ran out of Lantus 11 days before this admission.  Diabetes educator consult pending    Fasting glucose 158.  Random glucose .  Patient received Levemir 12 units yesterday morning but did not get the 6 units of Levemir last night.  Decrease Levemir to 12 units every morning and 4 units at bedtime.  Continue NovoLog 4 units with each meal plus as needed.    Denies hallucinations but feels weak.  Patient states that his last alcohol intake was on the day before admission.  Continue thiamine.  Will ask physical therapy to see patient.    Urine microalbumin elevated.  Recheck urine microalbumin and consider starting on an ACE inhibitor.    LDL 91.  HDL 60.  Thyroid function tests normal.  Consideration using Lipitor in the near future.    Objective   /80   Pulse 73   Temp 98.9 °F (37.2 °C) (Oral)   Resp 16   Ht 172.7 cm (67.99\")   Wt 63.5 kg (140 lb)   SpO2 90%   BMI 21.29 kg/m²    Physical Exam    Awake, alert, not in distress.  No rales or wheezes.  Regular heart rate and rhythm.  Abdomen soft, nontender.  No pedal edema.  No cyanosis.      Lab Results (last 24 hours)     Procedure Component Value Units Date/Time    Blood Culture - Blood, Blood, Venous Line [[id number]]  (Normal) Collected:  03/10/18 0707    Specimen:  Blood from Blood, Venous Line Updated:  03/12/18 0831     Blood Culture No growth at 2 days    Blood Culture - Blood, Blood, Venous Line [[id number]]  (Normal) Collected:  03/10/18 0707    Specimen:  Blood from Blood, Venous Line Updated:  03/12/18 0831     Blood Culture No growth at 2 days    POC Glucose Once [[id number]]  (Abnormal) Collected:  03/12/18 0805    Specimen:  Blood Updated:  03/12/18 0807     Glucose 158 (H) mg/dL     " Narrative:       Meter: [id number] : [id number] Chasity Bella    Vitamin B12 [[id number]]  (Normal) Collected:  03/12/18 0603    Specimen:  Blood Updated:  03/12/18 0657     Vitamin B-12 834 pg/mL     Folate [[id number]]  (Normal) Collected:  03/12/18 0603    Specimen:  Blood Updated:  03/12/18 0657     Folate 9.53 ng/mL     Comprehensive Metabolic Panel [[id number]]  (Abnormal) Collected:  03/12/18 0603    Specimen:  Blood Updated:  03/12/18 0639     Glucose 212 (H) mg/dL      BUN 5 (L) mg/dL      Creatinine 0.57 (L) mg/dL      Sodium 136 mmol/L      Potassium 4.0 mmol/L      Chloride 97 (L) mmol/L      CO2 28.1 mmol/L      Calcium 8.5 (L) mg/dL      Total Protein 5.4 (L) g/dL      Albumin 3.00 (L) g/dL      ALT (SGPT) 16 U/L      AST (SGOT) 39 U/L      Alkaline Phosphatase 57 U/L      Total Bilirubin 0.6 mg/dL      eGFR Non African Amer 146 mL/min/1.73      Globulin 2.4 gm/dL      A/G Ratio 1.3 g/dL      BUN/Creatinine Ratio 8.8     Anion Gap 10.9 mmol/L     Phosphorus [[id number]]  (Normal) Collected:  03/12/18 0603    Specimen:  Blood Updated:  03/12/18 0635     Phosphorus 2.7 mg/dL     CBC & Differential [[id number]] Collected:  03/12/18 0603    Specimen:  Blood Updated:  03/12/18 0626    Narrative:       The following orders were created for panel order CBC & Differential.  Procedure                               Abnormality         Status                     ---------                               -----------         ------                     Scan Slide[[id number]]                                                                  CBC Auto Differential[[id number]]        Abnormal            Final result                 Please view results for these tests on the individual orders.    CBC Auto Differential [[id number]]  (Abnormal) Collected:  03/12/18 0603    Specimen:  Blood Updated:  03/12/18 0626     WBC 4.97 10*3/mm3      RBC 3.03 (L) 10*6/mm3      Hemoglobin 10.9 (L) g/dL      Hematocrit 33.1 (L) %      MCV  109.2 (H) fL      MCH 36.0 (H) pg      MCHC 32.9 g/dL      RDW 12.6 %      RDW-SD 49.8 fl      MPV 10.4 fL      Platelets 266 10*3/mm3      Neutrophil % 36.4 (L) %      Lymphocyte % 42.9 %      Monocyte % 17.3 (H) %      Eosinophil % 2.6 %      Basophil % 0.4 %      Immature Grans % 0.4 %      Neutrophils, Absolute 1.81 (L) 10*3/mm3      Lymphocytes, Absolute 2.13 10*3/mm3      Monocytes, Absolute 0.86 10*3/mm3      Eosinophils, Absolute 0.13 10*3/mm3      Basophils, Absolute 0.02 10*3/mm3      Immature Grans, Absolute 0.02 10*3/mm3     POC Glucose Once [[id number]]  (Abnormal) Collected:  03/12/18 0529    Specimen:  Blood Updated:  03/12/18 0546     Glucose 204 (H) mg/dL     Narrative:       Meter: [id number] : [id number] Josefina Urena    POC Glucose Once [[id number]]  (Abnormal) Collected:  03/11/18 2210    Specimen:  Blood Updated:  03/11/18 2220     Glucose 156 (H) mg/dL     Narrative:       Meter: [id number] : [id number] Josefina Urena    Basic Metabolic Panel [[id number]]  (Abnormal) Collected:  03/11/18 1725    Specimen:  Blood Updated:  03/11/18 1801     Glucose 62 (L) mg/dL      BUN 7 mg/dL      Creatinine 0.64 (L) mg/dL      Sodium 139 mmol/L      Potassium 3.6 mmol/L      Chloride 100 mmol/L      CO2 30.7 (H) mmol/L      Calcium 8.7 mg/dL      eGFR Non African Amer 128 mL/min/1.73      BUN/Creatinine Ratio 10.9     Anion Gap 8.3 mmol/L     Narrative:       GFR Normal >60  Chronic Kidney Disease <60  Kidney Failure <15    POC Glucose Once [[id number]]  (Abnormal) Collected:  03/11/18 1728    Specimen:  Blood Updated:  03/11/18 1748     Glucose 58 (L) mg/dL     Narrative:       Meter: [id number] : [id number] VICKEY HIGUERA RN    POC Glucose Once [[id number]]  (Abnormal) Collected:  03/11/18 1706    Specimen:  Blood Updated:  03/11/18 1715     Glucose 48 (C) mg/dL     Narrative:       Result Not Confirmed Meter: [id number] : [id number] VICKEY HIGUERA RN    POC Glucose Once [[id number]]  (Abnormal)  Collected:  03/11/18 1504    Specimen:  Blood Updated:  03/11/18 1506     Glucose 60 (L) mg/dL     Narrative:       Meter: [id number] : [id number] Baudilio CAGE    Basic Metabolic Panel [[id number]]  (Abnormal) Collected:  03/11/18 1247    Specimen:  Blood Updated:  03/11/18 1323     Glucose 122 (H) mg/dL      BUN 8 mg/dL      Creatinine 0.67 (L) mg/dL      Sodium 138 mmol/L      Potassium 3.5 mmol/L      Chloride 98 mmol/L      CO2 26.7 mmol/L      Calcium 9.0 mg/dL      eGFR Non African Amer 121 mL/min/1.73      BUN/Creatinine Ratio 11.9     Anion Gap 13.3 mmol/L     Narrative:       GFR Normal >60  Chronic Kidney Disease <60  Kidney Failure <15    POC Glucose Once [[id number]]  (Normal) Collected:  03/11/18 1221    Specimen:  Blood Updated:  03/11/18 1231     Glucose 120 mg/dL     Narrative:       Meter: [id number] : [id number] VICKEY HIGUERA RN    POC Glucose Once [[id number]]  (Abnormal) Collected:  03/11/18 1149    Specimen:  Blood Updated:  03/11/18 1208     Glucose 66 (L) mg/dL     Narrative:       Meter: [id number] : [id number] VICKEY HIGUERA RN    POC Glucose Once [[id number]]  (Abnormal) Collected:  03/11/18 1113    Specimen:  Blood Updated:  03/11/18 1123     Glucose 34 (C) mg/dL     Narrative:       Result Not Confirmed Meter: [id number] : [id number] VICKEY HIGUERA RN            Principal Problem:    Diabetic ketoacidosis without coma associated with type 1 diabetes mellitus  Active Problems:    Type 1 diabetes mellitus    Tobacco abuse    Alcohol abuse    Insurance coverage problems    Bilateral carpal tunnel syndrome    Decrease Levemir as discussed above.  Continue mealtime NovoLog plus as needed.  Recheck urine microalbumin.  Will ask diabetes educator physical therapist to see patient.  Continue thiamine.          Electronically signed by Salvador Fernandez MD at 3/12/2018  9:33 AM     Mack Huertas MD at 3/11/2018  4:04 PM                                      Mack Huertas  MD                          [phone]      Patient ID:    Name:  Juan Payne    MRN:  [id number]    1960   57 y.o.  male            Patient Care Team:  No Known Provider as PCP - General    LOS: 3    CC/Reason for visit:     Subjective: Pt seen and examined this AM. No acute overnight events noted. Doing better.       ROS:   Denies any fevers/ chills/ CP/ palpitations/ Nausea/ vomiting/ Diarrhoea  No Worsening cough or SOA.     Objective     Vital Signs past 24hrs    BP range: BP: ()/(61-95) 134/83  Pulse range: Heart Rate:  [74-86] 78  Resp rate range: Resp:  [16] 16  Temp range: Temp (24hrs), Av.9 °F (37.2 °C), Min:98.8 °F (37.1 °C), Max:98.9 °F (37.2 °C)      Ventilator/Non-Invasive Ventilation Settings     None          Device (Oxygen Therapy): room air       63.5 kg (140 lb); Body mass index is 21.29 kg/m².      Intake/Output Summary (Last 24 hours) at 18 1605  Last data filed at 18 0700   Gross per 24 hour   Intake           4411.8 ml   Output              975 ml   Net           3436.8 ml       Exam:    GEN:  No respiratory distress, appears stated age  EYES:   EOM-i, anicteric sclera bilat  ENT:    External ears/nose normal, OP clear  NECK:  Supple, midline trachea, No JVD or cervical LApathy  LUNGS: Bilateral breath sounds heard, No adventitious sounds  CV:  Regular rate and rhythm, + PSM  ABD:  Non tender, no distended, no palpable liver or masses  EXT:  No cyanosis or clubbing, no peripheral/sacral edema    Scheduled meds:    enoxaparin 40 mg Subcutaneous Nightly   folic acid 1 mg Oral Daily   insulin aspart 0-4 Units Subcutaneous 4x Daily AC & at Bedtime   insulin aspart 4 Units Subcutaneous TID With Meals   [START ON 3/12/2018] insulin detemir 12 Units Subcutaneous QAM   insulin detemir 6 Units Subcutaneous Nightly   multivitamin 1 tablet Oral Daily   thiamine (VITAMIN B1) IVPB 100 mg Intravenous Daily       IV meds:                           Data Review:        Results from  last 7 days  Lab Units 03/11/18  1247 03/11/18  0447 03/10/18  1829  03/10/18  0541 03/10/18  0230  03/09/18  0028  03/08/18  1646   SODIUM mmol/L 138 138 135*  < > 137 133*  < > 136  < > 136   POTASSIUM mmol/L 3.5 3.5 3.9  < > 3.3* 4.3  < > 2.7*  < > 3.3*   CHLORIDE mmol/L 98 100 100  < > 93* 85*  < > 90*  < > 82*   CO2 mmol/L 26.7 28.5 24.6  < > 20.2* 8.1*  < > 19.3*  < > 12.6*   BUN mg/dL 8 9 11  < > 16 17  < > 22*  < > 19   CREATININE mg/dL 0.67* 0.67* 0.74*  < > 1.18 1.26  < > 1.42*  < > 1.39*   CALCIUM mg/dL 9.0 8.3* 7.8*  < > 8.9 8.8  < > 9.1  < > 9.7   BILIRUBIN mg/dL  --   --   --   --  0.9  --   --   --   --  1.7*   ALK PHOS U/L  --   --   --   --  62  --   --   --   --  84   ALT (SGPT) U/L  --   --   --   --  14  --   --   --   --  18   AST (SGOT) U/L  --   --   --   --  14  --   --   --   --  15   GLUCOSE mg/dL 122* 125* 189*  < > 309* 662*  < > 161*  < > 328*   WBC 10*3/mm3  --  6.13  --   --   --  6.88  --  9.47  --  10.85*   HEMOGLOBIN g/dL  --  10.6*  --   --   --  11.2*  --  11.7*  --  13.5*   PLATELETS 10*3/mm3  --  251  --   --   --  259  --  266  --  304   PROCALCITONIN ng/mL  --   --   --   --  0.63*  --   --   --   --   --    < > = values in this interval not displayed.    Lab Results   Component Value Date    CALCIUM 9.0 03/11/2018    PHOS 2.5 03/10/2018         Results from last 7 days  Lab Units 03/10/18  0707   BLOODCX  No growth at 24 hours  No growth at 24 hours         Results from last 7 days  Lab Units 03/10/18  0541 03/08/18 2013   TROPONIN T ng/mL 0.014 0.016       Results Review:    I have reviewed the available laboratory results and reviewed the chest imaging personally    Imaging Results (all)     Procedure Component Value Units Date/Time    XR Chest 1 View [[id number]] Collected:  03/08/18 1938     Updated:  03/08/18 1942    Narrative:       EMERGENCY PORTABLE CHEST SINGLE VIEW 19:45     HISTORY: 57-year-old male with shortness of breath, tobacco abuse and  diabetes      COMPARISON: None available     FINDINGS:  1. Negative acute portable chest, no evidence of an active intrathoracic  process nor other significant abnormality.     This report was finalized on 3/8/2018 7:39 PM by Dr. Zeb Davis MD.             ASSESSMENT:   Diabetic ketoacidosis  Generalized weakness  Current heavy alcohol drinker  Current heavy smoker  Known diabetes  Cardiac murmur, AS  Hypertriglyceridemia  Anemia, likely chronic disease    PLAN:  Patient has been off insulin drip for 24 hours.  Endocrinology trying to get a good regimen for him.  Multiple episodes of hypoglycemia noted due to poor by mouth intake.  I have talked to the patient regarding importance of being consistent with diet.   No more concerns for alcohol withdrawal.  Outpatient follow-up for cardiac murmur  Thiamine and folate for alcohol abuse  Ok to tx to floor.    I have discussed my findings and recommendations with patient, nursing staff and primary care team.     Mack Huertas MD  3/11/2018    Electronically signed by Mack Huertas MD at 3/11/2018  4:06 PM     Rodney Diaz MD at 3/11/2018  2:11 PM              Name: Juan Payne ADMIT: 3/8/2018   : 1960  PCP: No Known Provider    MRN: [id number] LOS: 3 days   AGE/SEX: 57 y.o. male  ROOM: [facility]/   Subjective   Did not eat much hand had hypoglycemia this morning. Improved now. No CP SOA NVD reported.    Objective   Vital Signs  Temp:  [98.8 °F (37.1 °C)-99.1 °F (37.3 °C)] 98.8 °F (37.1 °C)  Heart Rate:  [73-86] 79  Resp:  [16] 16  BP: ()/(61-95) 125/87  SpO2:  [94 %-98 %] 96 %  on   ;   Device (Oxygen Therapy): room air  Body mass index is 21.29 kg/m².    Physical Exam   Constitutional: He appears well-developed. No distress.   HENT:   Head: Normocephalic and atraumatic.   Eyes: EOM are normal. Pupils are equal, round, and reactive to light.   Neck: Normal range of motion. Neck supple.   Cardiovascular: Normal rate, regular rhythm and intact  distal pulses.    Murmur (4/6 LIZANDRO) heard.  Pulmonary/Chest: Effort normal and breath sounds normal. He has no wheezes.   Abdominal: Soft. He exhibits no distension. There is no tenderness. There is no rebound and no guarding.   Musculoskeletal: Normal range of motion. He exhibits no edema.   Neurological: He is alert. No cranial nerve deficit.   Skin: Skin is warm and dry. He is not diaphoretic.   jaundice   Psychiatric: He has a normal mood and affect. His behavior is normal.   Nursing note and vitals reviewed.      Results Review:       I reviewed the patient's new clinical results. Reviewed telemetry, sinus rhythm.    Results from last 7 days  Lab Units 03/11/18  0447 03/10/18  0230 03/09/18  0028 03/08/18  1646   WBC 10*3/mm3 6.13 6.88 9.47 10.85*   HEMOGLOBIN g/dL 10.6* 11.2* 11.7* 13.5*   PLATELETS 10*3/mm3 251 259 266 304     Results from last 7 days  Lab Units 03/11/18  1247 03/11/18  0447 03/10/18  1829 03/10/18  1440   SODIUM mmol/L 138 138 135* 138   POTASSIUM mmol/L 3.5 3.5 3.9 4.4   CHLORIDE mmol/L 98 100 100 100   CO2 mmol/L 26.7 28.5 24.6 25.2   BUN mg/dL 8 9 11 12   CREATININE mg/dL 0.67* 0.67* 0.74* 0.75*   GLUCOSE mg/dL 122* 125* 189* 160*   Estimated Creatinine Clearance: 106.6 mL/min (by C-G formula based on SCr of 0.67 mg/dL (L)).  Results from last 7 days  Lab Units 03/11/18  1247 03/11/18  0447 03/10/18  1829 03/10/18  1440  03/10/18  0757 03/10/18  0541 03/10/18  0230 03/09/18  2217  03/09/18  0801 03/09/18  0333  03/08/18  1646   CALCIUM mg/dL 9.0 8.3* 7.8* 7.6*  < >  --  8.9 8.8  --   --  8.6 8.9  < > 9.7   ALBUMIN g/dL  --   --   --   --   --   --  3.70  --   --   --   --   --   --  4.30   MAGNESIUM mg/dL  --   --   --   --   --   --  2.1 2.0  --   --  2.0 2.0  < >  --    PHOSPHORUS mg/dL  --   --  2.5  --   --  1.2*  --  3.4 1.8*  < > 1.2* 1.1*  < >  --    < > = values in this interval not displayed.      enoxaparin 40 mg Subcutaneous Nightly   folic acid 1 mg Oral Daily   insulin aspart  0-4 Units Subcutaneous 4x Daily AC & at Bedtime   insulin aspart 4 Units Subcutaneous TID With Meals   [START ON 3/12/2018] insulin detemir 12 Units Subcutaneous QAM   insulin detemir 6 Units Subcutaneous Nightly   multivitamin 1 tablet Oral Daily   thiamine (VITAMIN B1) IVPB 100 mg Intravenous Daily      Diet Regular; Consistent Carbohydrate      Assessment/Plan     Active Hospital Problems (** Indicates Principal Problem)    Diagnosis Date Noted   • **Diabetic ketoacidosis without coma associated with type 1 diabetes mellitus [E10.10] 03/08/2018   • Tobacco abuse [Z72.0] 03/09/2018   • Alcohol abuse [F10.10] 03/09/2018   • Insurance coverage problems [Z59.8] 03/09/2018   • Bilateral carpal tunnel syndrome [G56.03] 03/09/2018   • Type 1 diabetes mellitus [E10.9] 03/08/2018      Resolved Hospital Problems    Diagnosis Date Noted Date Resolved   No resolved problems to display.     - DKA: A1c 8.0. Off gtt now. Levemir, Novolog, SSI. D5 IVF PRN. Endocrinology and Pulmonology following.  - Alcohol Abuse: MVI Folate Thiamine. Ativan PRN. A bit more jaundiced than when he presented. Will repeat CMP.  - Macrocytosis: Likely from Etoh. TSH ok. Will check b12 and folate level.  - Severe Malnutrition: Phos replaced. Nutrition following.  - Poor Compliance: CCP following with insurance issues.  - Disposition: TBD    Appreciate help from all.    Rodney Diaz MD  [facility]  03/11/18  2:12 PM    Electronically signed by Rodney Diaz MD at 3/11/2018  2:22 PM     Hammad Roman MD at 3/11/2018 12:58 PM          57 y.o.   LOS: 3 days   Patient Care Team:  No Known Provider as PCP - General    Chief Complaint:  Hyperglycemia    Chief Complaint   Patient presents with   • Fatigue   • Vomiting   • Nausea       Subjective  Pt's concern now is low BG, he is eating at least 50% of his meals, but has been having low BG.   He did get his levemir this morning and is also on D5 for low bg.   Pt got 2 amps  of D50 last night for the low BG.       Interval History:    Review of Systems:   Review of Systems   Constitutional: Positive for appetite change, diaphoresis and fatigue.   Musculoskeletal: Positive for back pain and myalgias.   Skin: Negative for pallor.   Neurological: Positive for weakness and numbness.     Objective     Vital Signs   Temp:  [98.8 °F (37.1 °C)-99.1 °F (37.3 °C)] 98.8 °F (37.1 °C)  Heart Rate:  [73-86] 85  Resp:  [16] 16  BP: ()/(61-95) 132/78    Physical Exam:  Gen exam - alert and oriented x 3,  HEENT - Acanthosis nigricans. Thyroid palpable.   Resp - Clear to auscultation.   CVS - S1,S2 heard and no murmurs.   Abd - Non tender, BS heard.   Ext - No edema and intact pin prick and proprioception.     Physical ExamResults Review:     I reviewed the patient's new clinical results.      Glucose   Date/Time Value Ref Range Status   03/11/2018 0447 125 (H) 65 - 99 mg/dL Final   03/10/2018 1829 189 (H) 65 - 99 mg/dL Final   03/10/2018 1440 160 (H) 65 - 99 mg/dL Final   03/10/2018 1041 152 (H) 65 - 99 mg/dL Final   03/10/2018 0541 309 (H) 65 - 99 mg/dL Final   03/10/2018 0230 662 (C) 65 - 99 mg/dL Final   03/09/2018 0801 159 (H) 65 - 99 mg/dL Final   03/09/2018 0333 110 (H) 65 - 99 mg/dL Final   03/09/2018 0028 161 (H) 65 - 99 mg/dL Final   03/08/2018 2013 325 (H) 65 - 99 mg/dL Final   03/08/2018 1646 328 (H) 65 - 99 mg/dL Final     Lab Results (last 72 hours)     Procedure Component Value Units Date/Time    POC Glucose Once [[id number]]  (Abnormal) Collected:  03/10/18 1227    Specimen:  Blood Updated:  03/10/18 1236     Glucose 137 (H) mg/dL     Narrative:       Meter: [id number] : [id number] VICKEY HIGUERA RN    Basic Metabolic Panel [[id number]]  (Abnormal) Collected:  03/10/18 1041    Specimen:  Blood Updated:  03/10/18 1127     Glucose 152 (H) mg/dL      BUN 14 mg/dL      Creatinine 0.92 mg/dL      Sodium 138 mmol/L      Potassium 3.7 mmol/L      Chloride 98 mmol/L      CO2 27.1 mmol/L       Calcium 8.3 (L) mg/dL      eGFR Non African Amer 84 mL/min/1.73      BUN/Creatinine Ratio 15.2     Anion Gap 12.9 mmol/L     Narrative:       GFR Normal >60  Chronic Kidney Disease <60  Kidney Failure <15    POC Glucose Once [[id number]]  (Abnormal) Collected:  03/10/18 1116    Specimen:  Blood Updated:  03/10/18 1117     Glucose 148 (H) mg/dL     Narrative:       Meter: [id number] : [id number] Deyanira CAGE    Lactic Acid, Reflex [[id number]]  (Normal) Collected:  03/10/18 1041    Specimen:  Blood Updated:  03/10/18 1107     Lactate 1.0 mmol/L     POC Glucose Once [[id number]]  (Abnormal) Collected:  03/10/18 1018    Specimen:  Blood Updated:  03/10/18 1036     Glucose 133 (H) mg/dL     Narrative:       Meter: [id number] : [id number] VICKEY HIGUERA RN    POC Glucose Once [[id number]]  (Abnormal) Collected:  03/10/18 0938    Specimen:  Blood Updated:  03/10/18 0939     Glucose 167 (H) mg/dL     Narrative:       Meter: [id number] : [id number] VICKEY HIGUERA RN    Lactic Acid, Reflex Timer (This will reflex a repeat order 3-3:15 hours after ordered.) [[id number]] Collected:  03/10/18 0541    Specimen:  Blood Updated:  03/10/18 0931     Extra Tube Hold for add-ons.     Comment: Auto resulted.       Phosphorus [[id number]]  (Abnormal) Collected:  03/10/18 0757    Specimen:  Blood Updated:  03/10/18 0858     Phosphorus 1.2 (L) mg/dL     Iron Profile [[id number]]  (Abnormal) Collected:  03/10/18 0541    Specimen:  Blood Updated:  03/10/18 0825     Iron 86 mcg/dL      Iron Saturation 36 %      Transferrin 159 (L) mg/dL      TIBC 237 mcg/dL     POC Glucose Once [[id number]]  (Abnormal) Collected:  03/10/18 0754    Specimen:  Blood Updated:  03/10/18 0804     Glucose 136 (H) mg/dL     Narrative:       Meter: [id number] : [id number] Josefina Urena    Blood Culture - Blood, Blood, Venous Line [[id number]] Collected:  03/10/18 0707    Specimen:  Blood from Blood, Venous Line Updated:  03/10/18 0729    Blood Culture -  "Blood, Blood, Venous Line [[id number]] Collected:  03/10/18 0707    Specimen:  Blood from Blood, Venous Line Updated:  03/10/18 0729    POC Glucose Once [[id number]]  (Abnormal) Collected:  03/10/18 0704    Specimen:  Blood Updated:  03/10/18 0725     Glucose 188 (H) mg/dL     Narrative:       Meter: [id number] : [id number] Josefina Urena    Procalcitonin [[id number]]  (Abnormal) Collected:  03/10/18 0541    Specimen:  Blood Updated:  03/10/18 0720     Procalcitonin 0.63 (C) ng/mL     Narrative:       As a Marker for Sepsis (Non-Neonates):   1. <0.5 ng/mL represents a low risk of severe sepsis and/or septic shock.  1. >2 ng/mL represents a high risk of severe sepsis and/or septic shock.    As a Marker for Lower Respiratory Tract Infections that require antibiotic therapy:  PCT on Admission     Antibiotic Therapy             6-12 Hrs later  > 0.5                Strongly Recommended            >0.25 - <0.5         Recommended  0.1 - 0.25           Discouraged                   Remeasure/reassess PCT  <0.1                 Strongly Discouraged          Remeasure/reassess PCT      As 28 day mortality risk marker: \"Change in Procalcitonin Result\" (> 80 % or <=80 %) if Day 0 (or Day 1) and Day 4 values are available. Refer to http://www.[employer]-pct-calculator.com/   Change in PCT <=80 %   A decrease of PCT levels below or equal to 80 % defines a positive change in PCT test result representing a higher risk for 28-day all-cause mortality of patients diagnosed with severe sepsis or septic shock.  Change in PCT > 80 %   A decrease of PCT levels of more than 80 % defines a negative change in PCT result representing a lower risk for 28-day all-cause mortality of patients diagnosed with severe sepsis or septic shock.                POC Glucose Once [[id number]]  (Abnormal) Collected:  03/10/18 0640    Specimen:  Blood Updated:  03/10/18 0700     Glucose 194 (H) mg/dL     Narrative:       Meter: [id number] : [id number] Aldo " Jackelyn    Comprehensive Metabolic Panel [[id number]]  (Abnormal) Collected:  03/10/18 0541    Specimen:  Blood Updated:  03/10/18 0643     Glucose 309 (H) mg/dL      BUN 16 mg/dL      Creatinine 1.18 mg/dL      Sodium 137 mmol/L      Potassium 3.3 (L) mmol/L      Chloride 93 (L) mmol/L      CO2 20.2 (L) mmol/L      Calcium 8.9 mg/dL      Total Protein 5.7 (L) g/dL      Albumin 3.70 g/dL      ALT (SGPT) 14 U/L      AST (SGOT) 14 U/L      Alkaline Phosphatase 62 U/L      Total Bilirubin 0.9 mg/dL      eGFR Non African Amer 63 mL/min/1.73      Globulin 2.0 gm/dL      A/G Ratio 1.9 g/dL      BUN/Creatinine Ratio 13.6     Anion Gap 23.8 mmol/L     Magnesium [[id number]]  (Normal) Collected:  03/10/18 0541    Specimen:  Blood Updated:  03/10/18 0641     Magnesium 2.1 mg/dL     Troponin [[id number]]  (Normal) Collected:  03/10/18 0541    Specimen:  Blood Updated:  03/10/18 0637     Troponin T 0.014 ng/mL     Narrative:       Troponin T Reference Ranges:  Less than 0.03 ng/mL:    Negative for AMI  0.03 to 0.09 ng/mL:      Indeterminant for AMI  Greater than 0.09 ng/mL: Positive for AMI    Ammonia [[id number]]  (Normal) Collected:  03/10/18 0541    Specimen:  Blood Updated:  03/10/18 0626     Ammonia 27 umol/L     Lactic Acid, Plasma [[id number]]  (Abnormal) Collected:  03/10/18 0541    Specimen:  Blood Updated:  03/10/18 0624     Lactate 2.6 (C) mmol/L     POC Glucose Once [[id number]]  (Abnormal) Collected:  03/10/18 0539    Specimen:  Blood Updated:  03/10/18 0545     Glucose 297 (H) mg/dL     Narrative:       Meter: [id number] : [id number] Phi Kayla    CBC & Differential [[id number]] Collected:  03/10/18 0230    Specimen:  Blood Updated:  03/10/18 0532    Narrative:       The following orders were created for panel order CBC & Differential.  Procedure                               Abnormality         Status                     ---------                               -----------         ------                      Manual Differential[[id number]]          Abnormal            Final result               Scan Slide[[id number]]                                       Final result               CBC Auto Differential[[id number]]        Abnormal            Final result                 Please view results for these tests on the individual orders.    CBC Auto Differential [[id number]]  (Abnormal) Collected:  03/10/18 0230    Specimen:  Blood Updated:  03/10/18 0532     WBC 6.88 10*3/mm3      RBC 3.10 (L) 10*6/mm3      Hemoglobin 11.2 (L) g/dL      Hematocrit 35.4 (L) %      .2 (H) fL      MCH 36.1 (H) pg      MCHC 31.6 (L) g/dL      RDW 13.1 %      RDW-SD 54.3 (H) fl      MPV 10.3 fL      Platelets 259 10*3/mm3     Scan Slide [[id number]] Collected:  03/10/18 0230    Specimen:  Blood Updated:  03/10/18 0532     Scan Slide --     Comment: See Manual Differential Results       Manual Differential [[id number]]  (Abnormal) Collected:  03/10/18 0230    Specimen:  Blood Updated:  03/10/18 0532     Neutrophil % 87.0 (H) %      Lymphocyte % 3.0 (L) %      Monocyte % 10.0 %      Neutrophils Absolute 5.99 10*3/mm3      Lymphocytes Absolute 0.21 (L) 10*3/mm3      Monocytes Absolute 0.69 10*3/mm3      Stomatocytes Slight/1+     WBC Morphology Normal     Platelet Morphology Normal    Basic Metabolic Panel [[id number]]  (Abnormal) Collected:  03/10/18 0230    Specimen:  Blood Updated:  03/10/18 0520     Glucose 662 (C) mg/dL      BUN 17 mg/dL      Creatinine 1.26 mg/dL      Sodium 133 (L) mmol/L      Potassium 4.3 mmol/L      Chloride 85 (L) mmol/L      CO2 8.1 (L) mmol/L      Calcium 8.8 mg/dL      eGFR Non African Amer 59 (L) mL/min/1.73      BUN/Creatinine Ratio 13.5     Anion Gap 39.9 mmol/L     Narrative:       GFR Normal >60  Chronic Kidney Disease <60  Kidney Failure <15    Phosphorus [[id number]]  (Normal) Collected:  03/10/18 0230    Specimen:  Blood Updated:  03/10/18 0519     Phosphorus 3.4 mg/dL     Lipid Panel [[id number]]  (Abnormal)  Collected:  03/10/18 0230    Specimen:  Blood Updated:  03/10/18 0519     Total Cholesterol 192 mg/dL      Triglycerides 204 (H) mg/dL      HDL Cholesterol 60 mg/dL      LDL Cholesterol  91 mg/dL      VLDL Cholesterol 40.8 (H) mg/dL      LDL/HDL Ratio 1.52    Narrative:       Cholesterol Reference Ranges  (U.S. Department of Health and Human Services ATP III Classifications)    Desirable          <200 mg/dL  Borderline High    200-239 mg/dL  High Risk          >240 mg/dL      Triglyceride Reference Ranges  (U.S. Department of Health and Human Services ATP III Classifications)    Normal           <150 mg/dL  Borderline High  150-199 mg/dL  High             200-499 mg/dL  Very High        >500 mg/dL    HDL Reference Ranges  (U.S. Department of Health and Human Services ATP III Classifcations)    Low     <40 mg/dl (major risk factor for CHD)  High    >60 mg/dl ('negative' risk factor for CHD)        LDL Reference Ranges  (U.S. Department of Health and Human Services ATP III Classifcations)    Optimal          <100 mg/dL  Near Optimal     100-129 mg/dL  Borderline High  130-159 mg/dL  High             160-189 mg/dL  Very High        >189 mg/dL    Magnesium [[id number]]  (Normal) Collected:  03/10/18 0230    Specimen:  Blood Updated:  03/10/18 0518     Magnesium 2.0 mg/dL     POC Glucose Once [[id number]]  (Abnormal) Collected:  03/10/18 0449    Specimen:  Blood Updated:  03/10/18 0451     Glucose 459 (C) mg/dL     Narrative:       Treated Patient Meter: [id number] : [id number] Josefina Urena    POC Glucose Once [[id number]]  (Abnormal) Collected:  03/10/18 0427    Specimen:  Blood Updated:  03/10/18 0448     Glucose 415 (H) mg/dL     Narrative:       GUTIERREZ Meter: [id number] : [id number] Pacheco DÍAZ    POC Glucose Once [[id number]]  (Abnormal) Collected:  03/10/18 0425    Specimen:  Blood Updated:  03/10/18 0448     Glucose 441 (H) mg/dL     Narrative:       Repeat Test Meter: [id number] : [id number]  Goodknight Abena NA    POC Glucose Once [[id number]]  (Abnormal) Collected:  03/10/18 0224    Specimen:  Blood Updated:  03/10/18 0437     Glucose >599 (C) mg/dL     Narrative:       NOTIFIED RN Meter: [id number] : [id number] Goodknight Abena NA    POC Glucose Once [[id number]]  (Abnormal) Collected:  03/10/18 0222    Specimen:  Blood Updated:  03/10/18 0436     Glucose >599 (C) mg/dL     Narrative:       Repeat Test Meter: [id number] : [id number] Goodknight Abena NA    POC Glucose Once [[id number]]  (Abnormal) Collected:  03/10/18 0009    Specimen:  Blood Updated:  03/10/18 0014     Glucose 500 (C) mg/dL     Narrative:       NOTIFIED  RN Meter: [id number] : [id number] Goodknight Abena NA    POC Glucose Once [[id number]]  (Abnormal) Collected:  03/10/18 0007    Specimen:  Blood Updated:  03/10/18 0013     Glucose 482 (C) mg/dL     Narrative:       Repeat Test Meter: [id number] : [id number] Goodknight Abena NA    Potassium [[id number]]  (Normal) Collected:  03/09/18 2217    Specimen:  Blood Updated:  03/09/18 2247     Potassium 4.3 mmol/L     Phosphorus [[id number]]  (Abnormal) Collected:  03/09/18 2217    Specimen:  Blood Updated:  03/09/18 2247     Phosphorus 1.8 (L) mg/dL     POC Glucose Once [[id number]]  (Abnormal) Collected:  03/09/18 2201    Specimen:  Blood Updated:  03/09/18 2202     Glucose 380 (H) mg/dL     Narrative:       Meter: [id number] : [id number] Goodknight Abena NA    POC Glucose Once [[id number]]  (Abnormal) Collected:  03/09/18 1942    Specimen:  Blood Updated:  03/09/18 1943     Glucose 280 (H) mg/dL     Narrative:       Meter: [id number] : [id number] Goodknight Abena NA    POC Glucose Once [[id number]]  (Abnormal) Collected:  03/09/18 1833    Specimen:  Blood Updated:  03/09/18 1834     Glucose 317 (H) mg/dL     Narrative:       Meter: [id number] : [id number] Ramona DÍAZ    POC Glucose Once [[id number]]  (Abnormal) Collected:  03/09/18 1727    Specimen:  Blood Updated:  03/09/18  1751     Glucose 263 (H) mg/dL     Narrative:       Meter: [id number] : [id number] Ramona DÍAZ    POC Glucose Once [[id number]]  (Abnormal) Collected:  03/09/18 1636    Specimen:  Blood Updated:  03/09/18 1637     Glucose 249 (H) mg/dL     Narrative:       Meter: [id number] : [id number] Ramona DÍAZ    POC Glucose Once [[id number]]  (Abnormal) Collected:  03/09/18 1538    Specimen:  Blood Updated:  03/09/18 1539     Glucose 216 (H) mg/dL     Narrative:       Meter: [id number] : [id number] Ramona DÍAZ    Phosphorus [[id number]]  (Abnormal) Collected:  03/09/18 1455    Specimen:  Blood Updated:  03/09/18 1536     Phosphorus 1.6 (L) mg/dL     Potassium [[id number]]  (Normal) Collected:  03/09/18 1455    Specimen:  Blood Updated:  03/09/18 1532     Potassium 4.0 mmol/L     POC Glucose Once [[id number]]  (Abnormal) Collected:  03/09/18 1457    Specimen:  Blood Updated:  03/09/18 1458     Glucose 208 (H) mg/dL     Narrative:       Meter: [id number] : [id number] Ramona DÍAZ    POC Glucose Once [[id number]]  (Abnormal) Collected:  03/09/18 1336    Specimen:  Blood Updated:  03/09/18 1337     Glucose 158 (H) mg/dL     Narrative:       Meter: [id number] : [id number] Ramona DÍAZ    POC Glucose Once [[id number]]  (Abnormal) Collected:  03/09/18 1248    Specimen:  Blood Updated:  03/09/18 1249     Glucose 155 (H) mg/dL     Narrative:       Meter: [id number] : [id number] DÍAZ    Microalbumin / Creatinine Urine Ratio - [[id number]] Collected:  03/08/18 2107    Specimen:  Urine from Urine, Clean Catch Updated:  03/09/18 1138     Microalbumin/Creatinine Ratio 107.5 mg/g      Creatinine, Urine 57.7 mg/dL      Microalbumin, Urine 6.2 mg/L     POC Glucose Once [[id number]]  (Abnormal) Collected:  03/09/18 1133    Specimen:  Blood Updated:  03/09/18 1134     Glucose 157 (H) mg/dL     Narrative:       Meter: [id number] : [id number] Ramona DÍAZ    T4, Free [[id number]]  (Normal)  Collected:  03/09/18 0801    Specimen:  Blood Updated:  03/09/18 1128     Free T4 1.03 ng/dL     TSH [[id number]]  (Normal) Collected:  03/09/18 0801    Specimen:  Blood Updated:  03/09/18 1128     TSH 0.528 mIU/mL     POC Glucose Once [[id number]]  (Abnormal) Collected:  03/09/18 1028    Specimen:  Blood Updated:  03/09/18 1029     Glucose 154 (H) mg/dL     Narrative:       Meter: [id number] : [id number] Ramona DÍAZ    POC Glucose Once [[id number]]  (Abnormal) Collected:  03/09/18 0927    Specimen:  Blood Updated:  03/09/18 0928     Glucose 161 (H) mg/dL     Narrative:       Meter: [id number] : [id number] Ramona DÍAZ    Calcium, Ionized [[id number]]  (Normal) Collected:  03/09/18 0801    Specimen:  Blood Updated:  03/09/18 0858     Ionized Calcium 1.29 mmol/L      Ionized Calcium 5.2 mg/dL     Phosphorus [[id number]]  (Abnormal) Collected:  03/09/18 0801    Specimen:  Blood Updated:  03/09/18 0842     Phosphorus 1.2 (L) mg/dL     Basic Metabolic Panel [[id number]]  (Abnormal) Collected:  03/09/18 0801    Specimen:  Blood Updated:  03/09/18 0842     Glucose 159 (H) mg/dL      BUN 19 mg/dL      Creatinine 1.10 mg/dL      Sodium 136 mmol/L      Potassium 3.4 (L) mmol/L      Chloride 96 (L) mmol/L      CO2 26.2 mmol/L      Calcium 8.6 mg/dL      eGFR Non African Amer 69 mL/min/1.73      BUN/Creatinine Ratio 17.3     Anion Gap 13.8 mmol/L     Narrative:       GFR Normal >60  Chronic Kidney Disease <60  Kidney Failure <15    Magnesium [[id number]]  (Normal) Collected:  03/09/18 0801    Specimen:  Blood Updated:  03/09/18 0842     Magnesium 2.0 mg/dL     POC Glucose Once [[id number]]  (Abnormal) Collected:  03/09/18 0830    Specimen:  Blood Updated:  03/09/18 0831     Glucose 145 (H) mg/dL     Narrative:       Meter: [id number] : [id number] Ramona Gavin NA    POC Glucose Once [[id number]]  (Abnormal) Collected:  03/09/18 0727    Specimen:  Blood Updated:  03/09/18 0728     Glucose 138 (H) mg/dL     Narrative:        Meter: [id number] : [id number] Ramona DÍAZ    Potassium [[id number]]  (Abnormal) Collected:  03/09/18 0553    Specimen:  Blood Updated:  03/09/18 0645     Potassium 3.4 (L) mmol/L     POC Glucose Once [[id number]]  (Normal) Collected:  03/09/18 0613    Specimen:  Blood Updated:  03/09/18 0615     Glucose 128 mg/dL     Narrative:       Meter: [id number] : [id number] Channakhone Tinn NA    POC Glucose Once [[id number]]  (Normal) Collected:  03/09/18 0454    Specimen:  Blood Updated:  03/09/18 0455     Glucose 116 mg/dL     Narrative:       Meter: [id number] : [id number] Channakhone Tinn NA    Basic Metabolic Panel [[id number]]  (Abnormal) Collected:  03/09/18 0333    Specimen:  Blood Updated:  03/09/18 0449     Glucose 110 (H) mg/dL      BUN 20 mg/dL      Creatinine 1.29 (H) mg/dL      Sodium 134 (L) mmol/L      Potassium 2.9 (L) mmol/L      Chloride 92 (L) mmol/L      CO2 25.4 mmol/L      Calcium 8.9 mg/dL      eGFR Non African Amer 57 (L) mL/min/1.73      BUN/Creatinine Ratio 15.5     Anion Gap 16.6 mmol/L     Narrative:       GFR Normal >60  Chronic Kidney Disease <60  Kidney Failure <15    Phosphorus [[id number]]  (Abnormal) Collected:  03/09/18 0333    Specimen:  Blood Updated:  03/09/18 0443     Phosphorus 1.1 (L) mg/dL     Magnesium [[id number]]  (Normal) Collected:  03/09/18 0333    Specimen:  Blood Updated:  03/09/18 0443     Magnesium 2.0 mg/dL     Potassium [[id number]]  (Abnormal) Collected:  03/09/18 0333    Specimen:  Blood Updated:  03/09/18 0443     Potassium 2.9 (L) mmol/L     Calcium, Ionized [[id number]]  (Normal) Collected:  03/09/18 0333    Specimen:  Blood Updated:  03/09/18 0440     Ionized Calcium 1.27 mmol/L      Ionized Calcium 5.1 mg/dL     Hemoglobin A1c [[id number]]  (Abnormal) Collected:  03/09/18 0333    Specimen:  Blood Updated:  03/09/18 0425     Hemoglobin A1C 8.02 (H) %     Narrative:       Hemoglobin A1C Ranges:    Increased Risk for Diabetes  5.7% to 6.4%  Diabetes                      >= 6.5%  Diabetic Goal                < 7.0%    POC Glucose Once [[id number]]  (Normal) Collected:  03/09/18 0353    Specimen:  Blood Updated:  03/09/18 0355     Glucose 104 mg/dL     Narrative:       Meter: [id number] : [id number] Channakhone Tinn NA    POC Glucose Once [[id number]]  (Normal) Collected:  03/09/18 0239    Specimen:  Blood Updated:  03/09/18 0241     Glucose 115 mg/dL     Narrative:       Meter: [id number] : [id number] Channakhone Tinn NA    POC Glucose Once [[id number]]  (Normal) Collected:  03/09/18 0143    Specimen:  Blood Updated:  03/09/18 0144     Glucose 129 mg/dL     Narrative:       Meter: [id number] : [id number] OMEGA MORGANakhone Tinn NA    Calcium, Ionized [[id number]]  (Normal) Collected:  03/09/18 0028    Specimen:  Blood Updated:  03/09/18 0114     Ionized Calcium 1.32 mmol/L      Ionized Calcium 5.3 mg/dL     Phosphorus [[id number]]  (Abnormal) Collected:  03/09/18 0028    Specimen:  Blood Updated:  03/09/18 0105     Phosphorus 1.4 (L) mg/dL     Basic Metabolic Panel [[id number]]  (Abnormal) Collected:  03/09/18 0028    Specimen:  Blood Updated:  03/09/18 0105     Glucose 161 (H) mg/dL      BUN 22 (H) mg/dL      Creatinine 1.42 (H) mg/dL      Sodium 136 mmol/L      Potassium 2.7 (L) mmol/L      Chloride 90 (L) mmol/L      CO2 19.3 (L) mmol/L      Calcium 9.1 mg/dL      eGFR Non African Amer 51 (L) mL/min/1.73      BUN/Creatinine Ratio 15.5     Anion Gap 26.7 mmol/L     Narrative:       GFR Normal >60  Chronic Kidney Disease <60  Kidney Failure <15    Magnesium [[id number]]  (Normal) Collected:  03/09/18 0028    Specimen:  Blood Updated:  03/09/18 0100     Magnesium 2.1 mg/dL     CBC & Differential [[id number]] Collected:  03/09/18 0028    Specimen:  Blood Updated:  03/09/18 0049    Narrative:       The following orders were created for panel order CBC & Differential.  Procedure                               Abnormality         Status                     ---------                                -----------         ------                     Scan Slide[[id number]]                                                                  CBC Auto Differential[[id number]]        Abnormal            Final result                 Please view results for these tests on the individual orders.    CBC Auto Differential [[id number]]  (Abnormal) Collected:  03/09/18 0028    Specimen:  Blood Updated:  03/09/18 0049     WBC 9.47 10*3/mm3      RBC 3.26 (L) 10*6/mm3      Hemoglobin 11.7 (L) g/dL      Hematocrit 35.1 (L) %      .7 (H) fL      MCH 35.9 (H) pg      MCHC 33.3 g/dL      RDW 12.8 %      RDW-SD 49.9 fl      MPV 10.1 fL      Platelets 266 10*3/mm3      Neutrophil % 68.5 %      Lymphocyte % 17.3 (L) %      Monocyte % 13.7 (H) %      Eosinophil % 0.0 (L) %      Basophil % 0.1 %      Immature Grans % 0.4 %      Neutrophils, Absolute 6.48 10*3/mm3      Lymphocytes, Absolute 1.64 10*3/mm3      Monocytes, Absolute 1.30 (H) 10*3/mm3      Eosinophils, Absolute 0.00 10*3/mm3      Basophils, Absolute 0.01 10*3/mm3      Immature Grans, Absolute 0.04 (H) 10*3/mm3     POC Glucose Once [[id number]]  (Abnormal) Collected:  03/09/18 0031    Specimen:  Blood Updated:  03/09/18 0033     Glucose 148 (H) mg/dL     Narrative:       Meter: [id number] : [id number] Nedra DÍAZ    POC Glucose Once [[id number]]  (Abnormal) Collected:  03/08/18 2315    Specimen:  Blood Updated:  03/08/18 2317     Glucose 187 (H) mg/dL     Narrative:       Meter: [id number] : [id number] Vasquez Pate RN    Urine Drug Screen - Urine, Clean Catch [[id number]]  (Normal) Collected:  03/08/18 2107    Specimen:  Urine from Urine, Clean Catch Updated:  03/08/18 2209     Amphet/Methamphet, Screen Negative     Barbiturates Screen, Urine Negative     Benzodiazepine Screen, Urine Negative     Cocaine Screen, Urine Negative     Opiate Screen Negative     THC, Screen, Urine Negative     Methadone Screen, Urine Negative     Oxycodone Screen,  Urine Negative    Narrative:       Negative Thresholds For Drugs Screened:     Amphetamines               500 ng/ml   Barbiturates               200 ng/ml   Benzodiazepines            100 ng/ml   Cocaine                    300 ng/ml   Methadone                  300 ng/ml   Opiates                    300 ng/ml   Oxycodone                  100 ng/ml   THC                        50 ng/ml    The Normal Value for all drugs tested is negative. This report includes final unconfirmed screening results to be used for medical treatment purposes only. Unconfirmed results must not be used for non-medical purposes such as employment or legal testing. Clinical consideration should be applied to any drug of abuse test, particulary when unconfirmed results are used.    POC Glucose Once [[id number]]  (Abnormal) Collected:  03/08/18 2202    Specimen:  Blood Updated:  03/08/18 2203     Glucose 266 (H) mg/dL     Narrative:       Meter: [id number] : [id number] Fredy Ventura    Blood Gas, Arterial [[id number]]  (Abnormal) Collected:  03/08/18 2145    Specimen:  Arterial Blood Updated:  03/08/18 2149     Site Arterial: right radial     Howard's Test Positive     pH, Arterial 7.357 pH units      pCO2, Arterial 18.2 (C) mm Hg      Comment: Critical:Notify Dr SHANNAN TINSLEY MD (08-Mar-18 21:48:23)Read back ok        pO2, Arterial 102.9 (H) mm Hg      HCO3, Arterial 10.2 (L) mmol/L      Base Excess, Arterial -13.0 (L) mmol/L      O2 Saturation Calculated 97.9 %      Barometric Pressure for Blood Gas 752.8 mmHg      Modality Room Air     Rate 16 Breaths/minute     Narrative:        Meter: [id number] : [id number] Miladys Corona    Urinalysis With / Culture If Indicated - Urine, Clean Catch [[id number]]  (Abnormal) Collected:  03/08/18 2107    Specimen:  Urine from Urine, Clean Catch Updated:  03/08/18 2123     Color, UA Yellow     Appearance, UA Cloudy (A)     pH, UA 5.5     Specific Gravity, UA 1.021     Glucose, UA >=1000  mg/dL (3+) (A)     Ketones, UA 80 mg/dL (3+) (A)     Bilirubin, UA Negative     Blood, UA Negative     Protein, UA 30 mg/dL (1+) (A)     Leuk Esterase, UA Negative     Nitrite, UA Negative     Urobilinogen, UA 1.0 E.U./dL    Urinalysis, Microscopic Only - Urine, Clean Catch [[id number]]  (Abnormal) Collected:  03/08/18 2107    Specimen:  Urine from Urine, Clean Catch Updated:  03/08/18 2123     RBC, UA 3-5 (A) /HPF      WBC, UA 0-2 /HPF      Bacteria, UA None Seen /HPF      Squamous Epithelial Cells, UA 0-2 /HPF      Hyaline Casts, UA 7-12 /LPF      Methodology Automated Microscopy    POC Glucose Once [[id number]]  (Abnormal) Collected:  03/08/18 2057    Specimen:  Blood Updated:  03/08/18 2059     Glucose 360 (H) mg/dL     Narrative:       Meter: [id number] : [id number] Fredy Ventura    Calcium, Ionized [[id number]]  (Normal) Collected:  03/08/18 2013    Specimen:  Blood from Arm, Left Updated:  03/08/18 2044     Ionized Calcium 1.29 mmol/L      Ionized Calcium 5.2 mg/dL     Basic Metabolic Panel [[id number]]  (Abnormal) Collected:  03/08/18 2013    Specimen:  Blood from Arm, Left Updated:  03/08/18 2042     Glucose 325 (H) mg/dL      BUN 19 mg/dL      Creatinine 1.35 (H) mg/dL      Sodium 138 mmol/L      Potassium 3.8 mmol/L      Chloride 82 (L) mmol/L      CO2 13.0 (L) mmol/L      Calcium 9.8 mg/dL      eGFR Non African Amer 54 (L) mL/min/1.73      BUN/Creatinine Ratio 14.1     Anion Gap 43.0 mmol/L     Narrative:       GFR Normal >60  Chronic Kidney Disease <60  Kidney Failure <15    Phosphorus [[id number]]  (Normal) Collected:  03/08/18 2013    Specimen:  Blood from Arm, Left Updated:  03/08/18 2042     Phosphorus 4.0 mg/dL     Magnesium [[id number]]  (Normal) Collected:  03/08/18 2013    Specimen:  Blood from Arm, Left Updated:  03/08/18 2042     Magnesium 2.6 mg/dL     Troponin [[id number]]  (Normal) Collected:  03/08/18 2013    Specimen:  Blood from Arm, Left Updated:  03/08/18 2042     Troponin T 0.016  ng/mL     Narrative:       Troponin T Reference Ranges:  Less than 0.03 ng/mL:    Negative for AMI  0.03 to 0.09 ng/mL:      Indeterminant for AMI  Greater than 0.09 ng/mL: Positive for AMI    POC Glucose Once [[id number]]  (Abnormal) Collected:  03/08/18 1922    Specimen:  Blood Updated:  03/08/18 1925     Glucose 405 (H) mg/dL     Narrative:       Treated Patient Meter: [id number] : [id number] Vasquez Pate RN    Ethanol [[id number]] Collected:  03/08/18 1646    Specimen:  Blood Updated:  03/08/18 1920     Ethanol <10 mg/dL      Ethanol % <0.010 %     CBC & Differential [[id number]] Collected:  03/08/18 1646    Specimen:  Blood Updated:  03/08/18 1750    Narrative:       The following orders were created for panel order CBC & Differential.  Procedure                               Abnormality         Status                     ---------                               -----------         ------                     Scan Slide[[id number]]                                       Final result               CBC Auto Differential[[id number]]        Abnormal            Final result                 Please view results for these tests on the individual orders.    Scan Slide [[id number]] Collected:  03/08/18 1646    Specimen:  Blood Updated:  03/08/18 1750     Crenated RBC's Slight/1+     Microcytes Mod/2+     RBC Fragments Slight/1+     WBC Morphology Normal     Platelet Morphology Normal    CBC Auto Differential [[id number]]  (Abnormal) Collected:  03/08/18 1646    Specimen:  Blood Updated:  03/08/18 1750     WBC 10.85 (H) 10*3/mm3      RBC 3.67 (L) 10*6/mm3      Hemoglobin 13.5 (L) g/dL      Hematocrit 40.1 (L) %      .3 (H) fL      MCH 36.8 (H) pg      MCHC 33.7 g/dL      RDW 13.1 %      RDW-SD 52.2 fl      MPV 11.2 fL      Platelets 304 10*3/mm3      Neutrophil % 72.2 %      Lymphocyte % 17.1 (L) %      Monocyte % 10.0 %      Eosinophil % 0.0 (L) %      Basophil % 0.2 %      Immature Grans % 0.5 %      Neutrophils,  Absolute 7.85 10*3/mm3      Lymphocytes, Absolute 1.85 10*3/mm3      Monocytes, Absolute 1.08 10*3/mm3      Eosinophils, Absolute 0.00 10*3/mm3      Basophils, Absolute 0.02 10*3/mm3      Immature Grans, Absolute 0.05 (H) 10*3/mm3     [facility] Draw [[id number]] Collected:  03/08/18 1646    Specimen:  Blood Updated:  03/08/18 1746    Narrative:       The following orders were created for panel order [facility] Draw.  Procedure                               Abnormality         Status                     ---------                               -----------         ------                     Light Blue Top[[id number]]                                   Final result               Green Top (Gel)[[id number]]                                  Final result               Lavender Top[[id number]]                                     Final result               Gold Top - SST[[id number]]                                   Final result                 Please view results for these tests on the individual orders.    Light Blue Top [[id number]] Collected:  03/08/18 1646    Specimen:  Blood Updated:  03/08/18 1746     Extra Tube hold for add-on     Comment: Auto resulted       Green Top (Gel) [[id number]] Collected:  03/08/18 1646    Specimen:  Blood Updated:  03/08/18 1746     Extra Tube Hold for add-ons.     Comment: Auto resulted.       Lavender Top [[id number]] Collected:  03/08/18 1646    Specimen:  Blood Updated:  03/08/18 1746     Extra Tube hold for add-on     Comment: Auto resulted       Gold Top - SST [[id number]] Collected:  03/08/18 1646    Specimen:  Blood Updated:  03/08/18 1746     Extra Tube Hold for add-ons.     Comment: Auto resulted.       Comprehensive Metabolic Panel [[id number]]  (Abnormal) Collected:  03/08/18 1646    Specimen:  Blood Updated:  03/08/18 1730     Glucose 328 (H) mg/dL      BUN 19 mg/dL      Creatinine 1.39 (H) mg/dL      Sodium 136 mmol/L      Potassium 3.3 (L) mmol/L      Chloride 82 (L) mmol/L      CO2 12.6  (L) mmol/L      Calcium 9.7 mg/dL      Total Protein 7.1 g/dL      Albumin 4.30 g/dL      ALT (SGPT) 18 U/L      AST (SGOT) 15 U/L      Alkaline Phosphatase 84 U/L      Total Bilirubin 1.7 (H) mg/dL      eGFR Non African Amer 52 (L) mL/min/1.73      Globulin 2.8 gm/dL      A/G Ratio 1.5 g/dL      BUN/Creatinine Ratio 13.7     Anion Gap 41.4 mmol/L     Lipase [[id number]]  (Abnormal) Collected:  03/08/18 1646    Specimen:  Blood Updated:  03/08/18 1730     Lipase 64 (H) U/L     Lactic Acid, Plasma [[id number]]  (Normal) Collected:  03/08/18 1646    Specimen:  Blood Updated:  03/08/18 1709     Lactate 1.5 mmol/L         Imaging Results (last 72 hours)     Procedure Component Value Units Date/Time    XR Chest 1 View [[id number]] Collected:  03/08/18 1938     Updated:  03/08/18 1942    Narrative:       EMERGENCY PORTABLE CHEST SINGLE VIEW 19:45     HISTORY: 57-year-old male with shortness of breath, tobacco abuse and  diabetes     COMPARISON: None available     FINDINGS:  1. Negative acute portable chest, no evidence of an active intrathoracic  process nor other significant abnormality.     This report was finalized on 3/8/2018 7:39 PM by Dr. Zeb Davis MD.             Medication Review: done      Current Facility-Administered Medications:   •  acetaminophen (TYLENOL) tablet 650 mg, 650 mg, Oral, Q4H PRN, Mahamed Tolbert MD  •  dextrose (D50W) solution 25 g, 25 g, Intravenous, Q15 Min PRN, Hammad Roman MD, Stopped at 03/11/18 1158  •  dextrose (D5W) 5 % infusion 30 mL, 30 mL, Intravenous, PRN, Hammad Roman MD  •  dextrose (GLUTOSE) oral gel 15 g, 15 g, Oral, Q15 Min PRN, Hammad Roman MD, 15 g at 03/11/18 1126  •  enoxaparin (LOVENOX) syringe 40 mg, 40 mg, Subcutaneous, Nightly, Rodney Diaz MD, 40 mg at 03/10/18 2100  •  folic acid (FOLVITE) tablet 1 mg, 1 mg, Oral, Daily, Mack Huertas MD, 1 mg at 03/11/18 0850  •  glucagon (human recombinant) (GLUCAGEN DIAGNOSTIC) injection 1 mg, 1 mg,  Subcutaneous, PRN, Hammad Roman MD  •  insulin aspart (novoLOG) injection 0-4 Units, 0-4 Units, Subcutaneous, 4x Daily AC & at Bedtime, Hammad Roman MD  •  insulin aspart (novoLOG) injection 4 Units, 4 Units, Subcutaneous, TID With Meals, Hammad Roman MD  •  insulin detemir (LEVEMIR) injection 15 Units, 15 Units, Subcutaneous, QAM, Hammad Roman MD, 15 Units at 03/11/18 0854  •  insulin detemir (LEVEMIR) injection 8 Units, 8 Units, Subcutaneous, Nightly, Hammad Roman MD  •  LORazepam (ATIVAN) injection 1 mg, 1 mg, Intravenous, Q6H PRN, Mahamed Tolbert MD  •  LORazepam (ATIVAN) tablet 1 mg, 1 mg, Oral, Q6H PRN, Mahamed Tolbert MD  •  Magnesium Sulfate 2 gram Bolus, followed by 8 gram infusion (total Mg dose 10 grams)- Mg less than or equal to 1mg/dL, 2 g, Intravenous, PRN **OR** Magnesium Sulfate 6 gram Infusion (2 gm x 3) -Mg 1.1 -1.5 mg/dL, 2 g, Intravenous, PRN **OR** magnesium sulfate 4 gram infusion- Mg 1.6-1.9 mg/dL, 4 g, Intravenous, PRN, Mack Huertas MD  •  multivitamin (THERAGRAN) tablet 1 tablet, 1 tablet, Oral, Daily, Mahamed Tolbert MD, 1 tablet at 03/11/18 0850  •  ondansetron (ZOFRAN) tablet 4 mg, 4 mg, Oral, Q6H PRN **OR** ondansetron ODT (ZOFRAN-ODT) disintegrating tablet 4 mg, 4 mg, Oral, Q6H PRN **OR** ondansetron (ZOFRAN) injection 4 mg, 4 mg, Intravenous, Q6H PRN, Mahamed Tolbert MD, 4 mg at 03/09/18 2214  •  potassium & sodium phosphates (PHOS-NAK) 280-160-250 MG packet - for Phosphorus less than 1.25 mg/dL, 2 packet, Oral, Q6H PRN, 2 packet at 03/10/18 0118 **OR** potassium & sodium phosphates (PHOS-NAK) 280-160-250 MG packet - for Phosphorus 1.25 - 2.1 mg/dL, 2 packet, Oral, Once PRN, Rodney Diaz MD, 2 packet at 03/09/18 1232  •  potassium phosphate 45 mmol in sodium chloride 0.9 % 500 mL infusion, 45 mmol, Intravenous, PRN, Last Rate: 62.5 mL/hr at 03/10/18 0936, 45 mmol at 03/10/18 0936 **OR** potassium phosphate 30 mmol in sodium chloride 0.9 % 250 mL infusion, 30  mmol, Intravenous, PRN **OR** potassium phosphate 15 mmol in sodium chloride 0.9 % 100 mL infusion, 15 mmol, Intravenous, PRN **OR** sodium phosphates 45 mmol in sodium chloride 0.9 % 500 mL IVPB, 45 mmol, Intravenous, PRN **OR** sodium phosphates 30 mmol in sodium chloride 0.9 % 250 mL IVPB, 30 mmol, Intravenous, PRN **OR** sodium phosphates 15 mmol in sodium chloride 0.9 % 250 mL IVPB, 15 mmol, Intravenous, PRN, Mack Huertas MD  •  promethazine (PHENERGAN) injection 12.5 mg, 12.5 mg, Intravenous, Q6H PRN, Bandar Choudhary MD, 12.5 mg at 03/10/18 0245  •  sodium chloride 0.9 % flush 1-10 mL, 1-10 mL, Intravenous, PRN, Mahamed Tolbert MD  •  sodium chloride 0.9 % flush 10 mL, 10 mL, Intravenous, PRN, Jose Vásquez MD  •  thiamine (B-1) 100 mg in sodium chloride 0.9 % 100 mL IVPB, 100 mg, Intravenous, Daily, Mack Huertas MD, Last Rate: 200 mL/hr at 03/11/18 0850, 100 mg at 03/11/18 0850    Assessment/Plan     Active Hospital Problems (** Indicates Principal Problem)    Diagnosis Date Noted   • **Diabetic ketoacidosis without coma associated with type 1 diabetes mellitus [E10.10] 03/08/2018   • Tobacco abuse [Z72.0] 03/09/2018   • Alcohol abuse [F10.10] 03/09/2018   • Insurance coverage problems [Z59.8] 03/09/2018   • Bilateral carpal tunnel syndrome [G56.03] 03/09/2018   • Type 1 diabetes mellitus [E10.9] 03/08/2018      Resolved Hospital Problems    Diagnosis Date Noted Date Resolved   No resolved problems to display.     Type 1 diabetes mellitus  Decrease levemir to 12 units Q am  Decrease levemir to 6 units Q pm.   Decrease novolog 4 units tid ac  Change ssi to custom low ssi tid ac and hs.   Continue D5 as needed for low BG.  Discussed the plan with RN.     TSH - wnl.     Hyperlipidemia   LDL - mildly elevated, would consider lipitor 20 mg po daily.     I am covering the pt over the week end and pt will be seen by   on Monday.     19 minutes out of 35 minutes face to face spent on  "floor managing care, discussing plan with nursing and counseling patient/family on treatment options, side effects of the medications.        Hammad Roman MD.  03/11/18  1:33 PM      EMR Dragon / transcription disclaimer:    \"Dictated utilizing Dragon dictation\".     Electronically signed by Hammad Roman MD at 3/11/2018  1:55 PM       Consult Notes (last 24 hours) (Notes from 3/11/2018 12:01 PM through 3/12/2018 12:01 PM)     No notes of this type exist for this encounter.        "

## 2018-03-12 NOTE — PLAN OF CARE
Problem: Patient Care Overview  Goal: Plan of Care Review  Outcome: Ongoing (interventions implemented as appropriate)   03/12/18 8358   Coping/Psychosocial   Plan of Care Reviewed With patient   OTHER   Outcome Summary pt presents with impaired functional mobility and balance deficit, he will benefit from PT to address, pt moving fairly well with mild gait impaiment, rec 1-2 more PT visits to work on high level gait activities

## 2018-03-13 LAB
ANION GAP SERPL CALCULATED.3IONS-SCNC: 9.9 MMOL/L
BASOPHILS # BLD AUTO: 0.03 10*3/MM3 (ref 0–0.2)
BASOPHILS NFR BLD AUTO: 0.7 % (ref 0–1.5)
BUN BLD-MCNC: 9 MG/DL (ref 6–20)
BUN/CREAT SERPL: 13.8 (ref 7–25)
CALCIUM SPEC-SCNC: 8.3 MG/DL (ref 8.6–10.5)
CHLORIDE SERPL-SCNC: 98 MMOL/L (ref 98–107)
CO2 SERPL-SCNC: 30.1 MMOL/L (ref 22–29)
CREAT BLD-MCNC: 0.65 MG/DL (ref 0.76–1.27)
DEPRECATED RDW RBC AUTO: 49.5 FL (ref 37–54)
EOSINOPHIL # BLD AUTO: 0.15 10*3/MM3 (ref 0–0.7)
EOSINOPHIL NFR BLD AUTO: 3.4 % (ref 0.3–6.2)
ERYTHROCYTE [DISTWIDTH] IN BLOOD BY AUTOMATED COUNT: 12.4 % (ref 11.5–14.5)
GFR SERPL CREATININE-BSD FRML MDRD: 126 ML/MIN/1.73
GLUCOSE BLD-MCNC: 191 MG/DL (ref 65–99)
GLUCOSE BLDC GLUCOMTR-MCNC: 159 MG/DL (ref 70–130)
GLUCOSE BLDC GLUCOMTR-MCNC: 176 MG/DL (ref 70–130)
GLUCOSE BLDC GLUCOMTR-MCNC: 208 MG/DL (ref 70–130)
GLUCOSE BLDC GLUCOMTR-MCNC: 262 MG/DL (ref 70–130)
GLUCOSE BLDC GLUCOMTR-MCNC: 86 MG/DL (ref 70–130)
HCT VFR BLD AUTO: 29.8 % (ref 40.4–52.2)
HGB BLD-MCNC: 9.9 G/DL (ref 13.7–17.6)
IMM GRANULOCYTES # BLD: 0 10*3/MM3 (ref 0–0.03)
IMM GRANULOCYTES NFR BLD: 0 % (ref 0–0.5)
LYMPHOCYTES # BLD AUTO: 1.99 10*3/MM3 (ref 0.9–4.8)
LYMPHOCYTES NFR BLD AUTO: 45.1 % (ref 19.6–45.3)
MCH RBC QN AUTO: 36.7 PG (ref 27–32.7)
MCHC RBC AUTO-ENTMCNC: 33.2 G/DL (ref 32.6–36.4)
MCV RBC AUTO: 110.4 FL (ref 79.8–96.2)
MONOCYTES # BLD AUTO: 0.82 10*3/MM3 (ref 0.2–1.2)
MONOCYTES NFR BLD AUTO: 18.6 % (ref 5–12)
NEUTROPHILS # BLD AUTO: 1.42 10*3/MM3 (ref 1.9–8.1)
NEUTROPHILS NFR BLD AUTO: 32.2 % (ref 42.7–76)
PLATELET # BLD AUTO: 310 10*3/MM3 (ref 140–500)
PMV BLD AUTO: 10.3 FL (ref 6–12)
POTASSIUM BLD-SCNC: 3.7 MMOL/L (ref 3.5–5.2)
RBC # BLD AUTO: 2.7 10*6/MM3 (ref 4.6–6)
SODIUM BLD-SCNC: 138 MMOL/L (ref 136–145)
WBC NRBC COR # BLD: 4.41 10*3/MM3 (ref 4.5–10.7)

## 2018-03-13 PROCEDURE — 63710000001 INSULIN ASPART PER 5 UNITS: Performed by: INTERNAL MEDICINE

## 2018-03-13 PROCEDURE — 25010000002 ENOXAPARIN PER 10 MG: Performed by: INTERNAL MEDICINE

## 2018-03-13 PROCEDURE — 93010 ELECTROCARDIOGRAM REPORT: CPT | Performed by: INTERNAL MEDICINE

## 2018-03-13 PROCEDURE — 99231 SBSQ HOSP IP/OBS SF/LOW 25: CPT | Performed by: INTERNAL MEDICINE

## 2018-03-13 PROCEDURE — 80048 BASIC METABOLIC PNL TOTAL CA: CPT | Performed by: INTERNAL MEDICINE

## 2018-03-13 PROCEDURE — 93005 ELECTROCARDIOGRAM TRACING: CPT | Performed by: HOSPITALIST

## 2018-03-13 PROCEDURE — 85025 COMPLETE CBC W/AUTO DIFF WBC: CPT | Performed by: HOSPITALIST

## 2018-03-13 PROCEDURE — 82962 GLUCOSE BLOOD TEST: CPT

## 2018-03-13 PROCEDURE — 97110 THERAPEUTIC EXERCISES: CPT

## 2018-03-13 RX ADMIN — Medication 1 TABLET: at 08:45

## 2018-03-13 RX ADMIN — INSULIN ASPART 4 UNITS: 100 INJECTION, SOLUTION INTRAVENOUS; SUBCUTANEOUS at 12:11

## 2018-03-13 RX ADMIN — FOLIC ACID 1 MG: 1 TABLET ORAL at 08:45

## 2018-03-13 RX ADMIN — INSULIN ASPART 1 UNITS: 100 INJECTION, SOLUTION INTRAVENOUS; SUBCUTANEOUS at 12:11

## 2018-03-13 RX ADMIN — INSULIN ASPART 1 UNITS: 100 INJECTION, SOLUTION INTRAVENOUS; SUBCUTANEOUS at 07:56

## 2018-03-13 RX ADMIN — ENOXAPARIN SODIUM 40 MG: 40 INJECTION SUBCUTANEOUS at 20:01

## 2018-03-13 RX ADMIN — INSULIN ASPART 4 UNITS: 100 INJECTION, SOLUTION INTRAVENOUS; SUBCUTANEOUS at 07:56

## 2018-03-13 RX ADMIN — Medication 100 MG: at 08:45

## 2018-03-13 NOTE — CONSULTS
"Diabetes Education  Assessment/Teaching    Patient Name:  Juan Payne  YOB: 1960  MRN: [id number]  Admit Date:  3/8/2018      Assessment Date:  3/13/2018  Flowsheet Row Most Recent Value   General Information    Referral From: MD arriaga   Height 172.7 cm (67.99\")   Height Method Stated   Weight 63.5 kg (140 lb)   Weight Method Stated   Pregnancy Assessment   Diabetes History   What type of diabetes do you have? Type 1   Length of Diabetes Diagnosis 10 + years [27 years]   Do you test your blood sugar at home? yes   Frequency of checks 4-5 x/ week   Have you had low blood sugar? (<70mg/dl) yes   How often do you have low blood sugar? occasionally [Pt states couple of times a week.  Pt states takes insulin and falls asleep before eating.  ]   Education Preferences   Barriers to Learning other (comment) [None noted]   Nutrition Information   Assessment Topics   Healthy Eating - Assessment Needs education   Taking Medication - Assessment Needs education   Monitoring - Assessment Needs education   DM Goals   Taking Medication - Goal Tomorrow   Reducing Risk - Goal 0-30 days from discharge   Contact Plan Follow-up medical care          Flowsheet Row Most Recent Value   DM Education Needs   Meter Has own   Blood Glucose Target Range -- [Pt states only monitors 4-5 week.  Discussed various CGMs and encourage pt to follow up with endo.  ]   Medication -- [Encouraged to take insulin as he eats instead of taking it before he eats to prevent hypo as pt states he falls asleep before eating.  ]   Reducing Risks A1C testing [A1c 8%]   Healthy Eating Other (comment) [Encouraged pt to follow up and learn carb counting and follow up with endo to get ICR.  ]   Healthy Coping Appropriate   Discharge Plan Home, Follow-up with endocrinolgoist [Pt states sees Dr. Felipe]   Motivation Engaged   Teaching Method Explanation, Discussion   Patient Response Verbalized understanding            Other Comments:  "         Electronically signed by:  Deion Wei RN  03/13/18 11:25 AM

## 2018-03-13 NOTE — PROGRESS NOTES
[facility]     LOS: 5 days     Subjective:  He denies nausea or vomiting  Eating well  ambulating  No cp  No soa        Objective:    Vital Signs:  Temp:  [98 °F (36.7 °C)-98.8 °F (37.1 °C)] 98.3 °F (36.8 °C)  Heart Rate:  [70-82] 70  Resp:  [16-18] 16  BP: (109-124)/(67-78) 110/69    SpO2:  [96 %-98 %] 98 %  on   ;   Device (Oxygen Therapy): room air  Body mass index is 21.29 kg/m².    Physical Exam   Constitutional: He appears well-developed and well-nourished. No distress.   HENT:   Head: Normocephalic and atraumatic.   Nose: Nose normal.   Mouth/Throat: Oropharynx is clear and moist.   Eyes: Conjunctivae and EOM are normal. No scleral icterus.   Neck: No JVD present. No tracheal deviation present. No thyromegaly present.   Cardiovascular: Normal rate and regular rhythm.    Murmur heard.  2/6, systolic   Pulmonary/Chest: Effort normal and breath sounds normal. No stridor. No respiratory distress. He has no wheezes.   Abdominal: Soft. Bowel sounds are normal. He exhibits no distension and no mass. There is no tenderness. There is no rebound and no guarding.   Musculoskeletal: He exhibits no edema, tenderness or deformity.   Lymphadenopathy:     He has no cervical adenopathy.   Neurological: He is alert. No cranial nerve deficit.   Skin: Skin is warm and dry. No rash noted. He is not diaphoretic.   Psychiatric: He has a normal mood and affect. His behavior is normal.       Results Review:    Glucose   Date Value Ref Range Status   03/13/2018 191 (H) 65 - 99 mg/dL Final   03/12/2018 142 (H) 65 - 99 mg/dL Final   03/12/2018 212 (H) 65 - 99 mg/dL Final   03/11/2018 62 (L) 65 - 99 mg/dL Final   03/11/2018 122 (H) 65 - 99 mg/dL Final   03/11/2018 125 (H) 65 - 99 mg/dL Final       Results from last 7 days  Lab Units 03/13/18  0432   WBC 10*3/mm3 4.41*   HEMOGLOBIN g/dL 9.9*   HEMATOCRIT % 29.8*   PLATELETS 10*3/mm3 310       Results from last 7 days  Lab Units 03/13/18  0430  03/12/18  0603    SODIUM mmol/L 138  < > 136   POTASSIUM mmol/L 3.7  < > 4.0   CHLORIDE mmol/L 98  < > 97*   CO2 mmol/L 30.1*  < > 28.1   BUN mg/dL 9  < > 5*   CREATININE mg/dL 0.65*  < > 0.57*   CALCIUM mg/dL 8.3*  < > 8.5*   BILIRUBIN mg/dL  --   --  0.6   ALK PHOS U/L  --   --  57   ALT (SGPT) U/L  --   --  16   AST (SGOT) U/L  --   --  39   GLUCOSE mg/dL 191*  < > 212*   < > = values in this interval not displayed.        Results from last 7 days  Lab Units 03/10/18  0541   MAGNESIUM mg/dL 2.1       Results from last 7 days  Lab Units 03/10/18  0541 03/08/18 2013   TROPONIN T ng/mL 0.014 0.016     Cultures:  Blood Culture   Date Value Ref Range Status   03/10/2018 No growth at 24 hours  Preliminary   03/10/2018 No growth at 24 hours  Preliminary       I have reviewed daily medications and changes in CPOE    Scheduled meds    enoxaparin 40 mg Subcutaneous Nightly   insulin aspart 0-4 Units Subcutaneous 4x Daily AC & at Bedtime   insulin aspart 4 Units Subcutaneous TID With Meals   insulin detemir 12 Units Subcutaneous QAM   insulin detemir 5 Units Subcutaneous Nightly   multivitamin 1 tablet Oral Daily          PRN meds  •  acetaminophen  •  dextrose  •  dextrose  •  dextrose  •  glucagon (human recombinant)  •  LORazepam  •  LORazepam  •  magnesium sulfate **OR** magnesium sulfate **OR** magnesium sulfate  •  ondansetron **OR** ondansetron ODT **OR** ondansetron  •  potassium & sodium phosphates **OR** potassium & sodium phosphates  •  potassium phosphate infusion greater than 15 mMoles **OR** potassium phosphate infusion greater than 15 mMoles **OR** potassium phosphate **OR** sodium phosphate IVPB **OR** sodium phosphate IVPB **OR** sodium phosphate IVPB  •  promethazine  •  sodium chloride  •  sodium chloride      Principal Problem:    Diabetic ketoacidosis without coma associated with type 1 diabetes mellitus  Active Problems:    Type 1 diabetes mellitus    Tobacco abuse    Alcohol abuse    Insurance coverage problems     Bilateral carpal tunnel syndrome    Microalbuminuria        Assessment/Plan:  - DKA: A1c 8.0. Off gtt now. Levemir, Novolog, SSI. Endocrinology and Pulmonology following.     - Macrocytosis: Likely from Etoh. TSH 0.538; b12 834 and folate 9.5  -anemia- recheck in am and will need follow outpatient, likely related to dilution    - Alcohol Abuse: MVI Folate Thiamine. Ativan PRN.     - Severe Malnutrition: Phos replaced. Nutrition following.     -murmur-check echo    - Poor Compliance: CCP following with insurance issues.     - Disposition: likely d/c tomorrow if ok with endocrinology    Echo    >35 minutes spent, > 1/2 time spent counseling and coordination of care     Westley Barker MD  03/13/18  3:17 PM

## 2018-03-13 NOTE — PROGRESS NOTES
"  ENDOCRINE    Subjective   AND PLANS  Juan Payne is a 57 y.o. male.     Follow-up diabetes.    No nausea or vomiting.  Tolerated regular diet.  Fasting glucose 159.  Random glucose .  Continue Levemir 12 units every morning.  Increase Levemir to 5 units every evening.  Continue NovoLog 4 units with each meal plus as needed.    Urine microalbumin elevated.  Consider ACE inhibitor's period    Patient mildly anemic with normal iron and normal vitamin B-12.  Will defer workup to primary physician.    Objective   /78 (BP Location: Left arm, Patient Position: Lying)   Pulse 77   Temp 98 °F (36.7 °C) (Oral)   Resp 16   Ht 172.7 cm (67.99\")   Wt 63.5 kg (140 lb)   SpO2 98%   BMI 21.29 kg/m²   Physical Exam    Awake, alert, not in distress.  No rales or wheezes.  Regular heart rate and rhythm.  Abdomen soft, nontender.  No cyanosis or pedal edema.    Lab Results (last 24 hours)     Procedure Component Value Units Date/Time    POC Glucose Once [[id number]]  (Abnormal) Collected:  03/13/18 1130    Specimen:  Blood Updated:  03/13/18 1132     Glucose 176 (H) mg/dL     Narrative:       Meter: [id number] : [id number] Tigglyamanda DÍAZ    Blood Culture - Blood, Blood, Venous Line [[id number]]  (Normal) Collected:  03/10/18 0707    Specimen:  Blood from Blood, Venous Line Updated:  03/13/18 0831     Blood Culture No growth at 3 days    Blood Culture - Blood, Blood, Venous Line [[id number]]  (Normal) Collected:  03/10/18 0707    Specimen:  Blood from Blood, Venous Line Updated:  03/13/18 0831     Blood Culture No growth at 3 days    POC Glucose Once [[id number]]  (Abnormal) Collected:  03/13/18 0734    Specimen:  Blood Updated:  03/13/18 0736     Glucose 208 (H) mg/dL     Narrative:       Meter: [id number] : [id number] Tigglyt NA    CBC & Differential [[id number]] Collected:  03/13/18 0432    Specimen:  Blood Updated:  03/13/18 0609    Narrative:       The following orders were created for panel order " CBC & Differential.  Procedure                               Abnormality         Status                     ---------                               -----------         ------                     Scan Slide[[id number]]                                                                  CBC Auto Differential[[id number]]        Abnormal            Final result                 Please view results for these tests on the individual orders.    CBC Auto Differential [[id number]]  (Abnormal) Collected:  03/13/18 0432    Specimen:  Blood Updated:  03/13/18 0609     WBC 4.41 (L) 10*3/mm3      RBC 2.70 (L) 10*6/mm3      Hemoglobin 9.9 (L) g/dL      Hematocrit 29.8 (L) %      .4 (H) fL      MCH 36.7 (H) pg      MCHC 33.2 g/dL      RDW 12.4 %      RDW-SD 49.5 fl      MPV 10.3 fL      Platelets 310 10*3/mm3      Neutrophil % 32.2 (L) %      Lymphocyte % 45.1 %      Monocyte % 18.6 (H) %      Eosinophil % 3.4 %      Basophil % 0.7 %      Immature Grans % 0.0 %      Neutrophils, Absolute 1.42 (L) 10*3/mm3      Lymphocytes, Absolute 1.99 10*3/mm3      Monocytes, Absolute 0.82 10*3/mm3      Eosinophils, Absolute 0.15 10*3/mm3      Basophils, Absolute 0.03 10*3/mm3      Immature Grans, Absolute 0.00 10*3/mm3     Basic Metabolic Panel [[id number]]  (Abnormal) Collected:  03/13/18 0430    Specimen:  Blood Updated:  03/13/18 0608     Glucose 191 (H) mg/dL      BUN 9 mg/dL      Creatinine 0.65 (L) mg/dL      Sodium 138 mmol/L      Potassium 3.7 mmol/L      Chloride 98 mmol/L      CO2 30.1 (H) mmol/L      Calcium 8.3 (L) mg/dL      eGFR Non African Amer 126 mL/min/1.73      BUN/Creatinine Ratio 13.8     Anion Gap 9.9 mmol/L     Narrative:       GFR Normal >60  Chronic Kidney Disease <60  Kidney Failure <15    POC Glucose Once [[id number]]  (Abnormal) Collected:  03/13/18 0552    Specimen:  Blood Updated:  03/13/18 0555     Glucose 159 (H) mg/dL     Narrative:       Meter: [id number] : [id number] DÍAZ    POC Glucose Once  [[id number]]  (Abnormal) Collected:  03/12/18 2102    Specimen:  Blood Updated:  03/12/18 2127     Glucose 270 (H) mg/dL     Narrative:       Meter: [id number] : [id number] Tutu Fredy BRE    POC Glucose Once [[id number]]  (Abnormal) Collected:  03/12/18 1646    Specimen:  Blood Updated:  03/12/18 1708     Glucose 172 (H) mg/dL     Narrative:       Meter: [id number] : [id number] Neal CASEY RN    Basic Metabolic Panel [[id number]]  (Abnormal) Collected:  03/12/18 1510    Specimen:  Blood from Arm, Right Updated:  03/12/18 1605     Glucose 142 (H) mg/dL      BUN 7 mg/dL      Creatinine 0.75 (L) mg/dL      Sodium 137 mmol/L      Potassium 3.8 mmol/L      Chloride 98 mmol/L      CO2 32.2 (H) mmol/L      Calcium 8.5 (L) mg/dL      eGFR Non African Amer 107 mL/min/1.73      BUN/Creatinine Ratio 9.3     Anion Gap 6.8 mmol/L     Narrative:       GFR Normal >60  Chronic Kidney Disease <60  Kidney Failure <15    Microalbumin / Creatinine Urine Ratio - [[id number]] Collected:  03/12/18 1359    Specimen:  Urine Updated:  03/12/18 1528     Microalbumin/Creatinine Ratio 65.1 mg/g      Creatinine, Urine 55.3 mg/dL      Microalbumin, Urine 3.6 mg/L     POC Glucose Once [[id number]]  (Abnormal) Collected:  03/12/18 1511    Specimen:  Blood Updated:  03/12/18 1515     Glucose 139 (H) mg/dL     Narrative:       Meter: [id number] : [id number] Neal CASEY RN            Principal Problem:    Diabetic ketoacidosis without coma associated with type 1 diabetes mellitus  Active Problems:    Type 1 diabetes mellitus    Tobacco abuse    Alcohol abuse    Insurance coverage problems    Bilateral carpal tunnel syndrome    Microalbuminuria    Insulin therapy as discussed above.  Suggests ACE inhibitor.  Continue multivitamins.

## 2018-03-13 NOTE — THERAPY TREATMENT NOTE
Inpatient Rehabilitation - Physical Therapy Treatment Note  [facility]     Patient Name: Juan Payne  : 1960  MRN: [id number]  Today's Date: 3/13/2018  Onset of Illness/Injury or Date of Surgery: 18  Date of Referral to PT: 18  Referring Physician: Dr Fernandez    Admit Date: 3/8/2018    Visit Dx:    ICD-10-CM ICD-9-CM   1. Diabetic ketoacidosis without coma associated with type 1 diabetes mellitus E10.10 250.11   2. Impaired functional mobility and activity tolerance Z74.09 V49.89     Patient Active Problem List   Diagnosis   • Diabetic ketoacidosis without coma associated with type 1 diabetes mellitus   • Type 1 diabetes mellitus   • Tobacco abuse   • Alcohol abuse   • Insurance coverage problems   • Bilateral carpal tunnel syndrome   • Microalbuminuria       Therapy Treatment    Therapy Treatment / Health Promotion    Treatment Time/Intention  Discipline: physical therapy assistant  Document Type: therapy note (daily note)  Subjective Information: no complaints  Mode of Treatment: physical therapy  Patient Effort: good  Existing Precautions/Restrictions: fall    Vitals/Pain/Safety  Vital Signs  O2 Delivery Pre Treatment: room air  Positioning and Restraints  Pre-Treatment Position: in bed  Post Treatment Position: bed  In Bed: notified nsg, fowlers, call light within reach, encouraged to call for assist    Mobility,ADL,Motor, Modality  Bed Mobility Assessment/Treatment  Bed Mobility Assessment/Treatment: bed mobility (all) activities  [place] Level (Bed Mobility): conditional independence  Supine-Sit [place] (Bed Mobility): conditional independence  Sit-Supine [place] (Bed Mobility): conditional independence  Transfer Assessment/Treatment  Transfer Assessment/Treatment: sit-stand transfer, stand-sit transfer  Sit-Stand Transfer  Sit-Stand [place] (Transfers): conditional independence  Stand-Sit Transfer  Stand-Sit [place] (Transfers): conditional independence  Gait/Stairs  Assessment/Training  Gait/Stairs Assessment/Training: gait/ambulation independence  Drifting Level (Gait): conditional independence  Distance in Feet (Gait): 350  Pattern (Gait): step-through, swing-through                 ROM/MMT             Sensory, Edema, Orthotics          Cognition, Communication, Swallow  Cognitive Assessment/Intervention- PT/OT  Orientation Status (Cognition): oriented x 4  Follows Commands (Cognition): WNL  Personal Safety Interventions: fall prevention program maintained, gait belt, muscle strengthening facilitated, nonskid shoes/slippers when out of bed    Outcome Summary               PT Rehab Goals     Row Name 03/12/18 1425             Transfer Goal 1 (PT)    Activity/Assistive Device (Transfer Goal 1, PT) sit-to-stand/stand-to-sit  -PC      Drifting Level/Cues Needed (Transfer Goal 1, PT) supervision required  -PC      Time Frame (Transfer Goal 1, PT) 1 week  -PC         Gait Training Goal 1 (PT)    Activity/Assistive Device (Gait Training Goal 1, PT) gait (walking locomotion)  -PC      Drifting Level (Gait Training Goal 1, PT) supervision required  -PC      Distance (Gait Goal 1, PT) 300 ft  -PC      Time Frame (Gait Training Goal 1, PT) 1 week  -PC        User Key  (r) = Recorded By, (t) = Taken By, (c) = Cosigned By    Initials Name Provider Type    PC Renetta Morris PT Physical Therapist          Physical Therapy Education     Title: PT OT SLP Therapies (Resolved)     Topic: Physical Therapy (Resolved)     Point: Mobility training (Resolved)    Learning Progress Summary     Learner Status Readiness Method Response Comment Documented by    Patient Done Acceptance JAIME BONNER DU   03/13/18 1501     Done Acceptance E,D LAURY LEWIS,KIERA   03/12/18 1435          Point: Home exercise program (Resolved)    Learning Progress Summary     Learner Status Readiness Method Response Comment Documented by    Patient Done Acceptance JAIME BONNER DU  CW 03/13/18 1501     Done Acceptance E,D  VU,DU,NR  PC 03/12/18 1435          Point: Body mechanics (Resolved)    Learning Progress Summary     Learner Status Readiness Method Response Comment Documented by    Patient Done Acceptance JAIME BONNER DU   03/13/18 1501     Done Acceptance ETA DU,NR  PC 03/12/18 1435          Point: Precautions (Resolved)    Learning Progress Summary     Learner Status Readiness Method Response Comment Documented by    Patient Done Acceptance E,LAURY BOWEN   03/13/18 1501     Done Acceptance E,D LAURY LEWIS,NR  PC 03/12/18 1435                      User Key     Initials Effective Dates Name Provider Type Discipline    PC 12/01/15 -  Renetta Morris, PT Physical Therapist PT     03/07/18 -  Donnie Crump, RODRIGUE Physical Therapy Assistant PT                    PT Recommendation and Plan  Anticipated Discharge Disposition (PT): home or self care  Planned Therapy Interventions (PT Eval): gait training, balance training, transfer training  Therapy Frequency (PT Clinical Impression): daily             Outcome Measures     Row Name 03/13/18 1500 03/12/18 1400          How much help from another person do you currently need...    Turning from your back to your side while in flat bed without using bedrails? 4  -CW 4  -PC     Moving from lying on back to sitting on the side of a flat bed without bedrails? 4  -CW 4  -PC     Moving to and from a bed to a chair (including a wheelchair)? 4  -CW 3  -PC     Standing up from a chair using your arms (e.g., wheelchair, bedside chair)? 4  -CW 3  -PC     Climbing 3-5 steps with a railing? 3  -CW 3  -PC     To walk in hospital room? 4  -CW 3  -PC     AM-PAC 6 Clicks Score 23  -CW 20  -PC        Functional Assessment    Outcome Measure Options AM-PAC 6 Clicks Basic Mobility (PT)  -CW AM-PAC 6 Clicks Basic Mobility (PT)  -PC       User Key  (r) = Recorded By, (t) = Taken By, (c) = Cosigned By    Initials Name Provider Type    PC Renetta Morris, PT Physical Therapist    CW Donnie Crump, PTA Physical  Therapy Assistant           Time Calculation:         PT Charges     Row Name 03/13/18 1503             Time Calculation    Start Time 1447  -CW      Stop Time 1503  -CW      Time Calculation (min) 16 min  -CW      PT Received On 03/13/18  -CW        User Key  (r) = Recorded By, (t) = Taken By, (c) = Cosigned By    Initials Name Provider Type    CW Dnonie Crump PTA Physical Therapy Assistant          Therapy Charges for Today     Code Description Service Date Service Provider Modifiers Qty    17469889384 HC PT THER PROC EA 15 MIN 3/13/2018 Donnie Crump PTA GP 1          PT G-Codes  Outcome Measure Options: AM-PAC 6 Clicks Basic Mobility (PT)    Donnie Crump PTA  3/13/2018

## 2018-03-13 NOTE — PLAN OF CARE
Problem: Patient Care Overview  Goal: Plan of Care Review  Outcome: Ongoing (interventions implemented as appropriate)   03/13/18 1400   Coping/Psychosocial   Plan of Care Reviewed With patient   OTHER   Outcome Summary c/o weakness, working with PT, met with DM educator, recd schd levemir and novolog   Plan of Care Review   Progress improving     Goal: Individualization and Mutuality  Outcome: Ongoing (interventions implemented as appropriate)    Goal: Discharge Needs Assessment  Outcome: Ongoing (interventions implemented as appropriate)    Goal: Interprofessional Rounds/Family Conf  Outcome: Ongoing (interventions implemented as appropriate)      Problem: Nutrition, Imbalanced: Inadequate Oral Intake (Adult)  Goal: Identify Related Risk Factors and Signs and Symptoms  Outcome: Outcome(s) achieved Date Met: 03/13/18    Goal: Improved Oral Intake  Outcome: Ongoing (interventions implemented as appropriate)    Goal: Prevent Further Weight Loss  Outcome: Ongoing (interventions implemented as appropriate)      Problem: Skin Injury Risk (Adult)  Goal: Identify Related Risk Factors and Signs and Symptoms  Outcome: Outcome(s) achieved Date Met: 03/13/18

## 2018-03-13 NOTE — PLAN OF CARE
Problem: Patient Care Overview (Adult)  Goal: Plan of Care Review  Outcome: Ongoing (interventions implemented as appropriate)   03/13/18 1502   Coping/Psychosocial Response Interventions   Plan Of Care Reviewed With patient   Patient Care Overview   Progress progress toward functional goals as expected   Outcome Evaluation   Outcome Summary/Follow up Plan Pt is CI for be dmobility, transfers, and amb with no AD

## 2018-03-13 NOTE — PLAN OF CARE
Problem: Patient Care Overview (Adult)  Goal: Plan of Care Review   03/13/18 0427   Coping/Psychosocial Response Interventions   Plan Of Care Reviewed With patient   Patient Care Overview   Progress progress toward functional goals as expected   Outcome Evaluation   Outcome Summary/Follow up Plan Pt appeared to have a restful night. No problems or complains noted.        Problem: Fall Risk (Adult)  Goal: Identify Related Risk Factors and Signs and Symptoms  Outcome: Ongoing (interventions implemented as appropriate)    Goal: Absence of Fall  Outcome: Ongoing (interventions implemented as appropriate)

## 2018-03-14 ENCOUNTER — APPOINTMENT (OUTPATIENT)
Dept: CARDIOLOGY | Facility: HOSPITAL | Age: 58
End: 2018-03-14
Attending: INTERNAL MEDICINE

## 2018-03-14 ENCOUNTER — APPOINTMENT (OUTPATIENT)
Dept: CARDIOLOGY | Facility: HOSPITAL | Age: 58
End: 2018-03-14
Attending: HOSPITALIST

## 2018-03-14 LAB
ANION GAP SERPL CALCULATED.3IONS-SCNC: 12.4 MMOL/L
AORTIC DIMENSIONLESS INDEX: 0.2 (DI)
BASOPHILS # BLD AUTO: 0.04 10*3/MM3 (ref 0–0.2)
BASOPHILS NFR BLD AUTO: 0.8 % (ref 0–1.5)
BH CV ECHO MEAS - AO MAX PG: 122 MMHG
BH CV ECHO MEAS - AO MEAN PG (FULL): 60 MMHG
BH CV ECHO MEAS - AO MEAN PG: 62 MMHG
BH CV ECHO MEAS - AO ROOT AREA (BSA CORRECTED): 1.5
BH CV ECHO MEAS - AO ROOT AREA: 5.3 CM^2
BH CV ECHO MEAS - AO ROOT DIAM: 2.6 CM
BH CV ECHO MEAS - AO V2 MAX: 509 CM/SEC
BH CV ECHO MEAS - AO V2 MEAN: 371 CM/SEC
BH CV ECHO MEAS - AO V2 VTI: 122 CM
BH CV ECHO MEAS - ASC AORTA: 3.2 CM
BH CV ECHO MEAS - AVA(I,A): 0.54 CM^2
BH CV ECHO MEAS - AVA(I,D): 0.54 CM^2
BH CV ECHO MEAS - BSA(HAYCOCK): 1.7 M^2
BH CV ECHO MEAS - BSA: 1.8 M^2
BH CV ECHO MEAS - BZI_BMI: 21.3 KILOGRAMS/M^2
BH CV ECHO MEAS - BZI_METRIC_HEIGHT: 172.7 CM
BH CV ECHO MEAS - BZI_METRIC_WEIGHT: 63.5 KG
BH CV ECHO MEAS - CONTRAST EF (2CH): 68.8 ML/M^2
BH CV ECHO MEAS - CONTRAST EF 4CH: 72.5 ML/M^2
BH CV ECHO MEAS - EDV(CUBED): 110.6 ML
BH CV ECHO MEAS - EDV(MOD-SP2): 64 ML
BH CV ECHO MEAS - EDV(MOD-SP4): 51 ML
BH CV ECHO MEAS - EDV(TEICH): 107.5 ML
BH CV ECHO MEAS - EF(CUBED): 54.2 %
BH CV ECHO MEAS - EF(MOD-SP2): 68.8 %
BH CV ECHO MEAS - EF(MOD-SP4): 72.5 %
BH CV ECHO MEAS - EF(TEICH): 45.9 %
BH CV ECHO MEAS - ESV(CUBED): 50.7 ML
BH CV ECHO MEAS - ESV(MOD-SP2): 20 ML
BH CV ECHO MEAS - ESV(MOD-SP4): 14 ML
BH CV ECHO MEAS - ESV(TEICH): 58.1 ML
BH CV ECHO MEAS - FS: 22.9 %
BH CV ECHO MEAS - IVS/LVPW: 0.94
BH CV ECHO MEAS - IVSD: 1.6 CM
BH CV ECHO MEAS - LAT PEAK E' VEL: 4 CM/SEC
BH CV ECHO MEAS - LV DIASTOLIC VOL/BSA (35-75): 29 ML/M^2
BH CV ECHO MEAS - LV MASS(C)D: 350.7 GRAMS
BH CV ECHO MEAS - LV MASS(C)DI: 199.7 GRAMS/M^2
BH CV ECHO MEAS - LV MEAN PG: 2 MMHG
BH CV ECHO MEAS - LV SYSTOLIC VOL/BSA (12-30): 8 ML/M^2
BH CV ECHO MEAS - LV V1 MAX: 87 CM/SEC
BH CV ECHO MEAS - LV V1 MEAN: 61.2 CM/SEC
BH CV ECHO MEAS - LV V1 VTI: 21 CM
BH CV ECHO MEAS - LVIDD: 4.8 CM
BH CV ECHO MEAS - LVIDS: 3.7 CM
BH CV ECHO MEAS - LVLD AP2: 7.2 CM
BH CV ECHO MEAS - LVLD AP4: 6.8 CM
BH CV ECHO MEAS - LVLS AP2: 6.4 CM
BH CV ECHO MEAS - LVLS AP4: 5.4 CM
BH CV ECHO MEAS - LVOT AREA (M): 3.1 CM^2
BH CV ECHO MEAS - LVOT AREA: 3.1 CM^2
BH CV ECHO MEAS - LVOT DIAM: 2 CM
BH CV ECHO MEAS - LVPWD: 1.7 CM
BH CV ECHO MEAS - MED PEAK E' VEL: 5 CM/SEC
BH CV ECHO MEAS - MV A DUR: 1.4 SEC
BH CV ECHO MEAS - MV A MAX VEL: 93.3 CM/SEC
BH CV ECHO MEAS - MV DEC SLOPE: 264 CM/SEC^2
BH CV ECHO MEAS - MV DEC TIME: 0.3 SEC
BH CV ECHO MEAS - MV E MAX VEL: 64.7 CM/SEC
BH CV ECHO MEAS - MV E/A: 0.69
BH CV ECHO MEAS - MV MEAN PG: 2 MMHG
BH CV ECHO MEAS - MV P1/2T MAX VEL: 96.4 CM/SEC
BH CV ECHO MEAS - MV P1/2T: 107 MSEC
BH CV ECHO MEAS - MV V2 MEAN: 62.7 CM/SEC
BH CV ECHO MEAS - MV V2 VTI: 38.3 CM
BH CV ECHO MEAS - MVA P1/2T LCG: 2.3 CM^2
BH CV ECHO MEAS - MVA(P1/2T): 2.1 CM^2
BH CV ECHO MEAS - MVA(VTI): 1.7 CM^2
BH CV ECHO MEAS - PA ACC SLOPE: 22 CM/SEC^2
BH CV ECHO MEAS - PA ACC TIME: 0.12 SEC
BH CV ECHO MEAS - PA MAX PG (FULL): 1 MMHG
BH CV ECHO MEAS - PA MAX PG: 3.2 MMHG
BH CV ECHO MEAS - PA PR(ACCEL): 23.7 MMHG
BH CV ECHO MEAS - PA V2 MAX: 89.6 CM/SEC
BH CV ECHO MEAS - PULM A REVS DUR: 0.8 SEC
BH CV ECHO MEAS - PULM A REVS VEL: 43.9 CM/SEC
BH CV ECHO MEAS - PULM DIAS VEL: 36.5 CM/SEC
BH CV ECHO MEAS - PULM S/D: 1.6
BH CV ECHO MEAS - PULM SYS VEL: 56.8 CM/SEC
BH CV ECHO MEAS - PVA(V,A): 3.4 CM^2
BH CV ECHO MEAS - PVA(V,D): 3.4 CM^2
BH CV ECHO MEAS - QP/QS: 0.89
BH CV ECHO MEAS - RAP SYSTOLE: 3 MMHG
BH CV ECHO MEAS - RV MAX PG: 2.2 MMHG
BH CV ECHO MEAS - RV MEAN PG: 1 MMHG
BH CV ECHO MEAS - RV V1 MAX: 74.4 CM/SEC
BH CV ECHO MEAS - RV V1 MEAN: 42.9 CM/SEC
BH CV ECHO MEAS - RV V1 VTI: 14.2 CM
BH CV ECHO MEAS - RVOT AREA: 4.2 CM^2
BH CV ECHO MEAS - RVOT DIAM: 2.3 CM
BH CV ECHO MEAS - RVSP: 36.2 MMHG
BH CV ECHO MEAS - SI(AO): 368.8 ML/M^2
BH CV ECHO MEAS - SI(CUBED): 34.1 ML/M^2
BH CV ECHO MEAS - SI(LVOT): 37.6 ML/M^2
BH CV ECHO MEAS - SI(MOD-SP2): 25.1 ML/M^2
BH CV ECHO MEAS - SI(MOD-SP4): 21.1 ML/M^2
BH CV ECHO MEAS - SI(TEICH): 28.1 ML/M^2
BH CV ECHO MEAS - SV(AO): 647.7 ML
BH CV ECHO MEAS - SV(CUBED): 59.9 ML
BH CV ECHO MEAS - SV(LVOT): 66 ML
BH CV ECHO MEAS - SV(MOD-SP2): 44 ML
BH CV ECHO MEAS - SV(MOD-SP4): 37 ML
BH CV ECHO MEAS - SV(RVOT): 59 ML
BH CV ECHO MEAS - SV(TEICH): 49.4 ML
BH CV ECHO MEAS - TAPSE (>1.6): 2 CM2
BH CV ECHO MEAS - TR MAX VEL: 288 CM/SEC
BH CV VAS BP RIGHT ARM: NORMAL MMHG
BH CV XLRA - RV BASE: 2.9 CM
BH CV XLRA - TDI S': 12 CM/SEC
BUN BLD-MCNC: 12 MG/DL (ref 6–20)
BUN/CREAT SERPL: 17.4 (ref 7–25)
CALCIUM SPEC-SCNC: 8.3 MG/DL (ref 8.6–10.5)
CHLORIDE SERPL-SCNC: 100 MMOL/L (ref 98–107)
CO2 SERPL-SCNC: 26.6 MMOL/L (ref 22–29)
CREAT BLD-MCNC: 0.69 MG/DL (ref 0.76–1.27)
DEPRECATED RDW RBC AUTO: 53.7 FL (ref 37–54)
E/E' RATIO: 13
EOSINOPHIL # BLD AUTO: 0.14 10*3/MM3 (ref 0–0.7)
EOSINOPHIL NFR BLD AUTO: 2.9 % (ref 0.3–6.2)
ERYTHROCYTE [DISTWIDTH] IN BLOOD BY AUTOMATED COUNT: 13.1 % (ref 11.5–14.5)
GFR SERPL CREATININE-BSD FRML MDRD: 117 ML/MIN/1.73
GLUCOSE BLD-MCNC: 225 MG/DL (ref 65–99)
GLUCOSE BLDC GLUCOMTR-MCNC: 105 MG/DL (ref 70–130)
GLUCOSE BLDC GLUCOMTR-MCNC: 117 MG/DL (ref 70–130)
GLUCOSE BLDC GLUCOMTR-MCNC: 163 MG/DL (ref 70–130)
GLUCOSE BLDC GLUCOMTR-MCNC: 191 MG/DL (ref 70–130)
GLUCOSE BLDC GLUCOMTR-MCNC: 269 MG/DL (ref 70–130)
HCT VFR BLD AUTO: 29.5 % (ref 40.4–52.2)
HGB BLD-MCNC: 9.6 G/DL (ref 13.7–17.6)
IMM GRANULOCYTES # BLD: 0.02 10*3/MM3 (ref 0–0.03)
IMM GRANULOCYTES NFR BLD: 0.4 % (ref 0–0.5)
LEFT ATRIUM VOLUME INDEX: 27 ML/M2
LV EF 2D ECHO EST: 73 %
LYMPHOCYTES # BLD AUTO: 2.25 10*3/MM3 (ref 0.9–4.8)
LYMPHOCYTES NFR BLD AUTO: 46.6 % (ref 19.6–45.3)
MAXIMAL PREDICTED HEART RATE: 161 BPM
MCH RBC QN AUTO: 36.5 PG (ref 27–32.7)
MCHC RBC AUTO-ENTMCNC: 32.5 G/DL (ref 32.6–36.4)
MCV RBC AUTO: 112.2 FL (ref 79.8–96.2)
MONOCYTES # BLD AUTO: 1 10*3/MM3 (ref 0.2–1.2)
MONOCYTES NFR BLD AUTO: 20.7 % (ref 5–12)
NEUTROPHILS # BLD AUTO: 1.38 10*3/MM3 (ref 1.9–8.1)
NEUTROPHILS NFR BLD AUTO: 28.6 % (ref 42.7–76)
PLATELET # BLD AUTO: 385 10*3/MM3 (ref 140–500)
PMV BLD AUTO: 10.1 FL (ref 6–12)
POTASSIUM BLD-SCNC: 3.7 MMOL/L (ref 3.5–5.2)
RBC # BLD AUTO: 2.63 10*6/MM3 (ref 4.6–6)
SODIUM BLD-SCNC: 139 MMOL/L (ref 136–145)
STRESS TARGET HR: 137 BPM
WBC NRBC COR # BLD: 4.83 10*3/MM3 (ref 4.5–10.7)

## 2018-03-14 PROCEDURE — 63710000001 INSULIN ASPART PER 5 UNITS: Performed by: INTERNAL MEDICINE

## 2018-03-14 PROCEDURE — 25010000002 ENOXAPARIN PER 10 MG: Performed by: INTERNAL MEDICINE

## 2018-03-14 PROCEDURE — 93306 TTE W/DOPPLER COMPLETE: CPT

## 2018-03-14 PROCEDURE — 82962 GLUCOSE BLOOD TEST: CPT

## 2018-03-14 PROCEDURE — 93306 TTE W/DOPPLER COMPLETE: CPT | Performed by: INTERNAL MEDICINE

## 2018-03-14 PROCEDURE — 99255 IP/OBS CONSLTJ NEW/EST HI 80: CPT | Performed by: INTERNAL MEDICINE

## 2018-03-14 PROCEDURE — 99231 SBSQ HOSP IP/OBS SF/LOW 25: CPT | Performed by: INTERNAL MEDICINE

## 2018-03-14 PROCEDURE — 80048 BASIC METABOLIC PNL TOTAL CA: CPT | Performed by: INTERNAL MEDICINE

## 2018-03-14 PROCEDURE — 93880 EXTRACRANIAL BILAT STUDY: CPT

## 2018-03-14 PROCEDURE — 85025 COMPLETE CBC W/AUTO DIFF WBC: CPT | Performed by: HOSPITALIST

## 2018-03-14 RX ADMIN — ENOXAPARIN SODIUM 40 MG: 40 INJECTION SUBCUTANEOUS at 21:56

## 2018-03-14 RX ADMIN — INSULIN ASPART 4 UNITS: 100 INJECTION, SOLUTION INTRAVENOUS; SUBCUTANEOUS at 08:07

## 2018-03-14 RX ADMIN — INSULIN ASPART 1 UNITS: 100 INJECTION, SOLUTION INTRAVENOUS; SUBCUTANEOUS at 21:55

## 2018-03-14 RX ADMIN — Medication 1 TABLET: at 08:07

## 2018-03-14 RX ADMIN — INSULIN ASPART 2 UNITS: 100 INJECTION, SOLUTION INTRAVENOUS; SUBCUTANEOUS at 00:46

## 2018-03-14 RX ADMIN — INSULIN ASPART 4 UNITS: 100 INJECTION, SOLUTION INTRAVENOUS; SUBCUTANEOUS at 17:31

## 2018-03-14 RX ADMIN — INSULIN ASPART 4 UNITS: 100 INJECTION, SOLUTION INTRAVENOUS; SUBCUTANEOUS at 12:25

## 2018-03-14 RX ADMIN — INSULIN ASPART 1 UNITS: 100 INJECTION, SOLUTION INTRAVENOUS; SUBCUTANEOUS at 08:07

## 2018-03-14 NOTE — PROGRESS NOTES
"  ENDOCRINE    Subjective   AND PLANS  Juan Payne is a 57 y.o. male.     Follow-up diabetes.  Feels better.  No nausea or vomiting.  Fasting glucose 191.  Random glucose .  Patient was not given suppertime Humalog and bedtime Levemir was given late.  Increase Levemir to 12 units every morning and 6 units every evening.  Continue NovoLog 4 units with each meal + sliding scale.    If discharged, follow-up with primary care physician in 1-2 weeks      Objective   /68 (BP Location: Left arm, Patient Position: Lying)   Pulse 74   Temp 97.9 °F (36.6 °C) (Oral)   Resp 16   Ht 172.7 cm (67.99\")   Wt 63.5 kg (140 lb)   SpO2 100%   BMI 21.29 kg/m²   Physical Exam    Awake, alert, not in distress.  No rales or wheezes.  Regular heart rate and rhythm.  Systolic murmur at the base.  No gallop  Abdomen soft, nontender.  No cyanosis or pedal edema.    Lab Results (last 24 hours)     Procedure Component Value Units Date/Time    Blood Culture - Blood, Blood, Venous Line [[id number]]  (Normal) Collected:  03/10/18 0707    Specimen:  Blood from Blood, Venous Line Updated:  03/14/18 0831     Blood Culture No growth at 4 days    Blood Culture - Blood, Blood, Venous Line [[id number]]  (Normal) Collected:  03/10/18 0707    Specimen:  Blood from Blood, Venous Line Updated:  03/14/18 0831     Blood Culture No growth at 4 days    POC Glucose Once [[id number]]  (Abnormal) Collected:  03/14/18 0718    Specimen:  Blood Updated:  03/14/18 0719     Glucose 191 (H) mg/dL     Narrative:       Meter: [id number] : [id number] Ayden DÍAZ    CBC & Differential [[id number]] Collected:  03/14/18 0457    Specimen:  Blood Updated:  03/14/18 0610    Narrative:       The following orders were created for panel order CBC & Differential.  Procedure                               Abnormality         Status                     ---------                               -----------         ------                     Scan Slide[[id number]]      "                                                             CBC Auto Differential[[id number]]        Abnormal            Final result                 Please view results for these tests on the individual orders.    CBC Auto Differential [[id number]]  (Abnormal) Collected:  03/14/18 0457    Specimen:  Blood Updated:  03/14/18 0610     WBC 4.83 10*3/mm3      RBC 2.63 (L) 10*6/mm3      Hemoglobin 9.6 (L) g/dL      Hematocrit 29.5 (L) %      .2 (H) fL      MCH 36.5 (H) pg      MCHC 32.5 (L) g/dL      RDW 13.1 %      RDW-SD 53.7 fl      MPV 10.1 fL      Platelets 385 10*3/mm3      Neutrophil % 28.6 (L) %      Lymphocyte % 46.6 (H) %      Monocyte % 20.7 (H) %      Eosinophil % 2.9 %      Basophil % 0.8 %      Immature Grans % 0.4 %      Neutrophils, Absolute 1.38 (L) 10*3/mm3      Lymphocytes, Absolute 2.25 10*3/mm3      Monocytes, Absolute 1.00 10*3/mm3      Eosinophils, Absolute 0.14 10*3/mm3      Basophils, Absolute 0.04 10*3/mm3      Immature Grans, Absolute 0.02 10*3/mm3     Basic Metabolic Panel [[id number]]  (Abnormal) Collected:  03/14/18 0457    Specimen:  Blood Updated:  03/14/18 0603     Glucose 225 (H) mg/dL      BUN 12 mg/dL      Creatinine 0.69 (L) mg/dL      Sodium 139 mmol/L      Potassium 3.7 mmol/L      Chloride 100 mmol/L      CO2 26.6 mmol/L      Calcium 8.3 (L) mg/dL      eGFR Non African Amer 117 mL/min/1.73      BUN/Creatinine Ratio 17.4     Anion Gap 12.4 mmol/L     Narrative:       GFR Normal >60  Chronic Kidney Disease <60  Kidney Failure <15    POC Glucose Once [[id number]]  (Abnormal) Collected:  03/14/18 0552    Specimen:  Blood Updated:  03/14/18 0554     Glucose 269 (H) mg/dL     Narrative:       Meter: [id number] : [id number] Darryn Gu RN    POC Glucose Once [[id number]]  (Abnormal) Collected:  03/13/18 2055    Specimen:  Blood Updated:  03/13/18 2056     Glucose 262 (H) mg/dL     Narrative:       Meter: [id number] : [id number] Moustapha DÍAZ    POC Glucose Once [[id number]]   (Normal) Collected:  03/13/18 1636    Specimen:  Blood Updated:  03/13/18 1640     Glucose 86 mg/dL     Narrative:       Meter: [id number] : [id number] Ayden DÍAZ    POC Glucose Once [[id number]]  (Abnormal) Collected:  03/13/18 1130    Specimen:  Blood Updated:  03/13/18 1132     Glucose 176 (H) mg/dL     Narrative:       Meter: [id number] : [id number] Ayden DÍAZ            Principal Problem:    Diabetic ketoacidosis without coma associated with type 1 diabetes mellitus  Active Problems:    Type 1 diabetes mellitus    Tobacco abuse    Alcohol abuse    Insurance coverage problems    Bilateral carpal tunnel syndrome    Microalbuminuria    Insulin dose adjusted as discussed above.  Follow-up with PCP in 1-2 weeks.

## 2018-03-14 NOTE — PLAN OF CARE
Problem: Patient Care Overview  Goal: Plan of Care Review  Outcome: Ongoing (interventions implemented as appropriate)   03/14/18 0614   Coping/Psychosocial   Plan of Care Reviewed With patient   OTHER   Outcome Summary Pt spent and uneventful shift. Appeared to have a restful night. Pleasant demeaner when in conversation.    Plan of Care Review   Progress improving

## 2018-03-14 NOTE — PROGRESS NOTES
[facility]     LOS: 6 days     Subjective:  No chest pain and no shortness of air today is walking in the [place], he is eating well, no nausea or vomiting    Objective:    Vital Signs:  Temp:  [97.9 °F (36.6 °C)-98.8 °F (37.1 °C)] 98.8 °F (37.1 °C)  Heart Rate:  [70-74] 70  Resp:  [16] 16  BP: (110-122)/(63-69) 111/63    SpO2:  [98 %-100 %] 98 %  on   ;   Device (Oxygen Therapy): room air  Body mass index is 21.29 kg/m².    Physical Exam   Constitutional: He appears well-developed and well-nourished.   HENT:   Head: Normocephalic and atraumatic.   Eyes: EOM are normal.   Neck: Normal range of motion.   Cardiovascular: Normal rate and regular rhythm.    Murmur heard.  2-3/6 systolic    Pulmonary/Chest: Effort normal and breath sounds normal.   Abdominal: Soft. Bowel sounds are normal.   Musculoskeletal:   Walked the patient in the [place] he did reasonably well    Neurological: He is alert.   Skin: Skin is warm.       Results Review:    Glucose   Date Value Ref Range Status   03/14/2018 225 (H) 65 - 99 mg/dL Final   03/13/2018 191 (H) 65 - 99 mg/dL Final   03/12/2018 142 (H) 65 - 99 mg/dL Final   03/12/2018 212 (H) 65 - 99 mg/dL Final   03/11/2018 62 (L) 65 - 99 mg/dL Final       Results from last 7 days  Lab Units 03/14/18  0457   WBC 10*3/mm3 4.83   HEMOGLOBIN g/dL 9.6*   HEMATOCRIT % 29.5*   PLATELETS 10*3/mm3 385       Results from last 7 days  Lab Units 03/14/18  0457  03/12/18  0603   SODIUM mmol/L 139  < > 136   POTASSIUM mmol/L 3.7  < > 4.0   CHLORIDE mmol/L 100  < > 97*   CO2 mmol/L 26.6  < > 28.1   BUN mg/dL 12  < > 5*   CREATININE mg/dL 0.69*  < > 0.57*   CALCIUM mg/dL 8.3*  < > 8.5*   BILIRUBIN mg/dL  --   --  0.6   ALK PHOS U/L  --   --  57   ALT (SGPT) U/L  --   --  16   AST (SGOT) U/L  --   --  39   GLUCOSE mg/dL 225*  < > 212*   < > = values in this interval not displayed.        Results from last 7 days  Lab Units 03/10/18  0541   MAGNESIUM mg/dL 2.1       Results from  last 7 days  Lab Units 03/10/18  0541 03/08/18 2013   TROPONIN T ng/mL 0.014 0.016     Cultures:  Blood Culture   Date Value Ref Range Status   03/10/2018 No growth at 4 days  Preliminary   03/10/2018 No growth at 4 days  Preliminary       I have reviewed daily medications and changes in CPOE    Scheduled meds    enoxaparin 40 mg Subcutaneous Nightly   insulin aspart 0-4 Units Subcutaneous 4x Daily AC & at Bedtime   insulin aspart 4 Units Subcutaneous TID With Meals   insulin detemir 12 Units Subcutaneous QAM   insulin detemir 6 Units Subcutaneous Nightly   multivitamin 1 tablet Oral Daily          PRN meds  •  acetaminophen  •  dextrose  •  dextrose  •  dextrose  •  glucagon (human recombinant)  •  LORazepam  •  LORazepam  •  magnesium sulfate **OR** magnesium sulfate **OR** magnesium sulfate  •  ondansetron **OR** ondansetron ODT **OR** ondansetron  •  potassium & sodium phosphates **OR** potassium & sodium phosphates  •  potassium phosphate infusion greater than 15 mMoles **OR** potassium phosphate infusion greater than 15 mMoles **OR** potassium phosphate **OR** sodium phosphate IVPB **OR** sodium phosphate IVPB **OR** sodium phosphate IVPB  •  promethazine  •  sodium chloride  •  sodium chloride      Principal Problem:    Diabetic ketoacidosis without coma associated with type 1 diabetes mellitus  Active Problems:    Type 1 diabetes mellitus    Tobacco abuse    Alcohol abuse    Insurance coverage problems    Bilateral carpal tunnel syndrome    Microalbuminuria        Assessment/Plan:    Severe aortic stenosis by echo, terrible energy levels over the last several months,Energy levels have been so severely down he's unable to maintain his job at a [employer].  -Cardiology consultation      Diabetic ketoacidosis without coma associated with type 1 diabetes mellitus-status post stay in the intensive care unit now is doing well.  From endocrinology standpoint the patient is been given okay for discharge with  follow-up with PCP in 1-2 weeks      Type 1 diabetes mellitus      Tobacco abuse      Alcohol abuse      Bilateral carpal tunnel syndrome      Microalbuminuria      Westley Barker MD  03/14/18  2:41 PM

## 2018-03-14 NOTE — PLAN OF CARE
Problem: Patient Care Overview  Goal: Plan of Care Review  Outcome: Ongoing (interventions implemented as appropriate)   03/14/18 1804   Coping/Psychosocial   Plan of Care Reviewed With patient;daughter   OTHER   Outcome Summary no c/o, schd and ss insulin given, echo abnormal, cardiology consulted, pt to transfer to telemetry    Plan of Care Review   Progress improving     Goal: Individualization and Mutuality  Outcome: Ongoing (interventions implemented as appropriate)    Goal: Discharge Needs Assessment  Outcome: Ongoing (interventions implemented as appropriate)    Goal: Interprofessional Rounds/Family Conf  Outcome: Ongoing (interventions implemented as appropriate)      Problem: Diabetes, Type 1 (Adult)  Goal: Signs and Symptoms of Listed Potential Problems Will be Absent, Minimized or Managed (Diabetes, Type 1)  Outcome: Ongoing (interventions implemented as appropriate)      Problem: Nutrition, Imbalanced: Inadequate Oral Intake (Adult)  Goal: Improved Oral Intake  Outcome: Ongoing (interventions implemented as appropriate)    Goal: Prevent Further Weight Loss  Outcome: Ongoing (interventions implemented as appropriate)      Problem: Skin Injury Risk (Adult)  Goal: Skin Health and Integrity  Outcome: Outcome(s) achieved Date Met: 03/14/18

## 2018-03-15 PROBLEM — I35.0 NONRHEUMATIC AORTIC VALVE STENOSIS: Status: ACTIVE | Noted: 2018-03-08

## 2018-03-15 LAB
ANION GAP SERPL CALCULATED.3IONS-SCNC: 7.1 MMOL/L
BACTERIA SPEC AEROBE CULT: NORMAL
BACTERIA SPEC AEROBE CULT: NORMAL
BASOPHILS # BLD AUTO: 0.05 10*3/MM3 (ref 0–0.2)
BASOPHILS NFR BLD AUTO: 0.8 % (ref 0–1.5)
BH CV XLRA MEAS LEFT CCA RATIO VEL: -80.3 CM/SEC
BH CV XLRA MEAS LEFT DIST CCA EDV: -20.5 CM/SEC
BH CV XLRA MEAS LEFT DIST CCA PSV: -80.3 CM/SEC
BH CV XLRA MEAS LEFT DIST ICA EDV: -28.1 CM/SEC
BH CV XLRA MEAS LEFT DIST ICA PSV: -75 CM/SEC
BH CV XLRA MEAS LEFT ICA RATIO VEL: -77.4 CM/SEC
BH CV XLRA MEAS LEFT ICA/CCA RATIO: 0.96
BH CV XLRA MEAS LEFT MID ICA EDV: -27 CM/SEC
BH CV XLRA MEAS LEFT MID ICA PSV: -77.4 CM/SEC
BH CV XLRA MEAS LEFT PROX CCA EDV: 16.4 CM/SEC
BH CV XLRA MEAS LEFT PROX CCA PSV: 73.9 CM/SEC
BH CV XLRA MEAS LEFT PROX ECA EDV: -9.4 CM/SEC
BH CV XLRA MEAS LEFT PROX ECA PSV: -66.8 CM/SEC
BH CV XLRA MEAS LEFT PROX ICA EDV: 15.7 CM/SEC
BH CV XLRA MEAS LEFT PROX ICA PSV: 58.1 CM/SEC
BH CV XLRA MEAS LEFT PROX SCLA PSV: 113 CM/SEC
BH CV XLRA MEAS LEFT VERTEBRAL A EDV: 12.9 CM/SEC
BH CV XLRA MEAS LEFT VERTEBRAL A PSV: 54.5 CM/SEC
BH CV XLRA MEAS RIGHT CCA RATIO VEL: -76.2 CM/SEC
BH CV XLRA MEAS RIGHT DIST CCA EDV: -15.2 CM/SEC
BH CV XLRA MEAS RIGHT DIST CCA PSV: -76.2 CM/SEC
BH CV XLRA MEAS RIGHT DIST ICA EDV: 24 CM/SEC
BH CV XLRA MEAS RIGHT DIST ICA PSV: 70.4 CM/SEC
BH CV XLRA MEAS RIGHT ICA RATIO VEL: -73.3 CM/SEC
BH CV XLRA MEAS RIGHT ICA/CCA RATIO: 0.96
BH CV XLRA MEAS RIGHT MID ICA EDV: -23.5 CM/SEC
BH CV XLRA MEAS RIGHT MID ICA PSV: -73.3 CM/SEC
BH CV XLRA MEAS RIGHT PROX CCA EDV: 14.7 CM/SEC
BH CV XLRA MEAS RIGHT PROX CCA PSV: 72.1 CM/SEC
BH CV XLRA MEAS RIGHT PROX ECA EDV: -8.2 CM/SEC
BH CV XLRA MEAS RIGHT PROX ECA PSV: -73.9 CM/SEC
BH CV XLRA MEAS RIGHT PROX ICA EDV: -18.8 CM/SEC
BH CV XLRA MEAS RIGHT PROX ICA PSV: -71.5 CM/SEC
BH CV XLRA MEAS RIGHT PROX SCLA PSV: 84.4 CM/SEC
BH CV XLRA MEAS RIGHT VERTEBRAL A EDV: 11 CM/SEC
BH CV XLRA MEAS RIGHT VERTEBRAL A PSV: 35 CM/SEC
BUN BLD-MCNC: 12 MG/DL (ref 6–20)
BUN/CREAT SERPL: 24.5 (ref 7–25)
CALCIUM SPEC-SCNC: 8.4 MG/DL (ref 8.6–10.5)
CHLORIDE SERPL-SCNC: 103 MMOL/L (ref 98–107)
CHOLEST SERPL-MCNC: 163 MG/DL (ref 0–200)
CO2 SERPL-SCNC: 29.9 MMOL/L (ref 22–29)
CREAT BLD-MCNC: 0.49 MG/DL (ref 0.76–1.27)
DEPRECATED RDW RBC AUTO: 53 FL (ref 37–54)
EOSINOPHIL # BLD AUTO: 0.13 10*3/MM3 (ref 0–0.7)
EOSINOPHIL NFR BLD AUTO: 2.1 % (ref 0.3–6.2)
ERYTHROCYTE [DISTWIDTH] IN BLOOD BY AUTOMATED COUNT: 12.8 % (ref 11.5–14.5)
GFR SERPL CREATININE-BSD FRML MDRD: >150 ML/MIN/1.73
GLUCOSE BLD-MCNC: 108 MG/DL (ref 65–99)
GLUCOSE BLDC GLUCOMTR-MCNC: 100 MG/DL (ref 70–130)
GLUCOSE BLDC GLUCOMTR-MCNC: 108 MG/DL (ref 70–130)
GLUCOSE BLDC GLUCOMTR-MCNC: 147 MG/DL (ref 70–130)
GLUCOSE BLDC GLUCOMTR-MCNC: 268 MG/DL (ref 70–130)
GLUCOSE BLDC GLUCOMTR-MCNC: 58 MG/DL (ref 70–130)
HCT VFR BLD AUTO: 28.8 % (ref 40.4–52.2)
HDLC SERPL-MCNC: 48 MG/DL (ref 40–60)
HGB BLD-MCNC: 9.4 G/DL (ref 13.7–17.6)
IMM GRANULOCYTES # BLD: 0.04 10*3/MM3 (ref 0–0.03)
IMM GRANULOCYTES NFR BLD: 0.6 % (ref 0–0.5)
LDLC SERPL CALC-MCNC: 89 MG/DL (ref 0–100)
LDLC/HDLC SERPL: 1.85 {RATIO}
LEFT ARM BP: NORMAL MMHG
LYMPHOCYTES # BLD AUTO: 3.42 10*3/MM3 (ref 0.9–4.8)
LYMPHOCYTES NFR BLD AUTO: 54.1 % (ref 19.6–45.3)
MCH RBC QN AUTO: 36.7 PG (ref 27–32.7)
MCHC RBC AUTO-ENTMCNC: 32.6 G/DL (ref 32.6–36.4)
MCV RBC AUTO: 112.5 FL (ref 79.8–96.2)
MONOCYTES # BLD AUTO: 1.07 10*3/MM3 (ref 0.2–1.2)
MONOCYTES NFR BLD AUTO: 16.9 % (ref 5–12)
NEUTROPHILS # BLD AUTO: 1.61 10*3/MM3 (ref 1.9–8.1)
NEUTROPHILS NFR BLD AUTO: 25.5 % (ref 42.7–76)
PLATELET # BLD AUTO: 375 10*3/MM3 (ref 140–500)
PMV BLD AUTO: 9.5 FL (ref 6–12)
POTASSIUM BLD-SCNC: 3.7 MMOL/L (ref 3.5–5.2)
RBC # BLD AUTO: 2.56 10*6/MM3 (ref 4.6–6)
RIGHT ARM BP: NORMAL MMHG
SODIUM BLD-SCNC: 140 MMOL/L (ref 136–145)
TRIGL SERPL-MCNC: 130 MG/DL (ref 0–150)
VLDLC SERPL-MCNC: 26 MG/DL (ref 5–40)
WBC NRBC COR # BLD: 6.32 10*3/MM3 (ref 4.5–10.7)

## 2018-03-15 PROCEDURE — 82962 GLUCOSE BLOOD TEST: CPT

## 2018-03-15 PROCEDURE — 63710000001 INSULIN ASPART PER 5 UNITS: Performed by: INTERNAL MEDICINE

## 2018-03-15 PROCEDURE — 80061 LIPID PANEL: CPT | Performed by: INTERNAL MEDICINE

## 2018-03-15 PROCEDURE — 99232 SBSQ HOSP IP/OBS MODERATE 35: CPT | Performed by: INTERNAL MEDICINE

## 2018-03-15 PROCEDURE — 85025 COMPLETE CBC W/AUTO DIFF WBC: CPT | Performed by: HOSPITALIST

## 2018-03-15 PROCEDURE — 80048 BASIC METABOLIC PNL TOTAL CA: CPT | Performed by: INTERNAL MEDICINE

## 2018-03-15 PROCEDURE — 99252 IP/OBS CONSLTJ NEW/EST SF 35: CPT | Performed by: NURSE PRACTITIONER

## 2018-03-15 PROCEDURE — 99222 1ST HOSP IP/OBS MODERATE 55: CPT | Performed by: INTERNAL MEDICINE

## 2018-03-15 RX ORDER — ATORVASTATIN CALCIUM 20 MG/1
20 TABLET, FILM COATED ORAL NIGHTLY
Status: DISCONTINUED | OUTPATIENT
Start: 2018-03-15 | End: 2018-03-26 | Stop reason: HOSPADM

## 2018-03-15 RX ADMIN — INSULIN ASPART 4 UNITS: 100 INJECTION, SOLUTION INTRAVENOUS; SUBCUTANEOUS at 08:45

## 2018-03-15 RX ADMIN — Medication 1 TABLET: at 08:54

## 2018-03-15 RX ADMIN — INSULIN ASPART 4 UNITS: 100 INJECTION, SOLUTION INTRAVENOUS; SUBCUTANEOUS at 12:38

## 2018-03-15 NOTE — CONSULTS
"  Patient Care Team:  No Known Provider as PCP - General    Chief complaint: Aortic Stenosis  Subjective     History of Present Illness    Mr Payne is 57 year old male who has a history of diabetes type 1, HTN (untreated), ETOH, and tobacco abuse.   He states he stopped taking one of his \"prescribed insulins for one week\" and presented to the hospital in [facility].  He was found to have a murmur upon his exam, an echo was performed and severe aortic stenosis was noted.  He denies dizziness, light headedness, or syncope, however, his fiance who is at the beside states he \"staggers a lot when he gets up.\" He reports increase in fatigue, mild HAYNES, and weakness. His weakness is so severe he states when he is going up a flight of steps \"takes 1-3 steps and I have to rest.\"  He has had a recent loss of 30 lbs in the past year. He denies orthopnea, chest pain, and palpitations. He smokes 1.5 PPD and drinks 1 pint of bourbon a day he denies illicit drug use.  We were consulted to evaluate the patient for a TAVR vs SAVR aortic valve replacement.      Review of Systems   Constitutional: Positive for activity change, appetite change, fatigue and unexpected weight change.   HENT: Positive for dental problem.    Eyes: Negative.    Respiratory: Positive for apnea, cough and shortness of breath.    Cardiovascular: Negative for chest pain, palpitations and leg swelling.   Gastrointestinal: Negative.    Endocrine:        +DM   Genitourinary: Negative.    Musculoskeletal: Negative.    Skin: Negative for rash and wound.   Allergic/Immunologic: Negative.    Neurological: Positive for dizziness, weakness and numbness.   Psychiatric/Behavioral:        +ETOH        Past Medical History:   Diagnosis Date   • Diabetes mellitus      Past Surgical History:   Procedure Laterality Date   • TESTICLE SURGERY       History reviewed. No pertinent family history.  Social History   Substance Use Topics   • Smoking status: Current Every Day Smoker     " "Packs/day: 1.50   • Smokeless tobacco: Never Used   • Alcohol use 3.6 oz/week     6 Shots of liquor per week      Comment: pt states 1 pint/day     Prescriptions Prior to Admission   Medication Sig Dispense Refill Last Dose   • insulin glargine (LANTUS) 100 UNIT/ML injection Inject 15 Units under the skin 2 (Two) Times a Day. Patient states he is taking 18-21 units TID      • insulin lispro (humaLOG) 100 UNIT/ML injection Inject 18-21 Units under the skin 3 (Three) Times a Day Before Meals.      • insulin regular (humuLIN R,novoLIN R) 100 UNIT/ML injection Inject  under the skin 2 (Two) Times a Day Before Meals.          atorvastatin 20 mg Oral Nightly   enoxaparin 40 mg Subcutaneous Nightly   insulin aspart 0-4 Units Subcutaneous 4x Daily AC & at Bedtime   insulin aspart 4 Units Subcutaneous TID With Meals   insulin detemir 12 Units Subcutaneous QAM   insulin detemir 6 Units Subcutaneous Nightly   multivitamin 1 tablet Oral Daily     Allergies:  Review of patient's allergies indicates no known allergies.    Objective      Vital Signs  Temp:  [98 °F (36.7 °C)-98.8 °F (37.1 °C)] 98.3 °F (36.8 °C)  Heart Rate:  [66-76] 76  Resp:  [18] 18  BP: (106-138)/(64-86) 138/77    Flowsheet Rows    Flowsheet Row First Filed Value   Admission Height 172.7 cm (68\") Documented at 03/08/2018 1637   Admission Weight 63.5 kg (140 lb) Documented at 03/08/2018 1637        172.7 cm (67.99\")    Physical Exam   Constitutional: He appears well-developed and well-nourished. No distress.   HENT:   Head: Normocephalic and atraumatic.   Nose: Nose normal.   Mouth/Throat: Oropharynx is clear and moist. No oropharyngeal exudate.   Eyes: Conjunctivae and EOM are normal. Pupils are equal, round, and reactive to light. No scleral icterus.   Neck: Normal range of motion. Neck supple. No JVD present. No tracheal deviation present.   Cardiovascular: Normal rate, regular rhythm and intact distal pulses.  PMI is not displaced.    Murmur heard.   Systolic " murmur is present with a grade of 3/6   Pulses:       Carotid pulses are on the right side with bruit, and on the left side with bruit.       Radial pulses are 2+ on the right side, and 2+ on the left side.        Dorsalis pedis pulses are 1+ on the right side, and 1+ on the left side.   Pulmonary/Chest: Effort normal and breath sounds normal. No respiratory distress. He has no decreased breath sounds.   Neurological: He is alert. He has normal strength. No cranial nerve deficit or sensory deficit. GCS eye subscore is 4. GCS verbal subscore is 5. GCS motor subscore is 6.   Skin: He is not diaphoretic.   Psychiatric: He has a normal mood and affect. His speech is normal and behavior is normal. Thought content normal. Cognition and memory are normal.       Results Review:   Lab Results (last 24 hours)     Procedure Component Value Units Date/Time    POC Glucose Once [[id number]]  (Abnormal) Collected:  03/15/18 1131    Specimen:  Blood Updated:  03/15/18 1146     Glucose 147 (H) mg/dL     Narrative:       Meter: [id number] : [id number] Clifford DÍAZ    Blood Culture - Blood, Blood, Venous Line [[id number]]  (Normal) Collected:  03/10/18 0707    Specimen:  Blood from Blood, Venous Line Updated:  03/15/18 0831     Blood Culture No growth at 5 days    Blood Culture - Blood, Blood, Venous Line [[id number]]  (Normal) Collected:  03/10/18 0707    Specimen:  Blood from Blood, Venous Line Updated:  03/15/18 0831     Blood Culture No growth at 5 days    POC Glucose Once [[id number]]  (Normal) Collected:  03/15/18 0625    Specimen:  Blood Updated:  03/15/18 0629     Glucose 100 mg/dL     Narrative:       Meter: [id number] : [id number] Guilherme DÍAZ    Basic Metabolic Panel [[id number]]  (Abnormal) Collected:  03/15/18 0529    Specimen:  Blood Updated:  03/15/18 0608     Glucose 108 (H) mg/dL      BUN 12 mg/dL      Creatinine 0.49 (L) mg/dL      Sodium 140 mmol/L      Potassium 3.7 mmol/L      Chloride 103  mmol/L      CO2 29.9 (H) mmol/L      Calcium 8.4 (L) mg/dL      eGFR Non African Amer >150 mL/min/1.73      BUN/Creatinine Ratio 24.5     Anion Gap 7.1 mmol/L     Narrative:       GFR Normal >60  Chronic Kidney Disease <60  Kidney Failure <15    Lipid Panel [[id number]] Collected:  03/15/18 0529    Specimen:  Blood Updated:  03/15/18 0608     Total Cholesterol 163 mg/dL      Triglycerides 130 mg/dL      HDL Cholesterol 48 mg/dL      LDL Cholesterol  89 mg/dL      VLDL Cholesterol 26 mg/dL      LDL/HDL Ratio 1.85    Narrative:       Cholesterol Reference Ranges  (U.S. Department of Health and Human Services ATP III Classifications)    Desirable          <200 mg/dL  Borderline High    200-239 mg/dL  High Risk          >240 mg/dL      Triglyceride Reference Ranges  (U.S. Department of Health and Human Services ATP III Classifications)    Normal           <150 mg/dL  Borderline High  150-199 mg/dL  High             200-499 mg/dL  Very High        >500 mg/dL    HDL Reference Ranges  (U.S. Department of Health and Human Services ATP III Classifcations)    Low     <40 mg/dl (major risk factor for CHD)  High    >60 mg/dl ('negative' risk factor for CHD)        LDL Reference Ranges  (U.S. Department of Health and Human Services ATP III Classifcations)    Optimal          <100 mg/dL  Near Optimal     100-129 mg/dL  Borderline High  130-159 mg/dL  High             160-189 mg/dL  Very High        >189 mg/dL    CBC & Differential [[id number]] Collected:  03/15/18 0529    Specimen:  Blood Updated:  03/15/18 0551    Narrative:       The following orders were created for panel order CBC & Differential.  Procedure                               Abnormality         Status                     ---------                               -----------         ------                     Scan Slide[[id number]]                                                                  CBC Auto Differential[[id number]]        Abnormal            Final result                  Please view results for these tests on the individual orders.    CBC Auto Differential [[id number]]  (Abnormal) Collected:  03/15/18 0529    Specimen:  Blood Updated:  03/15/18 0551     WBC 6.32 10*3/mm3      RBC 2.56 (L) 10*6/mm3      Hemoglobin 9.4 (L) g/dL      Hematocrit 28.8 (L) %      .5 (H) fL      MCH 36.7 (H) pg      MCHC 32.6 g/dL      RDW 12.8 %      RDW-SD 53.0 fl      MPV 9.5 fL      Platelets 375 10*3/mm3      Neutrophil % 25.5 (L) %      Lymphocyte % 54.1 (H) %      Monocyte % 16.9 (H) %      Eosinophil % 2.1 %      Basophil % 0.8 %      Immature Grans % 0.6 (H) %      Neutrophils, Absolute 1.61 (L) 10*3/mm3      Lymphocytes, Absolute 3.42 10*3/mm3      Monocytes, Absolute 1.07 10*3/mm3      Eosinophils, Absolute 0.13 10*3/mm3      Basophils, Absolute 0.05 10*3/mm3      Immature Grans, Absolute 0.04 (H) 10*3/mm3     POC Glucose Once [[id number]]  (Abnormal) Collected:  03/14/18 2047    Specimen:  Blood Updated:  03/14/18 2048     Glucose 163 (H) mg/dL     Narrative:       Meter: [id number] : [id number] Guilherme DÍAZ    POC Glucose Once [[id number]]  (Normal) Collected:  03/14/18 1629    Specimen:  Blood Updated:  03/14/18 1643     Glucose 105 mg/dL     Narrative:       Meter: [id number] : [id number] Ayden DÍAZ              Assessment/Plan     Principal Problem:    Diabetic ketoacidosis without coma associated with type 1 diabetes mellitus  Active Problems:    Type 1 diabetes mellitus    Tobacco abuse    Alcohol abuse    Insurance coverage problems    Bilateral carpal tunnel syndrome    Microalbuminuria    Nonrheumatic aortic valve stenosis      Assessment & Plan    -Severe aortic stenosis- peak velocity is 5m/s;  maximal pressure gradient of 122mmHg and mean pressure gradient of 62mmHg; ALICIA is 0.54cm2 with a dimensionless index of 0.2.  -Severe concentric hypertrophy  -Left ventricular diastolic dysfunction   -preserved EF  -DM type 1  -DKA w/o coma-  resolved  -Tobacco abuse  -ETOH  -Poor Compliance  -Poor nutrition  -?Poor MCFP- may need Panorex prior to discharge      We were consulted for SAVR vs TAVR work up, I do not know if his risk factors would meet a moderate or high risk, will cardiac cath to assess coronary artery disease, as of right now, would probably be better served with a SAVR.          Mirian France, CODY  03/15/18  12:27 PM

## 2018-03-15 NOTE — PROGRESS NOTES
[facility]     LOS: 7 days     Subjective:  No chest pain and no shortness of air today, he is eating well, no nausea or vomiting  -he is still very fatigued    Objective:    Vital Signs:  Temp:  [98.3 °F (36.8 °C)-98.8 °F (37.1 °C)] 98.4 °F (36.9 °C)  Heart Rate:  [67-76] 67  Resp:  [18] 18  BP: (114-138)/(72-86) 116/77    SpO2:  [97 %-99 %] 99 %  on   ;   Device (Oxygen Therapy): room air  Body mass index is 21.29 kg/m².    Physical Exam   Constitutional: He appears well-developed and well-nourished.   HENT:   Head: Normocephalic and atraumatic.   Eyes: EOM are normal.   Neck: Normal range of motion.   Cardiovascular: Normal rate and regular rhythm.    Murmur heard.  2-3/6 systolic    Pulmonary/Chest: Effort normal and breath sounds normal.   Abdominal: Soft. Bowel sounds are normal.   Neurological: He is alert.   Skin: Skin is warm.       Results Review:    Glucose   Date Value Ref Range Status   03/15/2018 108 (H) 65 - 99 mg/dL Final   03/14/2018 225 (H) 65 - 99 mg/dL Final   03/13/2018 191 (H) 65 - 99 mg/dL Final       Results from last 7 days  Lab Units 03/15/18  0529   WBC 10*3/mm3 6.32   HEMOGLOBIN g/dL 9.4*   HEMATOCRIT % 28.8*   PLATELETS 10*3/mm3 375       Results from last 7 days  Lab Units 03/15/18  0529  03/12/18  0603   SODIUM mmol/L 140  < > 136   POTASSIUM mmol/L 3.7  < > 4.0   CHLORIDE mmol/L 103  < > 97*   CO2 mmol/L 29.9*  < > 28.1   BUN mg/dL 12  < > 5*   CREATININE mg/dL 0.49*  < > 0.57*   CALCIUM mg/dL 8.4*  < > 8.5*   BILIRUBIN mg/dL  --   --  0.6   ALK PHOS U/L  --   --  57   ALT (SGPT) U/L  --   --  16   AST (SGOT) U/L  --   --  39   GLUCOSE mg/dL 108*  < > 212*   < > = values in this interval not displayed.        Results from last 7 days  Lab Units 03/10/18  0541   MAGNESIUM mg/dL 2.1       Results from last 7 days  Lab Units 03/10/18  0541 03/08/18 2013   TROPONIN T ng/mL 0.014 0.016     Cultures:  Blood Culture   Date Value Ref Range Status   03/10/2018 No  growth at 4 days  Preliminary   03/10/2018 No growth at 4 days  Preliminary       I have reviewed daily medications and changes in CPOE    Scheduled meds    atorvastatin 20 mg Oral Nightly   enoxaparin 40 mg Subcutaneous Nightly   insulin aspart 0-4 Units Subcutaneous 4x Daily AC & at Bedtime   insulin aspart 4 Units Subcutaneous TID With Meals   insulin detemir 12 Units Subcutaneous QAM   insulin detemir 6 Units Subcutaneous Nightly   multivitamin 1 tablet Oral Daily          PRN meds  •  acetaminophen  •  dextrose  •  dextrose  •  dextrose  •  glucagon (human recombinant)  •  LORazepam  •  LORazepam  •  magnesium sulfate **OR** magnesium sulfate **OR** magnesium sulfate  •  ondansetron **OR** ondansetron ODT **OR** ondansetron  •  potassium & sodium phosphates **OR** potassium & sodium phosphates  •  potassium phosphate infusion greater than 15 mMoles **OR** potassium phosphate infusion greater than 15 mMoles **OR** potassium phosphate **OR** sodium phosphate IVPB **OR** sodium phosphate IVPB **OR** sodium phosphate IVPB  •  promethazine  •  sodium chloride  •  sodium chloride      Principal Problem:    Diabetic ketoacidosis without coma associated with type 1 diabetes mellitus  Active Problems:    Type 1 diabetes mellitus    Tobacco abuse    Alcohol abuse    Insurance coverage problems    Bilateral carpal tunnel syndrome    Microalbuminuria    Nonrheumatic aortic valve stenosis        Assessment/Plan:    Severe aortic stenosis by echo, terrible energy levels over the last several months, Energy levels have been so severely down he's unable to maintain his job at a [employer].  -Cardiology consultation appreciated      Diabetic ketoacidosis without coma associated with type 1 diabetes mellitus-status post stay in the intensive care unit now is doing well.  From endocrinology standpoint the patient is been given okay for discharge with follow-up with PCP in 1-2 weeks      Type 1 diabetes mellitus      Tobacco  abuse      Alcohol abuse      Bilateral carpal tunnel syndrome      Microalbuminuria    Plan:  Cath tomorrow    Westley Barker MD  03/15/18  6:16 PM

## 2018-03-15 NOTE — PAYOR COMM NOTE
"Tiffanie Gutierrez (57 y.o. Male)     PLEASE SEE ATTACHED CLINICAL REVIEW REQUESTED .      REF#[id number]    PLEASE CALL   OR  [phone] WITH CONTINUED STAY REVIEW AUTHORIZATION.     THANK YOU    JESSE GARCIA LPN, CCP        Date of Birth Social Security Number Address Home Phone MRN    1960  [id number] [facility] [address]  [id number]    Congregation Marital Status          None Single       Admission Date Admission Type Admitting Provider Attending Provider Department, Room/Bed    3/8/18 Emergency Mahamed Tolbert MD Edling, Westley ALVAREZ MD [address], 446/1    Discharge Date Discharge Disposition Discharge Destination                       Attending Provider:  Westley Barker MD    Allergies:  No Known Allergies    Isolation:  None   Infection:  None   Code Status:  FULL    Ht:  172.7 cm (67.99\")   Wt:  63.5 kg (140 lb)    Admission Cmt:  None   Principal Problem:  Diabetic ketoacidosis without coma associated with type 1 diabetes mellitus [E10.10]                 Active Insurance as of 3/8/2018     Primary Coverage     Payor Plan Insurance Group Employer/Plan Group    [employer] P[employer] [id number]     Payor Plan Address Payor Plan Phone Number Effective From Effective To    [phone] 1/1/2018     [place] [address]       Subscriber Name Subscriber Birth Date Member ID       TIFFANIE GUTIERREZ 1960 [id number]                 Emergency Contacts      (Rel.) Home Phone Work Phone Mobile Phone    Aurelia Briones (Significant Other) -- -- [phone]    Leonor Galindo (Daughter) -- -- [phone]              Intake & Output (last day)       03/14 0701 - 03/15 0700 03/15 0701 - 03/16 0700    P.O. 570     Total Intake(mL/kg) 570 (9)     Urine (mL/kg/hr) 100 (0.1)     Total Output 100      Net +470            Unmeasured Urine Occurrence 3 x            Physician Progress Notes (last 24 hours) (Notes from " "3/14/2018 11:30 AM through 3/15/2018 11:30 AM)      Salvador Fernandez MD at 3/15/2018  9:34 AM            ENDOCRINE    Subjective   AND PLANS  Juan Payne is a 57 y.o. male.     Follow-up diabetes and related disorders.    Feels better.  No nausea or vomiting.  No hypoglycemia.  .  -163.  Continue Levemit 12 units q AM and 6 units q PM, Novolog 4 units tid + sliding scale.    Cholesterol 163.  HDL 48.  Thyroid function normal.  10 year risk of heart disease of stroke using ACC calculator is high at 22.7%.  Start Lipitor 20 mg/day.    Echo report noted.  Cardiology consult noted.  No chest pain or SOB.  Await cardiologist's plans    Objective   /77 (BP Location: Right arm, Patient Position: Sitting)   Pulse 76   Temp 98.3 °F (36.8 °C) (Oral)   Resp 18   Ht 172.7 cm (67.99\")   Wt 63.5 kg (140 lb)   SpO2 97%   BMI 21.29 kg/m²    Physical Exam    Awake, alert, not dyspneic, not in distress  No rales or wheezes.  Regular heart rate and rhythm.  Systolic murmur at base.  No gallop.  Abdomen soft, nontender.  No pedal edema or cyanosis.  No xanthomas    Lab Results (last 24 hours)     Procedure Component Value Units Date/Time    Blood Culture - Blood, Blood, Venous Line [[id number]]  (Normal) Collected:  03/10/18 0707    Specimen:  Blood from Blood, Venous Line Updated:  03/15/18 0831     Blood Culture No growth at 5 days    Blood Culture - Blood, Blood, Venous Line [[id number]]  (Normal) Collected:  03/10/18 0707    Specimen:  Blood from Blood, Venous Line Updated:  03/15/18 0831     Blood Culture No growth at 5 days    POC Glucose Once [[id number]]  (Normal) Collected:  03/15/18 0625    Specimen:  Blood Updated:  03/15/18 0629     Glucose 100 mg/dL     Narrative:       Meter: [id number] : [id number] Guilherme DÍAZ    Basic Metabolic Panel [[id number]]  (Abnormal) Collected:  03/15/18 0529    Specimen:  Blood Updated:  03/15/18 0608     Glucose 108 (H) mg/dL      BUN 12 mg/dL      Creatinine " 0.49 (L) mg/dL      Sodium 140 mmol/L      Potassium 3.7 mmol/L      Chloride 103 mmol/L      CO2 29.9 (H) mmol/L      Calcium 8.4 (L) mg/dL      eGFR Non African Amer >150 mL/min/1.73      BUN/Creatinine Ratio 24.5     Anion Gap 7.1 mmol/L     Narrative:       GFR Normal >60  Chronic Kidney Disease <60  Kidney Failure <15    Lipid Panel [[id number]] Collected:  03/15/18 0529    Specimen:  Blood Updated:  03/15/18 0608     Total Cholesterol 163 mg/dL      Triglycerides 130 mg/dL      HDL Cholesterol 48 mg/dL      LDL Cholesterol  89 mg/dL      VLDL Cholesterol 26 mg/dL      LDL/HDL Ratio 1.85    Narrative:       CBC & Differential [[id number]] Collected:  03/15/18 0529    Specimen:  Blood Updated:  03/15/18 0551    Narrative:       CBC Auto Differential [[id number]]  (Abnormal) Collected:  03/15/18 0529    Specimen:  Blood Updated:  03/15/18 0551     WBC 6.32 10*3/mm3      RBC 2.56 (L) 10*6/mm3      Hemoglobin 9.4 (L) g/dL      Hematocrit 28.8 (L) %      .5 (H) fL      MCH 36.7 (H) pg      MCHC 32.6 g/dL      RDW 12.8 %      RDW-SD 53.0 fl      MPV 9.5 fL      Platelets 375 10*3/mm3      Neutrophil % 25.5 (L) %      Lymphocyte % 54.1 (H) %      Monocyte % 16.9 (H) %      Eosinophil % 2.1 %      Basophil % 0.8 %      Immature Grans % 0.6 (H) %      Neutrophils, Absolute 1.61 (L) 10*3/mm3      Lymphocytes, Absolute 3.42 10*3/mm3      Monocytes, Absolute 1.07 10*3/mm3      Eosinophils, Absolute 0.13 10*3/mm3      Basophils, Absolute 0.05 10*3/mm3      Immature Grans, Absolute 0.04 (H) 10*3/mm3     POC Glucose Once [[id number]]  (Abnormal) Collected:  03/14/18 2047    Specimen:  Blood Updated:  03/14/18 2048     Glucose 163 (H) mg/dL     Narrative:       Meter: [id number] : [id number] Guilherme DÍAZ    POC Glucose Once [[id number]]  (Normal) Collected:  03/14/18 1629    Specimen:  Blood Updated:  03/14/18 1643     Glucose 105 mg/dL     Narrative:       Meter: [id number] : [id number] Ayden DÍAZ     POC Glucose Once [[id number]]  (Normal) Collected:  18 1151    Specimen:  Blood Updated:  18 1211     Glucose 117 mg/dL     Narrative:       Meter: [id number] : [id number] Ayden DÍAZ            Principal Problem:    Diabetic ketoacidosis without coma associated with type 1 diabetes mellitus  Active Problems:    Type 1 diabetes mellitus    Tobacco abuse    Alcohol abuse    Insurance coverage problems    Bilateral carpal tunnel syndrome    Microalbuminuria    Insulin therapy as discussed above.   Start Lipitor  Await cardiologist's plans          Electronically signed by Salvador Fernandez MD at 3/15/2018  9:46 AM          Consult Notes (last 24 hours) (Notes from 3/14/2018 11:30 AM through 3/15/2018 11:30 AM)      Raul Buenrostro MD at 3/15/2018 10:41 AM          Date of Hospital Visit: [unfilled]ENC@  Encounter Provider: Raul Buenrostro MD  Place of Service: [facility] CARDIOLOGY  Patient Name: Juan Payne  :1960  Referral Provider: Mahamed Tolbert MD    Chief complaint:  Aortic stenosis    History of Present Illness:  The patient is a 57 year old man with history of diabetes and alcohol abuse.  He presents with diabetic ketoacidosis after having stopped his insulin for several days.    He was found to have a loud heart murmur on exam today.  Echo was of poor quality, but suggested critical AS with peak gradient of 122, mean of 62.    The patient lost his job as a  due to frequent work absenteeism.  He states that he was able to perform work duties.  He denies dyspnea or angina, but does complain of overall generalized fatigue.      Past Medical History:   Diagnosis Date   • Diabetes mellitus        Past Surgical History:   Procedure Laterality Date   • TESTICLE SURGERY         Prescriptions Prior to Admission   Medication Sig Dispense Refill Last Dose   • insulin glargine (LANTUS) 100 UNIT/ML injection Inject 15 Units under the skin 2 (Two) Times a  Day. Patient states he is taking 18-21 units TID      • insulin lispro (humaLOG) 100 UNIT/ML injection Inject 18-21 Units under the skin 3 (Three) Times a Day Before Meals.      • insulin regular (humuLIN R,novoLIN R) 100 UNIT/ML injection Inject  under the skin 2 (Two) Times a Day Before Meals.          Current Meds  Scheduled Meds:  atorvastatin 20 mg Oral Nightly   enoxaparin 40 mg Subcutaneous Nightly   insulin aspart 0-4 Units Subcutaneous 4x Daily AC & at Bedtime   insulin aspart 4 Units Subcutaneous TID With Meals   insulin detemir 12 Units Subcutaneous QAM   insulin detemir 6 Units Subcutaneous Nightly   multivitamin 1 tablet Oral Daily     Continuous Infusions:   PRN Meds:.•  acetaminophen  •  dextrose  •  dextrose  •  dextrose  •  glucagon (human recombinant)  •  LORazepam  •  LORazepam  •  magnesium sulfate **OR** magnesium sulfate **OR** magnesium sulfate  •  ondansetron **OR** ondansetron ODT **OR** ondansetron  •  potassium & sodium phosphates **OR** potassium & sodium phosphates  •  potassium phosphate infusion greater than 15 mMoles **OR** potassium phosphate infusion greater than 15 mMoles **OR** potassium phosphate **OR** sodium phosphate IVPB **OR** sodium phosphate IVPB **OR** sodium phosphate IVPB  •  promethazine  •  sodium chloride  •  sodium chloride    Allergies as of 03/08/2018   • (No Known Allergies)       Social History     Social History   • Marital status: Single     Spouse name: N/A   • Number of children: N/A   • Years of education: N/A     Occupational History   • Not on file.     Social History Main Topics   • Smoking status: Current Every Day Smoker     Packs/day: 1.50   • Smokeless tobacco: Never Used   • Alcohol use 3.6 oz/week     6 Shots of liquor per week      Comment: pt states 1 pint/day   • Drug use: No   • Sexual activity: Defer     Other Topics Concern   • Not on file     Social History Narrative   • No narrative on file     History reviewed. No pertinent family  "history.    REVIEW OF SYSTEMS:   12 point ROS was performed and is negative except as outlined in HPI     REVIEW OF SYSTEMS:   CONSTITUTIONAL: No weight loss, fever, chills, weakness or fatigue.   HEENT: Eyes: No visual loss, blurred vision, double vision or yellow sclerae. Ears, Nose, Throat: No hearing loss, sneezing, congestion, runny nose or sore throat.   SKIN: No rash or itching.     RESPIRATORY: No shortness of breath, hemoptysis, cough or sputum.   GASTROINTESTINAL: No anorexia, nausea, vomiting or diarrhea. No abdominal pain, bright red blood per rectum or melena.  GENITOURINARY: No burning on urination, hematuria or increased frequency.  NEUROLOGICAL: No headache, dizziness, syncope, paralysis, ataxia, numbness or tingling in the extremities. No change in bowel or bladder control.   MUSCULOSKELETAL: No muscle, back pain, joint pain or stiffness.   HEMATOLOGIC: No anemia, bleeding or bruising.   LYMPHATICS: No enlarged nodes. No history of splenectomy.   PSYCHIATRIC: No history of depression, anxiety, hallucinations.   ENDOCRINOLOGIC: No reports of sweating, cold or heat intolerance. No polyuria or polydipsia.        Objective:   Temp:  [98 °F (36.7 °C)-98.8 °F (37.1 °C)] 98.3 °F (36.8 °C)  Heart Rate:  [66-76] 76  Resp:  [18] 18  BP: (106-138)/(64-86) 138/77  Body mass index is 21.29 kg/m².  Flowsheet Rows    Flowsheet Row First Filed Value   Admission Height 172.7 cm (68\") Documented at 03/08/2018 1637   Admission Weight 63.5 kg (140 lb) Documented at 03/08/2018 1637        Vitals:    03/15/18 0748   BP: 138/77   Pulse: 76   Resp: 18   Temp: 98.3 °F (36.8 °C)   SpO2:        General Appearance:    Alert, cooperative, in no acute distress   Head:    Normocephalic, without obvious abnormality, atraumatic   Eyes:            Lids and lashes normal, conjunctivae and sclerae normal, no   icterus, no pallor, corneas clear, PERRLA   Ears:    Ears appear intact with no abnormalities noted   Throat:   No oral " lesions, no thrush, oral mucosa moist   Neck:   No adenopathy, supple, trachea midline, no thyromegaly, no   carotid bruit, no JVD   Back:     No kyphosis present, no scoliosis present, no skin lesions, erythema or scars, no tenderness to percussion or palpation, range of motion normal   Lungs:     Clear to auscultation,respirations regular, even and unlabored    Heart:  Regular, loud AS murmur.   Chest Wall:    No abnormalities observed   Abdomen:     Normal bowel sounds, no masses, no organomegaly, soft        non-tender, non-distended, no guarding, no rebound  tenderness   Extremities:   Moves all extremities well, no edema, no cyanosis, no redness   Pulses:   Pulses palpable and equal bilaterally. Normal radial, carotid, femoral, dorsalis pedis and posterior tibial pulses bilaterally. Normal abdominal aorta   Skin:  Psychiatric:   No bleeding, bruising or rash    Alert and oriented x 3, normal mood and affect                 Lab Review:      Results from last 7 days  Lab Units 03/15/18  0529  03/12/18  0603   SODIUM mmol/L 140  < > 136   POTASSIUM mmol/L 3.7  < > 4.0   CHLORIDE mmol/L 103  < > 97*   CO2 mmol/L 29.9*  < > 28.1   BUN mg/dL 12  < > 5*   CREATININE mg/dL 0.49*  < > 0.57*   CALCIUM mg/dL 8.4*  < > 8.5*   BILIRUBIN mg/dL  --   --  0.6   ALK PHOS U/L  --   --  57   ALT (SGPT) U/L  --   --  16   AST (SGOT) U/L  --   --  39   GLUCOSE mg/dL 108*  < > 212*   < > = values in this interval not displayed.    Results from last 7 days  Lab Units 03/10/18  0541 03/08/18 2013   TROPONIN T ng/mL 0.014 0.016     @LABRCNTbnp@    Results from last 7 days  Lab Units 03/15/18  0529 03/14/18  0457 03/13/18  0432   WBC 10*3/mm3 6.32 4.83 4.41*   HEMOGLOBIN g/dL 9.4* 9.6* 9.9*   HEMATOCRIT % 28.8* 29.5* 29.8*   PLATELETS 10*3/mm3 375 385 310           Results from last 7 days  Lab Units 03/10/18  0541   MAGNESIUM mg/dL 2.1     I personally viewed and interpreted the patient's EKG/Telemetry data    Patient Active Problem  List   Diagnosis   • Diabetic ketoacidosis without coma associated with type 1 diabetes mellitus   • Type 1 diabetes mellitus   • Tobacco abuse   • Alcohol abuse   • Insurance coverage problems   • Bilateral carpal tunnel syndrome   • Microalbuminuria     Assessment and Plan:    The patient is a 57 year old man with severe to critical AS by echo, but relatively mild symptoms.  Given his age, I suspect bicuspid aortic valve is the most likely diagnosis.  I would proceed with the usual AS evaluation including right and left heart cath, which I will schedule for tomorrow.  I would then consider a SUDHAKAR to fully evaluate his aortic valve anatomy.    Though he may be at increased risk due to his comorbidities, I would check his anatomy thoroughly before considering him for TAVR.      Raul Buenrostro MD  03/15/18  10:41 AM.                Electronically signed by Raul Buenrostro MD at 3/15/2018 10:46 AM     Carri Menchaca MD at 3/14/2018  4:59 PM      Consult Orders:    1. Inpatient Cardiology Consult [[id number]] ordered by Westley Barker MD at 18 1529                Date of Hospital Visit: [unfilled]ENC@  Encounter Provider: Carri Menchaca MD  Place of Service: [facility] CARDIOLOGY  Patient Name: Juan Payne  :1960  Referral Provider: Mahamed Tolbert MD    Chief complaint    History of Present Illness      Mr. Payne is 57-year-old gentleman with type 1 diabetes mellitus on insulin for the last 27 years.  He stopped taking one formed insulin insulin for a week and presented with diabetic ketoacidosis.  He was fatigued and vomiting.  This was treated.  He was noted on exam have a murmur.  He has a history of hypertension but does not take medication for this for quite some time and does not know his lipid status.    He smokes pack and a half of cigarettes a day and drinks a pint of hard alcohol every day.  He lives with his girlfriend.  He recently lost his job because he keeps  missing work.  He said over the past year, he's become so fatigued that he can't do his work.  Prior to that he was fine.  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  He said he just got to the point where he was too fatigued to even do that.    He's had no dizziness or syncope.  He has no real chest tightness but has been very short winded.  He's had no fevers chills or cough.    There is no family history of premature cardiovascular disease.      Echo done 3/14/18 shows     · Left ventricular wall thickness is consistent with severe concentric hypertrophy.  · Left ventricular systolic function is hyperdynamic (EF > 70).  · Left ventricular diastolic dysfunction (grade I) consistent with impaired relaxation.  · The aortic valve is abnormal in structure. There is calcification of the aortic valve.No aortic valve regurgitation is present. Severe aortic valve stenosis is present. The aortic peak velocity is 5m/s with a maximal pressure gradient of 122mmHg and mean pressure gradient of 62mmHg. The ALICIA is 0.54cm2 with a dimensionless index of 0.2.    Past Medical History:   Diagnosis Date   • Diabetes mellitus        Past Surgical History:   Procedure Laterality Date   • TESTICLE SURGERY         Prescriptions Prior to Admission   Medication Sig Dispense Refill Last Dose   • insulin glargine (LANTUS) 100 UNIT/ML injection Inject 15 Units under the skin 2 (Two) Times a Day. Patient states he is taking 18-21 units TID      • insulin lispro (humaLOG) 100 UNIT/ML injection Inject 18-21 Units under the skin 3 (Three) Times a Day Before Meals.      • insulin regular (humuLIN R,novoLIN R) 100 UNIT/ML injection Inject  under the skin 2 (Two) Times a Day Before Meals.          Current Meds  Scheduled Meds:  enoxaparin 40 mg Subcutaneous Nightly   insulin aspart 0-4 Units Subcutaneous 4x Daily AC & at Bedtime   insulin aspart 4 Units Subcutaneous TID With  "Meals   insulin detemir 12 Units Subcutaneous QAM   insulin detemir 6 Units Subcutaneous Nightly   multivitamin 1 tablet Oral Daily     Continuous Infusions:   PRN Meds:.•  acetaminophen  •  dextrose  •  dextrose  •  dextrose  •  glucagon (human recombinant)  •  LORazepam  •  LORazepam  •  magnesium sulfate **OR** magnesium sulfate **OR** magnesium sulfate  •  ondansetron **OR** ondansetron ODT **OR** ondansetron  •  potassium & sodium phosphates **OR** potassium & sodium phosphates  •  potassium phosphate infusion greater than 15 mMoles **OR** potassium phosphate infusion greater than 15 mMoles **OR** potassium phosphate **OR** sodium phosphate IVPB **OR** sodium phosphate IVPB **OR** sodium phosphate IVPB  •  promethazine  •  sodium chloride  •  sodium chloride    Allergies as of 03/08/2018   • (No Known Allergies)       Social History     Social History   • Marital status: Single     Spouse name: N/A   • Number of children: N/A   • Years of education: N/A     Occupational History   • Not on file.     Social History Main Topics   • Smoking status: Current Every Day Smoker     Packs/day: 1.50   • Smokeless tobacco: Never Used   • Alcohol use 3.6 oz/week     6 Shots of liquor per week      Comment: pt states 1 pint/day   • Drug use: No   • Sexual activity: Defer     Other Topics Concern   • Not on file     Social History Narrative   • No narrative on file       History reviewed. No pertinent family history.    REVIEW OF SYSTEMS:   12 point ROS was performed and is negative except as outlined in HPI          Objective:   Temp:  [97.9 °F (36.6 °C)-98.8 °F (37.1 °C)] 98 °F (36.7 °C)  Heart Rate:  [66-74] 66  Resp:  [16-18] 18  BP: (106-122)/(63-68) 106/64  Body mass index is 21.29 kg/m².  Flowsheet Rows    Flowsheet Row First Filed Value   Admission Height 172.7 cm (68\") Documented at 03/08/2018 1637   Admission Weight 63.5 kg (140 lb) Documented at 03/08/2018 1637        Vitals:    03/14/18 1445   BP: 106/64   Pulse: " 66   Resp: 18   Temp: 98 °F (36.7 °C)   SpO2: 99%       General Appearance:    Alert, cooperative, in no acute distress   Head:    Normocephalic, without obvious abnormality, atraumatic   Eyes:            Lids and lashes normal, conjunctivae and sclerae normal, no   icterus, no pallor, corneas clear   Ears:    Ears appear intact with no abnormalities noted   Throat:   No oral lesions, no thrush, oral mucosa moist   Neck:   No adenopathy, supple, trachea midline, no thyromegaly, no   carotid bruit, no JVD   Back:     No kyphosis present, no scoliosis present, no skin lesions, erythema or scars, no tenderness, range of motion normal   Lungs:     Clear to auscultation,respirations regular, even and unlabored    Heart:    Regular rhythm and normal rate, normal S1 and S2, 3/6 LIZANDRO no gallop, no rub, no click   Chest Wall:    No abnormalities observed   Abdomen:     Normal bowel sounds, no masses, no organomegaly, soft        non-tender, non-distended, no guarding, no rebound  tenderness   Extremities:   Moves all extremities well, no edema, no cyanosis, no redness   Pulses:   Pulses palpable and equal bilaterally. Normal radial, carotid,  dorsalis pedis and posterior tibial pulses bilaterally. B/l carotid bruits.    Skin:  Psychiatric:   No bleeding, bruising or rash    Alert and oriented x 3, normal mood and affect                 Lab Review:      Results from last 7 days  Lab Units 03/14/18  0457  03/12/18  0603   SODIUM mmol/L 139  < > 136   POTASSIUM mmol/L 3.7  < > 4.0   CHLORIDE mmol/L 100  < > 97*   CO2 mmol/L 26.6  < > 28.1   BUN mg/dL 12  < > 5*   CREATININE mg/dL 0.69*  < > 0.57*   CALCIUM mg/dL 8.3*  < > 8.5*   BILIRUBIN mg/dL  --   --  0.6   ALK PHOS U/L  --   --  57   ALT (SGPT) U/L  --   --  16   AST (SGOT) U/L  --   --  39   GLUCOSE mg/dL 225*  < > 212*   < > = values in this interval not displayed.    Results from last 7 days  Lab Units 03/10/18  0541 03/08/18 2013   TROPONIN T ng/mL 0.014 0.016          Results from last 7 days  Lab Units 03/14/18  0457 03/13/18  0432 03/12/18  0603   WBC 10*3/mm3 4.83 4.41* 4.97   HEMOGLOBIN g/dL 9.6* 9.9* 10.9*   HEMATOCRIT % 29.5* 29.8* 33.1*   PLATELETS 10*3/mm3 385 310 266           Results from last 7 days  Lab Units 03/10/18  0541   MAGNESIUM mg/dL 2.1       I personally viewed and interpreted the patient's EKG/Telemetry data  )  Patient Active Problem List   Diagnosis   • Diabetic ketoacidosis without coma associated with type 1 diabetes mellitus   • Type 1 diabetes mellitus   • Tobacco abuse   • Alcohol abuse   • Insurance coverage problems   • Bilateral carpal tunnel syndrome   • Microalbuminuria     Assessment and Plan:    1. DM, s/p DKA - treated per primary team  2. Severe AS - has symptoms consistent with poor cardiac output.   3. Alcohol abuse - advised cessation.  4. Tobacco use - advised cessation  5. Poor dentition.    Consult TAVR team, check carotid.  Will need oral surgery if proceed with AVR.     Spoke with Patient and his girlfriend.  I also called Virginia Edwards to get him consults for TAVR evaluation.  I explained to him the risk of aortic stenosis as well as the risk and benefit of aortic valve replacement versus tavr.    Transfer to monitored bed.     Carri Menchaca MD  03/14/18  4:59 PM.  Time spent in reviewing chart, discussion and examination:                  Electronically signed by Carri Menchaca MD at 3/14/2018  5:10 PM

## 2018-03-15 NOTE — PLAN OF CARE
Problem: Patient Care Overview  Goal: Plan of Care Review  Outcome: Ongoing (interventions implemented as appropriate)   03/15/18 0441   OTHER   Outcome Summary pt transfered to 446, A&O x3. vss. loud murmur noted. discussed plan of care, questions answered. no acute distress noted, cont to monitor.      Goal: Individualization and Mutuality  Outcome: Ongoing (interventions implemented as appropriate)    Goal: Interprofessional Rounds/Family Conf  Outcome: Ongoing (interventions implemented as appropriate)

## 2018-03-15 NOTE — PROGRESS NOTES
"  ENDOCRINE    Subjective   AND PLANS  Juan Payne is a 57 y.o. male.     Follow-up diabetes and related disorders.    Feels better.  No nausea or vomiting.  No hypoglycemia.  .  -163.  Continue Levemit 12 units q AM and 6 units q PM, Novolog 4 units tid + sliding scale.    Cholesterol 163.  HDL 48.  Thyroid function normal.  10 year risk of heart disease of stroke using ACC calculator is high at 22.7%.  Start Lipitor 20 mg/day.    Echo report noted.  Cardiology consult noted.  No chest pain or SOB.  Await cardiologist's plans    Objective   /77 (BP Location: Right arm, Patient Position: Sitting)   Pulse 76   Temp 98.3 °F (36.8 °C) (Oral)   Resp 18   Ht 172.7 cm (67.99\")   Wt 63.5 kg (140 lb)   SpO2 97%   BMI 21.29 kg/m²   Physical Exam    Awake, alert, not dyspneic, not in distress  No rales or wheezes.  Regular heart rate and rhythm.  Systolic murmur at base.  No gallop.  Abdomen soft, nontender.  No pedal edema or cyanosis.  No xanthomas    Lab Results (last 24 hours)     Procedure Component Value Units Date/Time    Blood Culture - Blood, Blood, Venous Line [[id number]]  (Normal) Collected:  03/10/18 0707    Specimen:  Blood from Blood, Venous Line Updated:  03/15/18 0831     Blood Culture No growth at 5 days    Blood Culture - Blood, Blood, Venous Line [[id number]]  (Normal) Collected:  03/10/18 0707    Specimen:  Blood from Blood, Venous Line Updated:  03/15/18 0831     Blood Culture No growth at 5 days    POC Glucose Once [[id number]]  (Normal) Collected:  03/15/18 0625    Specimen:  Blood Updated:  03/15/18 0629     Glucose 100 mg/dL     Narrative:       Meter: [id number] : [id number] Guilherme DÍAZ    Basic Metabolic Panel [[id number]]  (Abnormal) Collected:  03/15/18 0529    Specimen:  Blood Updated:  03/15/18 0608     Glucose 108 (H) mg/dL      BUN 12 mg/dL      Creatinine 0.49 (L) mg/dL      Sodium 140 mmol/L      Potassium 3.7 mmol/L      Chloride 103 mmol/L      CO2 29.9 (H) " mmol/L      Calcium 8.4 (L) mg/dL      eGFR Non African Amer >150 mL/min/1.73      BUN/Creatinine Ratio 24.5     Anion Gap 7.1 mmol/L     Narrative:       GFR Normal >60  Chronic Kidney Disease <60  Kidney Failure <15    Lipid Panel [[id number]] Collected:  03/15/18 0529    Specimen:  Blood Updated:  03/15/18 0608     Total Cholesterol 163 mg/dL      Triglycerides 130 mg/dL      HDL Cholesterol 48 mg/dL      LDL Cholesterol  89 mg/dL      VLDL Cholesterol 26 mg/dL      LDL/HDL Ratio 1.85    Narrative:       Cholesterol Reference Ranges  (U.S. Department of Health and Human Services ATP III Classifications)    Desirable          <200 mg/dL  Borderline High    200-239 mg/dL  High Risk          >240 mg/dL      Triglyceride Reference Ranges  (U.S. Department of Health and Human Services ATP III Classifications)    Normal           <150 mg/dL  Borderline High  150-199 mg/dL  High             200-499 mg/dL  Very High        >500 mg/dL    HDL Reference Ranges  (U.S. Department of Health and Human Services ATP III Classifcations)    Low     <40 mg/dl (major risk factor for CHD)  High    >60 mg/dl ('negative' risk factor for CHD)        LDL Reference Ranges  (U.S. Department of Health and Human Services ATP III Classifcations)    Optimal          <100 mg/dL  Near Optimal     100-129 mg/dL  Borderline High  130-159 mg/dL  High             160-189 mg/dL  Very High        >189 mg/dL    CBC & Differential [[id number]] Collected:  03/15/18 0529    Specimen:  Blood Updated:  03/15/18 0551    Narrative:       The following orders were created for panel order CBC & Differential.  Procedure                               Abnormality         Status                     ---------                               -----------         ------                     Scan Slide[[id number]]                                                                  CBC Auto Differential[[id number]]        Abnormal            Final result                 Please view  results for these tests on the individual orders.    CBC Auto Differential [[id number]]  (Abnormal) Collected:  03/15/18 0529    Specimen:  Blood Updated:  03/15/18 0551     WBC 6.32 10*3/mm3      RBC 2.56 (L) 10*6/mm3      Hemoglobin 9.4 (L) g/dL      Hematocrit 28.8 (L) %      .5 (H) fL      MCH 36.7 (H) pg      MCHC 32.6 g/dL      RDW 12.8 %      RDW-SD 53.0 fl      MPV 9.5 fL      Platelets 375 10*3/mm3      Neutrophil % 25.5 (L) %      Lymphocyte % 54.1 (H) %      Monocyte % 16.9 (H) %      Eosinophil % 2.1 %      Basophil % 0.8 %      Immature Grans % 0.6 (H) %      Neutrophils, Absolute 1.61 (L) 10*3/mm3      Lymphocytes, Absolute 3.42 10*3/mm3      Monocytes, Absolute 1.07 10*3/mm3      Eosinophils, Absolute 0.13 10*3/mm3      Basophils, Absolute 0.05 10*3/mm3      Immature Grans, Absolute 0.04 (H) 10*3/mm3     POC Glucose Once [[id number]]  (Abnormal) Collected:  03/14/18 2047    Specimen:  Blood Updated:  03/14/18 2048     Glucose 163 (H) mg/dL     Narrative:       Meter: [id number] : [id number] Guilherme Blackman NA    POC Glucose Once [[id number]]  (Normal) Collected:  03/14/18 1629    Specimen:  Blood Updated:  03/14/18 1643     Glucose 105 mg/dL     Narrative:       Meter: [id number] : [id number] MamMOLIova Saodat NA    POC Glucose Once [[id number]]  (Normal) Collected:  03/14/18 1151    Specimen:  Blood Updated:  03/14/18 1211     Glucose 117 mg/dL     Narrative:       Meter: [id number] : [id number] Tipjoyedova Saodat NA            Principal Problem:    Diabetic ketoacidosis without coma associated with type 1 diabetes mellitus  Active Problems:    Type 1 diabetes mellitus    Tobacco abuse    Alcohol abuse    Insurance coverage problems    Bilateral carpal tunnel syndrome    Microalbuminuria    Insulin therapy as discussed above.   Start Lipitor  Await cardiologist's plans

## 2018-03-15 NOTE — CONSULTS
Date of Hospital Visit: [unfilled]ENC@  Encounter Provider: Raul Buenrostro MD  Place of Service: [facility] CARDIOLOGY  Patient Name: Juan Payne  :1960  Referral Provider: Mahamed Tolbert MD    Chief complaint:  Aortic stenosis    History of Present Illness:  The patient is a 57 year old man with history of diabetes and alcohol abuse.  He presents with diabetic ketoacidosis after having stopped his insulin for several days.    He was found to have a loud heart murmur on exam today.  Echo was of poor quality, but suggested critical AS with peak gradient of 122, mean of 62.    The patient lost his job as a  due to frequent work absenteeism.  He states that he was able to perform work duties.  He denies dyspnea or angina, but does complain of overall generalized fatigue.      Past Medical History:   Diagnosis Date   • Diabetes mellitus        Past Surgical History:   Procedure Laterality Date   • TESTICLE SURGERY         Prescriptions Prior to Admission   Medication Sig Dispense Refill Last Dose   • insulin glargine (LANTUS) 100 UNIT/ML injection Inject 15 Units under the skin 2 (Two) Times a Day. Patient states he is taking 18-21 units TID      • insulin lispro (humaLOG) 100 UNIT/ML injection Inject 18-21 Units under the skin 3 (Three) Times a Day Before Meals.      • insulin regular (humuLIN R,novoLIN R) 100 UNIT/ML injection Inject  under the skin 2 (Two) Times a Day Before Meals.          Current Meds  Scheduled Meds:  atorvastatin 20 mg Oral Nightly   enoxaparin 40 mg Subcutaneous Nightly   insulin aspart 0-4 Units Subcutaneous 4x Daily AC & at Bedtime   insulin aspart 4 Units Subcutaneous TID With Meals   insulin detemir 12 Units Subcutaneous QAM   insulin detemir 6 Units Subcutaneous Nightly   multivitamin 1 tablet Oral Daily     Continuous Infusions:   PRN Meds:.•  acetaminophen  •  dextrose  •  dextrose  •  dextrose  •  glucagon (human recombinant)  •   LORazepam  •  LORazepam  •  magnesium sulfate **OR** magnesium sulfate **OR** magnesium sulfate  •  ondansetron **OR** ondansetron ODT **OR** ondansetron  •  potassium & sodium phosphates **OR** potassium & sodium phosphates  •  potassium phosphate infusion greater than 15 mMoles **OR** potassium phosphate infusion greater than 15 mMoles **OR** potassium phosphate **OR** sodium phosphate IVPB **OR** sodium phosphate IVPB **OR** sodium phosphate IVPB  •  promethazine  •  sodium chloride  •  sodium chloride    Allergies as of 03/08/2018   • (No Known Allergies)       Social History     Social History   • Marital status: Single     Spouse name: N/A   • Number of children: N/A   • Years of education: N/A     Occupational History   • Not on file.     Social History Main Topics   • Smoking status: Current Every Day Smoker     Packs/day: 1.50   • Smokeless tobacco: Never Used   • Alcohol use 3.6 oz/week     6 Shots of liquor per week      Comment: pt states 1 pint/day   • Drug use: No   • Sexual activity: Defer     Other Topics Concern   • Not on file     Social History Narrative   • No narrative on file     History reviewed. No pertinent family history.    REVIEW OF SYSTEMS:   12 point ROS was performed and is negative except as outlined in HPI     REVIEW OF SYSTEMS:   CONSTITUTIONAL: No weight loss, fever, chills, weakness or fatigue.   HEENT: Eyes: No visual loss, blurred vision, double vision or yellow sclerae. Ears, Nose, Throat: No hearing loss, sneezing, congestion, runny nose or sore throat.   SKIN: No rash or itching.     RESPIRATORY: No shortness of breath, hemoptysis, cough or sputum.   GASTROINTESTINAL: No anorexia, nausea, vomiting or diarrhea. No abdominal pain, bright red blood per rectum or melena.  GENITOURINARY: No burning on urination, hematuria or increased frequency.  NEUROLOGICAL: No headache, dizziness, syncope, paralysis, ataxia, numbness or tingling in the extremities. No change in bowel or bladder  "control.   MUSCULOSKELETAL: No muscle, back pain, joint pain or stiffness.   HEMATOLOGIC: No anemia, bleeding or bruising.   LYMPHATICS: No enlarged nodes. No history of splenectomy.   PSYCHIATRIC: No history of depression, anxiety, hallucinations.   ENDOCRINOLOGIC: No reports of sweating, cold or heat intolerance. No polyuria or polydipsia.        Objective:   Temp:  [98 °F (36.7 °C)-98.8 °F (37.1 °C)] 98.3 °F (36.8 °C)  Heart Rate:  [66-76] 76  Resp:  [18] 18  BP: (106-138)/(64-86) 138/77  Body mass index is 21.29 kg/m².  Flowsheet Rows    Flowsheet Row First Filed Value   Admission Height 172.7 cm (68\") Documented at 03/08/2018 1637   Admission Weight 63.5 kg (140 lb) Documented at 03/08/2018 1637        Vitals:    03/15/18 0748   BP: 138/77   Pulse: 76   Resp: 18   Temp: 98.3 °F (36.8 °C)   SpO2:        General Appearance:    Alert, cooperative, in no acute distress   Head:    Normocephalic, without obvious abnormality, atraumatic   Eyes:            Lids and lashes normal, conjunctivae and sclerae normal, no   icterus, no pallor, corneas clear, PERRLA   Ears:    Ears appear intact with no abnormalities noted   Throat:   No oral lesions, no thrush, oral mucosa moist   Neck:   No adenopathy, supple, trachea midline, no thyromegaly, no   carotid bruit, no JVD   Back:     No kyphosis present, no scoliosis present, no skin lesions, erythema or scars, no tenderness to percussion or palpation, range of motion normal   Lungs:     Clear to auscultation,respirations regular, even and unlabored    Heart:  Regular, loud AS murmur.   Chest Wall:    No abnormalities observed   Abdomen:     Normal bowel sounds, no masses, no organomegaly, soft        non-tender, non-distended, no guarding, no rebound  tenderness   Extremities:   Moves all extremities well, no edema, no cyanosis, no redness   Pulses:   Pulses palpable and equal bilaterally. Normal radial, carotid, femoral, dorsalis pedis and posterior tibial pulses bilaterally. " Normal abdominal aorta   Skin:  Psychiatric:   No bleeding, bruising or rash    Alert and oriented x 3, normal mood and affect                 Lab Review:      Results from last 7 days  Lab Units 03/15/18  0529  03/12/18  0603   SODIUM mmol/L 140  < > 136   POTASSIUM mmol/L 3.7  < > 4.0   CHLORIDE mmol/L 103  < > 97*   CO2 mmol/L 29.9*  < > 28.1   BUN mg/dL 12  < > 5*   CREATININE mg/dL 0.49*  < > 0.57*   CALCIUM mg/dL 8.4*  < > 8.5*   BILIRUBIN mg/dL  --   --  0.6   ALK PHOS U/L  --   --  57   ALT (SGPT) U/L  --   --  16   AST (SGOT) U/L  --   --  39   GLUCOSE mg/dL 108*  < > 212*   < > = values in this interval not displayed.    Results from last 7 days  Lab Units 03/10/18  0541 03/08/18 2013   TROPONIN T ng/mL 0.014 0.016     @LABRCNTbnp@    Results from last 7 days  Lab Units 03/15/18  0529 03/14/18  0457 03/13/18  0432   WBC 10*3/mm3 6.32 4.83 4.41*   HEMOGLOBIN g/dL 9.4* 9.6* 9.9*   HEMATOCRIT % 28.8* 29.5* 29.8*   PLATELETS 10*3/mm3 375 385 310           Results from last 7 days  Lab Units 03/10/18  0541   MAGNESIUM mg/dL 2.1     I personally viewed and interpreted the patient's EKG/Telemetry data    Patient Active Problem List   Diagnosis   • Diabetic ketoacidosis without coma associated with type 1 diabetes mellitus   • Type 1 diabetes mellitus   • Tobacco abuse   • Alcohol abuse   • Insurance coverage problems   • Bilateral carpal tunnel syndrome   • Microalbuminuria     Assessment and Plan:    The patient is a 57 year old man with severe to critical AS by echo, but relatively mild symptoms.  Given his age, I suspect bicuspid aortic valve is the most likely diagnosis.  I would proceed with the usual AS evaluation including right and left heart cath, which I will schedule for tomorrow.  I would then consider a SUDHAKAR to fully evaluate his aortic valve anatomy.    Though he may be at increased risk due to his comorbidities, I would check his anatomy thoroughly before considering him for TAVR.      Raul Buenrostro,  MD  03/15/18  10:41 AM.

## 2018-03-16 ENCOUNTER — APPOINTMENT (OUTPATIENT)
Dept: GENERAL RADIOLOGY | Facility: HOSPITAL | Age: 58
End: 2018-03-16
Attending: THORACIC SURGERY (CARDIOTHORACIC VASCULAR SURGERY)

## 2018-03-16 LAB
ANION GAP SERPL CALCULATED.3IONS-SCNC: 7.5 MMOL/L
BASOPHILS # BLD AUTO: 0.07 10*3/MM3 (ref 0–0.2)
BASOPHILS NFR BLD AUTO: 1 % (ref 0–1.5)
BUN BLD-MCNC: 12 MG/DL (ref 6–20)
BUN/CREAT SERPL: 21.8 (ref 7–25)
CALCIUM SPEC-SCNC: 8.2 MG/DL (ref 8.6–10.5)
CHLORIDE SERPL-SCNC: 104 MMOL/L (ref 98–107)
CO2 SERPL-SCNC: 30.5 MMOL/L (ref 22–29)
CREAT BLD-MCNC: 0.55 MG/DL (ref 0.76–1.27)
DEPRECATED RDW RBC AUTO: 53 FL (ref 37–54)
EOSINOPHIL # BLD AUTO: 0.17 10*3/MM3 (ref 0–0.7)
EOSINOPHIL NFR BLD AUTO: 2.4 % (ref 0.3–6.2)
ERYTHROCYTE [DISTWIDTH] IN BLOOD BY AUTOMATED COUNT: 12.9 % (ref 11.5–14.5)
GFR SERPL CREATININE-BSD FRML MDRD: >150 ML/MIN/1.73
GLUCOSE BLD-MCNC: 149 MG/DL (ref 65–99)
GLUCOSE BLDC GLUCOMTR-MCNC: 116 MG/DL (ref 70–130)
GLUCOSE BLDC GLUCOMTR-MCNC: 154 MG/DL (ref 70–130)
GLUCOSE BLDC GLUCOMTR-MCNC: 159 MG/DL (ref 70–130)
GLUCOSE BLDC GLUCOMTR-MCNC: 289 MG/DL (ref 70–130)
HCT VFR BLD AUTO: 29.4 % (ref 40.4–52.2)
HCT VFR BLDA CALC: 26 % (ref 38–51)
HGB BLD-MCNC: 9.5 G/DL (ref 13.7–17.6)
HGB BLDA-MCNC: 8.8 G/DL (ref 12–17)
IMM GRANULOCYTES # BLD: 0.08 10*3/MM3 (ref 0–0.03)
IMM GRANULOCYTES NFR BLD: 1.1 % (ref 0–0.5)
LYMPHOCYTES # BLD AUTO: 3.16 10*3/MM3 (ref 0.9–4.8)
LYMPHOCYTES NFR BLD AUTO: 45.3 % (ref 19.6–45.3)
MCH RBC QN AUTO: 36.4 PG (ref 27–32.7)
MCHC RBC AUTO-ENTMCNC: 32.3 G/DL (ref 32.6–36.4)
MCV RBC AUTO: 112.6 FL (ref 79.8–96.2)
MONOCYTES # BLD AUTO: 1.27 10*3/MM3 (ref 0.2–1.2)
MONOCYTES NFR BLD AUTO: 18.2 % (ref 5–12)
NEUTROPHILS # BLD AUTO: 2.23 10*3/MM3 (ref 1.9–8.1)
NEUTROPHILS NFR BLD AUTO: 32 % (ref 42.7–76)
PLATELET # BLD AUTO: 424 10*3/MM3 (ref 140–500)
PMV BLD AUTO: 9.5 FL (ref 6–12)
POTASSIUM BLD-SCNC: 4.4 MMOL/L (ref 3.5–5.2)
RBC # BLD AUTO: 2.61 10*6/MM3 (ref 4.6–6)
SAO2 % BLDA: 94 % (ref 95–98)
SODIUM BLD-SCNC: 142 MMOL/L (ref 136–145)
WBC NRBC COR # BLD: 6.98 10*3/MM3 (ref 4.5–10.7)

## 2018-03-16 PROCEDURE — C1894 INTRO/SHEATH, NON-LASER: HCPCS | Performed by: INTERNAL MEDICINE

## 2018-03-16 PROCEDURE — 85014 HEMATOCRIT: CPT

## 2018-03-16 PROCEDURE — C1887 CATHETER, GUIDING: HCPCS | Performed by: INTERNAL MEDICINE

## 2018-03-16 PROCEDURE — 80048 BASIC METABOLIC PNL TOTAL CA: CPT | Performed by: INTERNAL MEDICINE

## 2018-03-16 PROCEDURE — 93460 R&L HRT ART/VENTRICLE ANGIO: CPT | Performed by: INTERNAL MEDICINE

## 2018-03-16 PROCEDURE — 25010000002 HEPARIN (PORCINE) PER 1000 UNITS: Performed by: INTERNAL MEDICINE

## 2018-03-16 PROCEDURE — 99232 SBSQ HOSP IP/OBS MODERATE 35: CPT | Performed by: INTERNAL MEDICINE

## 2018-03-16 PROCEDURE — 25010000002 FENTANYL CITRATE (PF) 100 MCG/2ML SOLUTION: Performed by: INTERNAL MEDICINE

## 2018-03-16 PROCEDURE — 63710000001 INSULIN ASPART PER 5 UNITS: Performed by: INTERNAL MEDICINE

## 2018-03-16 PROCEDURE — 4A023N8 MEASUREMENT OF CARDIAC SAMPLING AND PRESSURE, BILATERAL, PERCUTANEOUS APPROACH: ICD-10-PCS | Performed by: INTERNAL MEDICINE

## 2018-03-16 PROCEDURE — 85018 HEMOGLOBIN: CPT

## 2018-03-16 PROCEDURE — C1769 GUIDE WIRE: HCPCS | Performed by: INTERNAL MEDICINE

## 2018-03-16 PROCEDURE — 25010000002 MIDAZOLAM PER 1 MG: Performed by: INTERNAL MEDICINE

## 2018-03-16 PROCEDURE — 25010000002 ENOXAPARIN PER 10 MG: Performed by: INTERNAL MEDICINE

## 2018-03-16 PROCEDURE — 99232 SBSQ HOSP IP/OBS MODERATE 35: CPT | Performed by: PHYSICIAN ASSISTANT

## 2018-03-16 PROCEDURE — 85025 COMPLETE CBC W/AUTO DIFF WBC: CPT | Performed by: HOSPITALIST

## 2018-03-16 PROCEDURE — 82962 GLUCOSE BLOOD TEST: CPT

## 2018-03-16 PROCEDURE — B2111ZZ FLUOROSCOPY OF MULTIPLE CORONARY ARTERIES USING LOW OSMOLAR CONTRAST: ICD-10-PCS | Performed by: INTERNAL MEDICINE

## 2018-03-16 PROCEDURE — B2151ZZ FLUOROSCOPY OF LEFT HEART USING LOW OSMOLAR CONTRAST: ICD-10-PCS | Performed by: INTERNAL MEDICINE

## 2018-03-16 PROCEDURE — 0 IOPAMIDOL PER 1 ML: Performed by: INTERNAL MEDICINE

## 2018-03-16 PROCEDURE — 70355 PANORAMIC X-RAY OF JAWS: CPT

## 2018-03-16 RX ORDER — FENTANYL CITRATE 50 UG/ML
INJECTION, SOLUTION INTRAMUSCULAR; INTRAVENOUS AS NEEDED
Status: DISCONTINUED | OUTPATIENT
Start: 2018-03-16 | End: 2018-03-16 | Stop reason: HOSPADM

## 2018-03-16 RX ORDER — SODIUM CHLORIDE 9 MG/ML
INJECTION, SOLUTION INTRAVENOUS CONTINUOUS PRN
Status: DISCONTINUED | OUTPATIENT
Start: 2018-03-16 | End: 2018-03-16 | Stop reason: HOSPADM

## 2018-03-16 RX ORDER — MIDAZOLAM HYDROCHLORIDE 1 MG/ML
INJECTION INTRAMUSCULAR; INTRAVENOUS AS NEEDED
Status: DISCONTINUED | OUTPATIENT
Start: 2018-03-16 | End: 2018-03-16 | Stop reason: HOSPADM

## 2018-03-16 RX ORDER — LIDOCAINE HYDROCHLORIDE 20 MG/ML
INJECTION, SOLUTION INFILTRATION; PERINEURAL AS NEEDED
Status: DISCONTINUED | OUTPATIENT
Start: 2018-03-16 | End: 2018-03-16 | Stop reason: HOSPADM

## 2018-03-16 RX ADMIN — INSULIN ASPART 2 UNITS: 100 INJECTION, SOLUTION INTRAVENOUS; SUBCUTANEOUS at 17:20

## 2018-03-16 RX ADMIN — INSULIN ASPART 1 UNITS: 100 INJECTION, SOLUTION INTRAVENOUS; SUBCUTANEOUS at 12:10

## 2018-03-16 RX ADMIN — ATORVASTATIN CALCIUM 20 MG: 20 TABLET, FILM COATED ORAL at 20:55

## 2018-03-16 RX ADMIN — Medication 1 TABLET: at 17:20

## 2018-03-16 RX ADMIN — INSULIN ASPART 4 UNITS: 100 INJECTION, SOLUTION INTRAVENOUS; SUBCUTANEOUS at 17:20

## 2018-03-16 RX ADMIN — ENOXAPARIN SODIUM 40 MG: 40 INJECTION SUBCUTANEOUS at 20:57

## 2018-03-16 RX ADMIN — INSULIN ASPART 1 UNITS: 100 INJECTION, SOLUTION INTRAVENOUS; SUBCUTANEOUS at 20:56

## 2018-03-16 NOTE — PERIOPERATIVE NURSING NOTE
Bleeding precautions and activity restrictions reviewed with patient, verbalizes and demonstrate understanding.Bleeding precautions and activity restrictions reviewed with patient, verbalizes and demonstrate understanding.

## 2018-03-16 NOTE — PROGRESS NOTES
LOS: 8 days   Patient Care Team:  No Known Provider as PCP - General    Chief Complaint:   Follow-up aortic stenosis     Subjective  No new complaints.     Vital Signs  Temp:  [97.9 °F (36.6 °C)-99 °F (37.2 °C)] 97.9 °F (36.6 °C)  Heart Rate:  [59-76] 76  Resp:  [16-20] 16  BP: (112-139)/(67-90) 120/67  Body mass index is 21.29 kg/m².    Intake/Output Summary (Last 24 hours) at 03/16/18 1332  Last data filed at 03/16/18 1226   Gross per 24 hour   Intake              800 ml   Output                0 ml   Net              800 ml     I/O this shift:  In: 440 [P.O.:240; I.V.:200]  Out: -       1    03/08/18  1637   Weight: 63.5 kg (140 lb)         Objective    Physical Exam:   General Appearance: awake and alert, no acute distress   HENT: NCAT, conjunctiva normal   Neck: trachea midline, no JVD noted, normal ROM   Lungs: clear to auscultation, respirations regular and respirations unlabored   Heart: regular rhythm & normal rate, normal S1, S2 and +murmur   Abdomen: soft or nontender   Skin: clean and dry   Musc: normal ROM, no edema.    Neuro: alert and oriented, no focal deficits.     Results Review:        WBC WBC   Date Value Ref Range Status   03/16/2018 6.98 4.50 - 10.70 10*3/mm3 Final   03/15/2018 6.32 4.50 - 10.70 10*3/mm3 Final   03/14/2018 4.83 4.50 - 10.70 10*3/mm3 Final      HGB Hemoglobin   Date Value Ref Range Status   03/16/2018 8.8 (L) 12.0 - 17.0 g/dL Final   03/16/2018 9.5 (L) 13.7 - 17.6 g/dL Final   03/15/2018 9.4 (L) 13.7 - 17.6 g/dL Final   03/14/2018 9.6 (L) 13.7 - 17.6 g/dL Final      HCT Hematocrit   Date Value Ref Range Status   03/16/2018 26 (L) 38 - 51 % Final   03/16/2018 29.4 (L) 40.4 - 52.2 % Final   03/15/2018 28.8 (L) 40.4 - 52.2 % Final   03/14/2018 29.5 (L) 40.4 - 52.2 % Final      Platelets Platelets   Date Value Ref Range Status   03/16/2018 424 140 - 500 10*3/mm3 Final   03/15/2018 375 140 - 500 10*3/mm3 Final   03/14/2018 385 140 - 500 10*3/mm3 Final        PT/INR:  No results  found for: PROTIME/No results found for: INR    Sodium Sodium   Date Value Ref Range Status   03/16/2018 142 136 - 145 mmol/L Final   03/15/2018 140 136 - 145 mmol/L Final   03/14/2018 139 136 - 145 mmol/L Final      Potassium Potassium   Date Value Ref Range Status   03/16/2018 4.4 3.5 - 5.2 mmol/L Final   03/15/2018 3.7 3.5 - 5.2 mmol/L Final   03/14/2018 3.7 3.5 - 5.2 mmol/L Final      Chloride Chloride   Date Value Ref Range Status   03/16/2018 104 98 - 107 mmol/L Final   03/15/2018 103 98 - 107 mmol/L Final   03/14/2018 100 98 - 107 mmol/L Final      Bicarbonate CO2   Date Value Ref Range Status   03/16/2018 30.5 (H) 22.0 - 29.0 mmol/L Final   03/15/2018 29.9 (H) 22.0 - 29.0 mmol/L Final   03/14/2018 26.6 22.0 - 29.0 mmol/L Final      BUN BUN   Date Value Ref Range Status   03/16/2018 12 6 - 20 mg/dL Final   03/15/2018 12 6 - 20 mg/dL Final   03/14/2018 12 6 - 20 mg/dL Final      Creatinine Creatinine   Date Value Ref Range Status   03/16/2018 0.55 (L) 0.76 - 1.27 mg/dL Final   03/15/2018 0.49 (L) 0.76 - 1.27 mg/dL Final   03/14/2018 0.69 (L) 0.76 - 1.27 mg/dL Final      Calcium Calcium   Date Value Ref Range Status   03/16/2018 8.2 (L) 8.6 - 10.5 mg/dL Final   03/15/2018 8.4 (L) 8.6 - 10.5 mg/dL Final   03/14/2018 8.3 (L) 8.6 - 10.5 mg/dL Final      Magnesium No results found for: MG         atorvastatin 20 mg Oral Nightly   enoxaparin 40 mg Subcutaneous Nightly   insulin aspart 0-4 Units Subcutaneous 4x Daily AC & at Bedtime   insulin aspart 4 Units Subcutaneous TID With Meals   insulin detemir 12 Units Subcutaneous QAM   insulin detemir 6 Units Subcutaneous Nightly   multivitamin 1 tablet Oral Daily              Patient Active Problem List   Diagnosis Code   • Diabetic ketoacidosis without coma associated with type 1 diabetes mellitus E10.10   • Type 1 diabetes mellitus E10.9   • Tobacco abuse Z72.0   • Alcohol abuse F10.10   • Insurance coverage problems Z59.8   • Bilateral carpal tunnel syndrome G56.03   •  Microalbuminuria R80.9   • Nonrheumatic aortic valve stenosis I35.0       Assessment & Plan    -Severe aortic stenosis- peak velocity is 5m/s;  maximal pressure gradient of 122mmHg and mean pressure gradient of 62mmHg; ALICIA is 0.54cm2   -Severe concentric hypertrophy  -Left ventricular diastolic dysfunction   -HTN  -DM type 1-- HgbA1c 8.02  -DKA w/o coma- resolved  -Tobacco abuse  -ETOH abuse  -Poor Compliance  -Poor nutrition  -?Poor halfway- may need Panorex prior to discharge    Carotid duplex: without significant stenosis.      Cardiac cath completed today without significant coronary disease. Poor dentition, order panorex to eval for infection. Plan for AVR next week by Dr. Cherry.     Vel Moore PA-C  03/16/18  1:32 PM

## 2018-03-16 NOTE — DISCHARGE INSTRUCTIONS
[facility]  [address] [place] [address]    Coronary Angiogram (Radial/Ulnar Approach) After Care    Refer to this sheet in the next few weeks. These instructions provide you with information on caring for yourself after your procedure. Your caregiver may also give you more specific instructions. Your treatment has been planned according to current medical practices, but problems sometimes occur. Call your caregiver if you have any problems or questions after your procedure.    Home Care Instructions:  · You may shower the day after the procedure. Remove the bandage (dressing) and gently wash the site with plain soap and water. Gently pat the site dry. You may apply a band aid daily for 2 days if desired.    · Do not apply powder or lotion to the site.  · Do not submerge the affected site in water for 3 to 5 days or until the site is completely healed.   · Do not lift, push or pull anything over 10 pounds for 2 days after your procedure.  · Inspect the site at least twice daily. You may notice some bruising at the site and it may be tender for 1 to 2 weeks.     · Increase your fluid intake for the next 2 days.    · Keep arm elevated for 24 hours. For the remainder of the day, keep your arm in “Pledge of Allegiance” position when up and about.     · You may drive 24 hours after the procedure unless otherwise instructed by your caregiver.  · Do not operate machinery or power tools for 24 hours.  · A responsible adult should be with you for the first 24 hours after you arrive home. Do not make any important legal decisions or sign legal papers for 24 hours.  Do not drink alcohol for 24 hours.    · Metformin or any medications containing Metformin should not be taken for 48 hours after your procedure.      Call Your Doctor if:   · You have unusual pain at the radial/ulnar (wrist) site.  · You have redness, warmth, swelling, or pain at the radial/ulnar (wrist) site.  · You have drainage (other  than a small amount of blood on the dressing).  · You have chills or a fever > 101.  · Your arm becomes pale or dark, cool, tingly, or numb.  · You have heavy bleeding from the site, hold pressure on the site for 20 minutes.  If the bleeding stops, apply a fresh bandage and call your cardiologist.  However, if you continue to have bleeding, call 911.

## 2018-03-16 NOTE — PLAN OF CARE
Problem: Patient Care Overview (Adult)  Goal: Plan of Care Review  Outcome: Ongoing (interventions implemented as appropriate)   03/16/18 9783   Coping/Psychosocial Response Interventions   Plan Of Care Reviewed With patient   Patient Care Overview   Progress no change   Outcome Evaluation   Outcome Summary/Follow up Plan R and L heart cath performed today. VSS. no c/o SOA, CP. pt needs SAVR. will be transferred to CVU for plans of surgery next week when bed is availiable. VSS. safety is maintained. cont to monitor

## 2018-03-16 NOTE — PERIOPERATIVE NURSING NOTE
Spoke with Dani yan: Levimer dose after cath.  States she will give this when patient arrives back on unit since she has the Levimer.

## 2018-03-16 NOTE — PROGRESS NOTES
[facility]     LOS: 8 days     Subjective:  No chest pain and no shortness of air today, he is eating well, no nausea or vomiting  -he is still very fatigued  -s/p cath 3/16    Objective:    Vital Signs:  Temp:  [97.9 °F (36.6 °C)-99 °F (37.2 °C)] 97.9 °F (36.6 °C)  Heart Rate:  [59-76] 76  Resp:  [16-20] 16  BP: (112-139)/(67-90) 120/67    SpO2:  [96 %-97 %] 97 %  on   ;   Device (Oxygen Therapy): room air  Body mass index is 21.29 kg/m².    Physical Exam   Constitutional: He appears well-developed and well-nourished.   HENT:   Head: Normocephalic and atraumatic.   Eyes: EOM are normal.   Neck: Normal range of motion.   Cardiovascular: Normal rate and regular rhythm.    Murmur heard.  2-3/6 systolic    Pulmonary/Chest: Effort normal and breath sounds normal.   Abdominal: Soft. Bowel sounds are normal.   Neurological: He is alert.   Skin: Skin is warm.       Results Review:    Glucose   Date Value Ref Range Status   03/16/2018 149 (H) 65 - 99 mg/dL Final   03/15/2018 108 (H) 65 - 99 mg/dL Final   03/14/2018 225 (H) 65 - 99 mg/dL Final       Results from last 7 days  Lab Units 03/16/18  1111 03/16/18  0616   WBC 10*3/mm3  --  6.98   HEMOGLOBIN g/dL  --  9.5*   HEMOGLOBIN, POC g/dL 8.8*  --    HEMATOCRIT %  --  29.4*   HEMATOCRIT POC % 26*  --    PLATELETS 10*3/mm3  --  424       Results from last 7 days  Lab Units 03/16/18  0616  03/12/18  0603   SODIUM mmol/L 142  < > 136   POTASSIUM mmol/L 4.4  < > 4.0   CHLORIDE mmol/L 104  < > 97*   CO2 mmol/L 30.5*  < > 28.1   BUN mg/dL 12  < > 5*   CREATININE mg/dL 0.55*  < > 0.57*   CALCIUM mg/dL 8.2*  < > 8.5*   BILIRUBIN mg/dL  --   --  0.6   ALK PHOS U/L  --   --  57   ALT (SGPT) U/L  --   --  16   AST (SGOT) U/L  --   --  39   GLUCOSE mg/dL 149*  < > 212*   < > = values in this interval not displayed.        Results from last 7 days  Lab Units 03/10/18  0541   MAGNESIUM mg/dL 2.1       Results from last 7 days  Lab Units 03/10/18  0541    TROPONIN T ng/mL 0.014     Cultures:  Blood Culture   Date Value Ref Range Status   03/10/2018 No growth at 4 days  Preliminary   03/10/2018 No growth at 4 days  Preliminary       I have reviewed daily medications and changes in CPOE    Scheduled meds    atorvastatin 20 mg Oral Nightly   enoxaparin 40 mg Subcutaneous Nightly   insulin aspart 0-4 Units Subcutaneous 4x Daily AC & at Bedtime   insulin aspart 4 Units Subcutaneous TID With Meals   insulin detemir 12 Units Subcutaneous QAM   insulin detemir 6 Units Subcutaneous Nightly   multivitamin 1 tablet Oral Daily          PRN meds  •  acetaminophen  •  dextrose  •  dextrose  •  dextrose  •  glucagon (human recombinant)  •  LORazepam  •  LORazepam  •  magnesium sulfate **OR** magnesium sulfate **OR** magnesium sulfate  •  ondansetron **OR** ondansetron ODT **OR** ondansetron  •  potassium & sodium phosphates **OR** potassium & sodium phosphates  •  potassium phosphate infusion greater than 15 mMoles **OR** potassium phosphate infusion greater than 15 mMoles **OR** potassium phosphate **OR** sodium phosphate IVPB **OR** sodium phosphate IVPB **OR** sodium phosphate IVPB  •  promethazine  •  sodium chloride  •  sodium chloride      Principal Problem:    Diabetic ketoacidosis without coma associated with type 1 diabetes mellitus  Active Problems:    Type 1 diabetes mellitus    Tobacco abuse    Alcohol abuse    Insurance coverage problems    Bilateral carpal tunnel syndrome    Microalbuminuria    Nonrheumatic aortic valve stenosis        Assessment/Plan:    Severe aortic stenosis by echo-savr vs tavr      Diabetic ketoacidosis without coma associated with type 1 diabetes mellitus-status post stay in the intensive care unit now is doing well.  From endocrinology standpoint the patient is been given okay for discharge with follow-up with PCP in 1-2 weeks      Type 1 diabetes mellitus      Tobacco abuse      Alcohol abuse      Bilateral carpal tunnel syndrome       Microalbuminuria    Plan:  Awaiting plan from cardiology    Westley Barker MD  03/16/18  2:09 PM

## 2018-03-16 NOTE — CONSULTS
received referral pertaining to Advance Directive for pt.  Pt politely informed  that he currently was not interested in reviewing pamphlet, however would consider doing so after 11am .  There were 2 women present encouraging him to review the information.  told pt a  would return at his request.

## 2018-03-16 NOTE — PROGRESS NOTES
"  ENDOCRINE    Subjective   AND PLANS  Juan Payne is a 57 y.o. male.     Follow-up diabetes and related disorders.    No chest pain, palpitations, shortness of breath.  Plans for cardiac catheterization noted.    No nausea or vomiting.  Appetite good.  Fasting glucose 116.  Random glucose 58 2 268.  Patient did not receive suppertime NovoLog and blood sugar was high at bedtime.  He had hypoglycemia at supper time last night.  Continue Levemir 12 units every morning and 6 units every evening and NovoLog 4 units with each meal plus sliding scale.    Patient was not given Lipitor last night because medication was not available    Objective   /74 (BP Location: Right arm, Patient Position: Lying)   Pulse 59   Temp 97.9 °F (36.6 °C) (Oral)   Resp 20   Ht 172.7 cm (67.99\")   Wt 63.5 kg (140 lb)   SpO2 99%   BMI 21.29 kg/m²   Physical Exam    Awake, alert, not in distress.  No rales or wheezes.    Regular heart rate and rhythm.  Systolic murmur at the base.  No gallop.  Abdomen soft, nontender.  No cyanosis or pedal edema.    Lab Results (last 24 hours)     Procedure Component Value Units Date/Time    Basic Metabolic Panel [[id number]]  (Abnormal) Collected:  03/16/18 0616    Specimen:  Blood Updated:  03/16/18 0706     Glucose 149 (H) mg/dL      BUN 12 mg/dL      Creatinine 0.55 (L) mg/dL      Sodium 142 mmol/L      Potassium 4.4 mmol/L      Chloride 104 mmol/L      CO2 30.5 (H) mmol/L      Calcium 8.2 (L) mg/dL      eGFR Non African Amer >150 mL/min/1.73      BUN/Creatinine Ratio 21.8     Anion Gap 7.5 mmol/L     Narrative:       GFR Normal >60  Chronic Kidney Disease <60  Kidney Failure <15    CBC & Differential [[id number]] Collected:  03/16/18 0616    Specimen:  Blood Updated:  03/16/18 0652    Narrative:       The following orders were created for panel order CBC & Differential.  Procedure                               Abnormality         Status                     ---------                               " -----------         ------                     Scan Slide[[id number]]                                                                  CBC Auto Differential[[id number]]        Abnormal            Final result                 Please view results for these tests on the individual orders.    CBC Auto Differential [[id number]]  (Abnormal) Collected:  03/16/18 0616    Specimen:  Blood Updated:  03/16/18 0652     WBC 6.98 10*3/mm3      RBC 2.61 (L) 10*6/mm3      Hemoglobin 9.5 (L) g/dL      Hematocrit 29.4 (L) %      .6 (H) fL      MCH 36.4 (H) pg      MCHC 32.3 (L) g/dL      RDW 12.9 %      RDW-SD 53.0 fl      MPV 9.5 fL      Platelets 424 10*3/mm3      Neutrophil % 32.0 (L) %      Lymphocyte % 45.3 %      Monocyte % 18.2 (H) %      Eosinophil % 2.4 %      Basophil % 1.0 %      Immature Grans % 1.1 (H) %      Neutrophils, Absolute 2.23 10*3/mm3      Lymphocytes, Absolute 3.16 10*3/mm3      Monocytes, Absolute 1.27 (H) 10*3/mm3      Eosinophils, Absolute 0.17 10*3/mm3      Basophils, Absolute 0.07 10*3/mm3      Immature Grans, Absolute 0.08 (H) 10*3/mm3     POC Glucose Once [[id number]]  (Normal) Collected:  03/16/18 0631    Specimen:  Blood Updated:  03/16/18 0632     Glucose 116 mg/dL     Narrative:       Meter: [id number] : [id number] Guilherme Blackman NA    POC Glucose Once [[id number]]  (Abnormal) Collected:  03/15/18 2124    Specimen:  Blood Updated:  03/15/18 2126     Glucose 268 (H) mg/dL     Narrative:       Meter: [id number] : [id number] Kelton Smith NA    POC Glucose Once [[id number]]  (Normal) Collected:  03/15/18 1711    Specimen:  Blood Updated:  03/15/18 1931     Glucose 108 mg/dL     Narrative:       Meter: [id number] : [id number] Lambert Bateman NA    POC Glucose Once [[id number]]  (Abnormal) Collected:  03/15/18 1629    Specimen:  Blood Updated:  03/15/18 1908     Glucose 58 (L) mg/dL     Narrative:       Meter: [id number] : [id number] Clifford DÍAZ    POC Glucose Once  [[id number]]  (Abnormal) Collected:  03/15/18 1131    Specimen:  Blood Updated:  03/15/18 1146     Glucose 147 (H) mg/dL     Narrative:       Meter: [id number] : [id number] Clifford DÍAZ    Blood Culture - Blood, Blood, Venous Line [[id number]]  (Normal) Collected:  03/10/18 0707    Specimen:  Blood from Blood, Venous Line Updated:  03/15/18 0831     Blood Culture No growth at 5 days    Blood Culture - Blood, Blood, Venous Line [[id number]]  (Normal) Collected:  03/10/18 0707    Specimen:  Blood from Blood, Venous Line Updated:  03/15/18 0831     Blood Culture No growth at 5 days            Principal Problem:    Diabetic ketoacidosis without coma associated with type 1 diabetes mellitus  Active Problems:    Type 1 diabetes mellitus    Tobacco abuse    Alcohol abuse    Insurance coverage problems    Bilateral carpal tunnel syndrome    Microalbuminuria    Nonrheumatic aortic valve stenosis    Insulin therapy as discussed above.  Start Lipitor.  LDL goal is 50% of baseline.  For cardiac catheterization today.

## 2018-03-16 NOTE — CONSULTS
Thank you for the referral.  Pt is s/p cath and needs either SAVR or TAVR.  Work up in progress.  We will follow after surgery to provide cardiac rehab information.

## 2018-03-16 NOTE — PROGRESS NOTES
LOS: 8 days   Patient Care Team:  No Known Provider as PCP - General    Chief Complaint: AS       Interval History: Cath today shows critical aortic stenosis.  Heavy calcium.  Coronary arteries appear quite healthy.      Objective   Vital Signs  Temp:  [97.9 °F (36.6 °C)-99 °F (37.2 °C)] 97.9 °F (36.6 °C)  Heart Rate:  [59-76] 76  Resp:  [16-20] 16  BP: (112-139)/(67-90) 120/67    Intake/Output Summary (Last 24 hours) at 03/16/18 1319  Last data filed at 03/16/18 1226   Gross per 24 hour   Intake             1120 ml   Output                0 ml   Net             1120 ml       Comfortable NAD  PERRL, conjunctiva clear  Neck supple, no JVD or thyromegaly appreciated  S1/S2 RRR, no m/r/g  Lungs CTA B, normal effort  Abdomen S/NT/ND (+) BS, no HSM appreciated  Extremities warm, no clubbing, cyanosis, or edema  Normal gait  No visible or palpable skin lesions  A/Ox4, mood and affect appropriate    Results Review:        Results from last 7 days  Lab Units 03/16/18  0616 03/15/18  0529 03/14/18  0457   SODIUM mmol/L 142 140 139   POTASSIUM mmol/L 4.4 3.7 3.7   CHLORIDE mmol/L 104 103 100   CO2 mmol/L 30.5* 29.9* 26.6   BUN mg/dL 12 12 12   CREATININE mg/dL 0.55* 0.49* 0.69*   GLUCOSE mg/dL 149* 108* 225*   CALCIUM mg/dL 8.2* 8.4* 8.3*       Results from last 7 days  Lab Units 03/10/18  0541   TROPONIN T ng/mL 0.014       Results from last 7 days  Lab Units 03/16/18  1111 03/16/18  0616 03/15/18  0529 03/14/18  0457   WBC 10*3/mm3  --  6.98 6.32 4.83   HEMOGLOBIN g/dL  --  9.5* 9.4* 9.6*   HEMOGLOBIN, POC g/dL 8.8*  --   --   --    HEMATOCRIT %  --  29.4* 28.8* 29.5*   HEMATOCRIT POC % 26*  --   --   --    PLATELETS 10*3/mm3  --  424 375 385           Results from last 7 days  Lab Units 03/15/18  0529   CHOLESTEROL mg/dL 163       Results from last 7 days  Lab Units 03/10/18  0541   MAGNESIUM mg/dL 2.1       Results from last 7 days  Lab Units 03/15/18  0529   CHOLESTEROL mg/dL 163   TRIGLYCERIDES mg/dL 130   HDL CHOL  mg/dL 48   LDL CHOL mg/dL 89       I reviewed the patient's new clinical results.  I personally viewed and interpreted the patient's EKG/Telemetry data        Medication Review:     atorvastatin 20 mg Oral Nightly   enoxaparin 40 mg Subcutaneous Nightly   insulin aspart 0-4 Units Subcutaneous 4x Daily AC & at Bedtime   insulin aspart 4 Units Subcutaneous TID With Meals   insulin detemir 12 Units Subcutaneous QAM   insulin detemir 6 Units Subcutaneous Nightly   multivitamin 1 tablet Oral Daily            Assessment/Plan     Principal Problem:    Diabetic ketoacidosis without coma associated with type 1 diabetes mellitus  Active Problems:    Nonrheumatic aortic valve stenosis    Type 1 diabetes mellitus    Tobacco abuse    Alcohol abuse    Insurance coverage problems    Bilateral carpal tunnel syndrome    Microalbuminuria    Critical aortic stenosis.  I recommend SAVR unless he has prohibitive risks, then TAVR eval.    Raul Buenrostro MD  03/16/18  1:19 PM

## 2018-03-17 LAB
ANION GAP SERPL CALCULATED.3IONS-SCNC: 8.2 MMOL/L
BASOPHILS # BLD AUTO: 0.09 10*3/MM3 (ref 0–0.2)
BASOPHILS NFR BLD AUTO: 1.1 % (ref 0–1.5)
BUN BLD-MCNC: 13 MG/DL (ref 6–20)
BUN/CREAT SERPL: 23.6 (ref 7–25)
CALCIUM SPEC-SCNC: 8.2 MG/DL (ref 8.6–10.5)
CHLORIDE SERPL-SCNC: 104 MMOL/L (ref 98–107)
CHOLEST SERPL-MCNC: 173 MG/DL (ref 0–200)
CO2 SERPL-SCNC: 28.8 MMOL/L (ref 22–29)
CREAT BLD-MCNC: 0.55 MG/DL (ref 0.76–1.27)
DEPRECATED RDW RBC AUTO: 54.4 FL (ref 37–54)
EOSINOPHIL # BLD AUTO: 0.19 10*3/MM3 (ref 0–0.7)
EOSINOPHIL NFR BLD AUTO: 2.4 % (ref 0.3–6.2)
ERYTHROCYTE [DISTWIDTH] IN BLOOD BY AUTOMATED COUNT: 13.4 % (ref 11.5–14.5)
GFR SERPL CREATININE-BSD FRML MDRD: >150 ML/MIN/1.73
GLUCOSE BLD-MCNC: 100 MG/DL (ref 65–99)
GLUCOSE BLDC GLUCOMTR-MCNC: 135 MG/DL (ref 70–130)
GLUCOSE BLDC GLUCOMTR-MCNC: 156 MG/DL (ref 70–130)
GLUCOSE BLDC GLUCOMTR-MCNC: 93 MG/DL (ref 70–130)
HBA1C MFR BLD: 7.4 % (ref 4.8–5.6)
HCT VFR BLD AUTO: 29.9 % (ref 40.4–52.2)
HDLC SERPL-MCNC: 46 MG/DL (ref 40–60)
HGB BLD-MCNC: 9.6 G/DL (ref 13.7–17.6)
IMM GRANULOCYTES # BLD: 0.08 10*3/MM3 (ref 0–0.03)
IMM GRANULOCYTES NFR BLD: 1 % (ref 0–0.5)
LDLC SERPL CALC-MCNC: 103 MG/DL (ref 0–100)
LDLC/HDLC SERPL: 2.25 {RATIO}
LYMPHOCYTES # BLD AUTO: 3.5 10*3/MM3 (ref 0.9–4.8)
LYMPHOCYTES NFR BLD AUTO: 43.5 % (ref 19.6–45.3)
MCH RBC QN AUTO: 36.2 PG (ref 27–32.7)
MCHC RBC AUTO-ENTMCNC: 32.1 G/DL (ref 32.6–36.4)
MCV RBC AUTO: 112.8 FL (ref 79.8–96.2)
MONOCYTES # BLD AUTO: 1.45 10*3/MM3 (ref 0.2–1.2)
MONOCYTES NFR BLD AUTO: 18 % (ref 5–12)
NEUTROPHILS # BLD AUTO: 2.73 10*3/MM3 (ref 1.9–8.1)
NEUTROPHILS NFR BLD AUTO: 34 % (ref 42.7–76)
PLATELET # BLD AUTO: 474 10*3/MM3 (ref 140–500)
PMV BLD AUTO: 9.9 FL (ref 6–12)
POTASSIUM BLD-SCNC: 3.8 MMOL/L (ref 3.5–5.2)
RBC # BLD AUTO: 2.65 10*6/MM3 (ref 4.6–6)
SODIUM BLD-SCNC: 141 MMOL/L (ref 136–145)
TRIGL SERPL-MCNC: 118 MG/DL (ref 0–150)
VLDLC SERPL-MCNC: 23.6 MG/DL (ref 5–40)
WBC NRBC COR # BLD: 8.04 10*3/MM3 (ref 4.5–10.7)

## 2018-03-17 PROCEDURE — 80061 LIPID PANEL: CPT | Performed by: INTERNAL MEDICINE

## 2018-03-17 PROCEDURE — 25010000002 ENOXAPARIN PER 10 MG: Performed by: INTERNAL MEDICINE

## 2018-03-17 PROCEDURE — 99232 SBSQ HOSP IP/OBS MODERATE 35: CPT | Performed by: INTERNAL MEDICINE

## 2018-03-17 PROCEDURE — 93005 ELECTROCARDIOGRAM TRACING: CPT | Performed by: INTERNAL MEDICINE

## 2018-03-17 PROCEDURE — 82962 GLUCOSE BLOOD TEST: CPT

## 2018-03-17 PROCEDURE — 93010 ELECTROCARDIOGRAM REPORT: CPT | Performed by: INTERNAL MEDICINE

## 2018-03-17 PROCEDURE — 83036 HEMOGLOBIN GLYCOSYLATED A1C: CPT | Performed by: INTERNAL MEDICINE

## 2018-03-17 PROCEDURE — 85025 COMPLETE CBC W/AUTO DIFF WBC: CPT | Performed by: HOSPITALIST

## 2018-03-17 PROCEDURE — 80048 BASIC METABOLIC PNL TOTAL CA: CPT | Performed by: INTERNAL MEDICINE

## 2018-03-17 PROCEDURE — 99231 SBSQ HOSP IP/OBS SF/LOW 25: CPT | Performed by: INTERNAL MEDICINE

## 2018-03-17 PROCEDURE — 63710000001 INSULIN ASPART PER 5 UNITS: Performed by: INTERNAL MEDICINE

## 2018-03-17 RX ORDER — NICOTINE 21 MG/24HR
1 PATCH, TRANSDERMAL 24 HOURS TRANSDERMAL EVERY 24 HOURS
Status: DISCONTINUED | OUTPATIENT
Start: 2018-03-17 | End: 2018-03-18

## 2018-03-17 RX ADMIN — ENOXAPARIN SODIUM 40 MG: 40 INJECTION SUBCUTANEOUS at 21:00

## 2018-03-17 RX ADMIN — INSULIN ASPART 4 UNITS: 100 INJECTION, SOLUTION INTRAVENOUS; SUBCUTANEOUS at 16:52

## 2018-03-17 RX ADMIN — Medication 1 TABLET: at 09:03

## 2018-03-17 RX ADMIN — ATORVASTATIN CALCIUM 20 MG: 20 TABLET, FILM COATED ORAL at 21:00

## 2018-03-17 RX ADMIN — INSULIN ASPART 1 UNITS: 100 INJECTION, SOLUTION INTRAVENOUS; SUBCUTANEOUS at 12:16

## 2018-03-17 RX ADMIN — INSULIN ASPART 4 UNITS: 100 INJECTION, SOLUTION INTRAVENOUS; SUBCUTANEOUS at 12:16

## 2018-03-17 NOTE — PLAN OF CARE
Problem: Patient Care Overview  Goal: Plan of Care Review  Outcome: Ongoing (interventions implemented as appropriate)   03/17/18 0288   Coping/Psychosocial   Plan of Care Reviewed With patient   OTHER   Outcome Summary Pt up ad valerie, stable on his feet. Pt denies pain to right arm due to cardiac cath done on 3/16/18. Dsg to right radial/brachial sites CDI, no swelling, (+) pulses. SAVR next week. Declines dietician. Pt to be transferred to CVU when bed available. VSS, safety maintained, will continue to monitor.   Plan of Care Review   Progress improving       Problem: Nutrition, Imbalanced: Inadequate Oral Intake (Adult)  Goal: Improved Oral Intake  Outcome: Ongoing (interventions implemented as appropriate)    Goal: Prevent Further Weight Loss  Outcome: Ongoing (interventions implemented as appropriate)      Problem: Cardiac Catheterization (Diagnostic/Interventional) (Adult)  Goal: Signs and Symptoms of Listed Potential Problems Will be Absent, Minimized or Managed (Cardiac Catheterization)  Outcome: Ongoing (interventions implemented as appropriate)

## 2018-03-17 NOTE — PROGRESS NOTES
"  ENDOCRINE    Subjective   AND PLANS  Juan Payne is a 57 y.o. male.     Follow-up diabetes and related disorders.    No chest pain, palpitations, or shortness of breath.  Plans for surgical aortic valve replacement noted.  Will ask respiratory therapist to see patient preop due to patient's long history of tobacco use    No nausea or vomiting.  Fasting glucose 93.  Random glucose 156-289.  Continue Levemir 12 units every morning and 6 units every evening.  Continue NovoLog 4 units with each meal plus sliding scale.    Started on Lipitor last night.  No side effect.    Objective   /70 (BP Location: Left arm, Patient Position: Lying)   Pulse 62   Temp 98.4 °F (36.9 °C) (Oral)   Resp 16   Ht 172.7 cm (67.99\")   Wt 63.5 kg (140 lb)   SpO2 98%   BMI 21.29 kg/m²   Physical Exam    Awake, alert, not in distress.  Normal rales or wheezes.  Regular heart rate and rhythm.  Systolic murmur at the base.  No gallop.  Abdomen soft, nontender.  No cyanosis or pedal edema.    Lab Results (last 24 hours)     Procedure Component Value Units Date/Time    POC Glucose Once [[id number]]  (Abnormal) Collected:  03/17/18 1051    Specimen:  Blood Updated:  03/17/18 1052     Glucose 156 (H) mg/dL     Narrative:       Meter: [id number] : [id number] Matthew Siomara BRE    Basic Metabolic Panel [[id number]]  (Abnormal) Collected:  03/17/18 0605    Specimen:  Blood Updated:  03/17/18 0810     Glucose 100 (H) mg/dL      BUN 13 mg/dL      Creatinine 0.55 (L) mg/dL      Sodium 141 mmol/L      Potassium 3.8 mmol/L      Chloride 104 mmol/L      CO2 28.8 mmol/L      Calcium 8.2 (L) mg/dL      eGFR Non African Amer >150 mL/min/1.73      BUN/Creatinine Ratio 23.6     Anion Gap 8.2 mmol/L     Narrative:       GFR Normal >60  Chronic Kidney Disease <60  Kidney Failure <15    Lipid Panel [[id number]]  (Abnormal) Collected:  03/17/18 0605    Specimen:  Blood Updated:  03/17/18 0810     Total Cholesterol 173 mg/dL      Triglycerides 118 mg/dL      " HDL Cholesterol 46 mg/dL      LDL Cholesterol  103 (H) mg/dL      VLDL Cholesterol 23.6 mg/dL      LDL/HDL Ratio 2.25    Narrative:       Cholesterol Reference Ranges  (U.S. Department of Health and Human Services ATP III Classifications)    Desirable          <200 mg/dL  Borderline High    200-239 mg/dL  High Risk          >240 mg/dL      Triglyceride Reference Ranges  (U.S. Department of Health and Human Services ATP III Classifications)    Normal           <150 mg/dL  Borderline High  150-199 mg/dL  High             200-499 mg/dL  Very High        >500 mg/dL    HDL Reference Ranges  (U.S. Department of Health and Human Services ATP III Classifcations)    Low     <40 mg/dl (major risk factor for CHD)  High    >60 mg/dl ('negative' risk factor for CHD)        LDL Reference Ranges  (U.S. Department of Health and Human Services ATP III Classifcations)    Optimal          <100 mg/dL  Near Optimal     100-129 mg/dL  Borderline High  130-159 mg/dL  High             160-189 mg/dL  Very High        >189 mg/dL    Hemoglobin A1c [[id number]]  (Abnormal) Collected:  03/17/18 0605    Specimen:  Blood Updated:  03/17/18 0757     Hemoglobin A1C 7.40 (H) %     Narrative:       Hemoglobin A1C Ranges:    Increased Risk for Diabetes  5.7% to 6.4%  Diabetes                     >= 6.5%  Diabetic Goal                < 7.0%    CBC & Differential [[id number]] Collected:  03/17/18 0605    Specimen:  Blood Updated:  03/17/18 0755    Narrative:       The following orders were created for panel order CBC & Differential.  Procedure                               Abnormality         Status                     ---------                               -----------         ------                     Scan Slide[[id number]]                                                                  CBC Auto Differential[[id number]]        Abnormal            Final result                 Please view results for these tests on the individual orders.    CBC Auto  Differential [[id number]]  (Abnormal) Collected:  03/17/18 0605    Specimen:  Blood Updated:  03/17/18 0755     WBC 8.04 10*3/mm3      RBC 2.65 (L) 10*6/mm3      Hemoglobin 9.6 (L) g/dL      Hematocrit 29.9 (L) %      .8 (H) fL      MCH 36.2 (H) pg      MCHC 32.1 (L) g/dL      RDW 13.4 %      RDW-SD 54.4 (H) fl      MPV 9.9 fL      Platelets 474 10*3/mm3      Neutrophil % 34.0 (L) %      Lymphocyte % 43.5 %      Monocyte % 18.0 (H) %      Eosinophil % 2.4 %      Basophil % 1.1 %      Immature Grans % 1.0 (H) %      Neutrophils, Absolute 2.73 10*3/mm3      Lymphocytes, Absolute 3.50 10*3/mm3      Monocytes, Absolute 1.45 (H) 10*3/mm3      Eosinophils, Absolute 0.19 10*3/mm3      Basophils, Absolute 0.09 10*3/mm3      Immature Grans, Absolute 0.08 (H) 10*3/mm3     POC Glucose Once [[id number]]  (Normal) Collected:  03/17/18 0631    Specimen:  Blood Updated:  03/17/18 0632     Glucose 93 mg/dL     Narrative:       Meter: [id number] : [id number] Rita DÍAZ    POC Glucose Once [[id number]]  (Abnormal) Collected:  03/16/18 2036    Specimen:  Blood Updated:  03/16/18 2037     Glucose 154 (H) mg/dL     Narrative:       Meter: [id number] : [id number] Rita DÍAZ    POC Glucose Once [[id number]]  (Abnormal) Collected:  03/16/18 1637    Specimen:  Blood Updated:  03/16/18 1638     Glucose 289 (H) mg/dL     Narrative:       Meter: [id number] : [id number] Janelle DÍAZ            Principal Problem:    Diabetic ketoacidosis without coma associated with type 1 diabetes mellitus  Active Problems:    Type 1 diabetes mellitus    Tobacco abuse    Alcohol abuse    Insurance coverage problems    Bilateral carpal tunnel syndrome    Microalbuminuria    Nonrheumatic aortic valve stenosis    Insulin therapy as discussed above.  Continue Lipitor.  For aortic valve replacement

## 2018-03-17 NOTE — PROGRESS NOTES
[facility]  Progress note: 3/17/2018    Patient Identification:  Name:Juan Payne  Age:57 y.o.  Sex: male  :  1960  MRN: [id number]           CC:  Aortic valve stenosis.    Interval history:  For surgical aortic valve replacement next week.  Without chest pain or shortness of breath.    Vital Signs:   Temp:  [97.6 °F (36.4 °C)-98.4 °F (36.9 °C)] 98.4 °F (36.9 °C)  Heart Rate:  [62-68] 62  Resp:  [16] 16  BP: (106-126)/(69-81) 126/70    Intake/Output Summary (Last 24 hours) at 18 1552  Last data filed at 18 1730   Gross per 24 hour   Intake              360 ml   Output                0 ml   Net              360 ml       Physical Examination:    General Appearance No acute distress   Neck No adenopathy, supple, trachea midline, no thyromegaly, no carotid bruit, no JVD   Lungs Clear to auscultation,respirations regular, even and unlabored   Heart Regular rhythm and normal rate, normal S1 and S2, no murmur, no gallop, no rub, no click   Chest wall No abnormalities observed   Abdomen Normal bowel sounds, no masses, no hepatomegaly, soft   Extremities Moves all extremities well, no edema, no cyanosis, no redness   Neurological Alert and oriented x 3     Lab Review:  Personally reviewed the labs, radiology imaging and other cardiac procedures.   Results from last 7 days  Lab Units 18  0605  18  0603   SODIUM mmol/L 141  < > 136   POTASSIUM mmol/L 3.8  < > 4.0   CHLORIDE mmol/L 104  < > 97*   CO2 mmol/L 28.8  < > 28.1   BUN mg/dL 13  < > 5*   CREATININE mg/dL 0.55*  < > 0.57*   CALCIUM mg/dL 8.2*  < > 8.5*   BILIRUBIN mg/dL  --   --  0.6   ALK PHOS U/L  --   --  57   ALT (SGPT) U/L  --   --  16   AST (SGOT) U/L  --   --  39   GLUCOSE mg/dL 100*  < > 212*   < > = values in this interval not displayed.      )  Results from last 7 days  Lab Units 18  0605 18  1111 18  0616 03/15/18  0529   WBC 10*3/mm3 8.04  --  6.98 6.32   HEMOGLOBIN g/dL 9.6*  --  9.5* 9.4*    HEMOGLOBIN, POC g/dL  --  8.8*  --   --    HEMATOCRIT % 29.9*  --  29.4* 28.8*   HEMATOCRIT POC %  --  26*  --   --    PLATELETS 10*3/mm3 474  --  424 375     Medication Review:   Meds reviewed  Scheduled Meds:  atorvastatin 20 mg Oral Nightly   enoxaparin 40 mg Subcutaneous Nightly   insulin aspart 0-4 Units Subcutaneous 4x Daily AC & at Bedtime   insulin aspart 4 Units Subcutaneous TID With Meals   insulin detemir 12 Units Subcutaneous QAM   insulin detemir 6 Units Subcutaneous Nightly   multivitamin 1 tablet Oral Daily     I personally viewed and interpreted the patient's EKG/Telemetry data    Assessment and Plan    1.  Critical aortic valve stenosis, Awaiting surgery on Tuesday per patient.  Hemodynamically stable  2.  Diabetes with ketoacidosis without coma  3.  Dyslipidemia  4.  Anemia  5.  Nicotine use  6.  Alcohol abuse    Mini Garcia  3/17/44468:52 PM  15min spent in reviewing records, discussion and examination of the patient and discussion with other members of the patient's medical team.     Dictated utilizing Dragon dictation

## 2018-03-18 LAB
ANION GAP SERPL CALCULATED.3IONS-SCNC: 9.2 MMOL/L
BASOPHILS # BLD AUTO: 0.06 10*3/MM3 (ref 0–0.2)
BASOPHILS NFR BLD AUTO: 0.7 % (ref 0–1.5)
BUN BLD-MCNC: 13 MG/DL (ref 6–20)
BUN/CREAT SERPL: 23.2 (ref 7–25)
CALCIUM SPEC-SCNC: 8.1 MG/DL (ref 8.6–10.5)
CHLORIDE SERPL-SCNC: 103 MMOL/L (ref 98–107)
CO2 SERPL-SCNC: 26.8 MMOL/L (ref 22–29)
CREAT BLD-MCNC: 0.56 MG/DL (ref 0.76–1.27)
DEPRECATED RDW RBC AUTO: 52.9 FL (ref 37–54)
EOSINOPHIL # BLD AUTO: 0.21 10*3/MM3 (ref 0–0.7)
EOSINOPHIL NFR BLD AUTO: 2.3 % (ref 0.3–6.2)
ERYTHROCYTE [DISTWIDTH] IN BLOOD BY AUTOMATED COUNT: 13 % (ref 11.5–14.5)
GFR SERPL CREATININE-BSD FRML MDRD: 149 ML/MIN/1.73
GLUCOSE BLD-MCNC: 225 MG/DL (ref 65–99)
GLUCOSE BLDC GLUCOMTR-MCNC: 114 MG/DL (ref 70–130)
GLUCOSE BLDC GLUCOMTR-MCNC: 139 MG/DL (ref 70–130)
GLUCOSE BLDC GLUCOMTR-MCNC: 164 MG/DL (ref 70–130)
GLUCOSE BLDC GLUCOMTR-MCNC: 280 MG/DL (ref 70–130)
HCT VFR BLD AUTO: 28.6 % (ref 40.4–52.2)
HGB BLD-MCNC: 9.2 G/DL (ref 13.7–17.6)
IMM GRANULOCYTES # BLD: 0.11 10*3/MM3 (ref 0–0.03)
IMM GRANULOCYTES NFR BLD: 1.2 % (ref 0–0.5)
LYMPHOCYTES # BLD AUTO: 3.3 10*3/MM3 (ref 0.9–4.8)
LYMPHOCYTES NFR BLD AUTO: 36.1 % (ref 19.6–45.3)
MCH RBC QN AUTO: 36.2 PG (ref 27–32.7)
MCHC RBC AUTO-ENTMCNC: 32.2 G/DL (ref 32.6–36.4)
MCV RBC AUTO: 112.6 FL (ref 79.8–96.2)
MONOCYTES # BLD AUTO: 1.5 10*3/MM3 (ref 0.2–1.2)
MONOCYTES NFR BLD AUTO: 16.4 % (ref 5–12)
NEUTROPHILS # BLD AUTO: 3.97 10*3/MM3 (ref 1.9–8.1)
NEUTROPHILS NFR BLD AUTO: 43.3 % (ref 42.7–76)
PLATELET # BLD AUTO: 413 10*3/MM3 (ref 140–500)
PMV BLD AUTO: 9.5 FL (ref 6–12)
POTASSIUM BLD-SCNC: 4 MMOL/L (ref 3.5–5.2)
RBC # BLD AUTO: 2.54 10*6/MM3 (ref 4.6–6)
SODIUM BLD-SCNC: 139 MMOL/L (ref 136–145)
WBC NRBC COR # BLD: 9.15 10*3/MM3 (ref 4.5–10.7)

## 2018-03-18 PROCEDURE — 99231 SBSQ HOSP IP/OBS SF/LOW 25: CPT | Performed by: INTERNAL MEDICINE

## 2018-03-18 PROCEDURE — 86901 BLOOD TYPING SEROLOGIC RH(D): CPT

## 2018-03-18 PROCEDURE — 99231 SBSQ HOSP IP/OBS SF/LOW 25: CPT | Performed by: NURSE PRACTITIONER

## 2018-03-18 PROCEDURE — 25010000002 ENOXAPARIN PER 10 MG: Performed by: INTERNAL MEDICINE

## 2018-03-18 PROCEDURE — 85025 COMPLETE CBC W/AUTO DIFF WBC: CPT | Performed by: HOSPITALIST

## 2018-03-18 PROCEDURE — 86900 BLOOD TYPING SEROLOGIC ABO: CPT

## 2018-03-18 PROCEDURE — 80048 BASIC METABOLIC PNL TOTAL CA: CPT | Performed by: INTERNAL MEDICINE

## 2018-03-18 PROCEDURE — 82962 GLUCOSE BLOOD TEST: CPT

## 2018-03-18 PROCEDURE — 63710000001 INSULIN ASPART PER 5 UNITS: Performed by: INTERNAL MEDICINE

## 2018-03-18 RX ORDER — NICOTINE 21 MG/24HR
1 PATCH, TRANSDERMAL 24 HOURS TRANSDERMAL DAILY PRN
Status: DISCONTINUED | OUTPATIENT
Start: 2018-03-18 | End: 2018-03-21

## 2018-03-18 RX ADMIN — ATORVASTATIN CALCIUM 20 MG: 20 TABLET, FILM COATED ORAL at 20:35

## 2018-03-18 RX ADMIN — INSULIN ASPART 3 UNITS: 100 INJECTION, SOLUTION INTRAVENOUS; SUBCUTANEOUS at 17:40

## 2018-03-18 RX ADMIN — INSULIN ASPART 4 UNITS: 100 INJECTION, SOLUTION INTRAVENOUS; SUBCUTANEOUS at 10:41

## 2018-03-18 RX ADMIN — ENOXAPARIN SODIUM 40 MG: 40 INJECTION SUBCUTANEOUS at 20:35

## 2018-03-18 RX ADMIN — INSULIN ASPART 2 UNITS: 100 INJECTION, SOLUTION INTRAVENOUS; SUBCUTANEOUS at 21:00

## 2018-03-18 RX ADMIN — Medication 1 TABLET: at 10:41

## 2018-03-18 NOTE — PLAN OF CARE
Problem: Patient Care Overview (Adult)  Goal: Plan of Care Review  Outcome: Ongoing (interventions implemented as appropriate)   03/17/18 3918   Coping/Psychosocial Response Interventions   Plan Of Care Reviewed With patient   Patient Care Overview   Progress progress toward functional goals as expected   Outcome Evaluation   Outcome Summary/Follow up Plan patient is resting comfortably. safety maintained. patient refused nicotine patch stating that it had been 9 days and he didnt want to start back. continue to monitor.

## 2018-03-18 NOTE — PROGRESS NOTES
No cp  No soa  No n/v/d    afvss 98.9 64 16    gen aa ox 3  Heart  rr + murmur  Lungs clear b no w/r  abd soft nt nd    Labs reviewed, hb is 9.2    Severe aortic stenosis by echo-savr vs tavr       Diabetic ketoacidosis without coma associated with type 1 diabetes mellitus-status post stay in the intensive care unit now is doing well.  From endocrinology standpoint the patient is been given okay for discharge with follow-up with PCP in 1-2 weeks       Type 1 diabetes mellitus       Tobacco abuse       Alcohol abuse       Bilateral carpal tunnel syndrome       Microalbuminuria     Plan:  Awaiting plan from cardiology

## 2018-03-18 NOTE — PLAN OF CARE
Problem: Cardiac Catheterization (Diagnostic/Interventional) (Adult)  Goal: Signs and Symptoms of Listed Potential Problems Will be Absent, Minimized or Managed (Cardiac Catheterization)  Outcome: Ongoing (interventions implemented as appropriate)   03/17/18 9856   Goal/Outcome Evaluation   Problems Assessed (Cardiac Catheterization) all   Problems Present (Cardiac Cath) none

## 2018-03-18 NOTE — PROGRESS NOTES
Cardiology Progress Note    Patient Identification:  Name: Juan Payne  Age: 57 y.o.  Sex: male  :  1960  MRN: [id number]                 Follow Up / Chief Complaint: Critical aortic stenosis         Subjective:  Denies chest pain, tightness or palpitations.  Denies postural dizziness, dyspnea on exertion.      Objective:  SR 60s, no ectopy  -120s/60s-70s  Afebrile    Past Medical History:  Past Medical History:   Diagnosis Date   • Diabetes mellitus      Past Surgical History:  Past Surgical History:   Procedure Laterality Date   • TESTICLE SURGERY          Social History:   Social History   Substance Use Topics   • Smoking status: Current Every Day Smoker     Packs/day: 1.50   • Smokeless tobacco: Never Used   • Alcohol use 3.6 oz/week     6 Shots of liquor per week      Comment: pt states 1 pint/day      Family History:  History reviewed. No pertinent family history.       Allergies:  No Known Allergies  Scheduled Meds:    atorvastatin 20 mg Nightly   enoxaparin 40 mg Nightly   insulin aspart 0-4 Units 4x Daily AC & at Bedtime   insulin aspart 4 Units TID With Meals   insulin detemir 12 Units QAM   insulin detemir 6 Units Nightly   multivitamin 1 tablet Daily   nicotine 1 patch Q24H           INTAKE AND OUTPUT:    Intake/Output Summary (Last 24 hours) at 18 1229  Last data filed at 18 1100   Gross per 24 hour   Intake              960 ml   Output                0 ml   Net              960 ml       Review of Systems:   GI:  No nausea or vomiting  Cardiac:  No chest pain or dizziness  Pulmonary: No PND or orthopnea    Constitutional:  Temp:  [97.9 °F (36.6 °C)-98.9 °F (37.2 °C)] 98.3 °F (36.8 °C)  Heart Rate:  [62-89] 64  Resp:  [16] 16  BP: (118-136)/(65-77) 118/73    Physical Exam:  General: Resting quietly.  Appears in no acute distress  Eyes: PERTL,  HEENT:  No JVD lying supine.. Oral mucosa moist, no cyanosis  Respiratory: Respirations regular and unlabored at rest. BBS with good air  entry in all fields. No crackles or wheezes auscultated  Cardiovascular: S1S2 Regular rate and rhythm. II-III/VI systolic murmur, no rub or gallop. No pretibial pitting edema  Gastrointestinal: Abdomen soft, flat, non tender. Bowel sounds present. No hepatosplenomegaly. No ascites  Musculoskeletal: GREEN x4. No abnormal movements  Extremities: Right radial procedure site without oozing, ecchymosis, palpable hematoma or thrill.  Right brachial 2/right ulnar 2/right radial 2  RUE pink, warm to touch with 1-2 second capillary refill.  No digital clubbing or cyanosis  Skin: Color pink. Skin warm and dry to touch.  Neuro: AAO x3 CN II-XII grossly intact  Psych: Mood and affect normal, pleasant and cooperative          Cardiographics  Telemetry:         Echocardiogram:   · Left ventricular wall thickness is consistent with severe concentric hypertrophy.  · Left ventricular systolic function is hyperdynamic (EF > 70).  · Left ventricular diastolic dysfunction (grade I) consistent with impaired relaxation.  · The aortic valve is abnormal in structure. There is calcification of the aortic valve.No aortic valve regurgitation is present. Severe aortic valve stenosis is present. The aortic peak velocity is 5m/s with a maximal pressure gradient of 122mmHg and mean pressure gradient of 62mmHg. The ALICIA is 0.54cm2 with a dimensionless index of 0.2.    Lab Review     Results from last 7 days  Lab Units 03/18/18  0346   SODIUM mmol/L 139   POTASSIUM mmol/L 4.0   BUN mg/dL 13   CREATININE mg/dL 0.56*   CALCIUM mg/dL 8.1*       Results from last 7 days  Lab Units 03/18/18  0346 03/17/18  0605 03/16/18  1111 03/16/18  0616   WBC 10*3/mm3 9.15 8.04  --  6.98   HEMOGLOBIN g/dL 9.2* 9.6*  --  9.5*   HEMOGLOBIN, POC g/dL  --   --  8.8*  --    HEMATOCRIT % 28.6* 29.9*  --  29.4*   HEMATOCRIT POC %  --   --  26*  --    PLATELETS 10*3/mm3 413 474  --  424             Assessment:  severe aortic stenosis  anemia  Diabetes with ketoacidosis without  "coma  Dyslipidemia - on statin  Anemia  Nicotine use  Alcohol abuse    Plan:  Continue current medical therapy.  Plan for aortic valve replacement later this week.  Patient's primary cardiologist, Dr. Menchaca to assume care tomorrow    Labs/tests ordered for am: Mg      )3/18/2018  CODY Wgigins      EMR Dragon/Transcription:   \"Dictated utilizing Dragon dictation\".     "

## 2018-03-18 NOTE — PROGRESS NOTES
"  ENDOCRINE    Subjective   AND PLANS  Juan Payne is a 57 y.o. male.     Follow-up diabetes and related disorders    No chest pain, palpitations, or shortness of breath.  For aortic valve replacement    Fasting glucose 164.  Random glucose 135-156.  Continue Levemir 12 units every morning and 6 units every evening.  Continue NovoLog 4 units with each meal plus sliding scale.    Objective   /65 (BP Location: Right arm, Patient Position: Lying)   Pulse 64   Temp 98.9 °F (37.2 °C) (Oral)   Resp 16   Ht 172.7 cm (67.99\")   Wt 63.5 kg (140 lb)   SpO2 97%   BMI 21.29 kg/m²   Physical Exam    Awake, alert, not in distress.  No rales or wheezes.  Regular heart rate and rhythm.  Systolic murmur at the base.  No gallop.  Abdomen soft, nontender.  No cyanosis or pedal edema.    Lab Results (last 24 hours)     Procedure Component Value Units Date/Time    POC Glucose Once [[id number]]  (Abnormal) Collected:  03/18/18 0559    Specimen:  Blood Updated:  03/18/18 0600     Glucose 164 (H) mg/dL     Narrative:       Meter: [id number] : [id number] Raul CAGE    Basic Metabolic Panel [[id number]]  (Abnormal) Collected:  03/18/18 0346    Specimen:  Blood Updated:  03/18/18 0435     Glucose 225 (H) mg/dL      BUN 13 mg/dL      Creatinine 0.56 (L) mg/dL      Sodium 139 mmol/L      Potassium 4.0 mmol/L      Chloride 103 mmol/L      CO2 26.8 mmol/L      Calcium 8.1 (L) mg/dL      eGFR Non African Amer 149 mL/min/1.73      BUN/Creatinine Ratio 23.2     Anion Gap 9.2 mmol/L     Narrative:       GFR Normal >60  Chronic Kidney Disease <60  Kidney Failure <15    CBC & Differential [[id number]] Collected:  03/18/18 0346    Specimen:  Blood Updated:  03/18/18 0425    Narrative:       The following orders were created for panel order CBC & Differential.  Procedure                               Abnormality         Status                     ---------                               -----------         ------                   "   Scan Slide[[id number]]                                                                  CBC Auto Differential[[id number]]        Abnormal            Final result                 Please view results for these tests on the individual orders.    CBC Auto Differential [[id number]]  (Abnormal) Collected:  03/18/18 0346    Specimen:  Blood Updated:  03/18/18 0425     WBC 9.15 10*3/mm3      RBC 2.54 (L) 10*6/mm3      Hemoglobin 9.2 (L) g/dL      Hematocrit 28.6 (L) %      .6 (H) fL      MCH 36.2 (H) pg      MCHC 32.2 (L) g/dL      RDW 13.0 %      RDW-SD 52.9 fl      MPV 9.5 fL      Platelets 413 10*3/mm3      Neutrophil % 43.3 %      Lymphocyte % 36.1 %      Monocyte % 16.4 (H) %      Eosinophil % 2.3 %      Basophil % 0.7 %      Immature Grans % 1.2 (H) %      Neutrophils, Absolute 3.97 10*3/mm3      Lymphocytes, Absolute 3.30 10*3/mm3      Monocytes, Absolute 1.50 (H) 10*3/mm3      Eosinophils, Absolute 0.21 10*3/mm3      Basophils, Absolute 0.06 10*3/mm3      Immature Grans, Absolute 0.11 (H) 10*3/mm3     POC Glucose Once [[id number]]  (Abnormal) Collected:  03/17/18 2024    Specimen:  Blood Updated:  03/17/18 2024     Glucose 135 (H) mg/dL     Narrative:       Meter: [id number] : [id number] Raul CAGE            Principal Problem:    Diabetic ketoacidosis without coma associated with type 1 diabetes mellitus  Active Problems:    Type 1 diabetes mellitus    Tobacco abuse    Alcohol abuse    Insurance coverage problems    Bilateral carpal tunnel syndrome    Microalbuminuria    Nonrheumatic aortic valve stenosis    Insulin therapy as discussed above.  Continue Lipitor.  For aortic valve replacement

## 2018-03-18 NOTE — PROGRESS NOTES
[facility]     LOS: 10 days     Subjective:  No chest pain and no shortness of air today, he is eating well, no nausea or vomiting  -he is still very fatigued  -s/p cath 3/16    Objective:    Vital Signs:  Temp:  [97.6 °F (36.4 °C)-98.9 °F (37.2 °C)] 97.6 °F (36.4 °C)  Heart Rate:  [64-89] 67  Resp:  [16] 16  BP: (111-136)/(65-77) 111/68    SpO2:  [97 %-100 %] 97 %  on   ;   Device (Oxygen Therapy): room air  Body mass index is 21.29 kg/m².    Physical Exam   Constitutional: He appears well-developed and well-nourished.   HENT:   Head: Normocephalic and atraumatic.   Eyes: EOM are normal.   Neck: Normal range of motion.   Cardiovascular: Normal rate and regular rhythm.    Murmur heard.  2-3/6 systolic    Pulmonary/Chest: Effort normal and breath sounds normal.   Abdominal: Soft. Bowel sounds are normal.   Neurological: He is alert.   Skin: Skin is warm.       Results Review:    Glucose   Date Value Ref Range Status   03/18/2018 225 (H) 65 - 99 mg/dL Final   03/17/2018 100 (H) 65 - 99 mg/dL Final   03/16/2018 149 (H) 65 - 99 mg/dL Final       Results from last 7 days  Lab Units 03/18/18  0346   WBC 10*3/mm3 9.15   HEMOGLOBIN g/dL 9.2*   HEMATOCRIT % 28.6*   PLATELETS 10*3/mm3 413       Results from last 7 days  Lab Units 03/18/18  0346  03/12/18  0603   SODIUM mmol/L 139  < > 136   POTASSIUM mmol/L 4.0  < > 4.0   CHLORIDE mmol/L 103  < > 97*   CO2 mmol/L 26.8  < > 28.1   BUN mg/dL 13  < > 5*   CREATININE mg/dL 0.56*  < > 0.57*   CALCIUM mg/dL 8.1*  < > 8.5*   BILIRUBIN mg/dL  --   --  0.6   ALK PHOS U/L  --   --  57   ALT (SGPT) U/L  --   --  16   AST (SGOT) U/L  --   --  39   GLUCOSE mg/dL 225*  < > 212*   < > = values in this interval not displayed.              Cultures:  Blood Culture   Date Value Ref Range Status   03/10/2018 No growth at 4 days  Preliminary   03/10/2018 No growth at 4 days  Preliminary       I have reviewed daily medications and changes in CPOE    Scheduled  meds    atorvastatin 20 mg Oral Nightly   enoxaparin 40 mg Subcutaneous Nightly   insulin aspart 0-4 Units Subcutaneous 4x Daily AC & at Bedtime   insulin aspart 4 Units Subcutaneous TID With Meals   insulin detemir 12 Units Subcutaneous QAM   insulin detemir 6 Units Subcutaneous Nightly   multivitamin 1 tablet Oral Daily          PRN meds  •  acetaminophen  •  dextrose  •  dextrose  •  dextrose  •  glucagon (human recombinant)  •  LORazepam  •  LORazepam  •  magnesium sulfate **OR** magnesium sulfate **OR** magnesium sulfate  •  nicotine  •  ondansetron **OR** ondansetron ODT **OR** ondansetron  •  potassium & sodium phosphates **OR** potassium & sodium phosphates  •  potassium phosphate infusion greater than 15 mMoles **OR** potassium phosphate infusion greater than 15 mMoles **OR** potassium phosphate **OR** sodium phosphate IVPB **OR** sodium phosphate IVPB **OR** sodium phosphate IVPB  •  promethazine  •  sodium chloride  •  sodium chloride      Principal Problem:    Diabetic ketoacidosis without coma associated with type 1 diabetes mellitus  Active Problems:    Type 1 diabetes mellitus    Tobacco abuse    Alcohol abuse    Insurance coverage problems    Bilateral carpal tunnel syndrome    Microalbuminuria    Nonrheumatic aortic valve stenosis        Assessment/Plan:    Severe aortic stenosis by echo-savr vs tavr, likely on Tuesday or wednesday      Diabetic ketoacidosis without coma associated with type 1 diabetes mellitus-status post stay in the intensive care unit now is doing well.  From endocrinology standpoint the patient is been given okay for discharge with follow-up with PCP in 1-2 weeks      Type 1 diabetes mellitus- insulin discussed with nurse and novolog tonight decreased to a schedule 3 units one time      Tobacco abuse      Alcohol abuse      Bilateral carpal tunnel syndrome      Microalbuminuria    Plan:  Awaiting plan from cardiology    Westley Barker MD  03/18/18  6:12 PM

## 2018-03-19 ENCOUNTER — APPOINTMENT (OUTPATIENT)
Dept: RESPIRATORY THERAPY | Facility: HOSPITAL | Age: 58
End: 2018-03-19
Attending: THORACIC SURGERY (CARDIOTHORACIC VASCULAR SURGERY)

## 2018-03-19 LAB
ARTERIAL PATENCY WRIST A: POSITIVE
ATMOSPHERIC PRESS: 743.6 MMHG
BASE EXCESS BLDA CALC-SCNC: 3.4 MMOL/L (ref 0–2)
BDY SITE: ABNORMAL
GLUCOSE BLDC GLUCOMTR-MCNC: 174 MG/DL (ref 70–130)
GLUCOSE BLDC GLUCOMTR-MCNC: 221 MG/DL (ref 70–130)
GLUCOSE BLDC GLUCOMTR-MCNC: 310 MG/DL (ref 70–130)
GLUCOSE BLDC GLUCOMTR-MCNC: 93 MG/DL (ref 70–130)
HCO3 BLDA-SCNC: 27.8 MMOL/L (ref 22–28)
HCT VFR BLDA CALC: 27 % (ref 38–51)
HGB BLDA-MCNC: 9.2 G/DL (ref 12–17)
MAGNESIUM SERPL-MCNC: 2.1 MG/DL (ref 1.6–2.6)
MODALITY: ABNORMAL
PCO2 BLDA: 40.6 MM HG (ref 35–45)
PH BLDA: 7.44 PH UNITS (ref 7.35–7.45)
PO2 BLDA: 72.8 MM HG (ref 80–100)
SAO2 % BLDA: 59 % (ref 95–98)
SAO2 % BLDCOA: 95 % (ref 92–99)
SET MECH RESP RATE: 18

## 2018-03-19 PROCEDURE — 36600 WITHDRAWAL OF ARTERIAL BLOOD: CPT

## 2018-03-19 PROCEDURE — 99231 SBSQ HOSP IP/OBS SF/LOW 25: CPT | Performed by: INTERNAL MEDICINE

## 2018-03-19 PROCEDURE — 82962 GLUCOSE BLOOD TEST: CPT

## 2018-03-19 PROCEDURE — 83735 ASSAY OF MAGNESIUM: CPT | Performed by: NURSE PRACTITIONER

## 2018-03-19 PROCEDURE — 82803 BLOOD GASES ANY COMBINATION: CPT

## 2018-03-19 PROCEDURE — 99232 SBSQ HOSP IP/OBS MODERATE 35: CPT | Performed by: INTERNAL MEDICINE

## 2018-03-19 PROCEDURE — 63710000001 INSULIN ASPART PER 5 UNITS: Performed by: INTERNAL MEDICINE

## 2018-03-19 PROCEDURE — 25010000002 ENOXAPARIN PER 10 MG: Performed by: INTERNAL MEDICINE

## 2018-03-19 PROCEDURE — 94060 EVALUATION OF WHEEZING: CPT

## 2018-03-19 RX ORDER — CHLORHEXIDINE GLUCONATE 500 MG/1
1 CLOTH TOPICAL EVERY 12 HOURS
Status: DISCONTINUED | OUTPATIENT
Start: 2018-03-19 | End: 2018-03-19

## 2018-03-19 RX ORDER — CEFAZOLIN SODIUM 2 G/100ML
2 INJECTION, SOLUTION INTRAVENOUS
Status: COMPLETED | OUTPATIENT
Start: 2018-03-21 | End: 2018-03-21

## 2018-03-19 RX ORDER — ALBUTEROL SULFATE 2.5 MG/3ML
2.5 SOLUTION RESPIRATORY (INHALATION) ONCE
Status: COMPLETED | OUTPATIENT
Start: 2018-03-19 | End: 2018-03-19

## 2018-03-19 RX ORDER — CHLORHEXIDINE GLUCONATE 500 MG/1
1 CLOTH TOPICAL EVERY 12 HOURS
Status: COMPLETED | OUTPATIENT
Start: 2018-03-20 | End: 2018-03-21

## 2018-03-19 RX ORDER — CHLORHEXIDINE GLUCONATE 0.12 MG/ML
15 RINSE ORAL EVERY 12 HOURS SCHEDULED
Status: COMPLETED | OUTPATIENT
Start: 2018-03-20 | End: 2018-03-21

## 2018-03-19 RX ORDER — CHLORHEXIDINE GLUCONATE 0.12 MG/ML
15 RINSE ORAL EVERY 12 HOURS SCHEDULED
Status: DISCONTINUED | OUTPATIENT
Start: 2018-03-19 | End: 2018-03-19

## 2018-03-19 RX ORDER — CEFAZOLIN SODIUM 2 G/100ML
2 INJECTION, SOLUTION INTRAVENOUS
Status: DISCONTINUED | OUTPATIENT
Start: 2018-03-20 | End: 2018-03-19

## 2018-03-19 RX ADMIN — ALBUTEROL SULFATE 2.5 MG: 2.5 SOLUTION RESPIRATORY (INHALATION) at 11:59

## 2018-03-19 RX ADMIN — INSULIN ASPART 4 UNITS: 100 INJECTION, SOLUTION INTRAVENOUS; SUBCUTANEOUS at 11:06

## 2018-03-19 RX ADMIN — ENOXAPARIN SODIUM 40 MG: 40 INJECTION SUBCUTANEOUS at 20:17

## 2018-03-19 RX ADMIN — Medication 1 TABLET: at 07:56

## 2018-03-19 RX ADMIN — INSULIN ASPART 3 UNITS: 100 INJECTION, SOLUTION INTRAVENOUS; SUBCUTANEOUS at 20:16

## 2018-03-19 RX ADMIN — INSULIN ASPART 1 UNITS: 100 INJECTION, SOLUTION INTRAVENOUS; SUBCUTANEOUS at 06:28

## 2018-03-19 RX ADMIN — ACETAMINOPHEN 650 MG: 325 TABLET ORAL at 20:16

## 2018-03-19 RX ADMIN — ATORVASTATIN CALCIUM 20 MG: 20 TABLET, FILM COATED ORAL at 20:16

## 2018-03-19 RX ADMIN — INSULIN ASPART 1 UNITS: 100 INJECTION, SOLUTION INTRAVENOUS; SUBCUTANEOUS at 11:07

## 2018-03-19 RX ADMIN — INSULIN ASPART 4 UNITS: 100 INJECTION, SOLUTION INTRAVENOUS; SUBCUTANEOUS at 06:29

## 2018-03-19 RX ADMIN — ACETAMINOPHEN 650 MG: 325 TABLET ORAL at 15:23

## 2018-03-19 NOTE — PLAN OF CARE
Problem: Patient Care Overview (Adult)  Goal: Plan of Care Review  Outcome: Ongoing (interventions implemented as appropriate)   03/17/18 2236 03/19/18 0324   Coping/Psychosocial Response Interventions   Plan Of Care Reviewed With patient --    Patient Care Overview   Progress progress toward functional goals as expected --    Outcome Evaluation   Outcome Summary/Follow up Plan --  Pt. with no c/o pain or discomfort t/o shift. VSS. To have AVR early this week. COn't use of sliding scale novolog.        Problem: Fall Risk (Adult)  Goal: Identify Related Risk Factors and Signs and Symptoms  Outcome: Ongoing (interventions implemented as appropriate)   03/17/18 0421 03/19/18 0324   Fall Risk (Adult)   Related Risk Factors (Fall Risk) --  environment unfamiliar;sensory deficits   Signs and Symptoms (Fall Risk) presence of risk factors --      Goal: Absence of Fall  Outcome: Ongoing (interventions implemented as appropriate)   03/17/18 0421   Fall Risk (Adult)   Absence of Fall making progress toward outcome       Problem: Patient Care Overview  Goal: Plan of Care Review  Outcome: Ongoing (interventions implemented as appropriate)    Goal: Individualization and Mutuality  Outcome: Ongoing (interventions implemented as appropriate)   03/14/18 1804   Individualization   Patient Specific Preferences goes by Juan   Patient Specific Goals (Include Timeframe) control blood sugars, resolve cardiac    Patient Specific Interventions accuchecks, insulin, consult cardiology       Problem: Diabetes, Type 1 (Adult)  Goal: Signs and Symptoms of Listed Potential Problems Will be Absent, Minimized or Managed (Diabetes, Type 1)  Outcome: Ongoing (interventions implemented as appropriate)   03/17/18 2237 03/19/18 0324   Goal/Outcome Evaluation   Problems Assessed (Type 1 Diabetes) all --    Problems Present (Type 1 Diabetes) --  hyperglycemia;situational response       Problem: Nutrition, Imbalanced: Inadequate Oral Intake (Adult)  Goal:  Improved Oral Intake  Outcome: Ongoing (interventions implemented as appropriate)    Goal: Prevent Further Weight Loss  Outcome: Ongoing (interventions implemented as appropriate)      Problem: Cardiac Catheterization (Diagnostic/Interventional) (Adult)  Goal: Signs and Symptoms of Listed Potential Problems Will be Absent, Minimized or Managed (Cardiac Catheterization)  Outcome: Ongoing (interventions implemented as appropriate)

## 2018-03-19 NOTE — PROGRESS NOTES
"Patient Care Team:  No Known Provider as PCP - General    Chief Complaint: Critical aortic valve stenosis    Interval History:   No complaints currently.  No pain.    Objective   Vital Signs  Temp:  [97.6 °F (36.4 °C)-98.8 °F (37.1 °C)] 98.4 °F (36.9 °C)  Heart Rate:  [64-75] 75  Resp:  [16-20] 20  BP: (110-143)/(68-91) 118/71    Intake/Output Summary (Last 24 hours) at 03/19/18 0810  Last data filed at 03/19/18 0700   Gross per 24 hour   Intake             1300 ml   Output              540 ml   Net              760 ml     Flowsheet Rows    Flowsheet Row First Filed Value   Admission Height 172.7 cm (68\") Documented at 03/08/2018 1637   Admission Weight 63.5 kg (140 lb) Documented at 03/08/2018 1637          General Appearance:    Alert, cooperative, in no acute distress   Head:    Normocephalic, without obvious abnormality, atraumatic       Neck:   No adenopathy, supple, no thyromegaly, no carotid bruit, no    JVD   Lungs:     Clear to auscultation bilaterally, no wheezes, rales, or     rhonchi    Heart:  Normal rate, regular. 3/6 systolic murmur peak late in systole    Chest Wall:    No abnormalities observed   Abdomen:     Normal bowel sounds, soft, non-tender, non-distended,            no rebound tenderness   Extremities:   No cyanosis, clubbing, or edema   Pulses:   Pulses palpable and equal bilaterally   Skin:   No bleeding or rash       Neurologic:   Cranial nerves 2 - 12 grossly intact, sensation intact             atorvastatin 20 mg Oral Nightly   enoxaparin 40 mg Subcutaneous Nightly   insulin aspart 0-4 Units Subcutaneous 4x Daily AC & at Bedtime   insulin aspart 4 Units Subcutaneous TID With Meals   insulin detemir 12 Units Subcutaneous QAM   insulin detemir 6 Units Subcutaneous Nightly   multivitamin 1 tablet Oral Daily            Results Review:      Results from last 7 days  Lab Units 03/18/18  0346   SODIUM mmol/L 139   POTASSIUM mmol/L 4.0   CHLORIDE mmol/L 103   CO2 mmol/L 26.8   BUN mg/dL 13 "   CREATININE mg/dL 0.56*   GLUCOSE mg/dL 225*   CALCIUM mg/dL 8.1*           Results from last 7 days  Lab Units 03/18/18  0346   WBC 10*3/mm3 9.15   HEMOGLOBIN g/dL 9.2*   HEMATOCRIT % 28.6*   PLATELETS 10*3/mm3 413           Results from last 7 days  Lab Units 03/17/18  0605   CHOLESTEROL mg/dL 173       Results from last 7 days  Lab Units 03/19/18  0319   MAGNESIUM mg/dL 2.1       Results from last 7 days  Lab Units 03/17/18  0605   CHOLESTEROL mg/dL 173   TRIGLYCERIDES mg/dL 118   HDL CHOL mg/dL 46   LDL CHOL mg/dL 103*     @LABRCNT(bnp)@  I reviewed the patient's new clinical results.  I personally viewed and interpreted the patient's EKG/Telemetry data            Assessment/Plan   Critical aortic stenosis: To OR on Tues  Anemia  Diabetes with ketoacidosis without coma  Dyslipidemia - on statin  Anemia  Nicotine use  Alcohol abuse      -BP and HR well controlled

## 2018-03-19 NOTE — PROGRESS NOTES
" LOS: 11 days   Patient Care Team:  No Known Provider as PCP - General    Chief Complaint: Aortic stenosis    Subjective:  Symptoms:  No shortness of breath or chest pain.    Diet:  Adequate intake.    Activity level: Normal.    Pain:  He reports no pain.          Vital Signs  Temp:  [97.6 °F (36.4 °C)-98.8 °F (37.1 °C)] 98.4 °F (36.9 °C)  Heart Rate:  [64-75] 75  Resp:  [16-20] 20  BP: (110-143)/(68-91) 118/71  Body mass index is 21.29 kg/m².    Intake/Output Summary (Last 24 hours) at 03/19/18 0815  Last data filed at 03/19/18 0700   Gross per 24 hour   Intake             1300 ml   Output              540 ml   Net              760 ml     No intake/output data recorded.      1    03/08/18  1637   Weight: 63.5 kg (140 lb)         Objective:  General Appearance:  In no acute distress and comfortable.    Vital signs: (most recent): Blood pressure 118/71, pulse 75, temperature 98.4 °F (36.9 °C), temperature source Oral, resp. rate 20, height 172.7 cm (67.99\"), weight 63.5 kg (140 lb), SpO2 97 %.  Vital signs are normal.    Output: Producing urine and producing stool.    HEENT: Normal HEENT exam.    Lungs:  Normal effort and normal respiratory rate.    Heart: Normal rate.  Regular rhythm.  S1 normal and S2 normal.  Positive for murmur.    Abdomen: Abdomen is soft.  Bowel sounds are normal.   There is no abdominal tenderness.     Extremities: Normal range of motion.    Pulses: Distal pulses are intact.    Neurological: Patient is alert and oriented to person, place and time.  Normal strength.    Skin:  Warm and dry.              Results Review:        WBC WBC   Date Value Ref Range Status   03/18/2018 9.15 4.50 - 10.70 10*3/mm3 Final   03/17/2018 8.04 4.50 - 10.70 10*3/mm3 Final      HGB Hemoglobin   Date Value Ref Range Status   03/18/2018 9.2 (L) 13.7 - 17.6 g/dL Final   03/17/2018 9.6 (L) 13.7 - 17.6 g/dL Final   03/16/2018 8.8 (L) 12.0 - 17.0 g/dL Final   03/16/2018 9.2 (L) 12.0 - 17.0 g/dL Final      HCT Hematocrit "   Date Value Ref Range Status   03/18/2018 28.6 (L) 40.4 - 52.2 % Final   03/17/2018 29.9 (L) 40.4 - 52.2 % Final   03/16/2018 26 (L) 38 - 51 % Final   03/16/2018 27 (L) 38 - 51 % Final      Platelets Platelets   Date Value Ref Range Status   03/18/2018 413 140 - 500 10*3/mm3 Final   03/17/2018 474 140 - 500 10*3/mm3 Final        PT/INR:  No results found for: PROTIME/No results found for: INR    Sodium Sodium   Date Value Ref Range Status   03/18/2018 139 136 - 145 mmol/L Final   03/17/2018 141 136 - 145 mmol/L Final      Potassium Potassium   Date Value Ref Range Status   03/18/2018 4.0 3.5 - 5.2 mmol/L Final   03/17/2018 3.8 3.5 - 5.2 mmol/L Final      Chloride Chloride   Date Value Ref Range Status   03/18/2018 103 98 - 107 mmol/L Final   03/17/2018 104 98 - 107 mmol/L Final      Bicarbonate CO2   Date Value Ref Range Status   03/18/2018 26.8 22.0 - 29.0 mmol/L Final   03/17/2018 28.8 22.0 - 29.0 mmol/L Final      BUN BUN   Date Value Ref Range Status   03/18/2018 13 6 - 20 mg/dL Final   03/17/2018 13 6 - 20 mg/dL Final      Creatinine Creatinine   Date Value Ref Range Status   03/18/2018 0.56 (L) 0.76 - 1.27 mg/dL Final   03/17/2018 0.55 (L) 0.76 - 1.27 mg/dL Final      Calcium Calcium   Date Value Ref Range Status   03/18/2018 8.1 (L) 8.6 - 10.5 mg/dL Final   03/17/2018 8.2 (L) 8.6 - 10.5 mg/dL Final      Magnesium Magnesium   Date Value Ref Range Status   03/19/2018 2.1 1.6 - 2.6 mg/dL Final            atorvastatin 20 mg Oral Nightly   enoxaparin 40 mg Subcutaneous Nightly   insulin aspart 0-4 Units Subcutaneous 4x Daily AC & at Bedtime   insulin aspart 4 Units Subcutaneous TID With Meals   insulin detemir 12 Units Subcutaneous QAM   insulin detemir 6 Units Subcutaneous Nightly   multivitamin 1 tablet Oral Daily              Patient Active Problem List   Diagnosis Code   • Diabetic ketoacidosis without coma associated with type 1 diabetes mellitus E10.10   • Type 1 diabetes mellitus E10.9   • Tobacco abuse  Z72.0   • Alcohol abuse F10.10   • Insurance coverage problems Z59.8   • Bilateral carpal tunnel syndrome G56.03   • Microalbuminuria R80.9   • Nonrheumatic aortic valve stenosis I35.0       Assessment & Plan    -Severe aortic stenosis- peak velocity is 5m/s;  maximal pressure gradient of 122mmHg and mean pressure gradient of 62mmHg; ALICIA is 0.54cm2 with a dimensionless index of 0.2.  -Severe concentric hypertrophy  -Left ventricular diastolic dysfunction   -preserved EF  -DM type 1  -DKA w/o coma- resolved  -Tobacco abuse  -ETOH  -Poor Compliance  -Poor nutrition    Plans for AVR with Dr. Cherry Tuesday or Wednesday, I will d/w Dr. Cherry for scheduling.       Miiran France, CODY  03/19/18  8:15 AM

## 2018-03-19 NOTE — PROGRESS NOTES
[facility]     LOS: 11 days     Subjective:  No chest pain and no shortness of air today, he is eating well, no nausea or vomiting  -he is still very fatigued  -s/p cath 3/16    Objective:    Vital Signs:  Temp:  [98.3 °F (36.8 °C)-98.8 °F (37.1 °C)] 98.6 °F (37 °C)  Heart Rate:  [63-81] 63  Resp:  [18-20] 20  BP: (114-143)/(71-91) 134/79       on   ;   Device (Oxygen Therapy): room air  Body mass index is 22.6 kg/m².    Physical Exam   Constitutional: He appears well-developed and well-nourished.   HENT:   Head: Normocephalic and atraumatic.   Eyes: EOM are normal.   Neck: Normal range of motion.   Cardiovascular: Normal rate and regular rhythm.    Murmur heard.  2-3/6 systolic    Pulmonary/Chest: Effort normal and breath sounds normal.   Abdominal: Soft. Bowel sounds are normal.   Neurological: He is alert.   Skin: Skin is warm.       Results Review:    Glucose   Date Value Ref Range Status   03/18/2018 225 (H) 65 - 99 mg/dL Final   03/17/2018 100 (H) 65 - 99 mg/dL Final       Results from last 7 days  Lab Units 03/18/18  0346   WBC 10*3/mm3 9.15   HEMOGLOBIN g/dL 9.2*   HEMATOCRIT % 28.6*   PLATELETS 10*3/mm3 413       Results from last 7 days  Lab Units 03/18/18  0346   SODIUM mmol/L 139   POTASSIUM mmol/L 4.0   CHLORIDE mmol/L 103   CO2 mmol/L 26.8   BUN mg/dL 13   CREATININE mg/dL 0.56*   CALCIUM mg/dL 8.1*   GLUCOSE mg/dL 225*           Results from last 7 days  Lab Units 03/19/18  0319   MAGNESIUM mg/dL 2.1         Cultures:  Blood Culture   Date Value Ref Range Status   03/10/2018 No growth at 4 days  Preliminary   03/10/2018 No growth at 4 days  Preliminary       I have reviewed daily medications and changes in CPOE    Scheduled meds    atorvastatin 20 mg Oral Nightly   [START ON 3/20/2018] ceFAZolin 2 g Intravenous On Call to OR   [START ON 3/20/2018] chlorhexidine 15 mL Mouth/Throat Q12H   [START ON 3/20/2018] Chlorhexidine Gluconate Cloth 1 application Topical Q12H    enoxaparin 40 mg Subcutaneous Nightly   insulin aspart 0-4 Units Subcutaneous 4x Daily AC & at Bedtime   insulin aspart 4 Units Subcutaneous TID With Meals   insulin detemir 12 Units Subcutaneous QAM   insulin detemir 7 Units Subcutaneous Nightly   multivitamin 1 tablet Oral Daily   [START ON 3/20/2018] mupirocin 1 application Each Nare Q12H          PRN meds  •  acetaminophen  •  dextrose  •  dextrose  •  dextrose  •  glucagon (human recombinant)  •  magnesium sulfate **OR** magnesium sulfate **OR** magnesium sulfate  •  nicotine  •  ondansetron **OR** ondansetron ODT **OR** ondansetron  •  potassium & sodium phosphates **OR** potassium & sodium phosphates  •  potassium phosphate infusion greater than 15 mMoles **OR** potassium phosphate infusion greater than 15 mMoles **OR** potassium phosphate **OR** sodium phosphate IVPB **OR** sodium phosphate IVPB **OR** sodium phosphate IVPB  •  promethazine  •  sodium chloride  •  sodium chloride      Principal Problem:    Diabetic ketoacidosis without coma associated with type 1 diabetes mellitus  Active Problems:    Type 1 diabetes mellitus    Tobacco abuse    Alcohol abuse    Insurance coverage problems    Bilateral carpal tunnel syndrome    Microalbuminuria    Nonrheumatic aortic valve stenosis        Assessment/Plan:    Severe aortic stenosis by echo-savr vs tavr, likely on Tuesday or wednesday      Diabetic ketoacidosis without coma associated with type 1 diabetes mellitus-status post stay in the intensive care unit now is doing well.  From endocrinology standpoint the patient is been given okay for discharge with follow-up with PCP in 1-2 weeks      Type 1 diabetes mellitus- insulin discussed with nurse and novolog tonight decreased to a schedule 3 units one time      Tobacco abuse      Alcohol abuse      Bilateral carpal tunnel syndrome      Microalbuminuria    Headaches today after albuterol    Plan:  Valve replacement 1-2 days    Westley Barker,  MD  03/19/18  7:58 PM

## 2018-03-19 NOTE — PROGRESS NOTES
Adult Nutrition  Assessment/PES    Patient Name:  Juan Payne  YOB: 1960  MRN: [id number]  Admit Date:  3/8/2018    Assessment Date:  3/19/2018          Adult Nutrition Assessment     Row Name 03/19/18 1500       Reason for Assessment    Reason For Assessment follow-up protocol       Nutrition/Diet History    Typical Food/Fluid Intake Intake consistently good at meals, and eating 3 meals daily. Labs reviewed and no further hypogly episodes       Labs/Procedures/Meds    Lab Results Reviewed reviewed    Lab Results Comments Glu in upper 100-200's       Diagnostic Tests/Procedures    Diagnostic Test/Procedure Reviewed reviewed, pertinent    Diagnostic Test/Procedures Comments To have AVR with Dr. Cherry either Tue/Wed of this week.        Physical Findings    Skin other (see comments)   intact       Nutrition Prescription PO    Current PO Diet Regular    Common Modifiers Cardiac;Consistent Carbohydrate;Renal       PO Evaluation    Number of Days PO Intake Evaluated 3 days    % PO Intake %    Row Name 03/19/18 1043       Anthropometrics    Weight 67.4 kg (148 lb 9.6 oz)          Problem/Interventions:  Malnutrition/ETOH history  Diabetes          Intervention Goal     Row Name 03/19/18 1517       Intervention Goal    General Maintain nutrition;Disease management/therapy;Meet nutritional needs for age/condition    PO Maintain intake;PO intake (%)    PO Intake % 85 %    Weight Maintain weight            Nutrition Intervention     Row Name 03/19/18 1517       Nutrition Intervention    RD/Tech Action Follow Tx progress;Care plan reviewd;Encourage intake;Interview for preference              Education/Evaluation     Row Name 03/19/18 1517       Monitor/Evaluation    Monitor Per protocol    Education Follow-up Reinforce PRN        Electronically signed by:  Mirian Shaw RD  03/19/18 3:17 PM

## 2018-03-19 NOTE — PROGRESS NOTES
"  ENDOCRINE    Subjective   AND PLANS  Juan Payne is a 57 y.o. male.     Follow-up diabetes    No complaints.  Appetite good.  Fasting glucose 174.  Random glucose 114-280.  Patient did not get mealtime NovoLog on schedule yesterday and received less NovoLog at supper time.  Increase Levemir to 12 units every morning and 7 units every evening.  Continue NovoLog 4 units with each meal plus as needed.    For aortic valve replacement tomorrow    Objective   /71 (BP Location: Right arm, Patient Position: Lying)   Pulse 75   Temp 98.4 °F (36.9 °C) (Oral)   Resp 20   Ht 172.7 cm (67.99\")   Wt 63.5 kg (140 lb)   SpO2 97%   BMI 21.29 kg/m²   Physical Exam    Awake, alert, not in distress.  No rales or wheezes.  Regular heart rate and rhythm.  Systolic murmur at the base.  No gallop.  Abdomen soft, nontender.  No cyanosis or pedal edema.    Lab Results (last 24 hours)     Procedure Component Value Units Date/Time    POC Panel 9, ISTAT [[id number]]  (Abnormal) Collected:  03/16/18 1103    Specimen:  Blood Updated:  03/19/18 0645     Hemoglobin 9.2 (L) g/dL      Hematocrit 27 (L) %      O2 Saturation, Arterial 59 (L) %      Comment: Serial Number: [id number]:  [id number]       POC Glucose Once [[id number]]  (Abnormal) Collected:  03/19/18 0551    Specimen:  Blood Updated:  03/19/18 0553     Glucose 174 (H) mg/dL     Narrative:       Meter: [id number] : [id number] [facility] CNA    Magnesium [[id number]]  (Normal) Collected:  03/19/18 0319    Specimen:  Blood Updated:  03/19/18 0415     Magnesium 2.1 mg/dL     POC Glucose Once [[id number]]  (Abnormal) Collected:  03/18/18 2021    Specimen:  Blood Updated:  03/18/18 2022     Glucose 280 (H) mg/dL     Narrative:       Meter: [id number] : [id number] [facility] CNA    POC Glucose Once [[id number]]  (Abnormal) Collected:  03/18/18 1557    Specimen:  Blood Updated:  03/18/18 1558     Glucose 139 (H) mg/dL     Narrative:       Meter: [id number] : " [id number] [facility] CAGE    POC Glucose Once [[id number]]  (Normal) Collected:  03/18/18 1131    Specimen:  Blood Updated:  03/18/18 1133     Glucose 114 mg/dL     Narrative:       Meter: [id number] : [id number] [facility] CAGE            Principal Problem:    Diabetic ketoacidosis without coma associated with type 1 diabetes mellitus  Active Problems:    Type 1 diabetes mellitus    Tobacco abuse    Alcohol abuse    Insurance coverage problems    Bilateral carpal tunnel syndrome    Microalbuminuria    Nonrheumatic aortic valve stenosis    Insulin doses adjusted as discussed above.  For aortic valve replacement tomorrow

## 2018-03-19 NOTE — PAYOR COMM NOTE
"Tiffanie Gutierrez (57 y.o. Male)                    ATTENTION;  CONTINUED STAY CLINICALS [id number], CLINICALS FOR YOUR REVIEW, REPLY TO UR                   DEPT, AURELIO RAO N  OR UR  [phone]       Date of Birth Social Security Number Address Home Phone MRN    [id number]  [id number] [facility] [address]  [id number]    [employer] Marital Status          None Single       Admission Date Admission Type Admitting Provider Attending Provider Department, Room/Bed    3/8/18 Emergency Mahamed Tolbert MD Edling, Westley ALVAREZ MD [facility] CARDIOVASC UNIT, 2227/1    Discharge Date Discharge Disposition Discharge Destination                       Attending Provider:  Westley Barker MD    Allergies:  No Known Allergies    Isolation:  None   Infection:  None   Code Status:  FULL    Ht:  172.7 cm (67.99\")   Wt:  67.4 kg (148 lb 9.6 oz)    Admission Cmt:  None   Principal Problem:  Diabetic ketoacidosis without coma associated with type 1 diabetes mellitus [E10.10]                 Active Insurance as of 3/8/2018     Primary Coverage     Payor Plan Insurance Group Employer/Plan Group    ANTHEM BLUE CROSS ANTHEM BLUE CROSS BLUE [facility] PPO [id number]     Payor Plan Address Payor Plan Phone Number Effective From Effective To    [address] [phone] 1/1/2018     [place] [address]       Subscriber Name Subscriber Birth Date Member ID       TIFFANIE GUTIERREZ 1960 [id number]                 Emergency Contacts      (Rel.) Home Phone Work Phone Mobile Phone    Aurelia Briones (Significant Other) -- -- [phone]    Leonor Galindo (Daughter) -- -- [phone]            Lines, Drains & Airways    Active LDAs     Name:   Placement date:   Placement time:   Site:   Days:    Peripheral IV 03/16/18 Left Antecubital  03/16/18    1042    Antecubital    3                 Progress Notes  Date of Service: 3/19/2018 8:10 AM  Jaziel Meléndez MD   Cardiology   Expand All " "Collapse All    []  Patient Care Team:  No Known Provider as PCP - General     Chief Complaint: Critical aortic valve stenosis     Interval History:   No complaints currently.  No pain.        Objective      Vital Signs  Temp:  [97.6 °F (36.4 °C)-98.8 °F (37.1 °C)] 98.4 °F (36.9 °C)  Heart Rate:  [64-75] 75  Resp:  [16-20] 20  BP: (110-143)/(68-91) 118/71     Intake/Output Summary (Last 24 hours) at 03/19/18 0810  Last data filed at 03/19/18 0700    Gross per 24 hour   Intake             1300 ml   Output              540 ml   Net              760 ml          Flowsheet Rows    Flowsheet Row First Filed Value   Admission Height 172.7 cm (68\") Documented at 03/08/2018 1637   Admission Weight 63.5 kg (140 lb) Documented at 03/08/2018 1637             General Appearance:    Alert, cooperative, in no acute distress   Head:    Normocephalic, without obvious abnormality, atraumatic         Neck:   No adenopathy, supple, no thyromegaly, no carotid bruit, no    JVD   Lungs:     Clear to auscultation bilaterally, no wheezes, rales, or     rhonchi    Heart:  Normal rate, regular. 3/6 systolic murmur peak late in systole    Chest Wall:    No abnormalities observed   Abdomen:     Normal bowel sounds, soft, non-tender, non-distended,            no rebound tenderness   Extremities:   No cyanosis, clubbing, or edema   Pulses:   Pulses palpable and equal bilaterally   Skin:   No bleeding or rash         Neurologic:   Cranial nerves 2 - 12 grossly intact, sensation intact               atorvastatin 20 mg Oral Nightly   enoxaparin 40 mg Subcutaneous Nightly   insulin aspart 0-4 Units Subcutaneous 4x Daily AC & at Bedtime   insulin aspart 4 Units Subcutaneous TID With Meals   insulin detemir 12 Units Subcutaneous QAM   insulin detemir 6 Units Subcutaneous Nightly   multivitamin 1 tablet Oral Daily            Results Review:       Results from last 7 days  Lab Units 03/18/18  0346   SODIUM mmol/L 139   POTASSIUM mmol/L 4.0   CHLORIDE " mmol/L 103   CO2 mmol/L 26.8   BUN mg/dL 13   CREATININE mg/dL 0.56*   GLUCOSE mg/dL 225*   CALCIUM mg/dL 8.1*             Results from last 7 days  Lab Units 03/18/18  0346   WBC 10*3/mm3 9.15   HEMOGLOBIN g/dL 9.2*   HEMATOCRIT % 28.6*   PLATELETS 10*3/mm3 413             Results from last 7 days  Lab Units 03/17/18  0605   CHOLESTEROL mg/dL 173         Results from last 7 days  Lab Units 03/19/18  0319   MAGNESIUM mg/dL 2.1         Results from last 7 days  Lab Units 03/17/18  0605   CHOLESTEROL mg/dL 173   TRIGLYCERIDES mg/dL 118   HDL CHOL mg/dL 46   LDL CHOL mg/dL 103*      @LABRCNT(bnp)@  I reviewed the patient's new clinical results.  I personally viewed and interpreted the patient's EKG/Telemetry data                 Assessment/Plan      Critical aortic stenosis: To OR on Tues  Anemia  Diabetes with ketoacidosis without coma  Dyslipidemia - on statin  Anemia  Nicotine use  Alcohol abuse        -BP and HR well controlled                                                Hospital Medications (active)       Dose Frequency Start End    acetaminophen (TYLENOL) tablet 650 mg 650 mg Every 4 Hours PRN 3/8/2018     Sig - Route: Take 2 tablets by mouth Every 4 (Four) Hours As Needed for Mild Pain . - Oral    albuterol (PROVENTIL) nebulizer solution 0.083% 2.5 mg/3mL 2.5 mg Once 3/19/2018 3/19/2018    Sig - Route: Take 2.5 mg by nebulization 1 (One) Time. - Nebulization    Cosign for Ordering: Required by Curry Cherry MD    atorvastatin (LIPITOR) tablet 20 mg 20 mg Nightly 3/15/2018     Sig - Route: Take 1 tablet by mouth Every Night. - Oral    ceFAZolin in dextrose (ANCEF) IVPB solution 2 g 2 g On Call to O.R. 3/20/2018 3/21/2018    Sig - Route: Infuse 100 mL into a venous catheter On Call to the Operating Room. - Intravenous    chlorhexidine (PERIDEX) 0.12 % solution 15 mL 15 mL Every 12 Hours Scheduled 3/20/2018 3/21/2018    Sig - Route: Apply 15 mL to the mouth or throat Every 12 (Twelve) Hours. - Mouth/Throat     Chlorhexidine Gluconate Cloth 2 % pads 1 application 1 application Every 12 Hours 3/20/2018 3/21/2018    Sig - Route: Apply 1 application topically Every 12 (Twelve) Hours. - Topical    dextrose (D50W) solution 25 g 25 g Every 15 Minutes PRN 3/10/2018     Sig - Route: Infuse 50 mL into a venous catheter Every 15 (Fifteen) Minutes As Needed for Low Blood Sugar (Blood Sugar Less Than 70). - Intravenous    dextrose (D5W) 5 % infusion 30 mL 30 mL As Needed 3/11/2018     Sig - Route: Infuse 30 mL into a venous catheter As Needed (low BG). - Intravenous    dextrose (GLUTOSE) oral gel 15 g 15 g Every 15 Minutes PRN 3/10/2018     Sig - Route: Take 15 g by mouth Every 15 (Fifteen) Minutes As Needed for Low Blood Sugar (Blood sugar less than 70). - Oral    enoxaparin (LOVENOX) syringe 40 mg 40 mg Nightly 3/9/2018     Sig - Route: Inject 0.4 mL under the skin Every Night. - Subcutaneous    glucagon (human recombinant) (GLUCAGEN DIAGNOSTIC) injection 1 mg 1 mg As Needed 3/10/2018     Sig - Route: Inject 1 mg under the skin As Needed (Blood Glucose Less Than 70). - Subcutaneous    insulin aspart (novoLOG) injection 0-4 Units 0-4 Units 4 Times Daily Before Meals & Nightly 3/11/2018     Sig - Route: Inject 0-4 Units under the skin 4 (Four) Times a Day Before Meals & at Bedtime. - Subcutaneous    insulin aspart (novoLOG) injection 4 Units 4 Units 3 Times Daily With Meals 3/11/2018     Sig - Route: Inject 4 Units under the skin 3 (Three) Times a Day With Meals. - Subcutaneous    insulin detemir (LEVEMIR) injection 12 Units 12 Units Every Morning 3/12/2018     Sig - Route: Inject 12 Units under the skin Every Morning. - Subcutaneous    insulin detemir (LEVEMIR) injection 7 Units 7 Units Nightly 3/19/2018     Sig - Route: Inject 7 Units under the skin Every Night. - Subcutaneous    LORazepam (ATIVAN) injection 1 mg 1 mg Every 6 Hours PRN 3/9/2018 3/19/2018    Sig - Route: Infuse 0.5 mL into a venous catheter Every 6 (Six) Hours As  "Needed for Anxiety or Withdrawal. - Intravenous    LORazepam (ATIVAN) tablet 1 mg 1 mg Every 6 Hours PRN 3/9/2018 3/19/2018    Sig - Route: Take 1 tablet by mouth Every 6 (Six) Hours As Needed for Anxiety or Withdrawal. - Oral    Magnesium Sulfate 2 gram Bolus, followed by 8 gram infusion (total Mg dose 10 grams)- Mg less than or equal to 1mg/dL 2 g As Needed 3/10/2018     Sig - Route: Infuse 50 mL into a venous catheter As Needed (Mg less than or equal to 1mg/dL). - Intravenous    Linked Group 1:  \"Or\" Linked Group Details        magnesium sulfate 4 gram infusion- Mg 1.6-1.9 mg/dL 4 g As Needed 3/10/2018     Sig - Route: Infuse 100 mL into a venous catheter As Needed (Mg 1.6-1.9 mg/dL). - Intravenous    Linked Group 1:  \"Or\" Linked Group Details        Magnesium Sulfate 6 gram Infusion (2 gm x 3) -Mg 1.1 -1.5 mg/dL 2 g As Needed 3/10/2018     Sig - Route: Infuse 50 mL into a venous catheter As Needed (Mg 1.1 -1.5 mg/dL). - Intravenous    Linked Group 1:  \"Or\" Linked Group Details        multivitamin (THERAGRAN) tablet 1 tablet 1 tablet Daily 3/9/2018     Sig - Route: Take 1 tablet by mouth Daily. - Oral    mupirocin (BACTROBAN) 2 % nasal ointment 1 application 1 application Every 12 Hours Scheduled 3/20/2018 3/21/2018    Sig - Route: 1 application by Each Nare route Every 12 (Twelve) Hours. - Each Nare    nicotine (NICODERM CQ) 14 MG/24HR patch 1 patch 1 patch Daily PRN 3/18/2018     Sig - Route: Place 1 patch on the skin Daily As Needed (nicotine withdraw symptoms). - Transdermal    ondansetron (ZOFRAN) injection 4 mg 4 mg Every 6 Hours PRN 3/8/2018     Sig - Route: Infuse 2 mL into a venous catheter Every 6 (Six) Hours As Needed for Nausea or Vomiting. - Intravenous    Linked Group 2:  \"Or\" Linked Group Details        ondansetron (ZOFRAN) tablet 4 mg 4 mg Every 6 Hours PRN 3/8/2018     Sig - Route: Take 1 tablet by mouth Every 6 (Six) Hours As Needed for Nausea or Vomiting. - Oral    Linked Group 2:  \"Or\" Linked " "Group Details        ondansetron ODT (ZOFRAN-ODT) disintegrating tablet 4 mg 4 mg Every 6 Hours PRN 3/8/2018     Sig - Route: Take 1 tablet by mouth Every 6 (Six) Hours As Needed for Nausea or Vomiting. - Oral    Linked Group 2:  \"Or\" Linked Group Details        potassium & sodium phosphates (PHOS-NAK) 280-160-250 MG packet - for Phosphorus 1.25 - 2.1 mg/dL 2 packet Once As Needed 3/9/2018     Sig - Route: Take 2 packets by mouth 1 (One) Time As Needed (Phosphorus 1.25 - 2.1 mg/dL). - Oral    Linked Group 3:  \"Or\" Linked Group Details        potassium & sodium phosphates (PHOS-NAK) 280-160-250 MG packet - for Phosphorus less than 1.25 mg/dL 2 packet Every 6 Hours PRN 3/9/2018     Sig - Route: Take 2 packets by mouth Every 6 (Six) Hours As Needed (Phosphorus less than 1.25 mg/dL). - Oral    Linked Group 3:  \"Or\" Linked Group Details        potassium phosphate 15 mmol in sodium chloride 0.9 % 100 mL infusion 15 mmol As Needed 3/10/2018     Sig - Route: Infuse 15 mmol into a venous catheter As Needed (Peripheral IV - Phosphorus 1.8 - 2.5). - Intravenous    Linked Group 4:  \"Or\" Linked Group Details        potassium phosphate 30 mmol in sodium chloride 0.9 % 250 mL infusion 30 mmol As Needed 3/10/2018     Sig - Route: Infuse 30 mmol into a venous catheter As Needed (Peripheral IV - Phosphorus 1.3 - 1.7). - Intravenous    Linked Group 4:  \"Or\" Linked Group Details        potassium phosphate 45 mmol in sodium chloride 0.9 % 500 mL infusion 45 mmol As Needed 3/10/2018     Sig - Route: Infuse 45 mmol into a venous catheter As Needed (Peripheral IV - Phosphorus Less Than 1.3). - Intravenous    Linked Group 4:  \"Or\" Linked Group Details        promethazine (PHENERGAN) injection 12.5 mg 12.5 mg Every 6 Hours PRN 3/10/2018     Sig - Route: Infuse 0.5 mL into a venous catheter Every 6 (Six) Hours As Needed for Nausea or Vomiting. - Intravenous    sodium chloride 0.9 % flush 1-10 mL 1-10 mL As Needed 3/8/2018     Sig - Route: " "Infuse 1-10 mL into a venous catheter As Needed for Line Care. - Intravenous    sodium chloride 0.9 % flush 10 mL 10 mL As Needed 3/8/2018     Sig - Route: Infuse 10 mL into a venous catheter As Needed for Line Care. - Intravenous    Cosign for Ordering: Accepted by Jose Vásquez MD on 3/9/2018 12:17 AM    sodium phosphates 15 mmol in sodium chloride 0.9 % 250 mL IVPB 15 mmol As Needed 3/10/2018     Sig - Route: Infuse 15 mmol into a venous catheter As Needed (Peripheral IV - Phosphorus 1.8 - 2.5 & Potassium Greater Than 4). - Intravenous    Linked Group 4:  \"Or\" Linked Group Details        sodium phosphates 30 mmol in sodium chloride 0.9 % 250 mL IVPB 30 mmol As Needed 3/10/2018     Sig - Route: Infuse 30 mmol into a venous catheter As Needed (Peripheral IV - Phosphorus 1.3-1.7 & Potassium Greater Than 4). - Intravenous    Linked Group 4:  \"Or\" Linked Group Details        sodium phosphates 45 mmol in sodium chloride 0.9 % 500 mL IVPB 45 mmol As Needed 3/10/2018     Sig - Route: Infuse 45 mmol into a venous catheter As Needed (Peripheral IV - Phosphorus Less Than 1.3 & Potassium Greater Than 4). - Intravenous    Linked Group 4:  \"Or\" Linked Group Details        chlorhexidine (PERIDEX) 0.12 % solution 15 mL (Discontinued) 15 mL Every 12 Hours Scheduled 3/19/2018 3/19/2018    Sig - Route: Apply 15 mL to the mouth or throat Every 12 (Twelve) Hours. - Mouth/Throat    Chlorhexidine Gluconate Cloth 2 % pads 1 application (Discontinued) 1 application Every 12 Hours 3/19/2018 3/19/2018    Sig - Route: Apply 1 application topically Every 12 (Twelve) Hours. - Topical    insulin detemir (LEVEMIR) injection 6 Units (Discontinued) 6 Units Nightly 3/14/2018 3/19/2018    Sig - Route: Inject 6 Units under the skin Every Night. - Subcutaneous    mupirocin (BACTROBAN) 2 % nasal ointment 1 application (Discontinued) 1 application Every 12 Hours Scheduled 3/19/2018 3/19/2018    Sig - Route: 1 application by Each Nare route Every 12 " "(Twelve) Hours. - Each Nare    nicotine (NICODERM CQ) 14 MG/24HR patch 1 patch (Discontinued) 1 patch Every 24 Hours 3/17/2018 3/18/2018    Sig - Route: Place 1 patch on the skin Daily. - Transdermal             Progress Notes  Date of Service: 3/19/2018 8:14 AM  CODY Em   Cardiothoracic Surgery   Cosigned by: Curry Cherry MD at 3/19/2018 10:14 AM   Attestation signed by Curry Cherry MD at 3/19/2018 10:14 AM   I have reviewed the documentation above and agree.         Expand All Collapse All    []Manual[]Template  []Copied   LOS: 11 days   Patient Care Team:  No Known Provider as PCP - General     Chief Complaint: Aortic stenosis     Subjective:  Symptoms:  No shortness of breath or chest pain.    Diet:  Adequate intake.    Activity level: Normal.    Pain:  He reports no pain.             Vital Signs  Temp:  [97.6 °F (36.4 °C)-98.8 °F (37.1 °C)] 98.4 °F (36.9 °C)  Heart Rate:  [64-75] 75  Resp:  [16-20] 20  BP: (110-143)/(68-91) 118/71  Body mass index is 21.29 kg/m².     Intake/Output Summary (Last 24 hours) at 03/19/18 0815  Last data filed at 03/19/18 0700    Gross per 24 hour   Intake             1300 ml   Output              540 ml   Net              760 ml      No intake/output data recorded.        Vitals   1     03/08/18  1637   Weight: 63.5 kg (140 lb)               Objective:  General Appearance:  In no acute distress and comfortable.    Vital signs: (most recent): Blood pressure 118/71, pulse 75, temperature 98.4 °F (36.9 °C), temperature source Oral, resp. rate 20, height 172.7 cm (67.99\"), weight 63.5 kg (140 lb), SpO2 97 %.  Vital signs are normal.    Output: Producing urine and producing stool.    HEENT: Normal HEENT exam.    Lungs:  Normal effort and normal respiratory rate.    Heart: Normal rate.  Regular rhythm.  S1 normal and S2 normal.  Positive for murmur.    Abdomen: Abdomen is soft.  Bowel sounds are normal.   There is no abdominal tenderness.     Extremities: Normal " range of motion.    Pulses: Distal pulses are intact.    Neurological: Patient is alert and oriented to person, place and time.  Normal strength.    Skin:  Warm and dry.                   Results Review:          WBC       WBC   Date Value Ref Range Status   03/18/2018 9.15 4.50 - 10.70 10*3/mm3 Final   03/17/2018 8.04 4.50 - 10.70 10*3/mm3 Final       HGB       Hemoglobin   Date Value Ref Range Status   03/18/2018 9.2 (L) 13.7 - 17.6 g/dL Final   03/17/2018 9.6 (L) 13.7 - 17.6 g/dL Final   03/16/2018 8.8 (L) 12.0 - 17.0 g/dL Final   03/16/2018 9.2 (L) 12.0 - 17.0 g/dL Final       HCT       Hematocrit   Date Value Ref Range Status   03/18/2018 28.6 (L) 40.4 - 52.2 % Final   03/17/2018 29.9 (L) 40.4 - 52.2 % Final   03/16/2018 26 (L) 38 - 51 % Final   03/16/2018 27 (L) 38 - 51 % Final       Platelets       Platelets   Date Value Ref Range Status   03/18/2018 413 140 - 500 10*3/mm3 Final   03/17/2018 474 140 - 500 10*3/mm3 Final          PT/INR:  No results found for: PROTIME/No results found for: INR     Sodium       Sodium   Date Value Ref Range Status   03/18/2018 139 136 - 145 mmol/L Final   03/17/2018 141 136 - 145 mmol/L Final       Potassium       Potassium   Date Value Ref Range Status   03/18/2018 4.0 3.5 - 5.2 mmol/L Final   03/17/2018 3.8 3.5 - 5.2 mmol/L Final       Chloride       Chloride   Date Value Ref Range Status   03/18/2018 103 98 - 107 mmol/L Final   03/17/2018 104 98 - 107 mmol/L Final       Bicarbonate       CO2   Date Value Ref Range Status   03/18/2018 26.8 22.0 - 29.0 mmol/L Final   03/17/2018 28.8 22.0 - 29.0 mmol/L Final       BUN       BUN   Date Value Ref Range Status   03/18/2018 13 6 - 20 mg/dL Final   03/17/2018 13 6 - 20 mg/dL Final       Creatinine       Creatinine   Date Value Ref Range Status   03/18/2018 0.56 (L) 0.76 - 1.27 mg/dL Final   03/17/2018 0.55 (L) 0.76 - 1.27 mg/dL Final       Calcium       Calcium   Date Value Ref Range Status   03/18/2018 8.1 (L) 8.6 - 10.5 mg/dL  Final   03/17/2018 8.2 (L) 8.6 - 10.5 mg/dL Final       Magnesium       Magnesium   Date Value Ref Range Status   03/19/2018 2.1 1.6 - 2.6 mg/dL Final                atorvastatin 20 mg Oral Nightly   enoxaparin 40 mg Subcutaneous Nightly   insulin aspart 0-4 Units Subcutaneous 4x Daily AC & at Bedtime   insulin aspart 4 Units Subcutaneous TID With Meals   insulin detemir 12 Units Subcutaneous QAM   insulin detemir 6 Units Subcutaneous Nightly   multivitamin 1 tablet Oral Daily                    Patient Active Problem List   Diagnosis Code   • Diabetic ketoacidosis without coma associated with type 1 diabetes mellitus E10.10   • Type 1 diabetes mellitus E10.9   • Tobacco abuse Z72.0   • Alcohol abuse F10.10   • Insurance coverage problems Z59.8   • Bilateral carpal tunnel syndrome G56.03   • Microalbuminuria R80.9   • Nonrheumatic aortic valve stenosis I35.0         Assessment & Plan     -Severe aortic stenosis- peak velocity is 5m/s;  maximal pressure gradient of 122mmHg and mean pressure gradient of 62mmHg; ALICIA is 0.54cm2 with a dimensionless index of 0.2.  -Severe concentric hypertrophy  -Left ventricular diastolic dysfunction   -preserved EF  -DM type 1  -DKA w/o coma- resolved  -Tobacco abuse  -ETOH  -Poor Compliance  -Poor nutrition     Plans for AVR with Dr. Cherry Tuesday or Wednesday, I will d/w Dr. Cherry for scheduling.         Mirian France, APRN  03/19/18  8:15 AM

## 2018-03-20 LAB
ABO GROUP BLD: NORMAL
ANION GAP SERPL CALCULATED.3IONS-SCNC: 9.2 MMOL/L
BASOPHILS # BLD AUTO: 0.1 10*3/MM3 (ref 0–0.2)
BASOPHILS NFR BLD AUTO: 0.8 % (ref 0–1.5)
BLD GP AB SCN SERPL QL: NEGATIVE
BUN BLD-MCNC: 10 MG/DL (ref 6–20)
BUN/CREAT SERPL: 19.6 (ref 7–25)
CALCIUM SPEC-SCNC: 8.4 MG/DL (ref 8.6–10.5)
CHLORIDE SERPL-SCNC: 100 MMOL/L (ref 98–107)
CLOSE TME COLL+ADP + EPINEP PNL BLD: 66 % (ref 86–100)
CO2 SERPL-SCNC: 28.8 MMOL/L (ref 22–29)
CREAT BLD-MCNC: 0.51 MG/DL (ref 0.76–1.27)
DEPRECATED RDW RBC AUTO: 54.4 FL (ref 37–54)
EOSINOPHIL # BLD AUTO: 0.18 10*3/MM3 (ref 0–0.7)
EOSINOPHIL NFR BLD AUTO: 1.4 % (ref 0.3–6.2)
ERYTHROCYTE [DISTWIDTH] IN BLOOD BY AUTOMATED COUNT: 13.3 % (ref 11.5–14.5)
GFR SERPL CREATININE-BSD FRML MDRD: >150 ML/MIN/1.73
GLUCOSE BLD-MCNC: 132 MG/DL (ref 65–99)
GLUCOSE BLDC GLUCOMTR-MCNC: 135 MG/DL (ref 70–130)
GLUCOSE BLDC GLUCOMTR-MCNC: 167 MG/DL (ref 70–130)
GLUCOSE BLDC GLUCOMTR-MCNC: 237 MG/DL (ref 70–130)
HCT VFR BLD AUTO: 29.3 % (ref 40.4–52.2)
HGB BLD-MCNC: 9.5 G/DL (ref 13.7–17.6)
IMM GRANULOCYTES # BLD: 0.04 10*3/MM3 (ref 0–0.03)
IMM GRANULOCYTES NFR BLD: 0.3 % (ref 0–0.5)
LYMPHOCYTES # BLD AUTO: 2.95 10*3/MM3 (ref 0.9–4.8)
LYMPHOCYTES NFR BLD AUTO: 23.5 % (ref 19.6–45.3)
MCH RBC QN AUTO: 36.5 PG (ref 27–32.7)
MCHC RBC AUTO-ENTMCNC: 32.4 G/DL (ref 32.6–36.4)
MCV RBC AUTO: 112.7 FL (ref 79.8–96.2)
MONOCYTES # BLD AUTO: 1.36 10*3/MM3 (ref 0.2–1.2)
MONOCYTES NFR BLD AUTO: 10.8 % (ref 5–12)
NEUTROPHILS # BLD AUTO: 7.92 10*3/MM3 (ref 1.9–8.1)
NEUTROPHILS NFR BLD AUTO: 63.2 % (ref 42.7–76)
PLATELET # BLD AUTO: 400 10*3/MM3 (ref 140–500)
PMV BLD AUTO: 9.5 FL (ref 6–12)
POTASSIUM BLD-SCNC: 3.7 MMOL/L (ref 3.5–5.2)
RBC # BLD AUTO: 2.6 10*6/MM3 (ref 4.6–6)
RH BLD: POSITIVE
SODIUM BLD-SCNC: 138 MMOL/L (ref 136–145)
WBC NRBC COR # BLD: 12.55 10*3/MM3 (ref 4.5–10.7)

## 2018-03-20 PROCEDURE — 63710000001 INSULIN ASPART PER 5 UNITS: Performed by: INTERNAL MEDICINE

## 2018-03-20 PROCEDURE — 85025 COMPLETE CBC W/AUTO DIFF WBC: CPT | Performed by: THORACIC SURGERY (CARDIOTHORACIC VASCULAR SURGERY)

## 2018-03-20 PROCEDURE — 82962 GLUCOSE BLOOD TEST: CPT

## 2018-03-20 PROCEDURE — 86900 BLOOD TYPING SEROLOGIC ABO: CPT

## 2018-03-20 PROCEDURE — 86900 BLOOD TYPING SEROLOGIC ABO: CPT | Performed by: NURSE PRACTITIONER

## 2018-03-20 PROCEDURE — 80048 BASIC METABOLIC PNL TOTAL CA: CPT | Performed by: THORACIC SURGERY (CARDIOTHORACIC VASCULAR SURGERY)

## 2018-03-20 PROCEDURE — 86901 BLOOD TYPING SEROLOGIC RH(D): CPT | Performed by: NURSE PRACTITIONER

## 2018-03-20 PROCEDURE — 86923 COMPATIBILITY TEST ELECTRIC: CPT

## 2018-03-20 PROCEDURE — 99231 SBSQ HOSP IP/OBS SF/LOW 25: CPT | Performed by: INTERNAL MEDICINE

## 2018-03-20 PROCEDURE — 99232 SBSQ HOSP IP/OBS MODERATE 35: CPT | Performed by: THORACIC SURGERY (CARDIOTHORACIC VASCULAR SURGERY)

## 2018-03-20 PROCEDURE — 99232 SBSQ HOSP IP/OBS MODERATE 35: CPT | Performed by: INTERNAL MEDICINE

## 2018-03-20 PROCEDURE — 86901 BLOOD TYPING SEROLOGIC RH(D): CPT

## 2018-03-20 PROCEDURE — 86850 RBC ANTIBODY SCREEN: CPT | Performed by: NURSE PRACTITIONER

## 2018-03-20 PROCEDURE — 85576 BLOOD PLATELET AGGREGATION: CPT | Performed by: NURSE PRACTITIONER

## 2018-03-20 RX ORDER — LORAZEPAM 0.5 MG/1
0.5 TABLET ORAL ONCE
Status: COMPLETED | OUTPATIENT
Start: 2018-03-20 | End: 2018-03-20

## 2018-03-20 RX ORDER — CHLORHEXIDINE GLUCONATE 500 MG/1
1 CLOTH TOPICAL EVERY 12 HOURS PRN
Status: DISCONTINUED | OUTPATIENT
Start: 2018-03-20 | End: 2018-03-21 | Stop reason: HOSPADM

## 2018-03-20 RX ORDER — CHLORHEXIDINE GLUCONATE 0.12 MG/ML
15 RINSE ORAL ONCE
Status: DISCONTINUED | OUTPATIENT
Start: 2018-03-20 | End: 2018-03-20

## 2018-03-20 RX ADMIN — CHLORHEXIDINE GLUCONATE 1 APPLICATION: 500 CLOTH TOPICAL at 22:01

## 2018-03-20 RX ADMIN — INSULIN ASPART 4 UNITS: 100 INJECTION, SOLUTION INTRAVENOUS; SUBCUTANEOUS at 11:10

## 2018-03-20 RX ADMIN — MUPIROCIN 1 APPLICATION: 20 OINTMENT TOPICAL at 20:32

## 2018-03-20 RX ADMIN — INSULIN ASPART 2 UNITS: 100 INJECTION, SOLUTION INTRAVENOUS; SUBCUTANEOUS at 20:32

## 2018-03-20 RX ADMIN — INSULIN ASPART 1 UNITS: 100 INJECTION, SOLUTION INTRAVENOUS; SUBCUTANEOUS at 11:10

## 2018-03-20 RX ADMIN — INSULIN ASPART 4 UNITS: 100 INJECTION, SOLUTION INTRAVENOUS; SUBCUTANEOUS at 06:19

## 2018-03-20 RX ADMIN — ATORVASTATIN CALCIUM 20 MG: 20 TABLET, FILM COATED ORAL at 20:32

## 2018-03-20 RX ADMIN — CHLORHEXIDINE GLUCONATE 15 ML: 1.2 RINSE ORAL at 20:32

## 2018-03-20 RX ADMIN — INSULIN ASPART 4 UNITS: 100 INJECTION, SOLUTION INTRAVENOUS; SUBCUTANEOUS at 17:07

## 2018-03-20 RX ADMIN — LORAZEPAM 0.5 MG: 0.5 TABLET ORAL at 20:33

## 2018-03-20 RX ADMIN — Medication 1 TABLET: at 08:08

## 2018-03-20 RX ADMIN — ACETAMINOPHEN 650 MG: 325 TABLET ORAL at 11:10

## 2018-03-20 NOTE — PROGRESS NOTES
" LOS: 12 days   Patient Care Team:  No Known Provider as PCP - General    Chief Complaint: post op    Subjective:  Symptoms:  No shortness of breath or chest pain.    Diet:  Adequate intake.    Activity level: Normal.    Pain:  He reports no pain.          Vital Signs  Temp:  [97.9 °F (36.6 °C)-98.9 °F (37.2 °C)] 98.4 °F (36.9 °C)  Heart Rate:  [63-78] 71  Resp:  [20] 20  BP: (114-134)/(72-96) 129/85  Body mass index is 22.6 kg/m².    Intake/Output Summary (Last 24 hours) at 03/20/18 1258  Last data filed at 03/20/18 1100   Gross per 24 hour   Intake             1330 ml   Output             1325 ml   Net                5 ml     I/O this shift:  In: 240 [P.O.:240]  Out: 200 [Urine:200]        1    03/08/18  1637 03/19/18  1043   Weight: 63.5 kg (140 lb) 67.4 kg (148 lb 9.6 oz)         Objective:  General Appearance:  In no acute distress and comfortable.    Vital signs: (most recent): Blood pressure 129/85, pulse 71, temperature 98.4 °F (36.9 °C), temperature source Oral, resp. rate 20, height 172.7 cm (67.99\"), weight 67.4 kg (148 lb 9.6 oz), SpO2 97 %.  Vital signs are normal.    Output: Producing urine and producing stool.    HEENT: Normal HEENT exam.    Lungs:  Normal effort and normal respiratory rate.  Breath sounds clear to auscultation.    Heart: Normal rate.  Regular rhythm.  S1 normal and S2 normal.    Abdomen: Abdomen is soft.  Bowel sounds are normal.     Extremities: Normal range of motion.    Pulses: Distal pulses are intact.    Neurological: Patient is alert and oriented to person, place and time.  Normal strength.    Pupils:  Pupils are equal, round, and reactive to light.    Skin:  Warm, dry and pale.              Results Review:        WBC WBC   Date Value Ref Range Status   03/20/2018 12.55 (H) 4.50 - 10.70 10*3/mm3 Final   03/18/2018 9.15 4.50 - 10.70 10*3/mm3 Final      HGB Hemoglobin   Date Value Ref Range Status   03/20/2018 9.5 (L) 13.7 - 17.6 g/dL Final   03/18/2018 9.2 (L) 13.7 - 17.6 g/dL " Final      HCT Hematocrit   Date Value Ref Range Status   03/20/2018 29.3 (L) 40.4 - 52.2 % Final   03/18/2018 28.6 (L) 40.4 - 52.2 % Final      Platelets Platelets   Date Value Ref Range Status   03/20/2018 400 140 - 500 10*3/mm3 Final   03/18/2018 413 140 - 500 10*3/mm3 Final        PT/INR:  No results found for: PROTIME/No results found for: INR    Sodium Sodium   Date Value Ref Range Status   03/20/2018 138 136 - 145 mmol/L Final   03/18/2018 139 136 - 145 mmol/L Final      Potassium Potassium   Date Value Ref Range Status   03/20/2018 3.7 3.5 - 5.2 mmol/L Final   03/18/2018 4.0 3.5 - 5.2 mmol/L Final      Chloride Chloride   Date Value Ref Range Status   03/20/2018 100 98 - 107 mmol/L Final   03/18/2018 103 98 - 107 mmol/L Final      Bicarbonate CO2   Date Value Ref Range Status   03/20/2018 28.8 22.0 - 29.0 mmol/L Final   03/18/2018 26.8 22.0 - 29.0 mmol/L Final      BUN BUN   Date Value Ref Range Status   03/20/2018 10 6 - 20 mg/dL Final   03/18/2018 13 6 - 20 mg/dL Final      Creatinine Creatinine   Date Value Ref Range Status   03/20/2018 0.51 (L) 0.76 - 1.27 mg/dL Final   03/18/2018 0.56 (L) 0.76 - 1.27 mg/dL Final      Calcium Calcium   Date Value Ref Range Status   03/20/2018 8.4 (L) 8.6 - 10.5 mg/dL Final   03/18/2018 8.1 (L) 8.6 - 10.5 mg/dL Final      Magnesium Magnesium   Date Value Ref Range Status   03/19/2018 2.1 1.6 - 2.6 mg/dL Final            atorvastatin 20 mg Oral Nightly   [START ON 3/21/2018] ceFAZolin 2 g Intravenous On Call to OR   chlorhexidine 15 mL Mouth/Throat Q12H   Chlorhexidine Gluconate Cloth 1 application Topical Q12H   insulin aspart 0-10 Units Subcutaneous 4x Daily AC & at Bedtime   insulin aspart 4 Units Subcutaneous TID With Meals   insulin detemir 12 Units Subcutaneous QAM   insulin detemir 7 Units Subcutaneous Nightly   multivitamin 1 tablet Oral Daily   mupirocin 1 application Each Nare Q12H              Patient Active Problem List   Diagnosis Code   • Diabetic  ketoacidosis without coma associated with type 1 diabetes mellitus E10.10   • Type 1 diabetes mellitus E10.9   • Tobacco abuse Z72.0   • Alcohol abuse F10.10   • Insurance coverage problems Z59.8   • Bilateral carpal tunnel syndrome G56.03   • Microalbuminuria R80.9   • Nonrheumatic aortic valve stenosis I35.0       Assessment & Plan     -Severe aortic stenosis- peak velocity is 5m/s;  maximal pressure gradient of 122mmHg and mean pressure gradient of 62mmHg; ALICIA is 0.54cm2 with a dimensionless index of 0.2.  -Severe concentric hypertrophy  -Left ventricular diastolic dysfunction   -preserved EF  -DM type 1  -DKA w/o coma- resolved  -Tobacco abuse  -ETOH  -Poor Compliance  -Poor nutrition   Platelet agg 66     Proximal right internal carotid artery plaque without significant stenosis.  Proximal left internal carotid artery plaque without significant stenosis.    Due to a schedule change plans for OR with [facility] in [place].  Discussed with bedside RN and patient.     CODY Juarez  03/20/18  12:58 PM

## 2018-03-20 NOTE — PROGRESS NOTES
LOS: 12 days   Patient Care Team:  No Known Provider as PCP - General    Chief Complaint:     F/u AS    Interval History:   He has no cp, dyspnea, tachycardia, dizziness or nausea.    Plan for surgery tomorrow.     Objective   Vital Signs  Temp:  [97.9 °F (36.6 °C)-98.9 °F (37.2 °C)] 98.9 °F (37.2 °C)  Heart Rate:  [63-81] 78  Resp:  [18-20] 20  BP: (114-134)/(72-96) 134/84    Intake/Output Summary (Last 24 hours) at 03/20/18 1043  Last data filed at 03/20/18 0816   Gross per 24 hour   Intake             1570 ml   Output             1125 ml   Net              445 ml       Comfortable NAD  Neck supple, no JVD or thyromegaly appreciated  S1/S2 RRR, no r/g, 3/6 LIZANDRO  Lungs CTA B, normal effort  Abdomen S/NT/ND (+) BS, no HSM appreciated  Extremities warm, no clubbing, cyanosis, or edema  No visible or palpable skin lesions  A/Ox4, mood and affect appropriate    Results Review:        Results from last 7 days  Lab Units 03/20/18  0437 03/18/18  0346 03/17/18  0605   SODIUM mmol/L 138 139 141   POTASSIUM mmol/L 3.7 4.0 3.8   CHLORIDE mmol/L 100 103 104   CO2 mmol/L 28.8 26.8 28.8   BUN mg/dL 10 13 13   CREATININE mg/dL 0.51* 0.56* 0.55*   GLUCOSE mg/dL 132* 225* 100*   CALCIUM mg/dL 8.4* 8.1* 8.2*           Results from last 7 days  Lab Units 03/20/18  0437 03/18/18  0346 03/17/18  0605   WBC 10*3/mm3 12.55* 9.15 8.04   HEMOGLOBIN g/dL 9.5* 9.2* 9.6*   HEMATOCRIT % 29.3* 28.6* 29.9*   PLATELETS 10*3/mm3 400 413 474           Results from last 7 days  Lab Units 03/17/18  0605   CHOLESTEROL mg/dL 173       Results from last 7 days  Lab Units 03/19/18  0319   MAGNESIUM mg/dL 2.1       Results from last 7 days  Lab Units 03/17/18  0605   CHOLESTEROL mg/dL 173   TRIGLYCERIDES mg/dL 118   HDL CHOL mg/dL 46   LDL CHOL mg/dL 103*       I reviewed the patient's new clinical results.  I personally viewed and interpreted the patient's EKG/Telemetry data        Medication Review:     atorvastatin 20 mg Oral Nightly   [START ON  3/21/2018] ceFAZolin 2 g Intravenous On Call to OR   chlorhexidine 15 mL Mouth/Throat Q12H   Chlorhexidine Gluconate Cloth 1 application Topical Q12H   insulin aspart 0-10 Units Subcutaneous 4x Daily AC & at Bedtime   insulin aspart 4 Units Subcutaneous TID With Meals   insulin detemir 12 Units Subcutaneous QAM   insulin detemir 7 Units Subcutaneous Nightly   multivitamin 1 tablet Oral Daily   mupirocin 1 application Each Nare Q12H            Assessment/Plan     Principal Problem:    Diabetic ketoacidosis without coma associated with type 1 diabetes mellitus  Active Problems:    Type 1 diabetes mellitus    Tobacco abuse    Alcohol abuse    Insurance coverage problems    Bilateral carpal tunnel syndrome    Microalbuminuria    Nonrheumatic aortic valve stenosis    1. DM, s/p DKA - treated per primary team  2. Severe AS - has symptoms consistent with poor cardiac output. Plan for surgery tomorrow.   3. Alcohol abuse - advised cessation.  4. Tobacco use - advised cessation  5. Poor dentition.    Will follow.     Carri Menchaca MD  03/20/18  10:43 AM

## 2018-03-20 NOTE — PLAN OF CARE
Problem: Cardiac Catheterization (Diagnostic/Interventional) (Adult)  Goal: Anesthesia/Sedation Recovery  Outcome: Ongoing (interventions implemented as appropriate)   03/20/18 0330   Goal/Outcome Evaluation   Anesthesia/Sedation Recovery criteria met for transfer

## 2018-03-20 NOTE — PLAN OF CARE
Problem: Patient Care Overview  Goal: Plan of Care Review  Outcome: Ongoing (interventions implemented as appropriate)   03/17/18 0725 03/20/18 0337   Coping/Psychosocial   Plan of Care Reviewed With --  patient   OTHER   Outcome Summary --  VSS. No c/o pain or discomofort t/o night. To have open AVR with Jo Ann on 3/21 2nd case, Pt. will not sign consent at this time d/t wants to speak with Dr. Cherry about surgery first. Consents are ready in chart after pt. recieves education.    Plan of Care Review   Progress improving --      Goal: Individualization and Mutuality  Outcome: Ongoing (interventions implemented as appropriate)   03/14/18 1804   Individualization   Patient Specific Preferences goes by Juan   Patient Specific Goals (Include Timeframe) control blood sugars, resolve cardiac    Patient Specific Interventions accuchecks, insulin, consult cardiology       Problem: Diabetes, Type 1 (Adult)  Goal: Signs and Symptoms of Listed Potential Problems Will be Absent, Minimized or Managed (Diabetes, Type 1)  Outcome: Ongoing (interventions implemented as appropriate)   03/17/18 2237 03/19/18 0324   Goal/Outcome Evaluation   Problems Assessed (Type 1 Diabetes) all --    Problems Present (Type 1 Diabetes) --  hyperglycemia;situational response       Problem: Nutrition, Imbalanced: Inadequate Oral Intake (Adult)  Goal: Improved Oral Intake  Outcome: Ongoing (interventions implemented as appropriate)   03/19/18 0324   Nutrition, Imbalanced: Inadequate Oral Intake (Adult)   Improved Oral Intake making progress toward outcome     Goal: Prevent Further Weight Loss  Outcome: Ongoing (interventions implemented as appropriate)   03/19/18 0324   Nutrition, Imbalanced: Inadequate Oral Intake (Adult)   Prevent Further Weight Loss making progress toward outcome       Problem: Cardiac Catheterization (Diagnostic/Interventional) (Adult)  Goal: Signs and Symptoms of Listed Potential Problems Will be Absent, Minimized or Managed  (Cardiac Catheterization)  Outcome: Outcome(s) achieved Date Met: 03/20/18 03/17/18 5991   Goal/Outcome Evaluation   Problems Assessed (Cardiac Catheterization) all   Problems Present (Cardiac Cath) none     Goal: Anesthesia/Sedation Recovery  Outcome: Outcome(s) achieved Date Met: 03/20/18 03/20/18 9556   Goal/Outcome Evaluation   Anesthesia/Sedation Recovery other (see comments)

## 2018-03-20 NOTE — PROGRESS NOTES
"  ENDOCRINE    Subjective   AND PLANS  Juan Payne is a 57 y.o. male.     Follow-up diabetes.    No complaints.  No nausea or vomiting.  Fasting glucose 135.  Random [place] .  Bedtime blood sugar was high because he did not receive suppertime blood sugars.  Continue Levemir 12 units every morning and 7 units every evening and NovoLog 4 units with each meal.  Revise NovoLog sliding scale to one unit per 40 above 150.    Schedule for aortic valve surgery tomorrow    Objective   /84 (BP Location: Right arm, Patient Position: Lying)   Pulse 78   Temp 98.9 °F (37.2 °C) (Oral)   Resp 20   Ht 172.7 cm (67.99\")   Wt 67.4 kg (148 lb 9.6 oz)   SpO2 97%   BMI 22.60 kg/m²   Physical Exam    Awake, alert, not in distress.  No rales or wheezes.  Regular heart rate and rhythm.  No gallop.  Abdomen soft, nontender.  No cyanosis or pedal edema.    Lab Results (last 24 hours)     Procedure Component Value Units Date/Time    POC Glucose Once [[id number]]  (Abnormal) Collected:  03/20/18 0555    Specimen:  Blood Updated:  03/20/18 0557     Glucose 135 (H) mg/dL     Narrative:       Meter: [id number] : [id number] Hiram Trejo [facility]    Basic Metabolic Panel [[id number]]  (Abnormal) Collected:  03/20/18 0437    Specimen:  Blood Updated:  03/20/18 0516     Glucose 132 (H) mg/dL      BUN 10 mg/dL      Creatinine 0.51 (L) mg/dL      Sodium 138 mmol/L      Potassium 3.7 mmol/L      Chloride 100 mmol/L      CO2 28.8 mmol/L      Calcium 8.4 (L) mg/dL      eGFR Non African Amer >150 mL/min/1.73      BUN/Creatinine Ratio 19.6     Anion Gap 9.2 mmol/L     Narrative:       GFR Normal >60  Chronic Kidney Disease <60  Kidney Failure <15    CBC Auto Differential [[id number]]  (Abnormal) Collected:  03/20/18 0437    Specimen:  Blood Updated:  03/20/18 0500     WBC 12.55 (H) 10*3/mm3      RBC 2.60 (L) 10*6/mm3      Hemoglobin 9.5 (L) g/dL      Hematocrit 29.3 (L) %      .7 (H) fL      MCH 36.5 (H) pg      MCHC 32.4 (L) g/dL      " RDW 13.3 %      RDW-SD 54.4 (H) fl      MPV 9.5 fL      Platelets 400 10*3/mm3      Neutrophil % 63.2 %      Lymphocyte % 23.5 %      Monocyte % 10.8 %      Eosinophil % 1.4 %      Basophil % 0.8 %      Immature Grans % 0.3 %      Neutrophils, Absolute 7.92 10*3/mm3      Lymphocytes, Absolute 2.95 10*3/mm3      Monocytes, Absolute 1.36 (H) 10*3/mm3      Eosinophils, Absolute 0.18 10*3/mm3      Basophils, Absolute 0.10 10*3/mm3      Immature Grans, Absolute 0.04 (H) 10*3/mm3     Platelet Function ADP [[id number]]  (Abnormal) Collected:  03/20/18 0437    Specimen:  Blood Updated:  03/20/18 0453     ADP Aggregation, % Platelet 66 (L) %     POC Glucose Once [[id number]]  (Abnormal) Collected:  03/19/18 2003    Specimen:  Blood Updated:  03/19/18 2004     Glucose 310 (H) mg/dL     Narrative:       Meter: [id number] : [id number] Hiram Maya [facility]    POC Glucose Once [[id number]]  (Normal) Collected:  03/19/18 1600    Specimen:  Blood Updated:  03/19/18 1601     Glucose 93 mg/dL     Narrative:       Meter: [id number] : [id number] Rolan CAGE    Blood Gas, Arterial [[id number]]  (Abnormal) Collected:  03/19/18 1249    Specimen:  Arterial Blood Updated:  03/19/18 1250     Site Arterial: right radial     Howard's Test Positive     pH, Arterial 7.443 pH units      pCO2, Arterial 40.6 mm Hg      pO2, Arterial 72.8 (L) mm Hg      HCO3, Arterial 27.8 mmol/L      Base Excess, Arterial 3.4 (H) mmol/L      O2 Saturation Calculated 95.0 %      Barometric Pressure for Blood Gas 743.6 mmHg      Modality Room Air     Set Mech Resp Rate 18    Narrative:       sat 94 Meter: [id number] : [id number] Sam Frey    POC Glucose Once [[id number]]  (Abnormal) Collected:  03/19/18 1045    Specimen:  Blood Updated:  03/19/18 1045     Glucose 221 (H) mg/dL     Narrative:       Meter: [id number] : [id number] Rolan CAGE            Principal Problem:    Diabetic ketoacidosis without coma associated with type 1  diabetes mellitus  Active Problems:    Type 1 diabetes mellitus    Tobacco abuse    Alcohol abuse    Insurance coverage problems    Bilateral carpal tunnel syndrome    Microalbuminuria    Nonrheumatic aortic valve stenosis    Insulin dose adjusted as discussed above.  Continue Lipitor.  For aortic valve surgery tomorrow.

## 2018-03-21 ENCOUNTER — ANESTHESIA EVENT (OUTPATIENT)
Dept: PERIOP | Facility: HOSPITAL | Age: 58
End: 2018-03-21

## 2018-03-21 ENCOUNTER — APPOINTMENT (OUTPATIENT)
Dept: GENERAL RADIOLOGY | Facility: HOSPITAL | Age: 58
End: 2018-03-21
Attending: THORACIC SURGERY (CARDIOTHORACIC VASCULAR SURGERY)

## 2018-03-21 ENCOUNTER — APPOINTMENT (OUTPATIENT)
Dept: GENERAL RADIOLOGY | Facility: HOSPITAL | Age: 58
End: 2018-03-21

## 2018-03-21 ENCOUNTER — ANESTHESIA (OUTPATIENT)
Dept: PERIOP | Facility: HOSPITAL | Age: 58
End: 2018-03-21

## 2018-03-21 ENCOUNTER — ANCILLARY PROCEDURE (OUTPATIENT)
Dept: PERIOP | Facility: HOSPITAL | Age: 58
End: 2018-03-21
Attending: ANESTHESIOLOGY

## 2018-03-21 LAB
ACT BLD: 114 SECONDS (ref 82–152)
ACT BLD: 158 SECONDS (ref 82–152)
ACT BLD: 285 SECONDS (ref 82–152)
ACT BLD: 307 SECONDS (ref 82–152)
ACT BLD: 318 SECONDS (ref 82–152)
ACT BLD: 373 SECONDS (ref 82–152)
ALBUMIN SERPL-MCNC: 3.7 G/DL (ref 3.5–5.2)
ALBUMIN SERPL-MCNC: 4.4 G/DL (ref 3.5–5.2)
ANION GAP SERPL CALCULATED.3IONS-SCNC: 10.9 MMOL/L
ANION GAP SERPL CALCULATED.3IONS-SCNC: 12.3 MMOL/L
ANION GAP SERPL CALCULATED.3IONS-SCNC: 14.5 MMOL/L
APTT PPP: 36.1 SECONDS (ref 22.7–35.4)
ARTERIAL PATENCY WRIST A: ABNORMAL
ARTERIAL PATENCY WRIST A: ABNORMAL
ATMOSPHERIC PRESS: 751.4 MMHG
ATMOSPHERIC PRESS: 752.4 MMHG
BASE EXCESS BLDA CALC-SCNC: 0.8 MMOL/L (ref 0–2)
BASE EXCESS BLDA CALC-SCNC: 2 MMOL/L (ref -5–5)
BASE EXCESS BLDA CALC-SCNC: 2 MMOL/L (ref -5–5)
BASE EXCESS BLDA CALC-SCNC: 3 MMOL/L (ref -5–5)
BASE EXCESS BLDA CALC-SCNC: 3 MMOL/L (ref -5–5)
BASE EXCESS BLDA CALC-SCNC: 3.2 MMOL/L (ref 0–2)
BASE EXCESS BLDA CALC-SCNC: 5 MMOL/L (ref -5–5)
BASE EXCESS BLDA CALC-SCNC: 7 MMOL/L (ref -5–5)
BASOPHILS # BLD AUTO: 0.04 10*3/MM3 (ref 0–0.2)
BASOPHILS # BLD AUTO: 0.09 10*3/MM3 (ref 0–0.2)
BASOPHILS NFR BLD AUTO: 0.3 % (ref 0–1.5)
BASOPHILS NFR BLD AUTO: 0.7 % (ref 0–1.5)
BDY SITE: ABNORMAL
BDY SITE: ABNORMAL
BUN BLD-MCNC: 11 MG/DL (ref 6–20)
BUN BLD-MCNC: 12 MG/DL (ref 6–20)
BUN BLD-MCNC: 12 MG/DL (ref 6–20)
BUN/CREAT SERPL: 15.4 (ref 7–25)
BUN/CREAT SERPL: 16.4 (ref 7–25)
BUN/CREAT SERPL: 20 (ref 7–25)
CA-I BLD-MCNC: 5.2 MG/DL (ref 4.6–5.4)
CA-I SERPL ISE-MCNC: 1.31 MMOL/L (ref 1.15–1.35)
CALCIUM SPEC-SCNC: 8.7 MG/DL (ref 8.6–10.5)
CALCIUM SPEC-SCNC: 9 MG/DL (ref 8.6–10.5)
CALCIUM SPEC-SCNC: 9.1 MG/DL (ref 8.6–10.5)
CHLORIDE SERPL-SCNC: 102 MMOL/L (ref 98–107)
CLOSE TME COLL+ADP + EPINEP PNL BLD: 97 % (ref 86–100)
CO2 BLDA-SCNC: 29 MMOL/L (ref 24–29)
CO2 BLDA-SCNC: 30 MMOL/L (ref 24–29)
CO2 BLDA-SCNC: 31 MMOL/L (ref 24–29)
CO2 BLDA-SCNC: 32 MMOL/L (ref 24–29)
CO2 BLDA-SCNC: 32 MMOL/L (ref 24–29)
CO2 BLDA-SCNC: 34 MMOL/L (ref 24–29)
CO2 SERPL-SCNC: 24.5 MMOL/L (ref 22–29)
CO2 SERPL-SCNC: 25.7 MMOL/L (ref 22–29)
CO2 SERPL-SCNC: 27.1 MMOL/L (ref 22–29)
CREAT BLD-MCNC: 0.6 MG/DL (ref 0.76–1.27)
CREAT BLD-MCNC: 0.67 MG/DL (ref 0.76–1.27)
CREAT BLD-MCNC: 0.78 MG/DL (ref 0.76–1.27)
DEPRECATED RDW RBC AUTO: 52.9 FL (ref 37–54)
DEPRECATED RDW RBC AUTO: 53.7 FL (ref 37–54)
DEPRECATED RDW RBC AUTO: 54 FL (ref 37–54)
EOSINOPHIL # BLD AUTO: 0.1 10*3/MM3 (ref 0–0.7)
EOSINOPHIL # BLD AUTO: 0.19 10*3/MM3 (ref 0–0.7)
EOSINOPHIL NFR BLD AUTO: 0.6 % (ref 0.3–6.2)
EOSINOPHIL NFR BLD AUTO: 1.5 % (ref 0.3–6.2)
ERYTHROCYTE [DISTWIDTH] IN BLOOD BY AUTOMATED COUNT: 13.2 % (ref 11.5–14.5)
ERYTHROCYTE [DISTWIDTH] IN BLOOD BY AUTOMATED COUNT: 13.3 % (ref 11.5–14.5)
ERYTHROCYTE [DISTWIDTH] IN BLOOD BY AUTOMATED COUNT: 13.3 % (ref 11.5–14.5)
FIBRINOGEN PPP-MCNC: 492 MG/DL (ref 219–464)
GFR SERPL CREATININE-BSD FRML MDRD: 102 ML/MIN/1.73
GFR SERPL CREATININE-BSD FRML MDRD: 121 ML/MIN/1.73
GFR SERPL CREATININE-BSD FRML MDRD: 138 ML/MIN/1.73
GLUCOSE BLD-MCNC: 101 MG/DL (ref 65–99)
GLUCOSE BLD-MCNC: 204 MG/DL (ref 65–99)
GLUCOSE BLD-MCNC: 234 MG/DL (ref 65–99)
GLUCOSE BLDC GLUCOMTR-MCNC: 114 MG/DL (ref 70–130)
GLUCOSE BLDC GLUCOMTR-MCNC: 116 MG/DL (ref 70–130)
GLUCOSE BLDC GLUCOMTR-MCNC: 128 MG/DL (ref 70–130)
GLUCOSE BLDC GLUCOMTR-MCNC: 180 MG/DL (ref 70–130)
GLUCOSE BLDC GLUCOMTR-MCNC: 181 MG/DL (ref 70–130)
GLUCOSE BLDC GLUCOMTR-MCNC: 196 MG/DL (ref 70–130)
GLUCOSE BLDC GLUCOMTR-MCNC: 205 MG/DL (ref 70–130)
GLUCOSE BLDC GLUCOMTR-MCNC: 242 MG/DL (ref 70–130)
GLUCOSE BLDC GLUCOMTR-MCNC: 259 MG/DL (ref 70–130)
GLUCOSE BLDC GLUCOMTR-MCNC: 263 MG/DL (ref 70–130)
GLUCOSE BLDC GLUCOMTR-MCNC: 263 MG/DL (ref 70–130)
GLUCOSE BLDC GLUCOMTR-MCNC: 266 MG/DL (ref 70–130)
GLUCOSE BLDC GLUCOMTR-MCNC: 267 MG/DL (ref 70–130)
GLUCOSE BLDC GLUCOMTR-MCNC: 47 MG/DL (ref 70–130)
GLUCOSE BLDC GLUCOMTR-MCNC: 95 MG/DL (ref 70–130)
GLUCOSE BLDC GLUCOMTR-MCNC: 96 MG/DL (ref 70–130)
HCO3 BLDA-SCNC: 26.6 MMOL/L (ref 22–28)
HCO3 BLDA-SCNC: 27.4 MMOL/L (ref 22–26)
HCO3 BLDA-SCNC: 28.6 MMOL/L (ref 22–26)
HCO3 BLDA-SCNC: 29 MMOL/L (ref 22–28)
HCO3 BLDA-SCNC: 29.5 MMOL/L (ref 22–26)
HCO3 BLDA-SCNC: 29.8 MMOL/L (ref 22–26)
HCO3 BLDA-SCNC: 30.1 MMOL/L (ref 22–26)
HCO3 BLDA-SCNC: 32.3 MMOL/L (ref 22–26)
HCT VFR BLD AUTO: 25.6 % (ref 40.4–52.2)
HCT VFR BLD AUTO: 26.5 % (ref 40.4–52.2)
HCT VFR BLD AUTO: 28.4 % (ref 40.4–52.2)
HCT VFR BLDA CALC: 24 % (ref 38–51)
HCT VFR BLDA CALC: 26 % (ref 38–51)
HGB BLD-MCNC: 8.3 G/DL (ref 13.7–17.6)
HGB BLD-MCNC: 8.6 G/DL (ref 13.7–17.6)
HGB BLD-MCNC: 9.2 G/DL (ref 13.7–17.6)
HGB BLDA-MCNC: 8.2 G/DL (ref 12–17)
HGB BLDA-MCNC: 8.8 G/DL (ref 12–17)
HOROWITZ INDEX BLD+IHG-RTO: 100 %
HOROWITZ INDEX BLD+IHG-RTO: 40 %
IMM GRANULOCYTES # BLD: 0.05 10*3/MM3 (ref 0–0.03)
IMM GRANULOCYTES # BLD: 0.09 10*3/MM3 (ref 0–0.03)
IMM GRANULOCYTES NFR BLD: 0.4 % (ref 0–0.5)
IMM GRANULOCYTES NFR BLD: 0.6 % (ref 0–0.5)
INR PPP: 1.16 (ref 0.9–1.1)
INR PPP: 1.3 (ref 0.8–1.2)
LYMPHOCYTES # BLD AUTO: 1.58 10*3/MM3 (ref 0.9–4.8)
LYMPHOCYTES # BLD AUTO: 2.97 10*3/MM3 (ref 0.9–4.8)
LYMPHOCYTES NFR BLD AUTO: 10 % (ref 19.6–45.3)
LYMPHOCYTES NFR BLD AUTO: 23.7 % (ref 19.6–45.3)
MAGNESIUM SERPL-MCNC: 2.5 MG/DL (ref 1.6–2.6)
MAGNESIUM SERPL-MCNC: 2.8 MG/DL (ref 1.6–2.6)
MCH RBC QN AUTO: 35.9 PG (ref 27–32.7)
MCH RBC QN AUTO: 36.2 PG (ref 27–32.7)
MCH RBC QN AUTO: 36.3 PG (ref 27–32.7)
MCHC RBC AUTO-ENTMCNC: 32.4 G/DL (ref 32.6–36.4)
MCHC RBC AUTO-ENTMCNC: 32.4 G/DL (ref 32.6–36.4)
MCHC RBC AUTO-ENTMCNC: 32.5 G/DL (ref 32.6–36.4)
MCV RBC AUTO: 110.8 FL (ref 79.8–96.2)
MCV RBC AUTO: 111.8 FL (ref 79.8–96.2)
MCV RBC AUTO: 111.8 FL (ref 79.8–96.2)
MODALITY: ABNORMAL
MODALITY: ABNORMAL
MONOCYTES # BLD AUTO: 0.8 10*3/MM3 (ref 0.2–1.2)
MONOCYTES # BLD AUTO: 1.25 10*3/MM3 (ref 0.2–1.2)
MONOCYTES NFR BLD AUTO: 10 % (ref 5–12)
MONOCYTES NFR BLD AUTO: 5 % (ref 5–12)
NEUTROPHILS # BLD AUTO: 13.25 10*3/MM3 (ref 1.9–8.1)
NEUTROPHILS # BLD AUTO: 7.97 10*3/MM3 (ref 1.9–8.1)
NEUTROPHILS NFR BLD AUTO: 63.7 % (ref 42.7–76)
NEUTROPHILS NFR BLD AUTO: 83.5 % (ref 42.7–76)
O2 A-A PPRESDIFF RESPIRATORY: 0.4 MMHG
O2 A-A PPRESDIFF RESPIRATORY: 0.6 MMHG
PCO2 BLDA: 43.3 MM HG (ref 35–45)
PCO2 BLDA: 47 MM HG (ref 35–45)
PCO2 BLDA: 50.4 MM HG (ref 35–45)
PCO2 BLDA: 50.8 MM HG (ref 35–45)
PCO2 BLDA: 59.3 MM HG (ref 35–45)
PCO2 BLDA: 60.4 MM HG (ref 35–45)
PCO2 BLDA: 62.5 MM HG (ref 35–45)
PCO2 BLDA: 63.6 MM HG (ref 35–45)
PEEP RESPIRATORY: 5 CM[H2O]
PEEP RESPIRATORY: 5 CM[H2O]
PH BLDA: 7.27 PH UNITS (ref 7.35–7.6)
PH BLDA: 7.28 PH UNITS (ref 7.35–7.6)
PH BLDA: 7.3 PH UNITS (ref 7.35–7.6)
PH BLDA: 7.34 PH UNITS (ref 7.35–7.6)
PH BLDA: 7.34 PH UNITS (ref 7.35–7.6)
PH BLDA: 7.36 PH UNITS (ref 7.35–7.45)
PH BLDA: 7.37 PH UNITS (ref 7.35–7.45)
PH BLDA: 7.44 PH UNITS (ref 7.35–7.6)
PHOSPHATE SERPL-MCNC: 3.1 MG/DL (ref 2.5–4.5)
PHOSPHATE SERPL-MCNC: 3.9 MG/DL (ref 2.5–4.5)
PLATELET # BLD AUTO: 183 10*3/MM3 (ref 140–500)
PLATELET # BLD AUTO: 202 10*3/MM3 (ref 140–500)
PLATELET # BLD AUTO: 390 10*3/MM3 (ref 140–500)
PMV BLD AUTO: 9 FL (ref 6–12)
PMV BLD AUTO: 9.1 FL (ref 6–12)
PMV BLD AUTO: 9.6 FL (ref 6–12)
PO2 BLDA: 105.9 MM HG (ref 80–100)
PO2 BLDA: 272 MMHG (ref 80–105)
PO2 BLDA: 414.9 MM HG (ref 80–100)
PO2 BLDA: 42 MMHG (ref 80–105)
PO2 BLDA: 423 MMHG (ref 80–105)
PO2 BLDA: 471 MMHG (ref 80–105)
PO2 BLDA: 482 MMHG (ref 80–105)
PO2 BLDA: 92 MMHG (ref 80–105)
POTASSIUM BLD-SCNC: 3.3 MMOL/L (ref 3.5–5.2)
POTASSIUM BLD-SCNC: 3.9 MMOL/L (ref 3.5–5.2)
POTASSIUM BLD-SCNC: 4.7 MMOL/L (ref 3.5–5.2)
POTASSIUM BLDA-SCNC: 3.5 MMOL/L (ref 3.5–4.9)
POTASSIUM BLDA-SCNC: 3.8 MMOL/L (ref 3.5–4.9)
POTASSIUM BLDA-SCNC: 3.8 MMOL/L (ref 3.5–4.9)
POTASSIUM BLDA-SCNC: 3.9 MMOL/L (ref 3.5–4.9)
POTASSIUM BLDA-SCNC: 3.9 MMOL/L (ref 3.5–4.9)
POTASSIUM BLDA-SCNC: 4.1 MMOL/L (ref 3.5–4.9)
PROTHROMBIN TIME: 14.6 SECONDS (ref 11.7–14.2)
PROTHROMBIN TIME: 15 SECONDS (ref 12.8–15.2)
PSV: 6 CMH2O
RBC # BLD AUTO: 2.31 10*6/MM3 (ref 4.6–6)
RBC # BLD AUTO: 2.37 10*6/MM3 (ref 4.6–6)
RBC # BLD AUTO: 2.54 10*6/MM3 (ref 4.6–6)
SAO2 % BLDA: 100 % (ref 95–98)
SAO2 % BLDA: 69 % (ref 95–98)
SAO2 % BLDA: 97 % (ref 95–98)
SAO2 % BLDCOA: 100 % (ref 92–99)
SAO2 % BLDCOA: 97.8 % (ref 92–99)
SET MECH RESP RATE: 14
SODIUM BLD-SCNC: 140 MMOL/L (ref 136–145)
SODIUM BLD-SCNC: 140 MMOL/L (ref 136–145)
SODIUM BLD-SCNC: 141 MMOL/L (ref 136–145)
TOTAL RATE: 14 BREATHS/MINUTE
TOTAL RATE: 14 BREATHS/MINUTE
VENTILATOR MODE: ABNORMAL
VENTILATOR MODE: ABNORMAL
VT ON VENT VENT: 600 ML
VT ON VENT VENT: 621 ML
WBC NRBC COR # BLD: 12.52 10*3/MM3 (ref 4.5–10.7)
WBC NRBC COR # BLD: 15.86 10*3/MM3 (ref 4.5–10.7)
WBC NRBC COR # BLD: 17.18 10*3/MM3 (ref 4.5–10.7)

## 2018-03-21 PROCEDURE — 93318 ECHO TRANSESOPHAGEAL INTRAOP: CPT | Performed by: ANESTHESIOLOGY

## 2018-03-21 PROCEDURE — 25010000002 ALBUMIN HUMAN 25% PER 50 ML

## 2018-03-21 PROCEDURE — 88305 TISSUE EXAM BY PATHOLOGIST: CPT | Performed by: THORACIC SURGERY (CARDIOTHORACIC VASCULAR SURGERY)

## 2018-03-21 PROCEDURE — 3E083GC INTRODUCTION OF OTHER THERAPEUTIC SUBSTANCE INTO HEART, PERCUTANEOUS APPROACH: ICD-10-PCS | Performed by: THORACIC SURGERY (CARDIOTHORACIC VASCULAR SURGERY)

## 2018-03-21 PROCEDURE — 82803 BLOOD GASES ANY COMBINATION: CPT

## 2018-03-21 PROCEDURE — 25010000003 CEFAZOLIN IN DEXTROSE 2-4 GM/100ML-% SOLUTION: Performed by: THORACIC SURGERY (CARDIOTHORACIC VASCULAR SURGERY)

## 2018-03-21 PROCEDURE — 25010000003 POTASSIUM CHLORIDE PER 2 MEQ: Performed by: THORACIC SURGERY (CARDIOTHORACIC VASCULAR SURGERY)

## 2018-03-21 PROCEDURE — 80048 BASIC METABOLIC PNL TOTAL CA: CPT | Performed by: THORACIC SURGERY (CARDIOTHORACIC VASCULAR SURGERY)

## 2018-03-21 PROCEDURE — 25010000002 METOCLOPRAMIDE PER 10 MG: Performed by: THORACIC SURGERY (CARDIOTHORACIC VASCULAR SURGERY)

## 2018-03-21 PROCEDURE — C1713 ANCHOR/SCREW BN/BN,TIS/BN: HCPCS | Performed by: THORACIC SURGERY (CARDIOTHORACIC VASCULAR SURGERY)

## 2018-03-21 PROCEDURE — 25010000002 FUROSEMIDE PER 20 MG

## 2018-03-21 PROCEDURE — 82962 GLUCOSE BLOOD TEST: CPT

## 2018-03-21 PROCEDURE — 94002 VENT MGMT INPAT INIT DAY: CPT

## 2018-03-21 PROCEDURE — P9046 ALBUMIN (HUMAN), 25%, 20 ML: HCPCS

## 2018-03-21 PROCEDURE — 93005 ELECTROCARDIOGRAM TRACING: CPT | Performed by: THORACIC SURGERY (CARDIOTHORACIC VASCULAR SURGERY)

## 2018-03-21 PROCEDURE — 80069 RENAL FUNCTION PANEL: CPT | Performed by: THORACIC SURGERY (CARDIOTHORACIC VASCULAR SURGERY)

## 2018-03-21 PROCEDURE — 25010000002 MAGNESIUM SULFATE PER 500 MG OF MAGNESIUM: Performed by: ANESTHESIOLOGY

## 2018-03-21 PROCEDURE — 85347 COAGULATION TIME ACTIVATED: CPT

## 2018-03-21 PROCEDURE — 85027 COMPLETE CBC AUTOMATED: CPT | Performed by: THORACIC SURGERY (CARDIOTHORACIC VASCULAR SURGERY)

## 2018-03-21 PROCEDURE — 99232 SBSQ HOSP IP/OBS MODERATE 35: CPT | Performed by: INTERNAL MEDICINE

## 2018-03-21 PROCEDURE — 82947 ASSAY GLUCOSE BLOOD QUANT: CPT

## 2018-03-21 PROCEDURE — 33405 REPLACEMENT AORTIC VALVE OPN: CPT | Performed by: PHYSICIAN ASSISTANT

## 2018-03-21 PROCEDURE — 94799 UNLISTED PULMONARY SVC/PX: CPT

## 2018-03-21 PROCEDURE — 82330 ASSAY OF CALCIUM: CPT | Performed by: THORACIC SURGERY (CARDIOTHORACIC VASCULAR SURGERY)

## 2018-03-21 PROCEDURE — 83735 ASSAY OF MAGNESIUM: CPT | Performed by: THORACIC SURGERY (CARDIOTHORACIC VASCULAR SURGERY)

## 2018-03-21 PROCEDURE — 85018 HEMOGLOBIN: CPT

## 2018-03-21 PROCEDURE — C1751 CATH, INF, PER/CENT/MIDLINE: HCPCS | Performed by: ANESTHESIOLOGY

## 2018-03-21 PROCEDURE — 25010000002 PROPOFOL 10 MG/ML EMULSION: Performed by: ANESTHESIOLOGY

## 2018-03-21 PROCEDURE — 25010000002 PHENYLEPHRINE PER 1 ML: Performed by: ANESTHESIOLOGY

## 2018-03-21 PROCEDURE — 85610 PROTHROMBIN TIME: CPT

## 2018-03-21 PROCEDURE — 93010 ELECTROCARDIOGRAM REPORT: CPT | Performed by: INTERNAL MEDICINE

## 2018-03-21 PROCEDURE — 25010000002 HEPARIN (PORCINE) PER 1000 UNITS

## 2018-03-21 PROCEDURE — 5A1221Z PERFORMANCE OF CARDIAC OUTPUT, CONTINUOUS: ICD-10-PCS | Performed by: THORACIC SURGERY (CARDIOTHORACIC VASCULAR SURGERY)

## 2018-03-21 PROCEDURE — 25010000002 PROTAMINE SULFATE PER 10 MG: Performed by: ANESTHESIOLOGY

## 2018-03-21 PROCEDURE — P9041 ALBUMIN (HUMAN),5%, 50ML: HCPCS | Performed by: THORACIC SURGERY (CARDIOTHORACIC VASCULAR SURGERY)

## 2018-03-21 PROCEDURE — 02RF08Z REPLACEMENT OF AORTIC VALVE WITH ZOOPLASTIC TISSUE, OPEN APPROACH: ICD-10-PCS | Performed by: THORACIC SURGERY (CARDIOTHORACIC VASCULAR SURGERY)

## 2018-03-21 PROCEDURE — 85384 FIBRINOGEN ACTIVITY: CPT | Performed by: THORACIC SURGERY (CARDIOTHORACIC VASCULAR SURGERY)

## 2018-03-21 PROCEDURE — 25010000002 FENTANYL CITRATE (PF) 100 MCG/2ML SOLUTION: Performed by: ANESTHESIOLOGY

## 2018-03-21 PROCEDURE — 85730 THROMBOPLASTIN TIME PARTIAL: CPT | Performed by: THORACIC SURGERY (CARDIOTHORACIC VASCULAR SURGERY)

## 2018-03-21 PROCEDURE — 85014 HEMATOCRIT: CPT

## 2018-03-21 PROCEDURE — 85610 PROTHROMBIN TIME: CPT | Performed by: THORACIC SURGERY (CARDIOTHORACIC VASCULAR SURGERY)

## 2018-03-21 PROCEDURE — 25010000002 ALBUMIN HUMAN 5% PER 50 ML: Performed by: THORACIC SURGERY (CARDIOTHORACIC VASCULAR SURGERY)

## 2018-03-21 PROCEDURE — 33405 REPLACEMENT AORTIC VALVE OPN: CPT | Performed by: THORACIC SURGERY (CARDIOTHORACIC VASCULAR SURGERY)

## 2018-03-21 PROCEDURE — 25010000002 MORPHINE PER 10 MG: Performed by: THORACIC SURGERY (CARDIOTHORACIC VASCULAR SURGERY)

## 2018-03-21 PROCEDURE — 25010000002 ONDANSETRON PER 1 MG: Performed by: HOSPITALIST

## 2018-03-21 PROCEDURE — 25010000002 HEPARIN (PORCINE) PER 1000 UNITS: Performed by: ANESTHESIOLOGY

## 2018-03-21 PROCEDURE — 85025 COMPLETE CBC W/AUTO DIFF WBC: CPT | Performed by: THORACIC SURGERY (CARDIOTHORACIC VASCULAR SURGERY)

## 2018-03-21 PROCEDURE — 25010000002 MIDAZOLAM PER 1 MG: Performed by: ANESTHESIOLOGY

## 2018-03-21 PROCEDURE — 5A1223Z PERFORMANCE OF CARDIAC PACING, CONTINUOUS: ICD-10-PCS | Performed by: THORACIC SURGERY (CARDIOTHORACIC VASCULAR SURGERY)

## 2018-03-21 PROCEDURE — 63710000001 INSULIN REGULAR HUMAN PER 5 UNITS: Performed by: ANESTHESIOLOGY

## 2018-03-21 PROCEDURE — 85576 BLOOD PLATELET AGGREGATION: CPT | Performed by: NURSE PRACTITIONER

## 2018-03-21 PROCEDURE — 88311 DECALCIFY TISSUE: CPT | Performed by: THORACIC SURGERY (CARDIOTHORACIC VASCULAR SURGERY)

## 2018-03-21 PROCEDURE — C1729 CATH, DRAINAGE: HCPCS | Performed by: THORACIC SURGERY (CARDIOTHORACIC VASCULAR SURGERY)

## 2018-03-21 DEVICE — VLV PERICARD PERIMOUNT MAGNA EASE 21: Type: IMPLANTABLE DEVICE | Site: HEART | Status: FUNCTIONAL

## 2018-03-21 RX ORDER — BISACODYL 5 MG/1
10 TABLET, DELAYED RELEASE ORAL DAILY PRN
Status: DISCONTINUED | OUTPATIENT
Start: 2018-03-21 | End: 2018-03-26 | Stop reason: HOSPADM

## 2018-03-21 RX ORDER — PROPOFOL 10 MG/ML
VIAL (ML) INTRAVENOUS CONTINUOUS PRN
Status: DISCONTINUED | OUTPATIENT
Start: 2018-03-21 | End: 2018-03-21 | Stop reason: SURG

## 2018-03-21 RX ORDER — NALOXONE HCL 0.4 MG/ML
0.4 VIAL (ML) INJECTION
Status: DISCONTINUED | OUTPATIENT
Start: 2018-03-21 | End: 2018-03-26 | Stop reason: HOSPADM

## 2018-03-21 RX ORDER — NITROGLYCERIN 5 MG/ML
INJECTION, SOLUTION INTRAVENOUS AS NEEDED
Status: DISCONTINUED | OUTPATIENT
Start: 2018-03-21 | End: 2018-03-21 | Stop reason: SURG

## 2018-03-21 RX ORDER — SODIUM CHLORIDE, SODIUM LACTATE, POTASSIUM CHLORIDE, CALCIUM CHLORIDE 600; 310; 30; 20 MG/100ML; MG/100ML; MG/100ML; MG/100ML
9 INJECTION, SOLUTION INTRAVENOUS CONTINUOUS
Status: DISCONTINUED | OUTPATIENT
Start: 2018-03-21 | End: 2018-03-21

## 2018-03-21 RX ORDER — DEXTROSE MONOHYDRATE 25 G/50ML
INJECTION, SOLUTION INTRAVENOUS
Status: COMPLETED
Start: 2018-03-21 | End: 2018-03-21

## 2018-03-21 RX ORDER — MAGNESIUM SULFATE HEPTAHYDRATE 40 MG/ML
2 INJECTION, SOLUTION INTRAVENOUS AS NEEDED
Status: DISCONTINUED | OUTPATIENT
Start: 2018-03-21 | End: 2018-03-26 | Stop reason: HOSPADM

## 2018-03-21 RX ORDER — HEPARIN SODIUM 1000 [USP'U]/ML
INJECTION, SOLUTION INTRAVENOUS; SUBCUTANEOUS AS NEEDED
Status: DISCONTINUED | OUTPATIENT
Start: 2018-03-21 | End: 2018-03-21 | Stop reason: SURG

## 2018-03-21 RX ORDER — SODIUM CHLORIDE 9 MG/ML
INJECTION, SOLUTION INTRAVENOUS CONTINUOUS PRN
Status: DISCONTINUED | OUTPATIENT
Start: 2018-03-21 | End: 2018-03-21 | Stop reason: SURG

## 2018-03-21 RX ORDER — MAGNESIUM SULFATE HEPTAHYDRATE 500 MG/ML
INJECTION, SOLUTION INTRAMUSCULAR; INTRAVENOUS AS NEEDED
Status: DISCONTINUED | OUTPATIENT
Start: 2018-03-21 | End: 2018-03-21 | Stop reason: SURG

## 2018-03-21 RX ORDER — DOPAMINE HYDROCHLORIDE 160 MG/100ML
2-20 INJECTION, SOLUTION INTRAVENOUS CONTINUOUS PRN
Status: DISCONTINUED | OUTPATIENT
Start: 2018-03-21 | End: 2018-03-22

## 2018-03-21 RX ORDER — FAMOTIDINE 10 MG/ML
20 INJECTION, SOLUTION INTRAVENOUS ONCE
Status: COMPLETED | OUTPATIENT
Start: 2018-03-21 | End: 2018-03-21

## 2018-03-21 RX ORDER — SUFENTANIL CITRATE 50 UG/ML
INJECTION EPIDURAL; INTRAVENOUS AS NEEDED
Status: DISCONTINUED | OUTPATIENT
Start: 2018-03-21 | End: 2018-03-21 | Stop reason: SURG

## 2018-03-21 RX ORDER — NITROGLYCERIN 20 MG/100ML
5-200 INJECTION INTRAVENOUS CONTINUOUS PRN
Status: DISCONTINUED | OUTPATIENT
Start: 2018-03-21 | End: 2018-03-26 | Stop reason: HOSPADM

## 2018-03-21 RX ORDER — ACETAMINOPHEN 650 MG/1
650 SUPPOSITORY RECTAL EVERY 4 HOURS PRN
Status: DISCONTINUED | OUTPATIENT
Start: 2018-03-21 | End: 2018-03-26 | Stop reason: HOSPADM

## 2018-03-21 RX ORDER — SODIUM CHLORIDE 9 MG/ML
30 INJECTION, SOLUTION INTRAVENOUS CONTINUOUS
Status: DISCONTINUED | OUTPATIENT
Start: 2018-03-21 | End: 2018-03-26 | Stop reason: HOSPADM

## 2018-03-21 RX ORDER — MORPHINE SULFATE 2 MG/ML
1 INJECTION, SOLUTION INTRAMUSCULAR; INTRAVENOUS EVERY 4 HOURS PRN
Status: DISCONTINUED | OUTPATIENT
Start: 2018-03-21 | End: 2018-03-26 | Stop reason: HOSPADM

## 2018-03-21 RX ORDER — PROPOFOL 10 MG/ML
VIAL (ML) INTRAVENOUS AS NEEDED
Status: DISCONTINUED | OUTPATIENT
Start: 2018-03-21 | End: 2018-03-21 | Stop reason: SURG

## 2018-03-21 RX ORDER — SODIUM CHLORIDE 0.9 % (FLUSH) 0.9 %
1-10 SYRINGE (ML) INJECTION AS NEEDED
Status: DISCONTINUED | OUTPATIENT
Start: 2018-03-21 | End: 2018-03-21 | Stop reason: HOSPADM

## 2018-03-21 RX ORDER — CYCLOBENZAPRINE HCL 10 MG
10 TABLET ORAL EVERY 8 HOURS PRN
Status: DISCONTINUED | OUTPATIENT
Start: 2018-03-22 | End: 2018-03-22

## 2018-03-21 RX ORDER — SODIUM CHLORIDE 0.9 % (FLUSH) 0.9 %
30 SYRINGE (ML) INJECTION ONCE AS NEEDED
Status: DISCONTINUED | OUTPATIENT
Start: 2018-03-21 | End: 2018-03-26 | Stop reason: HOSPADM

## 2018-03-21 RX ORDER — LIDOCAINE HYDROCHLORIDE 40 MG/ML
SOLUTION TOPICAL AS NEEDED
Status: DISCONTINUED | OUTPATIENT
Start: 2018-03-21 | End: 2018-03-21 | Stop reason: SURG

## 2018-03-21 RX ORDER — BISACODYL 10 MG
10 SUPPOSITORY, RECTAL RECTAL DAILY PRN
Status: DISCONTINUED | OUTPATIENT
Start: 2018-03-22 | End: 2018-03-26 | Stop reason: HOSPADM

## 2018-03-21 RX ORDER — PANTOPRAZOLE SODIUM 40 MG/1
40 TABLET, DELAYED RELEASE ORAL EVERY MORNING
Status: DISCONTINUED | OUTPATIENT
Start: 2018-03-22 | End: 2018-03-26 | Stop reason: HOSPADM

## 2018-03-21 RX ORDER — PROMETHAZINE HYDROCHLORIDE 25 MG/1
12.5 TABLET ORAL EVERY 6 HOURS PRN
Status: DISCONTINUED | OUTPATIENT
Start: 2018-03-21 | End: 2018-03-26 | Stop reason: HOSPADM

## 2018-03-21 RX ORDER — POTASSIUM CHLORIDE 29.8 MG/ML
20 INJECTION INTRAVENOUS
Status: DISCONTINUED | OUTPATIENT
Start: 2018-03-21 | End: 2018-03-26 | Stop reason: HOSPADM

## 2018-03-21 RX ORDER — POTASSIUM CHLORIDE 750 MG/1
40 CAPSULE, EXTENDED RELEASE ORAL AS NEEDED
Status: DISCONTINUED | OUTPATIENT
Start: 2018-03-21 | End: 2018-03-26 | Stop reason: HOSPADM

## 2018-03-21 RX ORDER — HYDROCODONE BITARTRATE AND ACETAMINOPHEN 5; 325 MG/1; MG/1
2 TABLET ORAL EVERY 4 HOURS PRN
Status: DISCONTINUED | OUTPATIENT
Start: 2018-03-21 | End: 2018-03-26 | Stop reason: HOSPADM

## 2018-03-21 RX ORDER — POTASSIUM CHLORIDE 29.8 MG/ML
20 INJECTION INTRAVENOUS
Status: COMPLETED | OUTPATIENT
Start: 2018-03-21 | End: 2018-03-21

## 2018-03-21 RX ORDER — EPHEDRINE SULFATE 50 MG/ML
INJECTION, SOLUTION INTRAVENOUS AS NEEDED
Status: DISCONTINUED | OUTPATIENT
Start: 2018-03-21 | End: 2018-03-21 | Stop reason: SURG

## 2018-03-21 RX ORDER — MIDAZOLAM HYDROCHLORIDE 1 MG/ML
2 INJECTION INTRAMUSCULAR; INTRAVENOUS
Status: DISCONTINUED | OUTPATIENT
Start: 2018-03-21 | End: 2018-03-22

## 2018-03-21 RX ORDER — ASPIRIN 81 MG/1
81 TABLET ORAL DAILY
Status: DISCONTINUED | OUTPATIENT
Start: 2018-03-22 | End: 2018-03-26 | Stop reason: HOSPADM

## 2018-03-21 RX ORDER — POTASSIUM CHLORIDE 7.45 MG/ML
10 INJECTION INTRAVENOUS
Status: DISCONTINUED | OUTPATIENT
Start: 2018-03-21 | End: 2018-03-26 | Stop reason: HOSPADM

## 2018-03-21 RX ORDER — FENTANYL CITRATE 50 UG/ML
50 INJECTION, SOLUTION INTRAMUSCULAR; INTRAVENOUS
Status: DISCONTINUED | OUTPATIENT
Start: 2018-03-21 | End: 2018-03-21 | Stop reason: HOSPADM

## 2018-03-21 RX ORDER — MIDAZOLAM HYDROCHLORIDE 1 MG/ML
1 INJECTION INTRAMUSCULAR; INTRAVENOUS
Status: DISCONTINUED | OUTPATIENT
Start: 2018-03-21 | End: 2018-03-21 | Stop reason: HOSPADM

## 2018-03-21 RX ORDER — DEXMEDETOMIDINE HYDROCHLORIDE 4 UG/ML
.2-1.5 INJECTION, SOLUTION INTRAVENOUS
Status: DISCONTINUED | OUTPATIENT
Start: 2018-03-21 | End: 2018-03-22

## 2018-03-21 RX ORDER — MAGNESIUM SULFATE 1 G/100ML
1 INJECTION INTRAVENOUS EVERY 8 HOURS
Status: DISPENSED | OUTPATIENT
Start: 2018-03-21 | End: 2018-03-22

## 2018-03-21 RX ORDER — CEFAZOLIN SODIUM 2 G/100ML
2 INJECTION, SOLUTION INTRAVENOUS EVERY 8 HOURS
Status: COMPLETED | OUTPATIENT
Start: 2018-03-21 | End: 2018-03-23

## 2018-03-21 RX ORDER — METOCLOPRAMIDE HYDROCHLORIDE 5 MG/ML
10 INJECTION INTRAMUSCULAR; INTRAVENOUS EVERY 6 HOURS
Status: COMPLETED | OUTPATIENT
Start: 2018-03-21 | End: 2018-03-22

## 2018-03-21 RX ORDER — ACETAMINOPHEN 325 MG/1
650 TABLET ORAL EVERY 4 HOURS PRN
Status: DISCONTINUED | OUTPATIENT
Start: 2018-03-21 | End: 2018-03-26 | Stop reason: HOSPADM

## 2018-03-21 RX ORDER — MIDAZOLAM HYDROCHLORIDE 1 MG/ML
2 INJECTION INTRAMUSCULAR; INTRAVENOUS
Status: DISCONTINUED | OUTPATIENT
Start: 2018-03-21 | End: 2018-03-21 | Stop reason: HOSPADM

## 2018-03-21 RX ORDER — POTASSIUM CHLORIDE 1.5 G/1.77G
40 POWDER, FOR SOLUTION ORAL AS NEEDED
Status: DISCONTINUED | OUTPATIENT
Start: 2018-03-21 | End: 2018-03-26 | Stop reason: HOSPADM

## 2018-03-21 RX ORDER — CHLORHEXIDINE GLUCONATE 0.12 MG/ML
15 RINSE ORAL EVERY 12 HOURS
Status: DISCONTINUED | OUTPATIENT
Start: 2018-03-21 | End: 2018-03-24

## 2018-03-21 RX ORDER — MILRINONE LACTATE 0.2 MG/ML
.25-.75 INJECTION, SOLUTION INTRAVENOUS CONTINUOUS PRN
Status: DISCONTINUED | OUTPATIENT
Start: 2018-03-21 | End: 2018-03-22

## 2018-03-21 RX ORDER — MEPERIDINE HYDROCHLORIDE 25 MG/ML
25 INJECTION INTRAMUSCULAR; INTRAVENOUS; SUBCUTANEOUS EVERY 4 HOURS PRN
Status: DISCONTINUED | OUTPATIENT
Start: 2018-03-21 | End: 2018-03-22

## 2018-03-21 RX ORDER — ONDANSETRON 2 MG/ML
4 INJECTION INTRAMUSCULAR; INTRAVENOUS EVERY 6 HOURS PRN
Status: DISCONTINUED | OUTPATIENT
Start: 2018-03-21 | End: 2018-03-26 | Stop reason: HOSPADM

## 2018-03-21 RX ORDER — OXYCODONE HYDROCHLORIDE 5 MG/1
10 TABLET ORAL EVERY 4 HOURS PRN
Status: DISCONTINUED | OUTPATIENT
Start: 2018-03-21 | End: 2018-03-26 | Stop reason: HOSPADM

## 2018-03-21 RX ORDER — ALBUMIN, HUMAN INJ 5% 5 %
1500 SOLUTION INTRAVENOUS AS NEEDED
Status: DISPENSED | OUTPATIENT
Start: 2018-03-21 | End: 2018-03-22

## 2018-03-21 RX ORDER — LIDOCAINE HYDROCHLORIDE 10 MG/ML
0.5 INJECTION, SOLUTION EPIDURAL; INFILTRATION; INTRACAUDAL; PERINEURAL ONCE AS NEEDED
Status: DISCONTINUED | OUTPATIENT
Start: 2018-03-21 | End: 2018-03-21 | Stop reason: HOSPADM

## 2018-03-21 RX ORDER — SODIUM CHLORIDE 9 MG/ML
30 INJECTION, SOLUTION INTRAVENOUS CONTINUOUS PRN
Status: DISCONTINUED | OUTPATIENT
Start: 2018-03-21 | End: 2018-03-26 | Stop reason: HOSPADM

## 2018-03-21 RX ORDER — PHENYLEPHRINE HCL IN 0.9% NACL 0.5 MG/5ML
.2-3 SYRINGE (ML) INTRAVENOUS CONTINUOUS PRN
Status: DISCONTINUED | OUTPATIENT
Start: 2018-03-21 | End: 2018-03-22

## 2018-03-21 RX ORDER — PROMETHAZINE HYDROCHLORIDE 25 MG/ML
12.5 INJECTION, SOLUTION INTRAMUSCULAR; INTRAVENOUS EVERY 6 HOURS PRN
Status: DISCONTINUED | OUTPATIENT
Start: 2018-03-21 | End: 2018-03-26 | Stop reason: HOSPADM

## 2018-03-21 RX ORDER — MAGNESIUM SULFATE HEPTAHYDRATE 40 MG/ML
4 INJECTION, SOLUTION INTRAVENOUS AS NEEDED
Status: DISCONTINUED | OUTPATIENT
Start: 2018-03-21 | End: 2018-03-26 | Stop reason: HOSPADM

## 2018-03-21 RX ORDER — TRANEXAMIC ACID 100 MG/ML
INJECTION, SOLUTION INTRAVENOUS AS NEEDED
Status: DISCONTINUED | OUTPATIENT
Start: 2018-03-21 | End: 2018-03-21 | Stop reason: SURG

## 2018-03-21 RX ORDER — FUROSEMIDE 10 MG/ML
40 INJECTION INTRAMUSCULAR; INTRAVENOUS EVERY 6 HOURS PRN
Status: DISCONTINUED | OUTPATIENT
Start: 2018-03-21 | End: 2018-03-22

## 2018-03-21 RX ORDER — SENNA AND DOCUSATE SODIUM 50; 8.6 MG/1; MG/1
2 TABLET, FILM COATED ORAL NIGHTLY
Status: DISCONTINUED | OUTPATIENT
Start: 2018-03-22 | End: 2018-03-26 | Stop reason: HOSPADM

## 2018-03-21 RX ORDER — PROTAMINE SULFATE 10 MG/ML
INJECTION, SOLUTION INTRAVENOUS AS NEEDED
Status: DISCONTINUED | OUTPATIENT
Start: 2018-03-21 | End: 2018-03-21 | Stop reason: SURG

## 2018-03-21 RX ORDER — VECURONIUM BROMIDE 1 MG/ML
INJECTION, POWDER, LYOPHILIZED, FOR SOLUTION INTRAVENOUS AS NEEDED
Status: DISCONTINUED | OUTPATIENT
Start: 2018-03-21 | End: 2018-03-21 | Stop reason: SURG

## 2018-03-21 RX ADMIN — LIDOCAINE HYDROCHLORIDE 1 EACH: 40 SPRAY LARYNGEAL; TRANSTRACHEAL at 13:11

## 2018-03-21 RX ADMIN — VECURONIUM BROMIDE 5 MG: 1 INJECTION, POWDER, LYOPHILIZED, FOR SOLUTION INTRAVENOUS at 15:05

## 2018-03-21 RX ADMIN — OXYCODONE HYDROCHLORIDE 10 MG: 5 TABLET ORAL at 17:40

## 2018-03-21 RX ADMIN — PROPOFOL 50 MCG/KG/MIN: 10 INJECTION, EMULSION INTRAVENOUS at 13:47

## 2018-03-21 RX ADMIN — DEXTROSE MONOHYDRATE 25 ML: 25 INJECTION, SOLUTION INTRAVENOUS at 18:28

## 2018-03-21 RX ADMIN — NICARDIPINE HYDROCHLORIDE 5 MG/HR: 2.5 INJECTION INTRAVENOUS at 19:05

## 2018-03-21 RX ADMIN — HEPARIN SODIUM 20000 UNITS: 1000 INJECTION, SOLUTION INTRAVENOUS; SUBCUTANEOUS at 13:47

## 2018-03-21 RX ADMIN — EPHEDRINE SULFATE 10 MG: 50 INJECTION INTRAMUSCULAR; INTRAVENOUS; SUBCUTANEOUS at 13:26

## 2018-03-21 RX ADMIN — SODIUM CHLORIDE: 9 INJECTION, SOLUTION INTRAVENOUS at 12:54

## 2018-03-21 RX ADMIN — FAMOTIDINE 20 MG: 10 INJECTION INTRAVENOUS at 10:32

## 2018-03-21 RX ADMIN — OXYCODONE HYDROCHLORIDE 10 MG: 5 TABLET ORAL at 22:37

## 2018-03-21 RX ADMIN — FAMOTIDINE 20 MG: 10 INJECTION, SOLUTION INTRAVENOUS at 17:28

## 2018-03-21 RX ADMIN — METOCLOPRAMIDE 10 MG: 5 INJECTION, SOLUTION INTRAMUSCULAR; INTRAVENOUS at 17:14

## 2018-03-21 RX ADMIN — CHLORHEXIDINE GLUCONATE 1 APPLICATION: 500 CLOTH TOPICAL at 09:13

## 2018-03-21 RX ADMIN — ALBUMIN HUMAN 250 ML: 0.05 INJECTION, SOLUTION INTRAVENOUS at 22:39

## 2018-03-21 RX ADMIN — MORPHINE SULFATE 4 MG: 4 INJECTION INTRAVENOUS at 22:19

## 2018-03-21 RX ADMIN — MAGNESIUM SULFATE HEPTAHYDRATE 2 G: 500 INJECTION, SOLUTION INTRAMUSCULAR; INTRAVENOUS at 14:57

## 2018-03-21 RX ADMIN — Medication 5 MG: at 13:09

## 2018-03-21 RX ADMIN — CEFAZOLIN SODIUM 2 G: 2 INJECTION, SOLUTION INTRAVENOUS at 13:09

## 2018-03-21 RX ADMIN — TRANEXAMIC ACID 200 MG: 100 INJECTION, SOLUTION INTRAVENOUS at 15:29

## 2018-03-21 RX ADMIN — FENTANYL CITRATE 50 MCG: 50 INJECTION, SOLUTION INTRAMUSCULAR; INTRAVENOUS at 10:37

## 2018-03-21 RX ADMIN — ACETAMINOPHEN 650 MG: 325 TABLET ORAL at 22:37

## 2018-03-21 RX ADMIN — METOPROLOL TARTRATE 12.5 MG: 25 TABLET ORAL at 09:28

## 2018-03-21 RX ADMIN — SUFENTANIL CITRATE 50 MCG: 50 INJECTION, SOLUTION EPIDURAL; INTRAVENOUS at 13:09

## 2018-03-21 RX ADMIN — TRANEXAMIC ACID 200 MG: 100 INJECTION, SOLUTION INTRAVENOUS at 14:28

## 2018-03-21 RX ADMIN — MUPIROCIN 1 APPLICATION: 20 OINTMENT TOPICAL at 09:28

## 2018-03-21 RX ADMIN — PROPOFOL 100 MG: 10 INJECTION, EMULSION INTRAVENOUS at 13:09

## 2018-03-21 RX ADMIN — DEXMEDETOMIDINE HYDROCHLORIDE 0.2 MCG/KG/HR: 4 INJECTION, SOLUTION INTRAVENOUS at 20:49

## 2018-03-21 RX ADMIN — PHENYLEPHRINE HYDROCHLORIDE 100 MCG: 10 INJECTION INTRAVENOUS at 15:54

## 2018-03-21 RX ADMIN — PHENYLEPHRINE HYDROCHLORIDE 100 MCG: 10 INJECTION INTRAVENOUS at 16:05

## 2018-03-21 RX ADMIN — ATORVASTATIN CALCIUM 20 MG: 20 TABLET, FILM COATED ORAL at 21:26

## 2018-03-21 RX ADMIN — SODIUM CHLORIDE 30 ML/HR: 9 INJECTION, SOLUTION INTRAVENOUS at 17:00

## 2018-03-21 RX ADMIN — SUFENTANIL CITRATE 50 MCG: 50 INJECTION, SOLUTION EPIDURAL; INTRAVENOUS at 13:41

## 2018-03-21 RX ADMIN — SUFENTANIL CITRATE 50 MCG: 50 INJECTION, SOLUTION EPIDURAL; INTRAVENOUS at 15:37

## 2018-03-21 RX ADMIN — SODIUM CHLORIDE 30 ML/HR: 9 INJECTION, SOLUTION INTRAVENOUS at 16:45

## 2018-03-21 RX ADMIN — ONDANSETRON 4 MG: 2 INJECTION INTRAMUSCULAR; INTRAVENOUS at 15:27

## 2018-03-21 RX ADMIN — ALBUMIN HUMAN 500 ML: 0.05 INJECTION, SOLUTION INTRAVENOUS at 18:21

## 2018-03-21 RX ADMIN — NITROGLYCERIN 100 MCG: 5 INJECTION, SOLUTION INTRAVENOUS at 13:42

## 2018-03-21 RX ADMIN — EPHEDRINE SULFATE 10 MG: 50 INJECTION INTRAMUSCULAR; INTRAVENOUS; SUBCUTANEOUS at 13:15

## 2018-03-21 RX ADMIN — Medication 5 MG: at 15:05

## 2018-03-21 RX ADMIN — TRANEXAMIC ACID 700 MG: 100 INJECTION, SOLUTION INTRAVENOUS at 13:28

## 2018-03-21 RX ADMIN — FENTANYL CITRATE 50 MCG: 50 INJECTION, SOLUTION INTRAMUSCULAR; INTRAVENOUS at 10:32

## 2018-03-21 RX ADMIN — Medication 5 MG: at 16:03

## 2018-03-21 RX ADMIN — VECURONIUM BROMIDE 7 MG: 1 INJECTION, POWDER, LYOPHILIZED, FOR SOLUTION INTRAVENOUS at 13:09

## 2018-03-21 RX ADMIN — MUPIROCIN: 20 OINTMENT TOPICAL at 21:09

## 2018-03-21 RX ADMIN — CHLORHEXIDINE GLUCONATE 15 ML: 1.2 RINSE ORAL at 09:28

## 2018-03-21 RX ADMIN — MIDAZOLAM HYDROCHLORIDE 2 MG: 1 INJECTION, SOLUTION INTRAMUSCULAR; INTRAVENOUS at 10:32

## 2018-03-21 RX ADMIN — HYDROCODONE BITARTRATE AND ACETAMINOPHEN 2 TABLET: 5; 325 TABLET ORAL at 20:51

## 2018-03-21 RX ADMIN — DEXMEDETOMIDINE HYDROCHLORIDE 0.2 MCG/KG/HR: 4 INJECTION, SOLUTION INTRAVENOUS at 17:14

## 2018-03-21 RX ADMIN — POTASSIUM CHLORIDE 20 MEQ: 400 INJECTION, SOLUTION INTRAVENOUS at 19:38

## 2018-03-21 RX ADMIN — POTASSIUM CHLORIDE 20 MEQ: 400 INJECTION, SOLUTION INTRAVENOUS at 18:46

## 2018-03-21 RX ADMIN — CEFAZOLIN SODIUM 2 G: 2 INJECTION, SOLUTION INTRAVENOUS at 15:27

## 2018-03-21 RX ADMIN — MIDAZOLAM HYDROCHLORIDE 2 MG: 1 INJECTION, SOLUTION INTRAMUSCULAR; INTRAVENOUS at 10:37

## 2018-03-21 RX ADMIN — PHENYLEPHRINE HYDROCHLORIDE 100 MCG: 10 INJECTION INTRAVENOUS at 14:00

## 2018-03-21 RX ADMIN — PHENYLEPHRINE HYDROCHLORIDE 0.25 MCG/KG/MIN: 10 INJECTION, SOLUTION INTRAMUSCULAR; INTRAVENOUS; SUBCUTANEOUS at 16:05

## 2018-03-21 RX ADMIN — CEFAZOLIN SODIUM 2 G: 2 INJECTION, SOLUTION INTRAVENOUS at 22:34

## 2018-03-21 RX ADMIN — VECURONIUM BROMIDE 3 MG: 1 INJECTION, POWDER, LYOPHILIZED, FOR SOLUTION INTRAVENOUS at 14:00

## 2018-03-21 RX ADMIN — METOCLOPRAMIDE 10 MG: 5 INJECTION, SOLUTION INTRAMUSCULAR; INTRAVENOUS at 23:25

## 2018-03-21 RX ADMIN — PROTAMINE SULFATE 250 MG: 10 INJECTION, SOLUTION INTRAVENOUS at 15:29

## 2018-03-21 RX ADMIN — SODIUM CHLORIDE 4.2 UNITS/HR: 900 INJECTION, SOLUTION INTRAVENOUS at 13:40

## 2018-03-21 NOTE — ANESTHESIA PREPROCEDURE EVALUATION
Anesthesia Evaluation     Patient summary reviewed and Nursing notes reviewed   NPO Solid Status: > 8 hours  NPO Liquid Status: > 8 hours           Airway   Mallampati: II  TM distance: >3 FB  Neck ROM: full  no difficulty expected  Dental - normal exam     Pulmonary - normal exam   (+) a smoker Current Abstained day of surgery,   Cardiovascular - normal exam  Exercise tolerance: good (4-7 METS)    ECG reviewed  Beta blocker given within 24 hours of surgery  Rhythm: regular  Rate: normal    (+) valvular problems/murmurs AS,       Neuro/Psych  (+) numbness,       ROS Comment: Bilateral carpal tunnel syndrome  GI/Hepatic/Renal/Endo    (+)   diabetes mellitus type 1,     ROS Comment: Hx of DKA    Musculoskeletal (-) negative ROS    Abdominal  - normal exam   Substance History   (+) alcohol use (pint a day),      OB/GYN          Other - negative ROS                     Anesthesia Plan    ASA 4     general   (A line  Central line  SDUHAKAR)  intravenous induction   Anesthetic plan and risks discussed with patient.

## 2018-03-21 NOTE — PROGRESS NOTES
LOS: 13 days   Patient Care Team:  No Known Provider as PCP - General    Chief Complaint:     F/u AS    Interval History:     He denies chest pain, tachycardia, dizziness, nausea, difficulty breathing or swallowing.  He is ready for surgery today.    Objective   Vital Signs  Temp:  [98.2 °F (36.8 °C)-99.1 °F (37.3 °C)] 99.1 °F (37.3 °C)  Heart Rate:  [65-71] 65  Resp:  [20] 20  BP: (128-150)/(76-86) 150/84    Intake/Output Summary (Last 24 hours) at 03/21/18 0721  Last data filed at 03/21/18 0300   Gross per 24 hour   Intake              720 ml   Output              550 ml   Net              170 ml       Comfortable NAD  Neck supple, no JVD or thyromegaly appreciated  S1/S2 RRR, no r/g, 3/6 LIZANDRO with radiation to carotids  Lungs CTA B, normal effort  Abdomen S/NT/ND (+) BS, no HSM appreciated  Extremities warm, no clubbing, cyanosis, or edema  No visible or palpable skin lesions  A/Ox4, mood and affect appropriate    Results Review:        Results from last 7 days  Lab Units 03/21/18  0304 03/20/18  0437 03/18/18  0346   SODIUM mmol/L 140 138 139   POTASSIUM mmol/L 3.9 3.7 4.0   CHLORIDE mmol/L 102 100 103   CO2 mmol/L 27.1 28.8 26.8   BUN mg/dL 12 10 13   CREATININE mg/dL 0.60* 0.51* 0.56*   GLUCOSE mg/dL 234* 132* 225*   CALCIUM mg/dL 8.7 8.4* 8.1*           Results from last 7 days  Lab Units 03/21/18  0304 03/20/18  0437 03/18/18  0346   WBC 10*3/mm3 12.52* 12.55* 9.15   HEMOGLOBIN g/dL 9.2* 9.5* 9.2*   HEMATOCRIT % 28.4* 29.3* 28.6*   PLATELETS 10*3/mm3 390 400 413           Results from last 7 days  Lab Units 03/17/18  0605   CHOLESTEROL mg/dL 173       Results from last 7 days  Lab Units 03/19/18  0319   MAGNESIUM mg/dL 2.1       Results from last 7 days  Lab Units 03/17/18  0605   CHOLESTEROL mg/dL 173   TRIGLYCERIDES mg/dL 118   HDL CHOL mg/dL 46   LDL CHOL mg/dL 103*       I reviewed the patient's new clinical results.  I personally viewed and interpreted the patient's EKG/Telemetry data         Medication Review:     atorvastatin 20 mg Oral Nightly   ceFAZolin 2 g Intravenous On Call to OR   chlorhexidine 15 mL Mouth/Throat Q12H   Chlorhexidine Gluconate Cloth 1 application Topical Q12H   insulin aspart 0-10 Units Subcutaneous 4x Daily AC & at Bedtime   insulin aspart 4 Units Subcutaneous TID With Meals   insulin detemir 12 Units Subcutaneous QAM   insulin detemir 7 Units Subcutaneous Nightly   metoprolol tartrate 12.5 mg Oral On Call to OR   multivitamin 1 tablet Oral Daily   mupirocin 1 application Each Nare Q12H            Assessment/Plan     Principal Problem:    Nonrheumatic aortic valve stenosis  Active Problems:    Diabetic ketoacidosis without coma associated with type 1 diabetes mellitus    Type 1 diabetes mellitus    Tobacco abuse    Alcohol abuse    Insurance coverage problems    Bilateral carpal tunnel syndrome    Microalbuminuria    1. DM, s/p DKA - treated per primary team  2. Severe AS - has symptoms consistent with poor cardiac output. Plan for surgery today.   3. Alcohol abuse - advised cessation.  4. Tobacco use - advised cessation  5. Poor dentition.    Will follow    Carri Menchaca MD  03/21/18  7:21 AM

## 2018-03-21 NOTE — ANESTHESIA PROCEDURE NOTES
Arterial Line     Line placed for hemodynamic monitoring and ABGs/Labs/ISTAT.  Performed By   Anesthesiologist: KEVIN TIM  Preanesthetic Checklist  Completed: patient identified, site marked, surgical consent, pre-op evaluation, timeout performed, IV checked, risks and benefits discussed and monitors and equipment checked  Arterial Line Prep   Sterile Tech: cap and gloves  Prep: ChloraPrep  Patient monitoring: blood pressure monitoring, continuous pulse oximetry and EKG  Arterial Line Procedure   Laterality:left  Location:  radial artery  Catheter size: 20 G   Guidance: ultrasound guided and palpation technique  Successful placement: yes          Post Assessment   Dressing Type: occlusive dressing applied and wrist guard applied.   Complications no  Circ/Move/Sens Assessment: unchanged.   Patient Tolerance: patient tolerated the procedure well with no apparent complications

## 2018-03-21 NOTE — ANESTHESIA POSTPROCEDURE EVALUATION
Patient: Juan Payne    Procedure Summary     Date:  03/21/18 Room / Location:  [facility] OR [address] [place] [address] MAIN OR    Anesthesia Start:  1254 Anesthesia Stop:  1648    Procedures:       INTRAOPERATIVE SUDHAKAR, MINI STERNOTOMY WITH AORTIC VALVE REPLACEMENT, PRP (N/A Chest)      TRANSESOPHAGEAL ECHOCARDIOGRAM WITH ANESTHESIA (N/A Chest) Diagnosis:       Nonrheumatic aortic valve stenosis      (Nonrheumatic aortic valve stenosis [I35.0])    Surgeon:  Cecil Dougherty MD Provider:  Brandon Irizarry MD    Anesthesia Type:  general ASA Status:  4          Anesthesia Type: general  Last vitals  BP   122/55 (03/21/18 1051)   Temp   37.1 °C (98.8 °F) (03/21/18 0959)   Pulse   62 (03/21/18 1050)   Resp   20 (03/21/18 0959)     SpO2   97 % (03/21/18 0739)     Post Anesthesia Care and Evaluation    Patient location during evaluation: PACU  Patient participation: complete - patient cannot participate  Level of consciousness: obtunded/minimal responses  Pain management: adequate  Airway patency: patent  Anesthetic complications: No anesthetic complications  PONV Status: NA  Cardiovascular status: acceptable  Respiratory status: acceptable, ETT, intubated and ventilator  Hydration status: acceptable

## 2018-03-21 NOTE — PROGRESS NOTES
[facility]     LOS: 12 days     Subjective:  No chest pain and no shortness of air today, he is eating well, no nausea or vomiting  -he is still very fatigued      Objective:    Vital Signs:  Temp:  [97.9 °F (36.6 °C)-98.9 °F (37.2 °C)] 98.2 °F (36.8 °C)  Heart Rate:  [65-78] 65  Resp:  [20] 20  BP: (128-134)/(76-96) 129/76    SpO2:  [97 %] 97 %  on   ;   Device (Oxygen Therapy): room air  Body mass index is 22.6 kg/m².    Physical Exam   Constitutional: He appears well-developed and well-nourished.   HENT:   Head: Normocephalic and atraumatic.   Eyes: EOM are normal.   Neck: Normal range of motion.   Cardiovascular: Normal rate and regular rhythm.    Murmur heard.  2-3/6 systolic    Pulmonary/Chest: Effort normal and breath sounds normal.   Abdominal: Soft. Bowel sounds are normal.   Neurological: He is alert.   Skin: Skin is warm.       Results Review:    Glucose   Date Value Ref Range Status   03/20/2018 132 (H) 65 - 99 mg/dL Final   03/18/2018 225 (H) 65 - 99 mg/dL Final       Results from last 7 days  Lab Units 03/20/18  0437   WBC 10*3/mm3 12.55*   HEMOGLOBIN g/dL 9.5*   HEMATOCRIT % 29.3*   PLATELETS 10*3/mm3 400       Results from last 7 days  Lab Units 03/20/18  0437   SODIUM mmol/L 138   POTASSIUM mmol/L 3.7   CHLORIDE mmol/L 100   CO2 mmol/L 28.8   BUN mg/dL 10   CREATININE mg/dL 0.51*   CALCIUM mg/dL 8.4*   GLUCOSE mg/dL 132*           Results from last 7 days  Lab Units 03/19/18  0319   MAGNESIUM mg/dL 2.1         Cultures:  Blood Culture   Date Value Ref Range Status   03/10/2018 No growth at 4 days  Preliminary   03/10/2018 No growth at 4 days  Preliminary       I have reviewed daily medications and changes in CPOE    Scheduled meds    atorvastatin 20 mg Oral Nightly   [START ON 3/21/2018] ceFAZolin 2 g Intravenous On Call to OR   chlorhexidine 15 mL Mouth/Throat Q12H   Chlorhexidine Gluconate Cloth 1 application Topical Q12H   insulin aspart 0-10 Units Subcutaneous 4x  Daily AC & at Bedtime   insulin aspart 4 Units Subcutaneous TID With Meals   insulin detemir 12 Units Subcutaneous QAM   insulin detemir 7 Units Subcutaneous Nightly   multivitamin 1 tablet Oral Daily   mupirocin 1 application Each Nare Q12H          PRN meds  •  acetaminophen  •  dextrose  •  dextrose  •  dextrose  •  glucagon (human recombinant)  •  magnesium sulfate **OR** magnesium sulfate **OR** magnesium sulfate  •  nicotine  •  ondansetron **OR** ondansetron ODT **OR** ondansetron  •  potassium & sodium phosphates **OR** potassium & sodium phosphates  •  potassium phosphate infusion greater than 15 mMoles **OR** potassium phosphate infusion greater than 15 mMoles **OR** potassium phosphate **OR** sodium phosphate IVPB **OR** sodium phosphate IVPB **OR** sodium phosphate IVPB  •  promethazine  •  sodium chloride  •  sodium chloride      Principal Problem:    Diabetic ketoacidosis without coma associated with type 1 diabetes mellitus  Active Problems:    Type 1 diabetes mellitus    Tobacco abuse    Alcohol abuse    Insurance coverage problems    Bilateral carpal tunnel syndrome    Microalbuminuria    Nonrheumatic aortic valve stenosis        Assessment/Plan:    Severe aortic stenosis by Valve replacement tomorrow      Diabetic ketoacidosis without coma associated with type 1 diabetes mellitus-status post stay in the intensive care unit now is doing well.  From endocrinology standpoint the patient is been given okay for discharge with follow-up with PCP in 1-2 weeks      Type 1 diabetes mellitus- adjusted by endocrinology, elevated yesterday but now improved      Tobacco abuse      Alcohol abuse      Bilateral carpal tunnel syndrome      Microalbuminuria    Headaches are mild today but improved    Plan:  Valve replacement tomorrow    Westley Barker MD  03/20/18  8:36 PM

## 2018-03-21 NOTE — ANESTHESIA PROCEDURE NOTES
Procedure Performed: Emergent/Open-Heart Anesthesia SUDHAKAR     Start Time:        End Time:        General Procedure Information  SUDHAKAR Placed for monitoring purposes only -- This is not a diagnostic SUDHAKAR

## 2018-03-21 NOTE — PLAN OF CARE
Problem: Patient Care Overview (Adult)  Goal: Plan of Care Review  Outcome: Ongoing (interventions implemented as appropriate)   03/21/18 0417   Outcome Evaluation   Outcome Summary/Follow up Plan Pt VSS throughout shift. 2nd case today for an AVR, Complained of anxiety at 1900 gave 1 dose of PO ativan, will continue to monitor       Problem: Fall Risk (Adult)  Goal: Identify Related Risk Factors and Signs and Symptoms  Outcome: Ongoing (interventions implemented as appropriate)    Goal: Absence of Fall  Outcome: Ongoing (interventions implemented as appropriate)      Problem: Patient Care Overview  Goal: Plan of Care Review  Outcome: Ongoing (interventions implemented as appropriate)    Goal: Individualization and Mutuality  Outcome: Ongoing (interventions implemented as appropriate)    Goal: Discharge Needs Assessment  Outcome: Ongoing (interventions implemented as appropriate)    Goal: Interprofessional Rounds/Family Conf  Outcome: Ongoing (interventions implemented as appropriate)      Problem: Diabetes, Type 1 (Adult)  Goal: Signs and Symptoms of Listed Potential Problems Will be Absent, Minimized or Managed (Diabetes, Type 1)  Outcome: Ongoing (interventions implemented as appropriate)      Problem: Nutrition, Imbalanced: Inadequate Oral Intake (Adult)  Goal: Improved Oral Intake  Outcome: Ongoing (interventions implemented as appropriate)    Goal: Prevent Further Weight Loss  Outcome: Ongoing (interventions implemented as appropriate)

## 2018-03-21 NOTE — ANESTHESIA PROCEDURE NOTES
Airway  Urgency: elective    Airway not difficult    General Information and Staff    Patient location during procedure: OR  Anesthesiologist: SLIME MCCALL    Indications and Patient Condition  Indications for airway management: airway protection    Preoxygenated: yes  MILS maintained throughout  Mask difficulty assessment: 1 - vent by mask    Final Airway Details  Final airway type: endotracheal airway      Successful airway: ETT  Cuffed: yes   Successful intubation technique: direct laryngoscopy  Endotracheal tube insertion site: oral  Blade: Kyleigh  Blade size: #3  ETT size: 8.5 mm  Cormack-Lehane Classification: grade IIa - partial view of glottis  Placement verified by: chest auscultation and capnometry   Measured from: lips  ETT to lips (cm): 23  Number of attempts at approach: 1

## 2018-03-21 NOTE — OP NOTE
"Operative Note    Date of Dictation: 03/21/18    Date of Procedure: 03/21/18    Referring Physician: Mahamed Tolbert MD    Preoperative diagnosis:  1. Severe aortic stenosis  2. Bicuspid aortic valve  3. Dyspnea and CHF class II-iii  4. ETOH abuse  5. Non compliance    Postoperative diagnosis:  Same    Procedure:   1. Minimally invasive \"J sternotomy\" AVR with a # 21 mm Magna pericardial prosthesis    Surgeon: Cecil Dougherty MD     Assistants: CLAUDIA Arreguin    Anesthesia: General endotracheal anesthesia and SUDHAKAR    Findings:  Bicuspid aortic valve with severe stenosis    Estimated Blood Loss:  Documented in the anesthesia record    STS Data:  The patient was explained the risks and benefits and alternatives of surgery and agreed to proceed. The STS risk calculator was used to calculate the risk. ATBx and B blockers were given within the STS required window        Description of the procedure:     The patient was placed supine on the operative table. Anesthesia was given and lines placed. The patient was prepped and draped using the usual sterile technique. A 5 cm limited median sternotomy was performed with a scalpel and the layers carried down to the sternum using the electrocautery. The sternum was splited in the midline into the right third intercostal space using a vertical oscillating saw. Hemostasis was achieved. 300 units of IV heparin was given to maintain the ACT over 400.  Cannulation sutures were placed in the distal ascending aorta and the right atrium. Cardiopulmonary bypass was started and then the ascending aorta was clamped.  One liter of cold blood cardioplegia was given in an antegrade fashion to achieved diastolic arrest and further doses every 15 minutes thereafter also using retrograde infusion and coronary ostia cannulas.  A transverse aortotomy was performed and extended into the noncoronary sinus.  The valve was bicuspid and severely calcific and barely opened. The leaflets were resected and " annulus debrided and washed out. The annulus was sized to a 21 mm magna pericardial prosthesis that was washed as recommended by the . 2.0 Ethibond pledgeted sutures were placed in a supra-annular fashion circumferentially around the annulus and passed through the sewing cuff of the prosthesis. The valve was seated and the knots secured with the Kor-not device.   The aortotomy was then closed with a double layer of 4-0 Prolene continuous suture, de-aired and the aortic clamp removed.    The right pleural space was suctioned and the lungs ventilated. The heart was paced till regular atrial rhythm resumed. I allowed the heart to eject  and I de-aired the left heart chambers under SUDHAKAR guidance and once hemodynamics were acceptable, then the CPB was discontinued and the venous and cardioplegia cannulas removed. The matching dose of protamine was given and the aortic cannula removed as well. AV temporary wires and a right pleural and mediastinal chest tubes were placed and the wound sprayed with platelet rich plasma. The perioperative SUDHAKAR showed the valve well seated without significant perivalvular leaks. The sternum was closed with single and double wires and soft tissue in layers of reabsorbable material. The wounds were covered with sterile dressings.    Specimen removed:  Aortic valve leaflets    CPB time:  84 minutes    Aortic clamp time:   69 minutes       Complications:  none           Disposition: Cardiovascular recovery room           Condition: Critical but stable.    Cecil Dougherty M.D.    CC: walker keys M.D.

## 2018-03-21 NOTE — PROGRESS NOTES
[facility]     LOS: 13 days     Subjective:  S/p AVR minimally invasive \"J sternotomy\" AVR with a # 21 mm Magna pericardial prosthesis  On vent and CXR and ABG looks ok    Objective:    Vital Signs:  Temp:  [98.1 °F (36.7 °C)-99.1 °F (37.3 °C)] 98.8 °F (37.1 °C)  Heart Rate:  [59-80] 80  Resp:  [18-20] 20  BP: (122-150)/(55-84) 122/55  Arterial Line BP: (109-111)/(50-55) 109/55  FiO2 (%):  [40 %-100 %] 40 %    SpO2:  [97 %-100 %] 100 %  on  Flow (L/min):  [2] 2;   Device (Oxygen Therapy): nasal cannula  Body mass index is 22.5 kg/m².    Physical Exam   Constitutional: He appears well-developed and well-nourished. He is easily aroused. He is intubated.   HENT:   Head: Normocephalic and atraumatic.   Eyes: EOM are normal.   Neck: Normal range of motion.   Cardiovascular: Normal rate and regular rhythm.    Murmur heard.  2-3/6 systolic    Pulmonary/Chest: Effort normal and breath sounds normal. He is intubated.   Abdominal: Soft. Bowel sounds are normal.   Neurological: He is easily aroused.   Skin: Skin is warm.       Results Review:    Glucose   Date Value Ref Range Status   03/21/2018 234 (H) 65 - 99 mg/dL Final   03/20/2018 132 (H) 65 - 99 mg/dL Final       Results from last 7 days  Lab Units 03/21/18  1710   WBC 10*3/mm3 15.86*   HEMOGLOBIN g/dL 8.3*   HEMATOCRIT % 25.6*   PLATELETS 10*3/mm3 183       Results from last 7 days  Lab Units 03/21/18  0304   SODIUM mmol/L 140   POTASSIUM mmol/L 3.9   CHLORIDE mmol/L 102   CO2 mmol/L 27.1   BUN mg/dL 12   CREATININE mg/dL 0.60*   CALCIUM mg/dL 8.7   GLUCOSE mg/dL 234*       Results from last 7 days  Lab Units 03/21/18  1545   INR  1.3*       Results from last 7 days  Lab Units 03/19/18  0319   MAGNESIUM mg/dL 2.1         Cultures:  Blood Culture   Date Value Ref Range Status   03/10/2018 No growth at 4 days  Preliminary   03/10/2018 No growth at 4 days  Preliminary       I have reviewed daily medications and changes in CPOE    Scheduled " meds    [START ON 3/22/2018] aspirin 81 mg Oral Daily   atorvastatin 20 mg Oral Nightly   ceFAZolin 2 g Intravenous Q8H   chlorhexidine 15 mL Mouth/Throat Q12H   [START ON 3/22/2018] enoxaparin 40 mg Subcutaneous Daily   famotidine 20 mg Intravenous Once   magnesium sulfate 1 g Intravenous Q8H   metoclopramide 10 mg Intravenous Q6H   metoprolol tartrate 12.5 mg Oral Q12H   mupirocin  Each Nare BID   [START ON 3/22/2018] pantoprazole 40 mg Oral QAM   [START ON 3/22/2018] sennosides-docusate sodium 2 tablet Oral Nightly         clevidipine 2-32 mg/hr    dexmedetomidine 0.2-1.5 mcg/kg/hr Last Rate: 0.2 mcg/kg/hr (03/21/18 1714)   DOPamine 2-20 mcg/kg/min    EPINEPHrine 0.02-0.3 mcg/kg/min    insulin regular infusion 1 unit/mL (CCU use) 0-50 Units/hr    milrinone 0.25-0.75 mcg/kg/min    niCARdipine 5-15 mg/hr    nitroglycerin 5-200 mcg/min    norepinephrine 0.02-0.3 mcg/kg/min    phenylephrine 0.2-3 mcg/kg/min    propofol 5-50 mcg/kg/min    sodium chloride 30 mL/hr    sodium chloride 30 mL/hr    vasopressin 0.02-0.1 Units/min          Principal Problem:    Nonrheumatic aortic valve stenosis  Active Problems:    Diabetic ketoacidosis without coma associated with type 1 diabetes mellitus    Type 1 diabetes mellitus    Tobacco abuse    Alcohol abuse    Insurance coverage problems    Bilateral carpal tunnel syndrome    Microalbuminuria      Assessment/Plan:    Severe aortic stenosis by Valve replacement tomorrow      Diabetic ketoacidosis without coma associated with type 1 diabetes mellitus-status post stay in the intensive care unit now is doing well.  From endocrinology standpoint the patient is been given okay for discharge with follow-up with PCP in 1-2 weeks      Type 1 diabetes mellitus- adjusted by endocrinology, elevated yesterday but now improved      Tobacco abuse      Alcohol abuse      Bilateral carpal tunnel syndrome      Microalbuminuria    Headaches are mild today but improved    Plan:  S/p AVR, extubate per  protocol     Saeed Fair MD  03/21/18  5:23 PM

## 2018-03-21 NOTE — PROGRESS NOTES
"  ENDOCRINE    Subjective   AND PLANS  Juan Payne is a 57 y.o. male.     Follow-up diabetes    Postop  Sedated on a respirator.  On propofol, Levophed and insulin drips  Blood sugar 242-266    Continue on insulin drip    Objective   /55   Pulse 62   Temp 98.8 °F (37.1 °C) (Oral)   Resp 20   Ht 172.7 cm (67.99\")   Wt 66.3 kg (146 lb 1.6 oz)   SpO2 97%   BMI 22.22 kg/m²   Physical Exam    Sedated on respirator  No rales or wheezes  Regular heart rate and rhythm.  No gallop.  Abdomen soft.  Extremities warm.  No cyanosis or pedal edema.      Lab Results (last 24 hours)     Procedure Component Value Units Date/Time    POC Protime / INR [[id number]]  (Abnormal) Collected:  03/21/18 1545    Specimen:  Blood Updated:  03/21/18 1558     Protime 15.0 seconds      Comment: Serial Number: [id number]:  [id number]        INR 1.3 (H)    POC OR Panel, ISTAT [[id number]]  (Abnormal) Collected:  03/21/18 1542    Specimen:  Blood Updated:  03/21/18 1558     Potassium 3.5 mmol/L      Total CO2 29 mmol/L      Comment: Serial Number: [id number]:  [id number]        Hemoglobin 8.2 (L) g/dL      Hematocrit 24 (L) %      pH, Arterial 7.34 (L) pH units      HCO3, Arterial 27.4 (H) mmol/L      Base Excess 2.0000 mmol/L      O2 Saturation, Arterial 100 (H) %      pO2, Arterial 272 (H) mmHg      pCO2, Arterial 50.8 (H) mm Hg      Glucose 205 (H) mg/dL     POC Activated Clotting Time [[id number]]  (Abnormal) Collected:  03/21/18 1515    Specimen:  Blood Updated:  03/21/18 1558     Activated Clotting Time  318 (H) Seconds      Comment: Serial Number: [id number]:  [id number]       POC Activated Clotting Time [[id number]]  (Abnormal) Collected:  03/21/18 1449    Specimen:  Blood Updated:  03/21/18 1558     Activated Clotting Time  307 (H) Seconds      Comment: Serial Number: [id number]:  [id number]       POC Activated Clotting Time [[id number]]  (Abnormal) Collected:  03/21/18 1416    Specimen:  Blood Updated:  03/21/18 1557     Activated Clotting " Time  285 (H) Seconds      Comment: Serial Number: [id number]:  [id number]       POC Activated Clotting Time [[id number]]  (Abnormal) Collected:  03/21/18 1352    Specimen:  Blood Updated:  03/21/18 1557     Activated Clotting Time  373 (H) Seconds      Comment: Serial Number: [id number]:  [id number]       POC Activated Clotting Time [[id number]]  (Abnormal) Collected:  03/21/18 1310    Specimen:  Blood Updated:  03/21/18 1557     Activated Clotting Time  158 (H) Seconds      Comment: Serial Number: [id number]:  [id number]       POC Activated Clotting Time [[id number]]  (Normal) Collected:  03/21/18 1541    Specimen:  Blood Updated:  03/21/18 1552     Activated Clotting Time  114 Seconds      Comment: Serial Number: [id number]:  [id number]       POC OR Panel, ISTAT [[id number]]  (Abnormal) Collected:  03/21/18 1515    Specimen:  Blood Updated:  03/21/18 1552     Potassium 3.9 mmol/L      Total CO2 32 (H) mmol/L      Comment: Serial Number: [id number]:  [id number]        Hemoglobin 8.8 (L) g/dL      Hematocrit 26 (L) %      pH, Arterial 7.30 (L) pH units      HCO3, Arterial 29.8 (H) mmol/L      Base Excess 3.0000 mmol/L      O2 Saturation, Arterial 100 (H) %      pO2, Arterial 423 (H) mmHg      pCO2, Arterial 60.4 (H) mm Hg      Glucose 242 (H) mg/dL     POC OR Panel, ISTAT [[id number]]  (Abnormal) Collected:  03/21/18 1450    Specimen:  Blood Updated:  03/21/18 1552     Potassium 3.8 mmol/L      Total CO2 32 (H) mmol/L      Comment: Serial Number: [id number]:  [id number]        Hemoglobin 8.8 (L) g/dL      Hematocrit 26 (L) %      pH, Arterial 7.28 (L) pH units      HCO3, Arterial 30.1 (H) mmol/L      Base Excess 3.0000 mmol/L      O2 Saturation, Arterial 100 (H) %      pO2, Arterial 471 (H) mmHg      pCO2, Arterial 63.6 (H) mm Hg      Glucose 263 (H) mg/dL     POC OR Panel, ISTAT [[id number]]  (Abnormal) Collected:  03/21/18 1429    Specimen:  Blood Updated:  03/21/18 1552     Potassium 3.9 mmol/L      Total CO2 30 (H) mmol/L       Comment: Serial Number: [id number]:  [id number]        Hemoglobin 8.8 (L) g/dL      Hematocrit 26 (L) %      pH, Arterial 7.27 (L) pH units      HCO3, Arterial 28.6 (H) mmol/L      Base Excess 2.0000 mmol/L      O2 Saturation, Arterial 69 (L) %      pO2, Arterial 42 (L) mmHg      pCO2, Arterial 62.5 (H) mm Hg      Glucose 263 (H) mg/dL     POC OR Panel, ISTAT [[id number]]  (Abnormal) Collected:  03/21/18 1417    Specimen:  Blood Updated:  03/21/18 1552     Potassium 3.8 mmol/L      Total CO2 34 (H) mmol/L      Comment: Serial Number: [id number]:  [id number]        Hemoglobin 8.8 (L) g/dL      Hematocrit 26 (L) %      pH, Arterial 7.34 (L) pH units      HCO3, Arterial 32.3 (H) mmol/L      Base Excess 7.0000 (H) mmol/L      O2 Saturation, Arterial 100 (H) %      pO2, Arterial 482 (H) mmHg      pCO2, Arterial 59.3 (H) mm Hg      Glucose 266 (H) mg/dL     POC OR Panel, ISTAT [[id number]]  (Abnormal) Collected:  03/21/18 1311    Specimen:  Blood Updated:  03/21/18 1552     Potassium 4.1 mmol/L      Total CO2 31 (H) mmol/L      Comment: Serial Number: [id number]:  [id number]        Hemoglobin 8.8 (L) g/dL      Hematocrit 26 (L) %      pH, Arterial 7.44 pH units      HCO3, Arterial 29.5 (H) mmol/L      Base Excess 5.0000 mmol/L      O2 Saturation, Arterial 97 %      pO2, Arterial 92 mmHg      pCO2, Arterial 43.3 mm Hg      Glucose 267 (H) mg/dL     POC Glucose Once [[id number]]  (Abnormal) Collected:  03/21/18 1215    Specimen:  Blood Updated:  03/21/18 1216     Glucose 259 (H) mg/dL     Narrative:       Meter: [id number] : [id number] Michael Ramey    POC Glucose Once [[id number]]  (Abnormal) Collected:  03/21/18 0528    Specimen:  Blood Updated:  03/21/18 0530     Glucose 180 (H) mg/dL     Narrative:       Meter: [id number] : [id number] Hiram Maya [facility]    Basic Metabolic Panel [[id number]]  (Abnormal) Collected:  03/21/18 0304    Specimen:  Blood Updated:  03/21/18 0351     Glucose 234 (H) mg/dL      BUN 12 mg/dL       Creatinine 0.60 (L) mg/dL      Sodium 140 mmol/L      Potassium 3.9 mmol/L      Chloride 102 mmol/L      CO2 27.1 mmol/L      Calcium 8.7 mg/dL      eGFR Non African Amer 138 mL/min/1.73      BUN/Creatinine Ratio 20.0     Anion Gap 10.9 mmol/L     Narrative:       GFR Normal >60  Chronic Kidney Disease <60  Kidney Failure <15    CBC & Differential [[id number]] Collected:  03/21/18 0304    Specimen:  Blood Updated:  03/21/18 0327    Narrative:       The following orders were created for panel order CBC & Differential.  Procedure                               Abnormality         Status                     ---------                               -----------         ------                     Scan Slide[[id number]]                                                                  CBC Auto Differential[[id number]]        Abnormal            Final result                 Please view results for these tests on the individual orders.    CBC Auto Differential [[id number]]  (Abnormal) Collected:  03/21/18 0304    Specimen:  Blood Updated:  03/21/18 0327     WBC 12.52 (H) 10*3/mm3      RBC 2.54 (L) 10*6/mm3      Hemoglobin 9.2 (L) g/dL      Hematocrit 28.4 (L) %      .8 (H) fL      MCH 36.2 (H) pg      MCHC 32.4 (L) g/dL      RDW 13.3 %      RDW-SD 54.0 fl      MPV 9.6 fL      Platelets 390 10*3/mm3      Neutrophil % 63.7 %      Lymphocyte % 23.7 %      Monocyte % 10.0 %      Eosinophil % 1.5 %      Basophil % 0.7 %      Immature Grans % 0.4 %      Neutrophils, Absolute 7.97 10*3/mm3      Lymphocytes, Absolute 2.97 10*3/mm3      Monocytes, Absolute 1.25 (H) 10*3/mm3      Eosinophils, Absolute 0.19 10*3/mm3      Basophils, Absolute 0.09 10*3/mm3      Immature Grans, Absolute 0.05 (H) 10*3/mm3     Platelet Function ADP [[id number]]  (Normal) Collected:  03/21/18 0304    Specimen:  Blood Updated:  03/21/18 0318     ADP Aggregation, % Platelet 97 %     POC Glucose Once [[id number]]  (Abnormal) Collected:  03/20/18 1954    Specimen:   Blood Updated:  03/20/18 1956     Glucose 237 (H) mg/dL     Narrative:       Meter: [id number] : [id number] Hiram Trejo CNANTONIO            Principal Problem:    Nonrheumatic aortic valve stenosis  Active Problems:    Diabetic ketoacidosis without coma associated with type 1 diabetes mellitus    Type 1 diabetes mellitus    Tobacco abuse    Alcohol abuse    Insurance coverage problems    Bilateral carpal tunnel syndrome    Microalbuminuria    Continue insulin drip  Restart Lipitor when able to take oral medications well.  Will defer blood pressure management to cardiologist.

## 2018-03-21 NOTE — ANESTHESIA PROCEDURE NOTES
Central Line    Patient location during procedure: holding area  Indications: central pressure monitoring and vascular access  Staff  Anesthesiologist: KEVIN TIM  Preanesthetic Checklist  Completed: patient identified, site marked, surgical consent, pre-op evaluation, timeout performed, IV checked, risks and benefits discussed and monitors and equipment checked  Central Line Prep  Sterile Tech:cap, gloves, gown, mask and sterile barriers  Prep: chloraprep  Patient monitoring: blood pressure monitoring, continuous pulse oximetry and EKG  Central Line Procedure  Laterality:right  Location:internal jugular  Catheter Type:Beckley-Jose M, double lumen and Cordis  Catheter Size:9 Fr  Guidance:ultrasound guided  Assessment  Post procedure:biopatch applied, line sutured and occlusive dressing applied  Assessement:blood return through all ports, free fluid flow and chest x-ray ordered  Complications:no  Patient Tolerance:patient tolerated the procedure well with no apparent complications

## 2018-03-22 ENCOUNTER — APPOINTMENT (OUTPATIENT)
Dept: GENERAL RADIOLOGY | Facility: HOSPITAL | Age: 58
End: 2018-03-22

## 2018-03-22 LAB
ALBUMIN SERPL-MCNC: 4.1 G/DL (ref 3.5–5.2)
ANION GAP SERPL CALCULATED.3IONS-SCNC: 14.9 MMOL/L
BASOPHILS # BLD AUTO: 0.05 10*3/MM3 (ref 0–0.2)
BASOPHILS NFR BLD AUTO: 0.4 % (ref 0–1.5)
BUN BLD-MCNC: 12 MG/DL (ref 6–20)
BUN/CREAT SERPL: 21.1 (ref 7–25)
CALCIUM SPEC-SCNC: 8.6 MG/DL (ref 8.6–10.5)
CHLORIDE SERPL-SCNC: 100 MMOL/L (ref 98–107)
CO2 SERPL-SCNC: 25.1 MMOL/L (ref 22–29)
CREAT BLD-MCNC: 0.57 MG/DL (ref 0.76–1.27)
DEPRECATED RDW RBC AUTO: 54 FL (ref 37–54)
EOSINOPHIL # BLD AUTO: 0.01 10*3/MM3 (ref 0–0.7)
EOSINOPHIL NFR BLD AUTO: 0.1 % (ref 0.3–6.2)
ERYTHROCYTE [DISTWIDTH] IN BLOOD BY AUTOMATED COUNT: 13.2 % (ref 11.5–14.5)
GFR SERPL CREATININE-BSD FRML MDRD: 146 ML/MIN/1.73
GLUCOSE BLD-MCNC: 107 MG/DL (ref 65–99)
GLUCOSE BLDC GLUCOMTR-MCNC: 108 MG/DL (ref 70–130)
GLUCOSE BLDC GLUCOMTR-MCNC: 229 MG/DL (ref 70–130)
GLUCOSE BLDC GLUCOMTR-MCNC: 232 MG/DL (ref 70–130)
GLUCOSE BLDC GLUCOMTR-MCNC: 298 MG/DL (ref 70–130)
GLUCOSE BLDC GLUCOMTR-MCNC: 359 MG/DL (ref 70–130)
GLUCOSE BLDC GLUCOMTR-MCNC: 379 MG/DL (ref 70–130)
GLUCOSE BLDC GLUCOMTR-MCNC: 52 MG/DL (ref 70–130)
GLUCOSE BLDC GLUCOMTR-MCNC: 67 MG/DL (ref 70–130)
GLUCOSE BLDC GLUCOMTR-MCNC: 91 MG/DL (ref 70–130)
HCT VFR BLD AUTO: 25.2 % (ref 40.4–52.2)
HGB BLD-MCNC: 8.1 G/DL (ref 13.7–17.6)
IMM GRANULOCYTES # BLD: 0.03 10*3/MM3 (ref 0–0.03)
IMM GRANULOCYTES NFR BLD: 0.2 % (ref 0–0.5)
INR PPP: 1.1 (ref 0.9–1.1)
LYMPHOCYTES # BLD AUTO: 0.45 10*3/MM3 (ref 0.9–4.8)
LYMPHOCYTES NFR BLD AUTO: 3.3 % (ref 19.6–45.3)
MAGNESIUM SERPL-MCNC: 2.4 MG/DL (ref 1.6–2.6)
MCH RBC QN AUTO: 36 PG (ref 27–32.7)
MCHC RBC AUTO-ENTMCNC: 32.1 G/DL (ref 32.6–36.4)
MCV RBC AUTO: 112 FL (ref 79.8–96.2)
MONOCYTES # BLD AUTO: 0.73 10*3/MM3 (ref 0.2–1.2)
MONOCYTES NFR BLD AUTO: 5.4 % (ref 5–12)
NEUTROPHILS # BLD AUTO: 12.28 10*3/MM3 (ref 1.9–8.1)
NEUTROPHILS NFR BLD AUTO: 90.6 % (ref 42.7–76)
PHOSPHATE SERPL-MCNC: 4.1 MG/DL (ref 2.5–4.5)
PLATELET # BLD AUTO: 218 10*3/MM3 (ref 140–500)
PMV BLD AUTO: 9.5 FL (ref 6–12)
POTASSIUM BLD-SCNC: 4.3 MMOL/L (ref 3.5–5.2)
PROTHROMBIN TIME: 14.1 SECONDS (ref 11.7–14.2)
RBC # BLD AUTO: 2.25 10*6/MM3 (ref 4.6–6)
SODIUM BLD-SCNC: 140 MMOL/L (ref 136–145)
WBC NRBC COR # BLD: 13.55 10*3/MM3 (ref 4.5–10.7)

## 2018-03-22 PROCEDURE — 25010000002 MAGNESIUM SULFATE IN D5W 1G/100ML (PREMIX) 1-5 GM/100ML-% SOLUTION: Performed by: THORACIC SURGERY (CARDIOTHORACIC VASCULAR SURGERY)

## 2018-03-22 PROCEDURE — 82962 GLUCOSE BLOOD TEST: CPT

## 2018-03-22 PROCEDURE — 63710000001 INSULIN ASPART PER 5 UNITS: Performed by: INTERNAL MEDICINE

## 2018-03-22 PROCEDURE — 85610 PROTHROMBIN TIME: CPT | Performed by: THORACIC SURGERY (CARDIOTHORACIC VASCULAR SURGERY)

## 2018-03-22 PROCEDURE — 83735 ASSAY OF MAGNESIUM: CPT | Performed by: THORACIC SURGERY (CARDIOTHORACIC VASCULAR SURGERY)

## 2018-03-22 PROCEDURE — 99232 SBSQ HOSP IP/OBS MODERATE 35: CPT | Performed by: INTERNAL MEDICINE

## 2018-03-22 PROCEDURE — 85025 COMPLETE CBC W/AUTO DIFF WBC: CPT | Performed by: THORACIC SURGERY (CARDIOTHORACIC VASCULAR SURGERY)

## 2018-03-22 PROCEDURE — 97110 THERAPEUTIC EXERCISES: CPT

## 2018-03-22 PROCEDURE — 25010000003 CEFAZOLIN IN DEXTROSE 2-4 GM/100ML-% SOLUTION: Performed by: THORACIC SURGERY (CARDIOTHORACIC VASCULAR SURGERY)

## 2018-03-22 PROCEDURE — 94799 UNLISTED PULMONARY SVC/PX: CPT

## 2018-03-22 PROCEDURE — 99024 POSTOP FOLLOW-UP VISIT: CPT | Performed by: THORACIC SURGERY (CARDIOTHORACIC VASCULAR SURGERY)

## 2018-03-22 PROCEDURE — 25010000002 METOCLOPRAMIDE PER 10 MG: Performed by: THORACIC SURGERY (CARDIOTHORACIC VASCULAR SURGERY)

## 2018-03-22 PROCEDURE — 97162 PT EVAL MOD COMPLEX 30 MIN: CPT

## 2018-03-22 PROCEDURE — 71045 X-RAY EXAM CHEST 1 VIEW: CPT

## 2018-03-22 PROCEDURE — 93005 ELECTROCARDIOGRAM TRACING: CPT | Performed by: THORACIC SURGERY (CARDIOTHORACIC VASCULAR SURGERY)

## 2018-03-22 PROCEDURE — 25010000002 ENOXAPARIN PER 10 MG: Performed by: THORACIC SURGERY (CARDIOTHORACIC VASCULAR SURGERY)

## 2018-03-22 PROCEDURE — 63710000001 INSULIN DETEMER PER 5 UNITS: Performed by: INTERNAL MEDICINE

## 2018-03-22 PROCEDURE — 80069 RENAL FUNCTION PANEL: CPT | Performed by: THORACIC SURGERY (CARDIOTHORACIC VASCULAR SURGERY)

## 2018-03-22 PROCEDURE — 25010000002 FUROSEMIDE PER 20 MG: Performed by: NURSE PRACTITIONER

## 2018-03-22 PROCEDURE — 93010 ELECTROCARDIOGRAM REPORT: CPT | Performed by: INTERNAL MEDICINE

## 2018-03-22 RX ORDER — FUROSEMIDE 10 MG/ML
20 INJECTION INTRAMUSCULAR; INTRAVENOUS ONCE
Status: COMPLETED | OUTPATIENT
Start: 2018-03-22 | End: 2018-03-22

## 2018-03-22 RX ORDER — POTASSIUM CHLORIDE 750 MG/1
10 CAPSULE, EXTENDED RELEASE ORAL ONCE
Status: COMPLETED | OUTPATIENT
Start: 2018-03-22 | End: 2018-03-22

## 2018-03-22 RX ORDER — AMIODARONE HYDROCHLORIDE 200 MG/1
200 TABLET ORAL EVERY 12 HOURS SCHEDULED
Status: DISCONTINUED | OUTPATIENT
Start: 2018-03-22 | End: 2018-03-26 | Stop reason: HOSPADM

## 2018-03-22 RX ADMIN — HYDROCODONE BITARTRATE AND ACETAMINOPHEN 2 TABLET: 5; 325 TABLET ORAL at 04:09

## 2018-03-22 RX ADMIN — POTASSIUM CHLORIDE 10 MEQ: 750 CAPSULE, EXTENDED RELEASE ORAL at 09:15

## 2018-03-22 RX ADMIN — METOPROLOL TARTRATE 12.5 MG: 25 TABLET ORAL at 20:54

## 2018-03-22 RX ADMIN — AMIODARONE HYDROCHLORIDE 200 MG: 200 TABLET ORAL at 09:17

## 2018-03-22 RX ADMIN — METOCLOPRAMIDE 10 MG: 5 INJECTION, SOLUTION INTRAMUSCULAR; INTRAVENOUS at 12:02

## 2018-03-22 RX ADMIN — MUPIROCIN: 20 OINTMENT TOPICAL at 09:09

## 2018-03-22 RX ADMIN — ASPIRIN 81 MG: 81 TABLET ORAL at 09:09

## 2018-03-22 RX ADMIN — HYDROCODONE BITARTRATE AND ACETAMINOPHEN 2 TABLET: 5; 325 TABLET ORAL at 16:05

## 2018-03-22 RX ADMIN — HYDROCODONE BITARTRATE AND ACETAMINOPHEN 2 TABLET: 5; 325 TABLET ORAL at 12:02

## 2018-03-22 RX ADMIN — CEFAZOLIN SODIUM 2 G: 2 INJECTION, SOLUTION INTRAVENOUS at 06:35

## 2018-03-22 RX ADMIN — METOCLOPRAMIDE 10 MG: 5 INJECTION, SOLUTION INTRAMUSCULAR; INTRAVENOUS at 06:33

## 2018-03-22 RX ADMIN — AMIODARONE HYDROCHLORIDE 200 MG: 200 TABLET ORAL at 20:52

## 2018-03-22 RX ADMIN — METOPROLOL TARTRATE 12.5 MG: 25 TABLET ORAL at 09:09

## 2018-03-22 RX ADMIN — PANTOPRAZOLE SODIUM 40 MG: 40 TABLET, DELAYED RELEASE ORAL at 06:35

## 2018-03-22 RX ADMIN — ENOXAPARIN SODIUM 40 MG: 40 INJECTION SUBCUTANEOUS at 19:20

## 2018-03-22 RX ADMIN — FUROSEMIDE 20 MG: 10 INJECTION, SOLUTION INTRAMUSCULAR; INTRAVENOUS at 09:15

## 2018-03-22 RX ADMIN — CEFAZOLIN SODIUM 2 G: 2 INJECTION, SOLUTION INTRAVENOUS at 16:05

## 2018-03-22 RX ADMIN — INSULIN ASPART 2 UNITS: 100 INJECTION, SOLUTION INTRAVENOUS; SUBCUTANEOUS at 16:34

## 2018-03-22 RX ADMIN — INSULIN DETEMIR 7 UNITS: 100 INJECTION, SOLUTION SUBCUTANEOUS at 20:58

## 2018-03-22 RX ADMIN — HYDROCODONE BITARTRATE AND ACETAMINOPHEN 2 TABLET: 5; 325 TABLET ORAL at 07:53

## 2018-03-22 RX ADMIN — CHLORHEXIDINE GLUCONATE 15 ML: 1.2 RINSE ORAL at 16:34

## 2018-03-22 RX ADMIN — HYDROCODONE BITARTRATE AND ACETAMINOPHEN 2 TABLET: 5; 325 TABLET ORAL at 20:51

## 2018-03-22 RX ADMIN — INSULIN ASPART 2 UNITS: 100 INJECTION, SOLUTION INTRAVENOUS; SUBCUTANEOUS at 12:02

## 2018-03-22 RX ADMIN — MAGNESIUM SULFATE HEPTAHYDRATE 1 G: 1 INJECTION, SOLUTION INTRAVENOUS at 04:09

## 2018-03-22 RX ADMIN — INSULIN ASPART 5 UNITS: 100 INJECTION, SOLUTION INTRAVENOUS; SUBCUTANEOUS at 20:55

## 2018-03-22 RX ADMIN — MUPIROCIN: 20 OINTMENT TOPICAL at 21:00

## 2018-03-22 RX ADMIN — INSULIN ASPART 4 UNITS: 100 INJECTION, SOLUTION INTRAVENOUS; SUBCUTANEOUS at 12:03

## 2018-03-22 RX ADMIN — INSULIN ASPART 3 UNITS: 100 INJECTION, SOLUTION INTRAVENOUS; SUBCUTANEOUS at 16:34

## 2018-03-22 RX ADMIN — DOCUSATE SODIUM -SENNOSIDES 2 TABLET: 50; 8.6 TABLET, COATED ORAL at 20:53

## 2018-03-22 RX ADMIN — INSULIN DETEMIR 12 UNITS: 100 INJECTION, SOLUTION SUBCUTANEOUS at 13:30

## 2018-03-22 RX ADMIN — CEFAZOLIN SODIUM 2 G: 2 INJECTION, SOLUTION INTRAVENOUS at 23:42

## 2018-03-22 RX ADMIN — CHLORHEXIDINE GLUCONATE 15 ML: 1.2 RINSE ORAL at 04:10

## 2018-03-22 RX ADMIN — ATORVASTATIN CALCIUM 20 MG: 20 TABLET, FILM COATED ORAL at 20:52

## 2018-03-22 NOTE — PROGRESS NOTES
"Patient Name: Juan Payne  :1960  57 y.o.      Patient Care Team:  No Known Provider as PCP - General    Interval History:   Yesterday he underwent aortic valve replacement.    Subjective:  Following for aortic stenosis.     Objective   Vital Signs  Temp:  [98.8 °F (37.1 °C)] 98.8 °F (37.1 °C)  Heart Rate:  [59-80] 71  Resp:  [13-20] 15  BP: ()/(36-86) 121/59  Arterial Line BP: ()/(38-59) 126/55  FiO2 (%):  [40 %-100 %] 40 %    Intake/Output Summary (Last 24 hours) at 18 0823  Last data filed at 18 0602   Gross per 24 hour   Intake           3680.2 ml   Output             5230 ml   Net          -1549.8 ml     Flowsheet Rows    Flowsheet Row First Filed Value   Admission Height 172.7 cm (68\") Documented at 2018 1637   Admission Weight 63.5 kg (140 lb) Documented at 2018 1637          Physical Exam:   General Appearance:    Alert, cooperative, in no acute distress   Lungs:     Clear to auscultation.  Normal respiratory effort and rate.      Heart:    Regular rhythm and normal rate, normal S1 and S2, no murmurs, gallops or rubs.     Chest Wall:    Healing well    Abdomen:     Soft, nontender, positive bowel sounds.     Extremities:   no cyanosis, clubbing or edema.  No marked joint deformities.  Adequate musculoskeletal strength.       Results Review:      Results from last 7 days  Lab Units 18  0305   SODIUM mmol/L 140   POTASSIUM mmol/L 4.3   CHLORIDE mmol/L 100   CO2 mmol/L 25.1   BUN mg/dL 12   CREATININE mg/dL 0.57*   GLUCOSE mg/dL 107*   CALCIUM mg/dL 8.6           Results from last 7 days  Lab Units 18  0305   WBC 10*3/mm3 13.55*   HEMOGLOBIN g/dL 8.1*   HEMATOCRIT % 25.2*   PLATELETS 10*3/mm3 218       Results from last 7 days  Lab Units 18  0305 18  1710 18  1545   INR  1.10 1.16* 1.3*   APTT seconds  --  36.1*  --        Results from last 7 days  Lab Units 18  0605   CHOLESTEROL mg/dL 173       Results from last 7 days  Lab Units " 03/22/18  0305   MAGNESIUM mg/dL 2.4       Results from last 7 days  Lab Units 03/17/18  0605   CHOLESTEROL mg/dL 173   TRIGLYCERIDES mg/dL 118   HDL CHOL mg/dL 46   LDL CHOL mg/dL 103*         Medication Review:     amiodarone 200 mg Oral Q12H   aspirin 81 mg Oral Daily   atorvastatin 20 mg Oral Nightly   ceFAZolin 2 g Intravenous Q8H   chlorhexidine 15 mL Mouth/Throat Q12H   enoxaparin 40 mg Subcutaneous Daily   furosemide 20 mg Intravenous Once   insulin aspart 0-12 Units Subcutaneous 4x Daily AC & at Bedtime   insulin aspart 2 Units Subcutaneous TID With Meals   insulin detemir 12 Units Subcutaneous QAM   insulin detemir 7 Units Subcutaneous Nightly   magnesium sulfate 1 g Intravenous Q8H   metoclopramide 10 mg Intravenous Q6H   metoprolol tartrate 12.5 mg Oral Q12H   mupirocin  Each Nare BID   pantoprazole 40 mg Oral QAM   potassium chloride 10 mEq Oral Once   sennosides-docusate sodium 2 tablet Oral Nightly          clevidipine 2-32 mg/hr    dexmedetomidine 0.2-1.5 mcg/kg/hr Last Rate: Stopped (03/22/18 0400)   DOPamine 2-20 mcg/kg/min    EPINEPHrine 0.02-0.3 mcg/kg/min    insulin regular infusion 1 unit/mL (CCU use) 0-50 Units/hr Last Rate: Stopped (03/22/18 0113)   milrinone 0.25-0.75 mcg/kg/min    niCARdipine 5-15 mg/hr Last Rate: Stopped (03/21/18 2235)   nitroglycerin 5-200 mcg/min    norepinephrine 0.02-0.3 mcg/kg/min    phenylephrine 0.2-3 mcg/kg/min Last Rate: Stopped (03/22/18 0300)   propofol 5-50 mcg/kg/min Last Rate: Stopped (03/21/18 1800)   sodium chloride 30 mL/hr Last Rate: 30 mL/hr (03/21/18 1700)   sodium chloride 30 mL/hr Last Rate: 30 mL/hr (03/21/18 1645)   vasopressin 0.02-0.1 Units/min        Assessment/Plan     1.  Aortic stenosis.  Postoperative day one and aortic valve replacement with a 21 mm magna pericardial prosthesis.  2.  Diabetes.  3.  Tobacco use  4.  Alcohol use  5.  Prophylactic amiodarone   6.  Anemia.    Looks good today.  Transfer to CVU    Rani Rutledge MD,  [facility]  03/22/18  8:23 AM

## 2018-03-22 NOTE — PLAN OF CARE
Problem: Patient Care Overview  Goal: Plan of Care Review  Outcome: Ongoing (interventions implemented as appropriate)   03/22/18 1450   Coping/Psychosocial   Plan of Care Reviewed With patient   OTHER   Outcome Summary Pt is a 56 y/o M s/p AVR. Mild fatigue present w/ ambulation. Impaired balance w/ turns noted. Pt alert and cooperative. Overall impairments present in functional mobility, endurance and gait which will benefit from skilled PT services. Pt to D/C home pending completion of all functional goals.

## 2018-03-22 NOTE — PROGRESS NOTES
" LOS: 14 days   Patient Care Team:  No Known Provider as PCP - General    Chief Complaint: post op AVR    Subjective:  Symptoms:  No shortness of breath or chest pain.    Diet:  NPO.    Activity level: Impaired due to weakness.    Pain:  He complains of pain that is mild.  He reports pain is improving.  Pain is requiring pain medication.          Vital Signs  Temp:  [98.8 °F (37.1 °C)] 98.8 °F (37.1 °C)  Heart Rate:  [59-80] 74  Resp:  [13-20] 13  BP: ()/(36-86) 105/86  Arterial Line BP: ()/(38-59) 109/43  FiO2 (%):  [40 %-100 %] 40 %  Body mass index is 22.5 kg/m².    Intake/Output Summary (Last 24 hours) at 03/22/18 0752  Last data filed at 03/22/18 0602   Gross per 24 hour   Intake           3680.2 ml   Output             5230 ml   Net          -1549.8 ml     No intake/output data recorded.    Chest tube drainage last 8 hours 60    1    03/21/18  1645 03/21/18  1910 03/21/18  2205   Weight: 67.1 kg (148 lb) 67.1 kg (148 lb) 67.1 kg (148 lb)         Objective:  General Appearance:  In no acute distress.    Vital signs: (most recent): Blood pressure 105/86, pulse 74, temperature 98.8 °F (37.1 °C), temperature source Oral, resp. rate 13, height 172.7 cm (68\"), weight 67.1 kg (148 lb), SpO2 100 %.  Vital signs are normal.    Output: Producing urine and no stool output.    HEENT: Normal HEENT exam.    Lungs:  Normal effort and normal respiratory rate.  Breath sounds clear to auscultation.    Heart: Normal rate.  Regular rhythm.  S1 normal and S2 normal.    Abdomen: Abdomen is soft.  Hyperactive bowel sounds.   There is no abdominal tenderness.     Extremities: Normal range of motion.    Pulses: Distal pulses are intact.    Neurological: Patient is alert and oriented to person, place and time.  Normal strength.    Pupils:  Pupils are equal, round, and reactive to light.    Skin:  Warm, dry and pale.              Results Review:        WBC WBC   Date Value Ref Range Status   03/22/2018 13.55 (H) 4.50 - 10.70 " 10*3/mm3 Final   03/21/2018 17.18 (H) 4.50 - 10.70 10*3/mm3 Final   03/21/2018 15.86 (H) 4.50 - 10.70 10*3/mm3 Final   03/21/2018 12.52 (H) 4.50 - 10.70 10*3/mm3 Final   03/20/2018 12.55 (H) 4.50 - 10.70 10*3/mm3 Final      HGB Hemoglobin   Date Value Ref Range Status   03/22/2018 8.1 (L) 13.7 - 17.6 g/dL Final   03/21/2018 8.6 (L) 13.7 - 17.6 g/dL Final   03/21/2018 8.3 (L) 13.7 - 17.6 g/dL Final   03/21/2018 8.2 (L) 12.0 - 17.0 g/dL Final   03/21/2018 8.8 (L) 12.0 - 17.0 g/dL Final   03/21/2018 8.8 (L) 12.0 - 17.0 g/dL Final   03/21/2018 8.8 (L) 12.0 - 17.0 g/dL Final   03/21/2018 8.8 (L) 12.0 - 17.0 g/dL Final   03/21/2018 8.8 (L) 12.0 - 17.0 g/dL Final   03/21/2018 9.2 (L) 13.7 - 17.6 g/dL Final   03/20/2018 9.5 (L) 13.7 - 17.6 g/dL Final      HCT Hematocrit   Date Value Ref Range Status   03/22/2018 25.2 (L) 40.4 - 52.2 % Final   03/21/2018 26.5 (L) 40.4 - 52.2 % Final   03/21/2018 25.6 (L) 40.4 - 52.2 % Final   03/21/2018 24 (L) 38 - 51 % Final   03/21/2018 26 (L) 38 - 51 % Final   03/21/2018 26 (L) 38 - 51 % Final   03/21/2018 26 (L) 38 - 51 % Final   03/21/2018 26 (L) 38 - 51 % Final   03/21/2018 26 (L) 38 - 51 % Final   03/21/2018 28.4 (L) 40.4 - 52.2 % Final   03/20/2018 29.3 (L) 40.4 - 52.2 % Final      Platelets Platelets   Date Value Ref Range Status   03/22/2018 218 140 - 500 10*3/mm3 Final   03/21/2018 202 140 - 500 10*3/mm3 Final   03/21/2018 183 140 - 500 10*3/mm3 Final   03/21/2018 390 140 - 500 10*3/mm3 Final   03/20/2018 400 140 - 500 10*3/mm3 Final        PT/INR:    Protime   Date Value Ref Range Status   03/22/2018 14.1 11.7 - 14.2 Seconds Final   03/21/2018 14.6 (H) 11.7 - 14.2 Seconds Final   03/21/2018 15.0 12.8 - 15.2 seconds Final     Comment:     Serial Number: [id number]:  [id number]   /  INR   Date Value Ref Range Status   03/22/2018 1.10 0.90 - 1.10 Final   03/21/2018 1.16 (H) 0.90 - 1.10 Final   03/21/2018 1.3 (H) 0.8 - 1.2 Final       Sodium Sodium   Date Value Ref Range Status    03/22/2018 140 136 - 145 mmol/L Final   03/21/2018 141 136 - 145 mmol/L Final   03/21/2018 140 136 - 145 mmol/L Final   03/21/2018 140 136 - 145 mmol/L Final   03/20/2018 138 136 - 145 mmol/L Final      Potassium Potassium   Date Value Ref Range Status   03/22/2018 4.3 3.5 - 5.2 mmol/L Final   03/21/2018 4.7 3.5 - 5.2 mmol/L Final   03/21/2018 3.3 (L) 3.5 - 5.2 mmol/L Final   03/21/2018 3.9 3.5 - 5.2 mmol/L Final   03/20/2018 3.7 3.5 - 5.2 mmol/L Final      Chloride Chloride   Date Value Ref Range Status   03/22/2018 100 98 - 107 mmol/L Final   03/21/2018 102 98 - 107 mmol/L Final   03/21/2018 102 98 - 107 mmol/L Final   03/21/2018 102 98 - 107 mmol/L Final   03/20/2018 100 98 - 107 mmol/L Final      Bicarbonate CO2   Date Value Ref Range Status   03/22/2018 25.1 22.0 - 29.0 mmol/L Final   03/21/2018 24.5 22.0 - 29.0 mmol/L Final   03/21/2018 25.7 22.0 - 29.0 mmol/L Final   03/21/2018 27.1 22.0 - 29.0 mmol/L Final   03/20/2018 28.8 22.0 - 29.0 mmol/L Final      BUN BUN   Date Value Ref Range Status   03/22/2018 12 6 - 20 mg/dL Final   03/21/2018 11 6 - 20 mg/dL Final   03/21/2018 12 6 - 20 mg/dL Final   03/21/2018 12 6 - 20 mg/dL Final   03/20/2018 10 6 - 20 mg/dL Final      Creatinine Creatinine   Date Value Ref Range Status   03/22/2018 0.57 (L) 0.76 - 1.27 mg/dL Final   03/21/2018 0.67 (L) 0.76 - 1.27 mg/dL Final   03/21/2018 0.78 0.76 - 1.27 mg/dL Final   03/21/2018 0.60 (L) 0.76 - 1.27 mg/dL Final   03/20/2018 0.51 (L) 0.76 - 1.27 mg/dL Final      Calcium Calcium   Date Value Ref Range Status   03/22/2018 8.6 8.6 - 10.5 mg/dL Final   03/21/2018 9.0 8.6 - 10.5 mg/dL Final   03/21/2018 9.1 8.6 - 10.5 mg/dL Final   03/21/2018 8.7 8.6 - 10.5 mg/dL Final   03/20/2018 8.4 (L) 8.6 - 10.5 mg/dL Final      Magnesium Magnesium   Date Value Ref Range Status   03/22/2018 2.4 1.6 - 2.6 mg/dL Final   03/21/2018 2.5 1.6 - 2.6 mg/dL Final   03/21/2018 2.8 (H) 1.6 - 2.6 mg/dL Final            aspirin 81 mg Oral Daily    atorvastatin 20 mg Oral Nightly   ceFAZolin 2 g Intravenous Q8H   chlorhexidine 15 mL Mouth/Throat Q12H   enoxaparin 40 mg Subcutaneous Daily   magnesium sulfate 1 g Intravenous Q8H   metoclopramide 10 mg Intravenous Q6H   metoprolol tartrate 12.5 mg Oral Q12H   mupirocin  Each Nare BID   pantoprazole 40 mg Oral QAM   sennosides-docusate sodium 2 tablet Oral Nightly       clevidipine 2-32 mg/hr    dexmedetomidine 0.2-1.5 mcg/kg/hr Last Rate: Stopped (03/22/18 0400)   DOPamine 2-20 mcg/kg/min    EPINEPHrine 0.02-0.3 mcg/kg/min    insulin regular infusion 1 unit/mL (CCU use) 0-50 Units/hr Last Rate: Stopped (03/22/18 0113)   milrinone 0.25-0.75 mcg/kg/min    niCARdipine 5-15 mg/hr Last Rate: Stopped (03/21/18 2235)   nitroglycerin 5-200 mcg/min    norepinephrine 0.02-0.3 mcg/kg/min    phenylephrine 0.2-3 mcg/kg/min Last Rate: Stopped (03/22/18 0300)   propofol 5-50 mcg/kg/min Last Rate: Stopped (03/21/18 1800)   sodium chloride 30 mL/hr Last Rate: 30 mL/hr (03/21/18 1700)   sodium chloride 30 mL/hr Last Rate: 30 mL/hr (03/21/18 1645)   vasopressin 0.02-0.1 Units/min            Patient Active Problem List   Diagnosis Code   • Diabetic ketoacidosis without coma associated with type 1 diabetes mellitus E10.10   • Type 1 diabetes mellitus E10.9   • Tobacco abuse Z72.0   • Alcohol abuse F10.10   • Insurance coverage problems Z59.8   • Bilateral carpal tunnel syndrome G56.03   • Microalbuminuria R80.9   • Nonrheumatic aortic valve stenosis I35.0       Assessment & Plan    -Severe aortic stenosis- s/p AVR POD#1- PAGNI  -Severe concentric hypertrophy  -Left ventricular diastolic dysfunction   -preserved EF  -DM type 1  -DKA w/o coma- resolved  -Tobacco abuse  -ETOH  -Poor Compliance  -Poor nutrition  -Post op anemia expected ABL    EKG NSR    Discontinue jc leger.  Mobilize, encourage IS  Transfer to [facility], [facility]  03/22/18  7:52 AM

## 2018-03-22 NOTE — PROGRESS NOTES
"  ENDOCRINE    Subjective    and PLANS  Juan Payne is a 57 y.o. male.     Follow-up diabetes and related disorders    Extubated.  Off pressors and insulin drip.  No nausea or vomiting.  Moderate postop pain.  Start Levemir 12 units every morning and 7 units at bedtime.  Start NovoLog 2 units with each meal plus one unit per 50 above 120.    Objective   /86   Pulse 74   Temp 98.8 °F (37.1 °C) (Oral)   Resp 13   Ht 172.7 cm (68\")   Wt 67.1 kg (148 lb)   SpO2 100%   BMI 22.50 kg/m²   Physical Exam    Awake, alert, not in distress.  Few rhonchi.  Regular heart rate and rhythm.  Abdomen soft, nontender.  Extremities warm.  No cyanosis or pedal edema.    Lab Results (last 24 hours)     Procedure Component Value Units Date/Time    POC Glucose Once [[id number]]  (Abnormal) Collected:  03/22/18 0744    Specimen:  Blood Updated:  03/22/18 0746     Glucose 232 (H) mg/dL     Narrative:       Meter: [id number] : [id number] Fred Ramirez RN    POC Glucose Once [[id number]]  (Abnormal) Collected:  03/22/18 0536    Specimen:  Blood Updated:  03/22/18 0537     Glucose 229 (H) mg/dL     Narrative:       Meter: [id number] : [id number] Haylie Stringer RN    Renal Function Panel [[id number]]  (Abnormal) Collected:  03/22/18 0305    Specimen:  Blood Updated:  03/22/18 0345     Glucose 107 (H) mg/dL      BUN 12 mg/dL      Creatinine 0.57 (L) mg/dL      Sodium 140 mmol/L      Potassium 4.3 mmol/L      Chloride 100 mmol/L      CO2 25.1 mmol/L      Calcium 8.6 mg/dL      Albumin 4.10 g/dL      Phosphorus 4.1 mg/dL      Anion Gap 14.9 mmol/L      BUN/Creatinine Ratio 21.1     eGFR Non African Amer 146 mL/min/1.73     Magnesium [[id number]]  (Normal) Collected:  03/22/18 0305    Specimen:  Blood Updated:  03/22/18 0345     Magnesium 2.4 mg/dL     CBC & Differential [[id number]] Collected:  03/22/18 0305    Specimen:  Blood Updated:  03/22/18 0337    Narrative:       The following orders were created for panel order CBC & " Differential.  Procedure                               Abnormality         Status                     ---------                               -----------         ------                     Scan Slide[[id number]]                                                                  CBC Auto Differential[[id number]]        Abnormal            Final result                 Please view results for these tests on the individual orders.    CBC Auto Differential [[id number]]  (Abnormal) Collected:  03/22/18 0305    Specimen:  Blood Updated:  03/22/18 0337     WBC 13.55 (H) 10*3/mm3      RBC 2.25 (L) 10*6/mm3      Hemoglobin 8.1 (L) g/dL      Hematocrit 25.2 (L) %      .0 (H) fL      MCH 36.0 (H) pg      MCHC 32.1 (L) g/dL      RDW 13.2 %      RDW-SD 54.0 fl      MPV 9.5 fL      Platelets 218 10*3/mm3      Neutrophil % 90.6 (H) %      Lymphocyte % 3.3 (L) %      Monocyte % 5.4 %      Eosinophil % 0.1 (L) %      Basophil % 0.4 %      Immature Grans % 0.2 %      Neutrophils, Absolute 12.28 (H) 10*3/mm3      Lymphocytes, Absolute 0.45 (L) 10*3/mm3      Monocytes, Absolute 0.73 10*3/mm3      Eosinophils, Absolute 0.01 10*3/mm3      Basophils, Absolute 0.05 10*3/mm3      Immature Grans, Absolute 0.03 10*3/mm3     Protime-INR [[id number]]  (Normal) Collected:  03/22/18 0305    Specimen:  Blood Updated:  03/22/18 0332     Protime 14.1 Seconds      INR 1.10    POC Glucose Once [[id number]]  (Normal) Collected:  03/22/18 0259    Specimen:  Blood Updated:  03/22/18 0300     Glucose 108 mg/dL     Narrative:       Meter: [id number] : [id number] Haylie Stringer RN    POC Glucose Once [[id number]]  (Normal) Collected:  03/22/18 0219    Specimen:  Blood Updated:  03/22/18 0221     Glucose 91 mg/dL     Narrative:       Meter: [id number] : [id number] Haylie Stringer RN    POC Glucose Once [[id number]]  (Abnormal) Collected:  03/22/18 0201    Specimen:  Blood Updated:  03/22/18 0202     Glucose 52 (L) mg/dL     Narrative:       Meter:  [id number] : [id number] Haylie Stringer RN    POC Glucose Once [[id number]]  (Abnormal) Collected:  03/22/18 0112    Specimen:  Blood Updated:  03/22/18 0114     Glucose 67 (L) mg/dL     Narrative:       Meter: [id number] : [id number] Haylie Stringer RN    POC Glucose Once [[id number]]  (Normal) Collected:  03/21/18 2340    Specimen:  Blood Updated:  03/21/18 2341     Glucose 128 mg/dL     Narrative:       Meter: [id number] : [id number] Haylie Stringer RN    Tissue Pathology Exam [[id number]] Collected:  03/21/18 1456    Specimen:  Tissue from Aortic valve Updated:  03/21/18 2316    POC Glucose Once [[id number]]  (Abnormal) Collected:  03/21/18 2211    Specimen:  Blood Updated:  03/21/18 2220     Glucose 181 (H) mg/dL     Narrative:       Meter: [id number] : [id number] Thais Leigh RN    Renal Function Panel [[id number]]  (Abnormal) Collected:  03/21/18 2115    Specimen:  Blood Updated:  03/21/18 2151     Glucose 204 (H) mg/dL      BUN 11 mg/dL      Creatinine 0.67 (L) mg/dL      Sodium 141 mmol/L      Potassium 4.7 mmol/L      Chloride 102 mmol/L      CO2 24.5 mmol/L      Calcium 9.0 mg/dL      Albumin 4.40 g/dL      Phosphorus 3.9 mg/dL      Anion Gap 14.5 mmol/L      BUN/Creatinine Ratio 16.4     eGFR Non African Amer 121 mL/min/1.73     Magnesium [[id number]]  (Normal) Collected:  03/21/18 2115    Specimen:  Blood Updated:  03/21/18 2148     Magnesium 2.5 mg/dL     CBC (No Diff) [[id number]]  (Abnormal) Collected:  03/21/18 2115    Specimen:  Blood Updated:  03/21/18 2136     WBC 17.18 (H) 10*3/mm3      RBC 2.37 (L) 10*6/mm3      Hemoglobin 8.6 (L) g/dL      Hematocrit 26.5 (L) %      .8 (H) fL      MCH 36.3 (H) pg      MCHC 32.5 (L) g/dL      RDW 13.3 %      RDW-SD 53.7 fl      MPV 9.1 fL      Platelets 202 10*3/mm3     POC Glucose Once [[id number]]  (Abnormal) Collected:  03/21/18 2113    Specimen:  Blood Updated:  03/21/18 2116     Glucose 196 (H) mg/dL     Narrative:       Meter: [id number] :  [id number] Thais Leigh RN    POC Glucose Once [[id number]]  (Normal) Collected:  03/21/18 1950    Specimen:  Blood Updated:  03/21/18 1959     Glucose 114 mg/dL     Narrative:       Meter: [id number] : [id number] Thais Leigh RN    Blood Gas, Arterial [[id number]]  (Abnormal) Collected:  03/21/18 1950    Specimen:  Arterial Blood Updated:  03/21/18 1952     Site Arterial Line     Howard's Test N/A     pH, Arterial 7.360 pH units      pCO2, Arterial 47.0 (H) mm Hg      pO2, Arterial 105.9 (H) mm Hg      HCO3, Arterial 26.6 mmol/L      Base Excess, Arterial 0.8 mmol/L      O2 Saturation Calculated 97.8 %      A-a Gradiant 0.4 mmHg      Barometric Pressure for Blood Gas 752.4 mmHg      Modality Adult Vent     FIO2 40 %      Ventilator Mode PS     Set Tidal Volume 621     Rate 14 Breaths/minute      PEEP 5     PSV 6 cmH2O     Narrative:       sat 100 Meter: [id number] : [id number] Fareed DePaul    POC Glucose Once [[id number]]  (Normal) Collected:  03/21/18 1912    Specimen:  Blood Updated:  03/21/18 1922     Glucose 95 mg/dL     Narrative:       Meter: [id number] : [id number] Thais Leigh RN    POC Glucose Once [[id number]]  (Normal) Collected:  03/21/18 1842    Specimen:  Blood Updated:  03/21/18 1852     Glucose 116 mg/dL     Narrative:       Meter: [id number] : [id number] Thais Leigh RN    POC Glucose Once [[id number]]  (Abnormal) Collected:  03/21/18 1824    Specimen:  Blood Updated:  03/21/18 1835     Glucose 47 (C) mg/dL     Narrative:       Meter: [id number] : [id number] Thais Leigh RN    Magnesium [[id number]]  (Abnormal) Collected:  03/21/18 1710    Specimen:  Blood Updated:  03/21/18 1746     Magnesium 2.8 (H) mg/dL     Renal Function Panel [[id number]]  (Abnormal) Collected:  03/21/18 1710    Specimen:  Blood Updated:  03/21/18 1742     Glucose 101 (H) mg/dL      BUN 12 mg/dL      Creatinine 0.78 mg/dL      Sodium 140 mmol/L      Potassium 3.3 (L) mmol/L      Chloride 102 mmol/L      CO2 25.7 mmol/L       Calcium 9.1 mg/dL      Albumin 3.70 g/dL      Phosphorus 3.1 mg/dL      Anion Gap 12.3 mmol/L      BUN/Creatinine Ratio 15.4     eGFR Non African Amer 102 mL/min/1.73     Calcium, Ionized [[id number]]  (Normal) Collected:  03/21/18 1710    Specimen:  Blood Updated:  03/21/18 1736     Ionized Calcium 1.31 mmol/L      Ionized Calcium 5.2 mg/dL     Protime-INR [[id number]]  (Abnormal) Collected:  03/21/18 1710    Specimen:  Blood Updated:  03/21/18 1731     Protime 14.6 (H) Seconds      INR 1.16 (H)    aPTT [[id number]]  (Abnormal) Collected:  03/21/18 1710    Specimen:  Blood Updated:  03/21/18 1731     PTT 36.1 (H) seconds     Fibrinogen [[id number]]  (Abnormal) Collected:  03/21/18 1710    Specimen:  Blood Updated:  03/21/18 1731     Fibrinogen 492 (H) mg/dL     CBC & Differential [[id number]] Collected:  03/21/18 1710    Specimen:  Blood Updated:  03/21/18 1720    Narrative:       The following orders were created for panel order CBC & Differential.  Procedure                               Abnormality         Status                     ---------                               -----------         ------                     Scan Slide[[id number]]                                                                  CBC Auto Differential[[id number]]        Abnormal            Final result                 Please view results for these tests on the individual orders.    CBC Auto Differential [[id number]]  (Abnormal) Collected:  03/21/18 1710    Specimen:  Blood Updated:  03/21/18 1720     WBC 15.86 (H) 10*3/mm3      RBC 2.31 (L) 10*6/mm3      Hemoglobin 8.3 (L) g/dL      Hematocrit 25.6 (L) %      .8 (H) fL      MCH 35.9 (H) pg      MCHC 32.4 (L) g/dL      RDW 13.2 %      RDW-SD 52.9 fl      MPV 9.0 fL      Platelets 183 10*3/mm3      Neutrophil % 83.5 (H) %      Lymphocyte % 10.0 (L) %      Monocyte % 5.0 %      Eosinophil % 0.6 %      Basophil % 0.3 %      Immature Grans % 0.6 (H) %      Neutrophils, Absolute 13.25 (H)  10*3/mm3      Lymphocytes, Absolute 1.58 10*3/mm3      Monocytes, Absolute 0.80 10*3/mm3      Eosinophils, Absolute 0.10 10*3/mm3      Basophils, Absolute 0.04 10*3/mm3      Immature Grans, Absolute 0.09 (H) 10*3/mm3     Blood Gas, Arterial [[id number]]  (Abnormal) Collected:  03/21/18 1714    Specimen:  Arterial Blood Updated:  03/21/18 1716     Site Arterial Line     Howard's Test N/A     pH, Arterial 7.368 pH units      pCO2, Arterial 50.4 (H) mm Hg      pO2, Arterial 414.9 (H) mm Hg      HCO3, Arterial 29.0 (H) mmol/L      Base Excess, Arterial 3.2 (H) mmol/L      O2 Saturation Calculated 100.0 (H) %      A-a Gradiant 0.6 mmHg      Barometric Pressure for Blood Gas 751.4 mmHg      Modality Adult Vent     FIO2 100 %      Ventilator Mode VC     Set Tidal Volume 600     Set Mech Resp Rate 14     Rate 14 Breaths/minute      PEEP 5    Narrative:       Meter: [id number] : [id number] Kenya Cash    POC Glucose Once [[id number]]  (Normal) Collected:  03/21/18 1703    Specimen:  Blood Updated:  03/21/18 1711     Glucose 96 mg/dL     Narrative:       Meter: [id number] : [id number] Thais Leigh RN    POC Protime / INR [[id number]]  (Abnormal) Collected:  03/21/18 1545    Specimen:  Blood Updated:  03/21/18 1558     Protime 15.0 seconds      Comment: Serial Number: [id number]:  [id number]        INR 1.3 (H)    POC OR Panel, ISTAT [[id number]]  (Abnormal) Collected:  03/21/18 1542    Specimen:  Blood Updated:  03/21/18 1558     Potassium 3.5 mmol/L      Total CO2 29 mmol/L      Comment: Serial Number: [id number]:  [id number]        Hemoglobin 8.2 (L) g/dL      Hematocrit 24 (L) %      pH, Arterial 7.34 (L) pH units      HCO3, Arterial 27.4 (H) mmol/L      Base Excess 2.0000 mmol/L      O2 Saturation, Arterial 100 (H) %      pO2, Arterial 272 (H) mmHg      pCO2, Arterial 50.8 (H) mm Hg      Glucose 205 (H) mg/dL     POC Activated Clotting Time [[id number]]  (Abnormal) Collected:  03/21/18 1515    Specimen:  Blood Updated:   03/21/18 1558     Activated Clotting Time  318 (H) Seconds      Comment: Serial Number: [id number]:  [id number]       POC Activated Clotting Time [[id number]]  (Abnormal) Collected:  03/21/18 1449    Specimen:  Blood Updated:  03/21/18 1558     Activated Clotting Time  307 (H) Seconds      Comment: Serial Number: [id number]:  [id number]       POC Activated Clotting Time [[id number]]  (Abnormal) Collected:  03/21/18 1416    Specimen:  Blood Updated:  03/21/18 1557     Activated Clotting Time  285 (H) Seconds      Comment: Serial Number: [id number]:  [id number]       POC Activated Clotting Time [[id number]]  (Abnormal) Collected:  03/21/18 1352    Specimen:  Blood Updated:  03/21/18 1557     Activated Clotting Time  373 (H) Seconds      Comment: Serial Number: [id number]:  [id number]       POC Activated Clotting Time [[id number]]  (Abnormal) Collected:  03/21/18 1310    Specimen:  Blood Updated:  03/21/18 1557     Activated Clotting Time  158 (H) Seconds      Comment: Serial Number: [id number]:  [id number]       POC Activated Clotting Time [[id number]]  (Normal) Collected:  03/21/18 1541    Specimen:  Blood Updated:  03/21/18 1552     Activated Clotting Time  114 Seconds      Comment: Serial Number: [id number]:  [id number]       POC OR Panel, ISTAT [[id number]]  (Abnormal) Collected:  03/21/18 1515    Specimen:  Blood Updated:  03/21/18 1552     Potassium 3.9 mmol/L      Total CO2 32 (H) mmol/L      Comment: Serial Number: [id number]:  [id number]        Hemoglobin 8.8 (L) g/dL      Hematocrit 26 (L) %      pH, Arterial 7.30 (L) pH units      HCO3, Arterial 29.8 (H) mmol/L      Base Excess 3.0000 mmol/L      O2 Saturation, Arterial 100 (H) %      pO2, Arterial 423 (H) mmHg      pCO2, Arterial 60.4 (H) mm Hg      Glucose 242 (H) mg/dL     POC OR Panel, ISTAT [[id number]]  (Abnormal) Collected:  03/21/18 1450    Specimen:  Blood Updated:  03/21/18 1552     Potassium 3.8 mmol/L      Total CO2 32 (H) mmol/L      Comment: Serial Number:  [id number]:  3361        Hemoglobin 8.8 (L) g/dL      Hematocrit 26 (L) %      pH, Arterial 7.28 (L) pH units      HCO3, Arterial 30.1 (H) mmol/L      Base Excess 3.0000 mmol/L      O2 Saturation, Arterial 100 (H) %      pO2, Arterial 471 (H) mmHg      pCO2, Arterial 63.6 (H) mm Hg      Glucose 263 (H) mg/dL     POC OR Panel, ISTAT [[id number]]  (Abnormal) Collected:  03/21/18 1429    Specimen:  Blood Updated:  03/21/18 1552     Potassium 3.9 mmol/L      Total CO2 30 (H) mmol/L      Comment: Serial Number: [id number]:  [id number]        Hemoglobin 8.8 (L) g/dL      Hematocrit 26 (L) %      pH, Arterial 7.27 (L) pH units      HCO3, Arterial 28.6 (H) mmol/L      Base Excess 2.0000 mmol/L      O2 Saturation, Arterial 69 (L) %      pO2, Arterial 42 (L) mmHg      pCO2, Arterial 62.5 (H) mm Hg      Glucose 263 (H) mg/dL     POC OR Panel, ISTAT [[id number]]  (Abnormal) Collected:  03/21/18 1417    Specimen:  Blood Updated:  03/21/18 1552     Potassium 3.8 mmol/L      Total CO2 34 (H) mmol/L      Comment: Serial Number: [id number]:  [id number]        Hemoglobin 8.8 (L) g/dL      Hematocrit 26 (L) %      pH, Arterial 7.34 (L) pH units      HCO3, Arterial 32.3 (H) mmol/L      Base Excess 7.0000 (H) mmol/L      O2 Saturation, Arterial 100 (H) %      pO2, Arterial 482 (H) mmHg      pCO2, Arterial 59.3 (H) mm Hg      Glucose 266 (H) mg/dL     POC OR Panel, ISTAT [[id number]]  (Abnormal) Collected:  03/21/18 1311    Specimen:  Blood Updated:  03/21/18 1552     Potassium 4.1 mmol/L      Total CO2 31 (H) mmol/L      Comment: Serial Number: [id number]:  [id number]        Hemoglobin 8.8 (L) g/dL      Hematocrit 26 (L) %      pH, Arterial 7.44 pH units      HCO3, Arterial 29.5 (H) mmol/L      Base Excess 5.0000 mmol/L      O2 Saturation, Arterial 97 %      pO2, Arterial 92 mmHg      pCO2, Arterial 43.3 mm Hg      Glucose 267 (H) mg/dL     POC Glucose Once [[id number]]  (Abnormal) Collected:  03/21/18 1215    Specimen:  Blood Updated:   03/21/18 1216     Glucose 259 (H) mg/dL     Narrative:       Meter: [id number] : [id number] Michael Ramey            Principal Problem:    Nonrheumatic aortic valve stenosis  Active Problems:    Diabetic ketoacidosis without coma associated with type 1 diabetes mellitus    Type 1 diabetes mellitus    Tobacco abuse    Alcohol abuse    Insurance coverage problems    Bilateral carpal tunnel syndrome    Microalbuminuria    Restart Levemir and NovoLog.  Restart Lipitor when taking her oral medications well.  Mobilize patient

## 2018-03-22 NOTE — THERAPY EVALUATION
Acute Care - Physical Therapy Initial Evaluation  [facility]     Patient Name: Juan Payne  : 1960  MRN: [id number]  Today's Date: 3/22/2018   Onset of Illness/Injury or Date of Surgery: (P) 18  Date of Referral to PT: (P) 18  Referring Physician: Dougherty (P)      Admit Date: 3/8/2018    Visit Dx:     ICD-10-CM ICD-9-CM   1. Diabetic ketoacidosis without coma associated with type 1 diabetes mellitus E10.10 250.11   2. Impaired functional mobility and activity tolerance Z74.09 V49.89   3. Nonrheumatic aortic valve stenosis I35.0 424.1     Patient Active Problem List   Diagnosis   • Diabetic ketoacidosis without coma associated with type 1 diabetes mellitus   • Type 1 diabetes mellitus   • Tobacco abuse   • Alcohol abuse   • Insurance coverage problems   • Bilateral carpal tunnel syndrome   • Microalbuminuria   • Nonrheumatic aortic valve stenosis     Past Medical History:   Diagnosis Date   • Diabetes mellitus      Past Surgical History:   Procedure Laterality Date   • AORTIC VALVE REPAIR/REPLACEMENT N/A 3/21/2018    Procedure: INTRAOPERATIVE SUDHAKAR, MINI STERNOTOMY WITH AORTIC VALVE REPLACEMENT, PRP;  Surgeon: Cecil Dougherty MD;  Location: [facility];  Service: Cardiothoracic   • CARDIAC CATHETERIZATION N/A 3/16/2018    Procedure: Right and Left Heart Cath;  Surgeon: Raul Buenrostro MD;  Location: [facility] CATH INVASIVE LOCATION;  Service: Cardiovascular   • TESTICLE SURGERY     • TRANSESOPHAGEAL ECHOCARDIOGRAM (SUDHAKAR) N/A 3/21/2018    Procedure: TRANSESOPHAGEAL ECHOCARDIOGRAM WITH ANESTHESIA;  Surgeon: Cecil Dougherty MD;  Location: [facility];  Service: Cardiothoracic        PT ASSESSMENT (last 72 hours)      Physical Therapy Evaluation     Row Name 18 0917          PT Evaluation Time/Intention    Subjective Information (P)  complains of;weakness;fatigue;pain  -RE     Document Type (P)  evaluation  -RE     Mode of Treatment (P)  physical therapy  -RE     Patient Effort (P)  good  -RE      Comment (P)  Pt had mild fatigue w/ ambulation.  -RE     Row Name 03/22/18 0917          General Information    Patient Profile Reviewed? (P)  yes  -RE     Onset of Illness/Injury or Date of Surgery (P)  03/08/18  -RE     Referring Physician (P)  Pagni  -RE     Patient Observations (P)  agree to therapy;cooperative  -RE     General Observations of Patient (P)  Pt sitting up in chair. No acute distress.  -RE     Prior Level of Function (P)  independent:  -RE     Equipment Currently Used at Home (P)  none  -RE     Pertinent History of Current Functional Problem (P)  This visit secondary to AVR. Recently seen for Diabetic ketoacidosis. H/O weakness, early onset of fatigue and N/V.  -RE     Existing Precautions/Restrictions (P)  cardiac;fall;sternal  -RE     Risks Reviewed (P)  patient:  -RE     Barriers to Rehab (P)  medically complex  -RE     Row Name 03/22/18 0917          Relationship/Environment    Lives With (P)  significant other  -RE     Row Name 03/22/18 0917          Resource/Environmental Concerns    Current Living Arrangements (P)  home/apartment/condo  -RE     Resource/Environmental Concerns (P)  home accessibility  -RE     Home Accessibility Concerns (P)  stairs to enter home  -RE     Transportation Concerns (P)  car, none  -RE     Row Name 03/22/18 0917          Home Main Entrance    Number of Stairs, Main Entrance (P)  four  -RE     Stair Railings, Main Entrance (P)  railings on both sides of stairs  -RE     Row Name 03/22/18 0917          Cognitive Assessment/Intervention- PT/OT    Orientation Status (Cognition) (P)  oriented x 4  -RE     Follows Commands (Cognition) (P)  WNL  -RE     Safety Deficit (Cognitive) (P)  safety precautions awareness;insight into deficits/self awareness  -RE     Personal Safety Interventions (P)  fall prevention program maintained;nonskid shoes/slippers when out of bed  -RE     Row Name 03/22/18 0917          Safety Issues, Functional Mobility    Safety Issues Affecting  Function (Mobility) (P)  safety precaution awareness;insight into deficits/self awareness  -RE     Impairments Affecting Function (Mobility) (P)  balance;endurance/activity tolerance;shortness of breath;strength  -     Row Name 03/22/18 0917          Bed Mobility Assessment/Treatment    Comment (Bed Mobility) (P)  Pt up in chair.  -RE     Row Name 03/22/18 0917          Transfer Assessment/Treatment    Transfer Assessment/Treatment (P)  sit-stand transfer;stand-sit transfer  -RE     Sit-Stand [place] (Transfers) (P)  minimum assist (75% patient effort);2 person assist  -RE     Stand-Sit [place] (Transfers) (P)  minimum assist (75% patient effort);2 person assist  -RE     Row Name 03/22/18 0917          Gait/Stairs Assessment/Training    [place] Level (Gait) (P)  minimum assist (75% patient effort);2 person assist  -RE     Distance in Feet (Gait) (P)  80  -RE     Pattern (Gait) (P)  swing-through  -RE     Deviations/Abnormal Patterns (Gait) (P)  base of support, narrow;kathi decreased;gait speed decreased;stride length decreased  -     Row Name 03/22/18 0917          General ROM    GENERAL ROM COMMENTS (P)  BLE/RUE WNL; LUE WFL.  -     Row Name 03/22/18 0917          General Assessment (Manual Muscle Testing)    Comment, General Manual Muscle Testing (MMT) Assessment (P)  BUE/BLE at least 3/5 upon visual inspection.  -     Row Name 03/22/18 0917          Motor Assessment/Intervention    Additional Documentation (P)  Therapeutic Exercise (Group)   cardiac exercise protocol x5; Cardiac level III.  -     Row Name 03/22/18 0917          Pain Assessment    Additional Documentation (P)  Pain Scale: Numbers Pre/Post-Treatment (Group)  -     Row Name 03/22/18 0917          Pain Scale: Numbers Pre/Post-Treatment    Pain Scale: Numbers, Pretreatment (P)  2/10  -RE     Pain Location (P)  other (see comments)   [place]  -RE     Pain Intervention(s) (P)  Repositioned  -     Row Name             Wound  03/21/18 1446 chest incision    Wound - Properties Group Date first assessed: 03/21/18  -MS Time first assessed: 1446  -MS Location: chest  -MS Type: incision  -MS    Row Name 03/22/18 0917          Plan of Care Review    Plan of Care Reviewed With (P)  patient  -RE     Row Name 03/22/18 0917          Physical Therapy Clinical Impression    Date of Referral to PT (P)  03/21/18  -RE     Functional Level at Time of Evaluation (PT Clinical Impression) (P)  Arlette  -RE     Patient/Family Goals Statement (PT Clinical Impression) (P)  Return to PLOF.  -RE     Criteria for Skilled Interventions Met (PT Clinical Impression) (P)  yes;treatment indicated  -RE     Pathology/Pathophysiology Noted (Describe Specifically for Each System) (P)  musculoskeletal;cardiovascular  -RE     Impairments Found (describe specific impairments) (P)  aerobic capacity/endurance;ergonomics and body mechanics;gait, locomotion, and balance;muscle performance  -RE     Rehab Potential (PT Clinical Summary) (P)  good, to achieve stated therapy goals  -RE     Row Name 03/22/18 0917          Vital Signs    Pre Systolic BP Rehab (P)  113  -RE     Pre Treatment Diastolic BP (P)  57  -RE     Pretreatment Heart Rate (beats/min) (P)  75  -RE     Posttreatment Heart Rate (beats/min) (P)  74  -RE     Pre SpO2 (%) (P)  100  -RE     O2 Delivery Pre Treatment (P)  supplemental O2   4L NC  -RE     Post SpO2 (%) (P)  100  -RE     O2 Delivery Post Treatment (P)  supplemental O2  -RE     Row Name 03/22/18 0917          Physical Therapy Goals    Problem Specific Goal Selection (PT) (P)  problem specific goal 1, PT  -RE     Row Name 03/22/18 0917          Problem Specific Goal 1 (PT)    Problem Specific Goal 1 (PT) (P)  Cardiovascular Level V  -RE     Time Frame (Problem Specific Goal 1, PT) (P)  1 week  -RE     Row Name 03/22/18 0917          Positioning and Restraints    Pre-Treatment Position (P)  sitting in chair/recliner  -RE     Post Treatment Position (P)  chair   -RE     In Bed (P)  encouraged to call for assist;with nsg;sitting  -RE     Row Name 03/22/18 0917          Living Environment    Home Accessibility (P)  stairs to enter home  -RE       User Key  (r) = Recorded By, (t) = Taken By, (c) = Cosigned By    Initials Name Provider Type    MS MEGAN Salomon, RN Registered Nurse    RE Rickey Isaac, PT Student PT Student          Physical Therapy Education     Title: PT OT SLP Therapies (Done)     Topic: Physical Therapy (Done)     Point: Mobility training (Done)    Learning Progress Summary     Learner Status Readiness Method Response Comment Documented by    Patient Done Acceptance E,D VU,DU,NR  RE 03/22/18 0928     Done Acceptance E,TB VU,DU   03/13/18 1501     Done Acceptance E,D VU,DU,NR  PC 03/12/18 1435          Point: Home exercise program (Done)    Learning Progress Summary     Learner Status Readiness Method Response Comment Documented by    Patient Done Acceptance E,D VU,DU,NR  RE 03/22/18 0928     Done Acceptance E,TB VU,DU   03/13/18 1501     Done Acceptance E,D VU,DU,NR  PC 03/12/18 1435          Point: Body mechanics (Done)    Learning Progress Summary     Learner Status Readiness Method Response Comment Documented by    Patient Done Acceptance E,D VU,DU,NR  RE 03/22/18 0928     Done Acceptance E,TB VU,DU   03/13/18 1501     Done Acceptance E,D VU,DU,NR  PC 03/12/18 1435          Point: Precautions (Done)    Learning Progress Summary     Learner Status Readiness Method Response Comment Documented by    Patient Done Acceptance E,D VU,DU,NR  RE 03/22/18 0928     Done Acceptance E,TB VU,DU   03/13/18 1501     Done Acceptance E,D VU,DU,NR  PC 03/12/18 1435                      User Key     Initials Effective Dates Name Provider Type Discipline    PC 12/01/15 -  Renetta Morris, PT Physical Therapist PT     03/07/18 -  Donnie Crump, PTA Physical Therapy Assistant PT     02/05/18 -  Rickey Isaac, PT Student PT Student PT                PT  Recommendation and Plan  Anticipated Discharge Disposition (PT): (P) home or self care  Planned Therapy Interventions (PT Eval): (P) balance training, bed mobility training, gait training, home exercise program, strengthening, transfer training  Therapy Frequency (PT Clinical Impression): (P) daily  Outcome Summary/Treatment Plan (PT)  Anticipated Discharge Disposition (PT): (P) home or self care  Plan of Care Reviewed With: (P) patient  Outcome Summary: (P) Pt is a 58 y/o M s/p AVR. Mild fatigue present w/ ambulation. Impaired balance w/ turns noted. Pt alert and cooperative.  Overall impairments present in functional mobility, endurance and gait which will benefit from skilled PT services. Pt to D/C home pending completion of all functional goals.          Outcome Measures     Row Name 03/22/18 0930             How much help from another person do you currently need...    Turning from your back to your side while in flat bed without using bedrails? (P)  3  -RE      Moving from lying on back to sitting on the side of a flat bed without bedrails? (P)  2  -RE      Moving to and from a bed to a chair (including a wheelchair)? (P)  3  -RE      Standing up from a chair using your arms (e.g., wheelchair, bedside chair)? (P)  3  -RE      Climbing 3-5 steps with a railing? (P)  2  -RE      To walk in hospital room? (P)  3  -RE      AM-PAC 6 Clicks Score (P)  16  -RE         Functional Assessment    Outcome Measure Options (P)  AM-PAC 6 Clicks Basic Mobility (PT)  -RE        User Key  (r) = Recorded By, (t) = Taken By, (c) = Cosigned By    Initials Name Provider Type    RE Rickey Isaac, PT Student PT Student           Time Calculation:         PT Charges     Row Name 03/22/18 0931             Time Calculation    Start Time (P)  0840  -RE      Stop Time (P)  0855  -RE      Time Calculation (min) (P)  15 min  -RE      PT Received On (P)  03/22/18  -RE      PT - Next Appointment (P)  03/23/18  -RE      PT Goal Re-Cert Due  Date (P)  03/29/18  -RE        User Key  (r) = Recorded By, (t) = Taken By, (c) = Cosigned By    Initials Name Provider Type    RE Rickey Isaac, PT Student PT Student          Therapy Charges for Today     Code Description Service Date Service Provider Modifiers Qty    61969189076  PT EVAL MOD COMPLEXITY 2 3/22/2018 Rickey Isaac, PT Student GP 1    08749811071 HC PT THER PROC EA 15 MIN 3/22/2018 Rickey Isaac, PT Student GP 1    11235376097 HC PT THER SUPP EA 15 MIN 3/22/2018 Rickey Isaac, PT Student GP 1          PT G-Codes  Outcome Measure Options: (P) AM-PAC 6 Clicks Basic Mobility (PT)      Rickey Isaac PT Student  3/22/2018

## 2018-03-22 NOTE — CONSULTS
"Met with patient at [facility] on [facility] s/p AVR yesterday.  He looks good!  Discussed benefits of cardiac rehab and provided Phase II information packet which includes a red cover sheet with general information about cardiac rehab, [facility] Cardiac Rehab Programs handout, and [place] [facility] Letter article entitled \" Cardiac Rehab is Often the Best Medicine for Recovery\" that stresses the importance of cardiac rehab after a heart event.  He lives in the [place] area closest to [facility] or [facility], so I provided contact information for both cardiac rehab programs. Encouraged him to call to set up his first visit as soon as he knows when home health will be concluding their visits.  I did discuss that pt cannot participate in outpt cardiac rehab while he is still being followed by home health and encouraged him to check with his insurance company re: copays, deductibles, etc.   He verbalized understanding of all information.      "

## 2018-03-22 NOTE — PROGRESS NOTES
"    [facility]     LOS: 14 days     Subjective:  S/p AVR, minimally invasive \"J sternotomy\" AVR with a # 21 mm Magna pericardial prosthesis  Extubated with any problem yesterday    Objective:    Vital Signs:  Temp:  [98.9 °F (37.2 °C)-99 °F (37.2 °C)] 98.9 °F (37.2 °C)  Heart Rate:  [60-80] 61  Resp:  [13-17] 16  BP: ()/(36-86) 116/56  Arterial Line BP: ()/(38-59) 126/55  FiO2 (%):  [40 %-100 %] 40 %    SpO2:  [98 %-100 %] 100 %  on  Flow (L/min):  [2-4] 2;   Device (Oxygen Therapy): nasal cannula  Body mass index is 22.5 kg/m².    Physical Exam   Constitutional: He appears well-developed and well-nourished. Nasal cannula in place.   HENT:   Head: Normocephalic and atraumatic.   Eyes: EOM are normal.   Neck: Normal range of motion.   Cardiovascular: Normal rate and regular rhythm.    Pulmonary/Chest: Effort normal and breath sounds normal.   Abdominal: Soft. Bowel sounds are normal.   Neurological: He is alert.   Skin: Skin is warm.       Results Review:    Glucose   Date Value Ref Range Status   03/22/2018 107 (H) 65 - 99 mg/dL Final   03/21/2018 204 (H) 65 - 99 mg/dL Final   03/21/2018 101 (H) 65 - 99 mg/dL Final   03/21/2018 234 (H) 65 - 99 mg/dL Final   03/20/2018 132 (H) 65 - 99 mg/dL Final       Results from last 7 days  Lab Units 03/22/18  0305   WBC 10*3/mm3 13.55*   HEMOGLOBIN g/dL 8.1*   HEMATOCRIT % 25.2*   PLATELETS 10*3/mm3 218       Results from last 7 days  Lab Units 03/22/18  0305   SODIUM mmol/L 140   POTASSIUM mmol/L 4.3   CHLORIDE mmol/L 100   CO2 mmol/L 25.1   BUN mg/dL 12   CREATININE mg/dL 0.57*   CALCIUM mg/dL 8.6   GLUCOSE mg/dL 107*       Results from last 7 days  Lab Units 03/22/18  0305 03/21/18  1710   INR  1.10 1.16*   APTT seconds  --  36.1*       Results from last 7 days  Lab Units 03/22/18  0305   MAGNESIUM mg/dL 2.4         Cultures:  Blood Culture   Date Value Ref Range Status   03/10/2018 No growth at 4 days  Preliminary   03/10/2018 No growth " at 4 days  Preliminary       I have reviewed daily medications and changes in CPOE    Scheduled meds    amiodarone 200 mg Oral Q12H   aspirin 81 mg Oral Daily   atorvastatin 20 mg Oral Nightly   ceFAZolin 2 g Intravenous Q8H   chlorhexidine 15 mL Mouth/Throat Q12H   enoxaparin 40 mg Subcutaneous Daily   insulin aspart 0-12 Units Subcutaneous 4x Daily AC & at Bedtime   insulin aspart 2 Units Subcutaneous TID With Meals   insulin detemir 12 Units Subcutaneous QAM   insulin detemir 7 Units Subcutaneous Nightly   magnesium sulfate 1 g Intravenous Q8H   metoprolol tartrate 12.5 mg Oral Q12H   mupirocin  Each Nare BID   pantoprazole 40 mg Oral QAM   sennosides-docusate sodium 2 tablet Oral Nightly         nitroglycerin 5-200 mcg/min    sodium chloride 30 mL/hr Last Rate: 30 mL/hr (03/21/18 1700)   sodium chloride 30 mL/hr Last Rate: 30 mL/hr (03/21/18 1645)         Principal Problem:    Nonrheumatic aortic valve stenosis  Active Problems:    Diabetic ketoacidosis without coma associated with type 1 diabetes mellitus    Type 1 diabetes mellitus    Tobacco abuse    Alcohol abuse    Insurance coverage problems    Bilateral carpal tunnel syndrome    Microalbuminuria      Assessment/Plan:    S/p AVR      Diabetic ketoacidosis without coma associated with type 1 diabetes mellitus-status, resolved    Type 1 diabetes mellitus- adjusted by endocrinology, elevated yesterday but now improved      Tobacco abuse      Alcohol abuse      Bilateral carpal tunnel syndrome      Microalbuminuria    Plan:  Discharge when ok with CTS and Cardiology     Saeed Fair MD  03/22/18  4:15 PM

## 2018-03-23 ENCOUNTER — APPOINTMENT (OUTPATIENT)
Dept: GENERAL RADIOLOGY | Facility: HOSPITAL | Age: 58
End: 2018-03-23

## 2018-03-23 LAB
ANION GAP SERPL CALCULATED.3IONS-SCNC: 11.1 MMOL/L
BUN BLD-MCNC: 17 MG/DL (ref 6–20)
BUN/CREAT SERPL: 27 (ref 7–25)
CALCIUM SPEC-SCNC: 8.9 MG/DL (ref 8.6–10.5)
CHLORIDE SERPL-SCNC: 97 MMOL/L (ref 98–107)
CO2 SERPL-SCNC: 26.9 MMOL/L (ref 22–29)
CREAT BLD-MCNC: 0.63 MG/DL (ref 0.76–1.27)
CYTO UR: NORMAL
DEPRECATED RDW RBC AUTO: 55 FL (ref 37–54)
ERYTHROCYTE [DISTWIDTH] IN BLOOD BY AUTOMATED COUNT: 13.7 % (ref 11.5–14.5)
GFR SERPL CREATININE-BSD FRML MDRD: 130 ML/MIN/1.73
GLUCOSE BLD-MCNC: 138 MG/DL (ref 65–99)
GLUCOSE BLDC GLUCOMTR-MCNC: 140 MG/DL (ref 70–130)
GLUCOSE BLDC GLUCOMTR-MCNC: 258 MG/DL (ref 70–130)
GLUCOSE BLDC GLUCOMTR-MCNC: 267 MG/DL (ref 70–130)
GLUCOSE BLDC GLUCOMTR-MCNC: 295 MG/DL (ref 70–130)
HCT VFR BLD AUTO: 24.4 % (ref 40.4–52.2)
HGB BLD-MCNC: 7.8 G/DL (ref 13.7–17.6)
LAB AP CASE REPORT: NORMAL
Lab: NORMAL
MCH RBC QN AUTO: 35.6 PG (ref 27–32.7)
MCHC RBC AUTO-ENTMCNC: 32 G/DL (ref 32.6–36.4)
MCV RBC AUTO: 111.4 FL (ref 79.8–96.2)
PATH REPORT.FINAL DX SPEC: NORMAL
PATH REPORT.GROSS SPEC: NORMAL
PLATELET # BLD AUTO: 222 10*3/MM3 (ref 140–500)
PMV BLD AUTO: 9.6 FL (ref 6–12)
POTASSIUM BLD-SCNC: 4.3 MMOL/L (ref 3.5–5.2)
RBC # BLD AUTO: 2.19 10*6/MM3 (ref 4.6–6)
SODIUM BLD-SCNC: 135 MMOL/L (ref 136–145)
WBC NRBC COR # BLD: 9.64 10*3/MM3 (ref 4.5–10.7)

## 2018-03-23 PROCEDURE — 25010000002 ENOXAPARIN PER 10 MG: Performed by: THORACIC SURGERY (CARDIOTHORACIC VASCULAR SURGERY)

## 2018-03-23 PROCEDURE — 93010 ELECTROCARDIOGRAM REPORT: CPT | Performed by: INTERNAL MEDICINE

## 2018-03-23 PROCEDURE — 25010000003 CEFAZOLIN IN DEXTROSE 2-4 GM/100ML-% SOLUTION: Performed by: THORACIC SURGERY (CARDIOTHORACIC VASCULAR SURGERY)

## 2018-03-23 PROCEDURE — 99232 SBSQ HOSP IP/OBS MODERATE 35: CPT | Performed by: INTERNAL MEDICINE

## 2018-03-23 PROCEDURE — 63710000001 INSULIN ASPART PER 5 UNITS: Performed by: INTERNAL MEDICINE

## 2018-03-23 PROCEDURE — 97110 THERAPEUTIC EXERCISES: CPT

## 2018-03-23 PROCEDURE — 25010000002 FUROSEMIDE PER 20 MG: Performed by: NURSE PRACTITIONER

## 2018-03-23 PROCEDURE — 36430 TRANSFUSION BLD/BLD COMPNT: CPT

## 2018-03-23 PROCEDURE — 82962 GLUCOSE BLOOD TEST: CPT

## 2018-03-23 PROCEDURE — P9016 RBC LEUKOCYTES REDUCED: HCPCS

## 2018-03-23 PROCEDURE — 71045 X-RAY EXAM CHEST 1 VIEW: CPT

## 2018-03-23 PROCEDURE — 99024 POSTOP FOLLOW-UP VISIT: CPT | Performed by: THORACIC SURGERY (CARDIOTHORACIC VASCULAR SURGERY)

## 2018-03-23 PROCEDURE — 80048 BASIC METABOLIC PNL TOTAL CA: CPT | Performed by: THORACIC SURGERY (CARDIOTHORACIC VASCULAR SURGERY)

## 2018-03-23 PROCEDURE — 86900 BLOOD TYPING SEROLOGIC ABO: CPT

## 2018-03-23 PROCEDURE — 93005 ELECTROCARDIOGRAM TRACING: CPT | Performed by: THORACIC SURGERY (CARDIOTHORACIC VASCULAR SURGERY)

## 2018-03-23 PROCEDURE — 63710000001 INSULIN DETEMER PER 5 UNITS: Performed by: INTERNAL MEDICINE

## 2018-03-23 PROCEDURE — 85027 COMPLETE CBC AUTOMATED: CPT | Performed by: THORACIC SURGERY (CARDIOTHORACIC VASCULAR SURGERY)

## 2018-03-23 RX ORDER — CYCLOBENZAPRINE HCL 10 MG
5 TABLET ORAL EVERY 8 HOURS PRN
Status: DISCONTINUED | OUTPATIENT
Start: 2018-03-23 | End: 2018-03-26 | Stop reason: HOSPADM

## 2018-03-23 RX ORDER — DIPHENOXYLATE HYDROCHLORIDE AND ATROPINE SULFATE 2.5; .025 MG/1; MG/1
1 TABLET ORAL DAILY
Status: DISCONTINUED | OUTPATIENT
Start: 2018-03-23 | End: 2018-03-26 | Stop reason: HOSPADM

## 2018-03-23 RX ORDER — IRON POLYSACCHARIDE COMPLEX 150 MG
150 CAPSULE ORAL DAILY
Status: DISCONTINUED | OUTPATIENT
Start: 2018-03-23 | End: 2018-03-26 | Stop reason: HOSPADM

## 2018-03-23 RX ORDER — FUROSEMIDE 40 MG/1
40 TABLET ORAL DAILY
Status: DISCONTINUED | OUTPATIENT
Start: 2018-03-24 | End: 2018-03-26 | Stop reason: HOSPADM

## 2018-03-23 RX ORDER — FUROSEMIDE 10 MG/ML
20 INJECTION INTRAMUSCULAR; INTRAVENOUS ONCE
Status: COMPLETED | OUTPATIENT
Start: 2018-03-23 | End: 2018-03-23

## 2018-03-23 RX ORDER — POTASSIUM CHLORIDE 750 MG/1
20 CAPSULE, EXTENDED RELEASE ORAL DAILY
Status: DISCONTINUED | OUTPATIENT
Start: 2018-03-23 | End: 2018-03-25

## 2018-03-23 RX ADMIN — MUPIROCIN: 20 OINTMENT TOPICAL at 08:28

## 2018-03-23 RX ADMIN — DOCUSATE SODIUM -SENNOSIDES 2 TABLET: 50; 8.6 TABLET, COATED ORAL at 21:10

## 2018-03-23 RX ADMIN — ACETAMINOPHEN 650 MG: 325 TABLET ORAL at 00:26

## 2018-03-23 RX ADMIN — INSULIN ASPART 2 UNITS: 100 INJECTION, SOLUTION INTRAVENOUS; SUBCUTANEOUS at 16:54

## 2018-03-23 RX ADMIN — INSULIN ASPART 2 UNITS: 100 INJECTION, SOLUTION INTRAVENOUS; SUBCUTANEOUS at 16:52

## 2018-03-23 RX ADMIN — INSULIN DETEMIR 12 UNITS: 100 INJECTION, SOLUTION SUBCUTANEOUS at 06:52

## 2018-03-23 RX ADMIN — CHLORHEXIDINE GLUCONATE 15 ML: 1.2 RINSE ORAL at 05:12

## 2018-03-23 RX ADMIN — OXYCODONE HYDROCHLORIDE 10 MG: 5 TABLET ORAL at 00:26

## 2018-03-23 RX ADMIN — HYDROCODONE BITARTRATE AND ACETAMINOPHEN 2 TABLET: 5; 325 TABLET ORAL at 07:01

## 2018-03-23 RX ADMIN — INSULIN ASPART 2 UNITS: 100 INJECTION, SOLUTION INTRAVENOUS; SUBCUTANEOUS at 08:27

## 2018-03-23 RX ADMIN — CHLORHEXIDINE GLUCONATE 15 ML: 1.2 RINSE ORAL at 16:40

## 2018-03-23 RX ADMIN — OXYCODONE HYDROCHLORIDE 10 MG: 5 TABLET ORAL at 21:20

## 2018-03-23 RX ADMIN — PANTOPRAZOLE SODIUM 40 MG: 40 TABLET, DELAYED RELEASE ORAL at 06:52

## 2018-03-23 RX ADMIN — INSULIN ASPART 2 UNITS: 100 INJECTION, SOLUTION INTRAVENOUS; SUBCUTANEOUS at 11:11

## 2018-03-23 RX ADMIN — Medication 1 TABLET: at 21:10

## 2018-03-23 RX ADMIN — INSULIN ASPART 3 UNITS: 100 INJECTION, SOLUTION INTRAVENOUS; SUBCUTANEOUS at 21:07

## 2018-03-23 RX ADMIN — ACETAMINOPHEN 650 MG: 325 TABLET ORAL at 21:20

## 2018-03-23 RX ADMIN — AMIODARONE HYDROCHLORIDE 200 MG: 200 TABLET ORAL at 08:36

## 2018-03-23 RX ADMIN — ASPIRIN 81 MG: 81 TABLET ORAL at 08:28

## 2018-03-23 RX ADMIN — POTASSIUM CHLORIDE 20 MEQ: 750 CAPSULE, EXTENDED RELEASE ORAL at 16:40

## 2018-03-23 RX ADMIN — INSULIN ASPART 2 UNITS: 100 INJECTION, SOLUTION INTRAVENOUS; SUBCUTANEOUS at 11:13

## 2018-03-23 RX ADMIN — ATORVASTATIN CALCIUM 20 MG: 20 TABLET, FILM COATED ORAL at 21:10

## 2018-03-23 RX ADMIN — CEFAZOLIN SODIUM 2 G: 2 INJECTION, SOLUTION INTRAVENOUS at 06:51

## 2018-03-23 RX ADMIN — CYCLOBENZAPRINE 5 MG: 10 TABLET, FILM COATED ORAL at 11:12

## 2018-03-23 RX ADMIN — FUROSEMIDE 20 MG: 10 INJECTION, SOLUTION INTRAMUSCULAR; INTRAVENOUS at 16:40

## 2018-03-23 RX ADMIN — AMIODARONE HYDROCHLORIDE 200 MG: 200 TABLET ORAL at 21:10

## 2018-03-23 RX ADMIN — MUPIROCIN 2 APPLICATION: 20 OINTMENT TOPICAL at 21:10

## 2018-03-23 RX ADMIN — INSULIN DETEMIR 7 UNITS: 100 INJECTION, SOLUTION SUBCUTANEOUS at 21:08

## 2018-03-23 RX ADMIN — ENOXAPARIN SODIUM 40 MG: 40 INJECTION SUBCUTANEOUS at 17:29

## 2018-03-23 RX ADMIN — HYDROCODONE BITARTRATE AND ACETAMINOPHEN 2 TABLET: 5; 325 TABLET ORAL at 17:29

## 2018-03-23 RX ADMIN — Medication 150 MG: at 21:10

## 2018-03-23 NOTE — PLAN OF CARE
Problem: Patient Care Overview  Goal: Plan of Care Review  Outcome: Ongoing (interventions implemented as appropriate)   03/23/18 1001   Coping/Psychosocial   Plan of Care Reviewed With patient   OTHER   Outcome Summary Pt w/ mild SOB w/ ambulation. Pain level well controlled. Still requires some assist for balance w/ STS. Mild increased postural sway w/ ambulation. Pt will continue to benefit from skilled PT in order to promote improvement in endurance, functional mobility and ambulatory tolerance.   Plan of Care Review   Progress improving

## 2018-03-23 NOTE — PROGRESS NOTES
Adult Nutrition  Assessment/PES    Patient Name:  Juan Payne  YOB: 1960  MRN: [id number]  Admit Date:  3/8/2018    Assessment Date:  3/23/2018    Comments:    Intake is good, 100% last 2 meals.   Glucoses are erratic; weight trending up.   Will continue to monitor.           Adult Nutrition Assessment     Row Name 03/23/18 1100       Reason for Assessment    Reason For Assessment follow-up protocol       Nutrition/Diet History    Typical Food/Fluid Intake Intake is good post op.        Admit Weight    Admit Weight 63.5 kg (140 lb)   current 146 lb       Labs/Procedures/Meds    Lab Results Reviewed reviewed    Lab Results Comments Glu 138-379       Diagnostic Tests/Procedures    Diagnostic Test/Procedure Reviewed reviewed, pertinent    Diagnostic Test/Procedures Comments aortic valve replacement with a 21 mm magna pericardial prosthesis       Medications    Pertinent Medications Reviewed reviewed       Nutrition Prescription PO    Common Modifiers Consistent Carbohydrate       Fluid Intake Evaluation    IV Fluid (mL) 720       PO Evaluation    Number of Meals 2    % PO Intake 100    Row Name 03/23/18 0500       Anthropometrics    Weight 66.2 kg (146 lb)          Problem/Interventions:          Intervention Goal     Row Name 03/23/18 1131       Intervention Goal    General Maintain nutrition;Disease management/therapy    PO Maintain intake;PO intake (%)    PO Intake % 100 %    Weight Maintain weight            Nutrition Intervention     Row Name 03/23/18 1132       Nutrition Intervention    RD/Tech Action Interview for preference;Encourage intake;Care plan reviewd;Follow Tx progress              Education/Evaluation     Row Name 03/23/18 1132       Education    Education Previous education by PLACIDO/OLVIN       Monitor/Evaluation    Monitor Per protocol        Electronically signed by:  Mirian Shaw RD  03/23/18 11:32 AM

## 2018-03-23 NOTE — PROGRESS NOTES
Continued Stay Note  [facility]     Patient Name: Juan Payne  MRN: [id number]  Today's Date: 3/23/2018    Admit Date: 3/8/2018          Discharge Plan     Row Name 03/23/18 5205       Plan    Plan Home with [facility] to follow.      Plan Comments [facility] s/p Pt at bedside and he advised he recently lost his job and was concerned he may not have insurance for much longer.  CCP advised Pt to call his former employer to see when his insurance would be terminated.  CCP called Karolyn with [employer] for advice and she will Pt this afternoon at bedside to discuss options of possible assistance once his ins expires.  Pt states he will discharge home with assistance of his s.o. Aurelia Briones ([phone]) and his brother in law Osvaldo.  Pt chose [facility] to follow him at home.  CCP called referral to [facility] at 1:31 PM.  Pt has no PCP.  CCP printed out the [facility] physicians list and the [facility] appointment liaison number and gave to Pt.  CCP will continue to follow.  MAGDI Wilson RN/DIONNE              Discharge Codes    No documentation.           Leonor Wilson RN

## 2018-03-23 NOTE — PAYOR COMM NOTE
"Tiffanie Gutierrez (57 y.o. Male)                   Attention;   [facility] JEFFREY [employer] for your review, auth [id number], reply to UR dept, Lesli Mike LPN                 [phone] or UR fax        Date of Birth Social Security Number Address Home Phone MRN    1960  [id number] [address]  [id number]    [employer] Marital Status          None Single       Admission Date Admission Type Admitting Provider Attending Provider Department, Room/Bed    3/8/18 Emergency Mahamed Tolbert MD Ramaswamy, Saeed LEE MD [facility] CARDIOVASC UNIT, 2224/1    Discharge Date Discharge Disposition Discharge Destination                       Attending Provider:  Saeed Fair MD    Allergies:  No Known Allergies    Isolation:  None   Infection:  None   Code Status:  FULL    Ht:  172.7 cm (68\")   Wt:  66.2 kg (146 lb)    Admission Cmt:  None   Principal Problem:  Nonrheumatic aortic valve stenosis [I35.0] More...                 Active Insurance as of 3/8/2018     Primary Coverage     Payor Plan Insurance Group Employer/Plan Group    [employer] BLUE [employer]OSS BLUE [employer] PPO [id number]     Payor Plan Address Payor Plan Phone Number Effective From Effective To    [id number] [phone] 1/1/2018     [place] [address]       Subscriber Name Subscriber Birth Date Member ID       TIFFANIE GUTIERREZ 1960 [id number]                 Emergency Contacts      (Rel.) Home Phone Work Phone Mobile Phone    Aurelia Briones (Significant Other) [phone] -- [phone]    Leonor Galindo (Daughter) [phone] -- [phone]            Lines, Drains & Airways    Active LDAs     Name:   Placement date:   Placement time:   Site:   Days:    Y Chest Tube 1 and 2 1 Anterior Mediastinal 28 Fr. 2 Anterior Mediastinal 28 Fr.  03/21/18    1504      1                Hospital Medications (active)       Dose Frequency Start End    acetaminophen (TYLENOL) suppository 650 mg 650 mg " Every 4 Hours PRN 3/21/2018     Sig - Route: Insert 1 suppository into the rectum Every 4 (Four) Hours As Needed for Mild Pain . - Rectal    acetaminophen (TYLENOL) tablet 650 mg 650 mg Every 4 Hours PRN 3/21/2018     Sig - Route: Take 2 tablets by mouth Every 4 (Four) Hours As Needed for Mild Pain . - Oral    albumin human 5 % bottle 1,500 mL 1,500 mL As Needed 3/21/2018 3/22/2018    Sig - Route: Infuse 1,500 mL into a venous catheter As Needed (CVR Protocol). - Intravenous    amiodarone (PACERONE) tablet 200 mg 200 mg Every 12 Hours Scheduled 3/22/2018     Sig - Route: Take 1 tablet by mouth Every 12 (Twelve) Hours. - Oral    aspirin EC tablet 81 mg 81 mg Daily 3/22/2018     Sig - Route: Take 1 tablet by mouth Daily. - Oral    atorvastatin (LIPITOR) tablet 20 mg 20 mg Nightly 3/15/2018     Sig - Route: Take 1 tablet by mouth Every Night. - Oral    bisacodyl (DULCOLAX) EC tablet 10 mg 10 mg Daily PRN 3/21/2018     Sig - Route: Take 2 tablets by mouth Daily As Needed for Constipation. - Oral    bisacodyl (DULCOLAX) suppository 10 mg 10 mg Daily PRN 3/22/2018     Sig - Route: Insert 1 suppository into the rectum Daily As Needed for Constipation. - Rectal    ceFAZolin in dextrose (ANCEF) IVPB solution 2 g 2 g Every 8 Hours 3/21/2018 3/23/2018    Sig - Route: Infuse 100 mL into a venous catheter Every 8 (Eight) Hours. - Intravenous    chlorhexidine (PERIDEX) 0.12 % solution 15 mL 15 mL Every 12 Hours 3/21/2018     Sig - Route: Apply 15 mL to the mouth or throat Every 12 (Twelve) Hours. - Mouth/Throat    cyclobenzaprine (FLEXERIL) tablet 5 mg 5 mg Every 8 Hours PRN 3/23/2018     Sig - Route: Take 0.5 tablets by mouth Every 8 (Eight) Hours As Needed for Muscle Spasms. - Oral    enoxaparin (LOVENOX) syringe 40 mg 40 mg Daily 3/22/2018     Sig - Route: Inject 0.4 mL under the skin Daily. - Subcutaneous    HYDROcodone-acetaminophen (NORCO) 5-325 MG per tablet 2 tablet 2 tablet Every 4 Hours PRN 3/21/2018 3/31/2018    Sig -  "Route: Take 2 tablets by mouth Every 4 (Four) Hours As Needed for Moderate Pain . - Oral    insulin aspart (novoLOG) injection 0-12 Units 0-12 Units 4 Times Daily Before Meals & Nightly 3/22/2018     Sig - Route: Inject 0-12 Units under the skin 4 (Four) Times a Day Before Meals & at Bedtime. - Subcutaneous    insulin aspart (novoLOG) injection 2 Units 2 Units 3 Times Daily With Meals 3/22/2018     Sig - Route: Inject 2 Units under the skin 3 (Three) Times a Day With Meals. - Subcutaneous    insulin detemir (LEVEMIR) injection 12 Units 12 Units Every Morning 3/22/2018     Sig - Route: Inject 12 Units under the skin Every Morning. - Subcutaneous    insulin detemir (LEVEMIR) injection 7 Units 7 Units Nightly 3/22/2018     Sig - Route: Inject 7 Units under the skin Every Night. - Subcutaneous    magnesium hydroxide (MILK OF MAGNESIA) suspension 2400 mg/10mL 10 mL 10 mL Daily PRN 3/22/2018     Sig - Route: Take 10 mL by mouth Daily As Needed for Constipation. - Oral    Magnesium Sulfate 2 gram Bolus, followed by 8 gram infusion (total Mg dose 10 grams)- Mg less than or equal to 1mg/dL 2 g As Needed 3/21/2018     Sig - Route: Infuse 50 mL into a venous catheter As Needed (Mg less than or equal to 1mg/dL). - Intravenous    Linked Group 1:  \"Or\" Linked Group Details        magnesium sulfate 4 gram infusion- Mg 1.6-1.9 mg/dL 4 g As Needed 3/21/2018     Sig - Route: Infuse 100 mL into a venous catheter As Needed (Mg 1.6-1.9 mg/dL). - Intravenous    Linked Group 1:  \"Or\" Linked Group Details        Magnesium Sulfate 6 gram Infusion (2 gm x 3) -Mg 1.1 -1.5 mg/dL 2 g As Needed 3/21/2018     Sig - Route: Infuse 50 mL into a venous catheter As Needed (Mg 1.1 -1.5 mg/dL). - Intravenous    Linked Group 1:  \"Or\" Linked Group Details        magnesium sulfate in D5W 1g/100mL (PREMIX) 1 g Every 8 Hours 3/21/2018 3/22/2018    Sig - Route: Infuse 100 mL into a venous catheter Every 8 (Eight) Hours. - Intravenous    metoprolol tartrate " "(LOPRESSOR) tablet 12.5 mg 12.5 mg Every 12 Hours Scheduled 3/21/2018     Sig - Route: Take 0.5 tablets by mouth Every 12 (Twelve) Hours. - Oral    morphine injection 1 mg 1 mg Every 4 Hours PRN 3/21/2018 3/31/2018    Sig - Route: Infuse 0.5 mL into a venous catheter Every 4 (Four) Hours As Needed for Moderate Pain . - Intravenous    Linked Group 2:  \"And\" Linked Group Details        morphine injection 4 mg 4 mg Every 30 Minutes PRN 3/21/2018 3/31/2018    Sig - Route: Infuse 1 mL into a venous catheter Every 30 (Thirty) Minutes As Needed for Severe Pain  (while on ventilator). - Intravenous    mupirocin (BACTROBAN) 2 % nasal ointment  2 Times Daily 3/21/2018     Sig - Route: by Each Nare route 2 (Two) Times a Day. - Each Nare    naloxone (NARCAN) injection 0.4 mg 0.4 mg Every 5 Minutes PRN 3/21/2018     Sig - Route: Infuse 1 mL into a venous catheter Every 5 (Five) Minutes As Needed for Respiratory Depression. - Intravenous    Linked Group 2:  \"And\" Linked Group Details        nitroglycerin 50 mg/250 mL (0.2 mg/mL) infusion 5-200 mcg/min Continuous PRN 3/21/2018     Sig - Route: Infuse 5-200 mcg/min into a venous catheter Continuous As Needed (See Admin Instructions). - Intravenous    ondansetron (ZOFRAN) injection 4 mg 4 mg Every 6 Hours PRN 3/21/2018     Sig - Route: Infuse 2 mL into a venous catheter Every 6 (Six) Hours As Needed for Nausea or Vomiting. - Intravenous    oxyCODONE (ROXICODONE) immediate release tablet 10 mg 10 mg Every 4 Hours PRN 3/21/2018 3/31/2018    Sig - Route: Take 2 tablets by mouth Every 4 (Four) Hours As Needed for Severe Pain . - Oral    pantoprazole (PROTONIX) EC tablet 40 mg 40 mg Every Morning 3/22/2018     Sig - Route: Take 1 tablet by mouth Every Morning. - Oral    potassium chloride (KLOR-CON) packet 40 mEq 40 mEq As Needed 3/21/2018     Sig - Route: Take 40 mEq by mouth As Needed (potassium replacement, see admin instructions). - Oral    Linked Group 3:  \"Or\" Linked Group Details  " "      potassium chloride (MICRO-K) CR capsule 40 mEq 40 mEq As Needed 3/21/2018     Sig - Route: Take 4 capsules by mouth As Needed (potassium replacement.  see admin instructions). - Oral    Linked Group 3:  \"Or\" Linked Group Details        potassium chloride 10 mEq in 100 mL IVPB 10 mEq Every 1 Hour PRN 3/21/2018     Sig - Route: Infuse 100 mL into a venous catheter Every 1 (One) Hour As Needed (for K+ less than or equal to 3.1). - Intravenous    Linked Group 4:  \"Or\" Linked Group Details        potassium chloride 10 mEq in 100 mL IVPB 10 mEq Every 1 Hour PRN 3/21/2018     Sig - Route: Infuse 100 mL into a venous catheter Every 1 (One) Hour As Needed (for K+ 3.2 - 3.6). - Intravenous    Linked Group 4:  \"Or\" Linked Group Details        potassium chloride 20 mEq in 50 mL IVPB 20 mEq Every 1 Hour PRN 3/21/2018     Sig - Route: Infuse 50 mL into a venous catheter Every 1 (One) Hour As Needed (for K+ less than or equal to 3.1). - Intravenous    potassium chloride 20 mEq in 50 mL IVPB 20 mEq Every 1 Hour PRN 3/21/2018     Sig - Route: Infuse 50 mL into a venous catheter Every 1 (One) Hour As Needed (for K+ 3.7 - 4.0). - Intravenous    promethazine (PHENERGAN) injection 12.5 mg 12.5 mg Every 6 Hours PRN 3/21/2018     Sig - Route: Infuse 0.5 mL into a venous catheter Every 6 (Six) Hours As Needed for Nausea or Vomiting. - Intravenous    Linked Group 5:  \"Or\" Linked Group Details        promethazine (PHENERGAN) tablet 12.5 mg 12.5 mg Every 6 Hours PRN 3/21/2018     Sig - Route: Take 0.5 tablets by mouth Every 6 (Six) Hours As Needed for Nausea or Vomiting. - Oral    Linked Group 5:  \"Or\" Linked Group Details        sennosides-docusate sodium (SENOKOT-S) 8.6-50 MG tablet 2 tablet 2 tablet Nightly 3/22/2018     Sig - Route: Take 2 tablets by mouth Every Night. - Oral    sodium chloride 0.9 % flush 30 mL 30 mL Once As Needed 3/21/2018     Sig - Route: Infuse 30 mL into a venous catheter 1 (One) Time As Needed for Line Care. " "- Intravenous    sodium chloride 0.9 % infusion 30 mL/hr Continuous 3/21/2018     Sig - Route: Infuse 30 mL/hr into a venous catheter Continuous. - Intravenous    sodium chloride 0.9 % infusion 30 mL/hr Continuous PRN 3/21/2018     Sig - Route: Infuse 30 mL/hr into a venous catheter Continuous As Needed (if insulin infusion rate is insufficient to maintain IV patency.). - Intravenous                     Progress Notes  Date of Service: 3/23/2018 8:17 AM  Rani Rutledge MD   Cardiology   Expand All Collapse All    []Manual[]Template  []Copied  Patient Name: Juan Payne  :1960  57 y.o.        Patient Care Team:  No Known Provider as PCP - General     Interval History:   Moved toCVU     Subjective:  Following for aortic valve surgery         Objective      Vital Signs  Temp:  [98.7 °F (37.1 °C)-100 °F (37.8 °C)] 98.9 °F (37.2 °C)  Heart Rate:  [56-72] 66  Resp:  [16-20] 16  BP: ()/(50-65) 99/55     Intake/Output Summary (Last 24 hours) at 18 0817  Last data filed at 18 0805    Gross per 24 hour   Intake             1100 ml   Output             2305 ml   Net            -1205 ml          Flowsheet Rows    Flowsheet Row First Filed Value   Admission Height 172.7 cm (68\") Documented at 2018 1637   Admission Weight 63.5 kg (140 lb) Documented at 2018 1637             Physical Exam:           General Appearance:    Alert, cooperative, in no acute distress   Lungs:     Clear to auscultation.  Normal respiratory effort and rate.      Heart:    Regular rhythm and normal rate, normal S1 and S2, no murmurs, gallops or rubs.     Chest Wall:    No abnormalities observed   Abdomen:     Soft, nontender, positive bowel sounds.     Extremities:   no cyanosis, clubbing or edema.  No marked joint deformities.  Adequate musculoskeletal strength.        Results Review:       Results from last 7 days  Lab Units 18  0434   SODIUM mmol/L 135*   POTASSIUM mmol/L 4.3   CHLORIDE mmol/L 97*   CO2 " mmol/L 26.9   BUN mg/dL 17   CREATININE mg/dL 0.63*   GLUCOSE mg/dL 138*   CALCIUM mg/dL 8.9             Results from last 7 days  Lab Units 03/23/18  0434   WBC 10*3/mm3 9.64   HEMOGLOBIN g/dL 7.8*   HEMATOCRIT % 24.4*   PLATELETS 10*3/mm3 222         Results from last 7 days  Lab Units 03/22/18  0305 03/21/18  1710 03/21/18  1545   INR   1.10 1.16* 1.3*   APTT seconds  --  36.1*  --          Results from last 7 days  Lab Units 03/17/18  0605   CHOLESTEROL mg/dL 173         Results from last 7 days  Lab Units 03/22/18  0305   MAGNESIUM mg/dL 2.4         Results from last 7 days  Lab Units 03/17/18  0605   CHOLESTEROL mg/dL 173   TRIGLYCERIDES mg/dL 118   HDL CHOL mg/dL 46   LDL CHOL mg/dL 103*          Medication Review:      amiodarone 200 mg Oral Q12H   aspirin 81 mg Oral Daily   atorvastatin 20 mg Oral Nightly   chlorhexidine 15 mL Mouth/Throat Q12H   enoxaparin 40 mg Subcutaneous Daily   insulin aspart 0-12 Units Subcutaneous 4x Daily AC & at Bedtime   insulin aspart 2 Units Subcutaneous TID With Meals   insulin detemir 12 Units Subcutaneous QAM   insulin detemir 7 Units Subcutaneous Nightly   metoprolol tartrate 12.5 mg Oral Q12H   mupirocin   Each Nare BID   pantoprazole 40 mg Oral QAM   sennosides-docusate sodium 2 tablet Oral Nightly            nitroglycerin 5-200 mcg/min     sodium chloride 30 mL/hr Last Rate: 30 mL/hr (03/21/18 1700)   sodium chloride 30 mL/hr Last Rate: 30 mL/hr (03/21/18 1645)            Assessment/Plan         1.  Aortic stenosis.  Postoperative day 2 aortic valve replacement with a 21 mm magna pericardial prosthesis.  2.  Diabetes.  3.  Tobacco use  4.  Alcohol use  5.  Prophylactic amiodarone   6.  Postoperative anemia.  Monitor.     Rani Rutledge MD, [facility]  03/23/18  8:17 AM                           Progress Notes    Date of Service: 3/23/2018 9:30 AM  [facility]   Cardiothoracic Surgery   Expand All Collapse All     []Manual[]Template  []Copied   LOS: 15 days   Patient Care Team:  No Known Provider as PCP - General     Chief Complaint: post op     Subjective:  Symptoms:  He reports weakness.  No shortness of breath.    Diet:  Adequate intake.  No nausea.    Activity level: Impaired due to weakness.    Pain:  He complains of pain that is mild.  He reports pain is improving.  Pain is requiring pain medication.             Vital Signs  Temp:  [98.7 °F (37.1 °C)-100 °F (37.8 °C)] 98.9 °F (37.2 °C)  Heart Rate:  [56-69] 66  Resp:  [16-20] 16  BP: ()/(55-65) 99/55  Body mass index is 22.2 kg/m².     Intake/Output Summary (Last 24 hours) at 03/23/18 0930  Last data filed at 03/23/18 0805    Gross per 24 hour   Intake             1100 ml   Output             2145 ml   Net            -1045 ml      I/O this shift:  In: 240 [P.O.:240]  Out: -      Chest tube drainage last 8 hours 120     Vitals         1     03/21/18  2205 03/22/18  2301 03/23/18  0500   Weight: 67.1 kg (148 lb) 66.2 kg (146 lb) 66.2 kg (146 lb)               Objective     Results Review:          WBC       WBC   Date Value Ref Range Status   03/23/2018 9.64 4.50 - 10.70 10*3/mm3 Final   03/22/2018 13.55 (H) 4.50 - 10.70 10*3/mm3 Final   03/21/2018 17.18 (H) 4.50 - 10.70 10*3/mm3 Final   03/21/2018 15.86 (H) 4.50 - 10.70 10*3/mm3 Final   03/21/2018 12.52 (H) 4.50 - 10.70 10*3/mm3 Final       HGB       Hemoglobin   Date Value Ref Range Status   03/23/2018 7.8 (L) 13.7 - 17.6 g/dL Final   03/22/2018 8.1 (L) 13.7 - 17.6 g/dL Final   03/21/2018 8.6 (L) 13.7 - 17.6 g/dL Final   03/21/2018 8.3 (L) 13.7 - 17.6 g/dL Final   03/21/2018 8.2 (L) 12.0 - 17.0 g/dL Final   03/21/2018 8.8 (L) 12.0 - 17.0 g/dL Final   03/21/2018 8.8 (L) 12.0 - 17.0 g/dL Final   03/21/2018 8.8 (L) 12.0 - 17.0 g/dL Final   03/21/2018 8.8 (L) 12.0 - 17.0 g/dL Final   03/21/2018 8.8 (L) 12.0 - 17.0 g/dL Final   03/21/2018 9.2 (L) 13.7 - 17.6 g/dL Final       HCT       Hematocrit   Date Value Ref  Range Status   03/23/2018 24.4 (L) 40.4 - 52.2 % Final   03/22/2018 25.2 (L) 40.4 - 52.2 % Final   03/21/2018 26.5 (L) 40.4 - 52.2 % Final   03/21/2018 25.6 (L) 40.4 - 52.2 % Final   03/21/2018 24 (L) 38 - 51 % Final   03/21/2018 26 (L) 38 - 51 % Final   03/21/2018 26 (L) 38 - 51 % Final   03/21/2018 26 (L) 38 - 51 % Final   03/21/2018 26 (L) 38 - 51 % Final   03/21/2018 26 (L) 38 - 51 % Final   03/21/2018 28.4 (L) 40.4 - 52.2 % Final       Platelets       Platelets   Date Value Ref Range Status   03/23/2018 222 140 - 500 10*3/mm3 Final   03/22/2018 218 140 - 500 10*3/mm3 Final   03/21/2018 202 140 - 500 10*3/mm3 Final   03/21/2018 183 140 - 500 10*3/mm3 Final   03/21/2018 390 140 - 500 10*3/mm3 Final          PT/INR:            Protime   Date Value Ref Range Status   03/22/2018 14.1 11.7 - 14.2 Seconds Final   03/21/2018 14.6 (H) 11.7 - 14.2 Seconds Final   03/21/2018 15.0 12.8 - 15.2 seconds Final       Comment:       Serial Number: [id number]:  [id number]   /        INR   Date Value Ref Range Status   03/22/2018 1.10 0.90 - 1.10 Final   03/21/2018 1.16 (H) 0.90 - 1.10 Final   03/21/2018 1.3 (H) 0.8 - 1.2 Final         Sodium       Sodium   Date Value Ref Range Status   03/23/2018 135 (L) 136 - 145 mmol/L Final   03/22/2018 140 136 - 145 mmol/L Final   03/21/2018 141 136 - 145 mmol/L Final   03/21/2018 140 136 - 145 mmol/L Final   03/21/2018 140 136 - 145 mmol/L Final       Potassium       Potassium   Date Value Ref Range Status   03/23/2018 4.3 3.5 - 5.2 mmol/L Final   03/22/2018 4.3 3.5 - 5.2 mmol/L Final   03/21/2018 4.7 3.5 - 5.2 mmol/L Final   03/21/2018 3.3 (L) 3.5 - 5.2 mmol/L Final   03/21/2018 3.9 3.5 - 5.2 mmol/L Final       Chloride       Chloride   Date Value Ref Range Status   03/23/2018 97 (L) 98 - 107 mmol/L Final   03/22/2018 100 98 - 107 mmol/L Final   03/21/2018 102 98 - 107 mmol/L Final   03/21/2018 102 98 - 107 mmol/L Final   03/21/2018 102 98 - 107 mmol/L Final       Bicarbonate       CO2   Date  Value Ref Range Status   03/23/2018 26.9 22.0 - 29.0 mmol/L Final   03/22/2018 25.1 22.0 - 29.0 mmol/L Final   03/21/2018 24.5 22.0 - 29.0 mmol/L Final   03/21/2018 25.7 22.0 - 29.0 mmol/L Final   03/21/2018 27.1 22.0 - 29.0 mmol/L Final       BUN       BUN   Date Value Ref Range Status   03/23/2018 17 6 - 20 mg/dL Final   03/22/2018 12 6 - 20 mg/dL Final   03/21/2018 11 6 - 20 mg/dL Final   03/21/2018 12 6 - 20 mg/dL Final   03/21/2018 12 6 - 20 mg/dL Final       Creatinine       Creatinine   Date Value Ref Range Status   03/23/2018 0.63 (L) 0.76 - 1.27 mg/dL Final   03/22/2018 0.57 (L) 0.76 - 1.27 mg/dL Final   03/21/2018 0.67 (L) 0.76 - 1.27 mg/dL Final   03/21/2018 0.78 0.76 - 1.27 mg/dL Final   03/21/2018 0.60 (L) 0.76 - 1.27 mg/dL Final       Calcium       Calcium   Date Value Ref Range Status   03/23/2018 8.9 8.6 - 10.5 mg/dL Final   03/22/2018 8.6 8.6 - 10.5 mg/dL Final   03/21/2018 9.0 8.6 - 10.5 mg/dL Final   03/21/2018 9.1 8.6 - 10.5 mg/dL Final   03/21/2018 8.7 8.6 - 10.5 mg/dL Final       Magnesium       Magnesium   Date Value Ref Range Status   03/22/2018 2.4 1.6 - 2.6 mg/dL Final   03/21/2018 2.5 1.6 - 2.6 mg/dL Final   03/21/2018 2.8 (H) 1.6 - 2.6 mg/dL Final                amiodarone 200 mg Oral Q12H   aspirin 81 mg Oral Daily   atorvastatin 20 mg Oral Nightly   chlorhexidine 15 mL Mouth/Throat Q12H   enoxaparin 40 mg Subcutaneous Daily   insulin aspart 0-12 Units Subcutaneous 4x Daily AC & at Bedtime   insulin aspart 2 Units Subcutaneous TID With Meals   insulin detemir 12 Units Subcutaneous QAM   insulin detemir 7 Units Subcutaneous Nightly   metoprolol tartrate 12.5 mg Oral Q12H   mupirocin   Each Nare BID   pantoprazole 40 mg Oral QAM   sennosides-docusate sodium 2 tablet Oral Nightly         nitroglycerin 5-200 mcg/min     sodium chloride 30 mL/hr Last Rate: 30 mL/hr (03/21/18 1700)   sodium chloride 30 mL/hr Last Rate: 30 mL/hr (03/21/18 1645)                    Patient Active Problem List    Diagnosis Code   • Diabetic ketoacidosis without coma associated with type 1 diabetes mellitus E10.10   • Type 1 diabetes mellitus E10.9   • Tobacco abuse Z72.0   • Alcohol abuse F10.10   • Insurance coverage problems Z59.8   • Bilateral carpal tunnel syndrome G56.03   • Microalbuminuria R80.9   • Nonrheumatic aortic valve stenosis I35.0         Assessment & Plan        -Severe aortic stenosis- s/p AVR-tissue POD#2- PAGNI  -Severe concentric hypertrophy  -Left ventricular diastolic dysfunction   -preserved EF  -DM type 1 - endocrine following  -DKA w/o coma- resolved  -Tobacco abuse  -ETOH  -Poor Compliance  -Poor nutrition  -Post op anemia expected ABL, 7.8/24.4 today     EKG NSR     Mobilize, encourage IS.  Discontinue chest tubes.  Making great progress.        Mirian France, APRN  03/23/18  9:30 AM

## 2018-03-23 NOTE — THERAPY TREATMENT NOTE
Acute Care - Physical Therapy Treatment Note  [facility]     Patient Name: Juan Payne  : 1960  MRN: [id number]  Today's Date: 3/23/2018  Onset of Illness/Injury or Date of Surgery: 18  Date of Referral to PT: 18  Referring Physician: Ladonna    Admit Date: 3/8/2018    Visit Dx:    ICD-10-CM ICD-9-CM   1. Diabetic ketoacidosis without coma associated with type 1 diabetes mellitus E10.10 250.11   2. Impaired functional mobility and activity tolerance Z74.09 V49.89   3. Nonrheumatic aortic valve stenosis I35.0 424.1     Patient Active Problem List   Diagnosis   • Diabetic ketoacidosis without coma associated with type 1 diabetes mellitus   • Type 1 diabetes mellitus   • Tobacco abuse   • Alcohol abuse   • Insurance coverage problems   • Bilateral carpal tunnel syndrome   • Microalbuminuria   • Nonrheumatic aortic valve stenosis       Therapy Treatment    Therapy Treatment / Health Promotion    Treatment Time/Intention  Discipline: (P) physical therapist (18 0956 : Rickey Isaac PT Student)  Document Type: (P) therapy note (daily note) (18 0956 : Rickey Isaac PT Student)  Subjective Information: (P) complains of, weakness, fatigue, pain (18 0956 : Rickey Isaac PT Student)  Mode of Treatment: (P) physical therapy (18 0956 : Rickey Isaac PT Student)  Patient/Family Observations: (P) Pt seated in chair. No acute distress. (18 0956 : Rickey Isaac PT Student)  Patient Effort: (P) good (18 0956 : Rickey Isaac PT Student)  Patient Response to Treatment: (P) Pt pain well controlled. Mild SOB w/ ambulation. (18 0956 : Rickey Isaac PT Student)  Plan of Care Review  Plan of Care Reviewed With: (P) patient (18 1001 : Rickey Isaac PT Student)    Vitals/Pain/Safety  Vital Signs  Pre Systolic BP Rehab: (P) 99 (18 0956 : Rickey Isaac PT Student)  Pre Treatment Diastolic BP: (P) 55 (18 0956 : Rickey Isaac PT  [place]itt[place])  Pretreatment Heart Rate (beats/min): (P) 64 (03/23/18 0956 : Rickey Isaac PT Student)  Posttreatment Heart Rate (beats/min): (P) 67 (03/23/18 0956 : Rickey Isaac PT Student)  Pre SpO2 (%): (P) 97 (03/23/18 0956 : Rickey Isaac PT Student)  O2 Delivery Pre Treatment: (P) room air (03/23/18 0956 : Rickey Isaac PT Student)  Post SpO2 (%): (P) 100 (03/23/18 0956 : Rickey Isaac PT Student)  O2 Delivery Post Treatment: (P) room air (03/23/18 0956 : Rickey Isaac PT Student)  Pain Assessment  Additional Documentation: (P) Pain Scale: Numbers Pre/Post-Treatment (Group) (03/23/18 0956 : PEGGY Donahue)  Pain Scale: Numbers Pre/Post-Treatment  Pain Scale: Numbers, Pretreatment: (P) 5/10 (03/23/18 0956 : PEGGY Donahue)  Pain Location: (P) other (see comments) ([place]) (03/23/18 0956 : Rickey Isaac PT Student)  Pain Intervention(s): (P) Repositioned (03/23/18 0956 : Rickey Isaac PT Student)  Pain Scale: Word Pre/Post-Treatment  Pain Location: (P) other (see comments) ([place]) (03/23/18 0956 : Rickey Isaac PT Student)  Pain Intervention(s): (P) Repositioned (03/23/18 0956 : Rickey Isaac PT Student)  Pain Scale: FACES Pre/Post-Treatment  Pain Location: (P) other (see comments) ([place]) (03/23/18 0956 : Rickey Isaac PT Student)  Pain Intervention(s): (P) Repositioned (03/23/18 0956 : Rickey Isaac PT Student)  Safety Issues, Functional Mobility  Safety Issues Affecting Function (Mobility): (P) insight into deficits/self awareness, safety precaution awareness (03/23/18 0956 : Rickey Isaac PT Student)  Impairments Affecting Function (Mobility): (P) balance, endurance/activity tolerance, pain, shortness of breath, strength (03/23/18 0956 : Rickey Eatinger, PT Student)  Positioning and Restraints  Pre-Treatment Position: (P) sitting in chair/recliner (03/23/18 0956 : Rickey Isaac PT Student)  Post Treatment Position: (P) bed  (03/23/18 0956 : PEGGY Donahue)  In Bed: (P) supine, call light within reach, encouraged to call for assist, with nsg (03/23/18 0956 : PEGGY Donahue)    Mobility,ADL,Motor, Modality  Bed Mobility Assessment/Treatment  Sit-Supine [place] (Bed Mobility): (P) minimum assist (75% patient effort), 2 person assist (03/23/18 0956 : PEGGY Donahue)  Sit-Stand Transfer  Sit-Stand [place] (Transfers): (P) 2 person assist, minimum assist (75% patient effort) (03/23/18 0956 : Rickey Isaac PT Student)  Stand-Sit Transfer  Stand-Sit [place] (Transfers): (P) contact guard, 2 person assist (03/23/18 0956 : PEGGY Donahue)  Gait/Stairs Assessment/Training  [place] Level (Gait): (P) contact guard (03/23/18 0956 : PEGGY Donahue)  Distance in Feet (Gait): (P) 150 (03/23/18 0956 : PEGGY Donahue)  Pattern (Gait): (P) swing-through (03/23/18 0956 : PEGGY Donahue)  Deviations/Abnormal Patterns (Gait): (P) base of support, narrow, kathi decreased, gait speed decreased, stride length decreased (03/23/18 0956 : PEGGY Donahue)                 ROM/MMT             Sensory, Edema, Orthotics          Cognition, Communication, Swallow  Cognitive Assessment/Intervention- PT/OT  Orientation Status (Cognition): (P) oriented x 4 (03/23/18 0956 : PEGGY Donahue)  Follows Commands (Cognition): (P) WFL (03/23/18 0956 : PEGGY Donahue)  Safety Deficit (Cognitive): (P) insight into deficits/self awareness, safety precautions awareness (03/23/18 0956 : PEGGY Donahue)  Personal Safety Interventions: (P) fall prevention program maintained, nonskid shoes/slippers when out of bed (03/23/18 0956 : Rickey Isaac PT Student)  Speaking Valve  Pretreatment Heart Rate (beats/min): (P) 64 (03/23/18 0956 : PEGGY Donahue)  Pre SpO2 (%): (P) 97 (03/23/18 0956 : Rickey  Marlin, PT Student)  Posttreatment Heart Rate (beats/min): (P) 67 (03/23/18 0956 : Rickey Isaac PT Student)  Post SpO2 (%): (P) 100 (03/23/18 0956 : Rickey Isaac PT Student)  General Eating/Swallowing Observations  Pre SpO2 (%): (P) 97 (03/23/18 0956 : Rickey Isaac PT Student)  Post SpO2 (%): (P) 100 (03/23/18 0956 : Rickey Isaac PT Student)    Outcome Summary               PT Rehab Goals     Row Name 03/22/18 0917 03/12/18 1425          Transfer Goal 1 (PT)    Activity/Assistive Device (Transfer Goal 1, PT)  -- sit-to-stand/stand-to-sit  -PC     [place] Level/Cues Needed (Transfer Goal 1, PT)  -- supervision required  -PC     Time Frame (Transfer Goal 1, PT)  -- 1 week  -PC        Gait Training Goal 1 (PT)    Activity/Assistive Device (Gait Training Goal 1, PT)  -- gait (walking locomotion)  -PC     [place] Level (Gait Training Goal 1, PT)  -- supervision required  -PC     Distance (Gait Goal 1, PT)  -- 300 ft  -PC     Time Frame (Gait Training Goal 1, PT)  -- 1 week  -PC        Problem Specific Goal 1 (PT)    Problem Specific Goal 1 (PT) Cardiovascular Level V  -PC (r) RE (t) PC (c)  --     Time Frame (Problem Specific Goal 1, PT) 1 week  -PC (r) RE (t) PC (c)  --       User Key  (r) = Recorded By, (t) = Taken By, (c) = Cosigned By    Initials Name Provider Type    PC Renetta Morris, PT Physical Therapist    RE Rickey Isaac, PT Student PT Student          Physical Therapy Education     Title: PT OT SLP Therapies (Done)     Topic: Physical Therapy (Done)     Point: Mobility training (Done)    Learning Progress Summary     Learner Status Readiness Method Response Comment Documented by    Patient Done Acceptance TA BONNER DU, NR  RE 03/23/18 0959     Done Acceptance TA BONNER DUNR  RE 03/22/18 0928     Done Acceptance JAIME BONNER DU  CW 03/13/18 1501     Done Acceptance TA B[place] DU, NR  PC 03/12/18 1435          Point: Home exercise program (Done)    Learning Progress Summary     Learner Status  Readiness Method Response Comment Documented by    Patient Done Acceptance E,D VU,DU,NR  RE 03/23/18 0959     Done Acceptance E,D VU,DU,NR  RE 03/22/18 0928     Done Acceptance E,TB VU,DU   03/13/18 1501     Done Acceptance E,D VU,DU,NR  PC 03/12/18 1435          Point: Body mechanics (Done)    Learning Progress Summary     Learner Status Readiness Method Response Comment Documented by    Patient Done Acceptance E,D VU,DU,NR  RE 03/23/18 0959     Done Acceptance E,D VU,DU,NR  RE 03/22/18 0928     Done Acceptance E,TB VU,DU  CW 03/13/18 1501     Done Acceptance E,D VU,DU,NR  PC 03/12/18 1435          Point: Precautions (Done)    Learning Progress Summary     Learner Status Readiness Method Response Comment Documented by    Patient Done Acceptance E,D VU,DU,NR  RE 03/23/18 0959     Done Acceptance E,D VU,DU,NR  RE 03/22/18 0928     Done Acceptance E,TB VU,DU   03/13/18 1501     Done Acceptance E,D VU,DU,NR  PC 03/12/18 1435                      User Key     Initials Effective Dates Name Provider Type Discipline    PC 12/01/15 -  Renetta Morris, PT Physical Therapist PT     03/07/18 -  Donnie Crump, PTA Physical Therapy Assistant PT     02/05/18 -  Rickey Isaac, PT Student PT Student PT                    PT Recommendation and Plan  Anticipated Discharge Disposition (PT): home or self care  Planned Therapy Interventions (PT Eval): balance training, bed mobility training, gait training, home exercise program, strengthening, transfer training  Therapy Frequency (PT Clinical Impression): daily  Outcome Summary/Treatment Plan (PT)  Anticipated Discharge Disposition (PT): home or self care  Plan of Care Reviewed With: (P) patient  Progress: (P) improving  Outcome Summary: (P) Pt w/ mild SOB w/ ambulation. Pain level well controlled. Still requires some assist for balance w/ STS. Mild increased postural sway w/ ambulation. Pt will continue to benefit from skilled PT in order to promote improvement in  endurance, functional mobility and ambulatory tolerance.          Outcome Measures     Row Name 03/23/18 1003 03/22/18 0930          How much help from another person do you currently need...    Turning from your back to your side while in flat bed without using bedrails? (P)  3  -RE 3  -PC (r) RE (t) PC (c)     Moving from lying on back to sitting on the side of a flat bed without bedrails? (P)  3  -RE 2  -PC (r) RE (t) PC (c)     Moving to and from a bed to a chair (including a wheelchair)? (P)  3  -RE 3  -PC (r) RE (t) PC (c)     Standing up from a chair using your arms (e.g., wheelchair, bedside chair)? (P)  3  -RE 3  -PC (r) RE (t) PC (c)     Climbing 3-5 steps with a railing? (P)  2  -RE 2  -PC (r) RE (t) PC (c)     To walk in hospital room? (P)  3  -RE 3  -PC (r) RE (t) PC (c)     AM-PAC 6 Clicks Score (P)  17  -RE 16  -PC (r) RE (t)        Functional Assessment    Outcome Measure Options (P)  AM-PAC 6 Clicks Basic Mobility (PT)  -RE AM-PAC 6 Clicks Basic Mobility (PT)  -PC (r) RE (t) PC (c)       User Key  (r) = Recorded By, (t) = Taken By, (c) = Cosigned By    Initials Name Provider Type    YENIFER Morris, PT Physical Therapist    NIC Isaac, PT Student PT Student           Time Calculation:         PT Charges     Row Name 03/23/18 1003             Time Calculation    Start Time (P)  0935  -RE      Stop Time (P)  0945  -RE      Time Calculation (min) (P)  10 min  -RE      PT Received On (P)  03/23/18  -RE      PT - Next Appointment (P)  03/24/18  -RE        User Key  (r) = Recorded By, (t) = Taken By, (c) = Cosigned By    Initials Name Provider Type    NIC Isaac PT Student PT Student          Therapy Charges for Today     Code Description Service Date Service Provider Modifiers Qty    19500861834  PT EVAL MOD COMPLEXITY 2 3/22/2018 Rickey Eatinger, PT Student GP 1    40421531068 HC PT THER PROC EA 15 MIN 3/22/2018 Rickey Isaac, PT Student GP 1    [id number]  PT THER SUPP EA  15 MIN 3/22/2018 Rickey Isaac, PT Student GP 1    20892760744 HC PT THER PROC EA 15 MIN 3/23/2018 Rickey Isaac, PT Student GP 1    36927932977 HC PT THER SUPP EA 15 MIN 3/23/2018 Rickey Isaac, PT Student GP 1          PT G-Codes  Outcome Measure Options: (P) AM-PAC 6 Clicks Basic Mobility (PT)    Rickey Isaac, PT Student  3/23/2018

## 2018-03-23 NOTE — PROGRESS NOTES
"Patient Name: Juan Payne  :1960  57 y.o.      Patient Care Team:  No Known Provider as PCP - General    Interval History:   Moved toCVU    Subjective:  Following for aortic valve surgery     Objective   Vital Signs  Temp:  [98.7 °F (37.1 °C)-100 °F (37.8 °C)] 98.9 °F (37.2 °C)  Heart Rate:  [56-72] 66  Resp:  [16-20] 16  BP: ()/(50-65) 99/55    Intake/Output Summary (Last 24 hours) at 18 0817  Last data filed at 18 0805   Gross per 24 hour   Intake             1100 ml   Output             2305 ml   Net            -1205 ml     Flowsheet Rows    Flowsheet Row First Filed Value   Admission Height 172.7 cm (68\") Documented at 2018 1637   Admission Weight 63.5 kg (140 lb) Documented at 2018 1637          Physical Exam:   General Appearance:    Alert, cooperative, in no acute distress   Lungs:     Clear to auscultation.  Normal respiratory effort and rate.      Heart:    Regular rhythm and normal rate, normal S1 and S2, no murmurs, gallops or rubs.     Chest Wall:    No abnormalities observed   Abdomen:     Soft, nontender, positive bowel sounds.     Extremities:   no cyanosis, clubbing or edema.  No marked joint deformities.  Adequate musculoskeletal strength.       Results Review:      Results from last 7 days  Lab Units 18  0434   SODIUM mmol/L 135*   POTASSIUM mmol/L 4.3   CHLORIDE mmol/L 97*   CO2 mmol/L 26.9   BUN mg/dL 17   CREATININE mg/dL 0.63*   GLUCOSE mg/dL 138*   CALCIUM mg/dL 8.9           Results from last 7 days  Lab Units 18  0434   WBC 10*3/mm3 9.64   HEMOGLOBIN g/dL 7.8*   HEMATOCRIT % 24.4*   PLATELETS 10*3/mm3 222       Results from last 7 days  Lab Units 18  0305 18  1710 18  1545   INR  1.10 1.16* 1.3*   APTT seconds  --  36.1*  --        Results from last 7 days  Lab Units 18  0605   CHOLESTEROL mg/dL 173       Results from last 7 days  Lab Units 18  0305   MAGNESIUM mg/dL 2.4       Results from last 7 days  Lab Units " 03/17/18  0605   CHOLESTEROL mg/dL 173   TRIGLYCERIDES mg/dL 118   HDL CHOL mg/dL 46   LDL CHOL mg/dL 103*         Medication Review:     amiodarone 200 mg Oral Q12H   aspirin 81 mg Oral Daily   atorvastatin 20 mg Oral Nightly   chlorhexidine 15 mL Mouth/Throat Q12H   enoxaparin 40 mg Subcutaneous Daily   insulin aspart 0-12 Units Subcutaneous 4x Daily AC & at Bedtime   insulin aspart 2 Units Subcutaneous TID With Meals   insulin detemir 12 Units Subcutaneous QAM   insulin detemir 7 Units Subcutaneous Nightly   metoprolol tartrate 12.5 mg Oral Q12H   mupirocin  Each Nare BID   pantoprazole 40 mg Oral QAM   sennosides-docusate sodium 2 tablet Oral Nightly          nitroglycerin 5-200 mcg/min    sodium chloride 30 mL/hr Last Rate: 30 mL/hr (03/21/18 1700)   sodium chloride 30 mL/hr Last Rate: 30 mL/hr (03/21/18 1645)       Assessment/Plan     1.  Aortic stenosis.  Postoperative day 2 aortic valve replacement with a 21 mm magna pericardial prosthesis.  2.  Diabetes.  3.  Tobacco use  4.  Alcohol use  5.  Prophylactic amiodarone   6.  Postoperative anemia.  Monitor.    Rani Rutledge MD, [facility]  03/23/18  8:17 AM

## 2018-03-23 NOTE — PROGRESS NOTES
LOS: 15 days   Patient Care Team:  No Known Provider as PCP - General    Chief Complaint: post op    Subjective:  Symptoms:  He reports weakness.  No shortness of breath.    Diet:  Adequate intake.  No nausea.    Activity level: Impaired due to weakness.    Pain:  He complains of pain that is mild.  He reports pain is improving.  Pain is requiring pain medication.          Vital Signs  Temp:  [98.7 °F (37.1 °C)-100 °F (37.8 °C)] 98.9 °F (37.2 °C)  Heart Rate:  [56-69] 66  Resp:  [16-20] 16  BP: ()/(55-65) 99/55  Body mass index is 22.2 kg/m².    Intake/Output Summary (Last 24 hours) at 03/23/18 0930  Last data filed at 03/23/18 0805   Gross per 24 hour   Intake             1100 ml   Output             2145 ml   Net            -1045 ml     I/O this shift:  In: 240 [P.O.:240]  Out: -     Chest tube drainage last 8 hours 120    1    03/21/18  2205 03/22/18  2301 03/23/18  0500   Weight: 67.1 kg (148 lb) 66.2 kg (146 lb) 66.2 kg (146 lb)         Objective    Results Review:        WBC WBC   Date Value Ref Range Status   03/23/2018 9.64 4.50 - 10.70 10*3/mm3 Final   03/22/2018 13.55 (H) 4.50 - 10.70 10*3/mm3 Final   03/21/2018 17.18 (H) 4.50 - 10.70 10*3/mm3 Final   03/21/2018 15.86 (H) 4.50 - 10.70 10*3/mm3 Final   03/21/2018 12.52 (H) 4.50 - 10.70 10*3/mm3 Final      HGB Hemoglobin   Date Value Ref Range Status   03/23/2018 7.8 (L) 13.7 - 17.6 g/dL Final   03/22/2018 8.1 (L) 13.7 - 17.6 g/dL Final   03/21/2018 8.6 (L) 13.7 - 17.6 g/dL Final   03/21/2018 8.3 (L) 13.7 - 17.6 g/dL Final   03/21/2018 8.2 (L) 12.0 - 17.0 g/dL Final   03/21/2018 8.8 (L) 12.0 - 17.0 g/dL Final   03/21/2018 8.8 (L) 12.0 - 17.0 g/dL Final   03/21/2018 8.8 (L) 12.0 - 17.0 g/dL Final   03/21/2018 8.8 (L) 12.0 - 17.0 g/dL Final   03/21/2018 8.8 (L) 12.0 - 17.0 g/dL Final   03/21/2018 9.2 (L) 13.7 - 17.6 g/dL Final      HCT Hematocrit   Date Value Ref Range Status   03/23/2018 24.4 (L) 40.4 - 52.2 % Final   03/22/2018 25.2 (L) 40.4 - 52.2 %  Final   03/21/2018 26.5 (L) 40.4 - 52.2 % Final   03/21/2018 25.6 (L) 40.4 - 52.2 % Final   03/21/2018 24 (L) 38 - 51 % Final   03/21/2018 26 (L) 38 - 51 % Final   03/21/2018 26 (L) 38 - 51 % Final   03/21/2018 26 (L) 38 - 51 % Final   03/21/2018 26 (L) 38 - 51 % Final   03/21/2018 26 (L) 38 - 51 % Final   03/21/2018 28.4 (L) 40.4 - 52.2 % Final      Platelets Platelets   Date Value Ref Range Status   03/23/2018 222 140 - 500 10*3/mm3 Final   03/22/2018 218 140 - 500 10*3/mm3 Final   03/21/2018 202 140 - 500 10*3/mm3 Final   03/21/2018 183 140 - 500 10*3/mm3 Final   03/21/2018 390 140 - 500 10*3/mm3 Final        PT/INR:    Protime   Date Value Ref Range Status   03/22/2018 14.1 11.7 - 14.2 Seconds Final   03/21/2018 14.6 (H) 11.7 - 14.2 Seconds Final   03/21/2018 15.0 12.8 - 15.2 seconds Final     Comment:     Serial Number: [id number]:  [id number]   /  INR   Date Value Ref Range Status   03/22/2018 1.10 0.90 - 1.10 Final   03/21/2018 1.16 (H) 0.90 - 1.10 Final   03/21/2018 1.3 (H) 0.8 - 1.2 Final       Sodium Sodium   Date Value Ref Range Status   03/23/2018 135 (L) 136 - 145 mmol/L Final   03/22/2018 140 136 - 145 mmol/L Final   03/21/2018 141 136 - 145 mmol/L Final   03/21/2018 140 136 - 145 mmol/L Final   03/21/2018 140 136 - 145 mmol/L Final      Potassium Potassium   Date Value Ref Range Status   03/23/2018 4.3 3.5 - 5.2 mmol/L Final   03/22/2018 4.3 3.5 - 5.2 mmol/L Final   03/21/2018 4.7 3.5 - 5.2 mmol/L Final   03/21/2018 3.3 (L) 3.5 - 5.2 mmol/L Final   03/21/2018 3.9 3.5 - 5.2 mmol/L Final      Chloride Chloride   Date Value Ref Range Status   03/23/2018 97 (L) 98 - 107 mmol/L Final   03/22/2018 100 98 - 107 mmol/L Final   03/21/2018 102 98 - 107 mmol/L Final   03/21/2018 102 98 - 107 mmol/L Final   03/21/2018 102 98 - 107 mmol/L Final      Bicarbonate CO2   Date Value Ref Range Status   03/23/2018 26.9 22.0 - 29.0 mmol/L Final   03/22/2018 25.1 22.0 - 29.0 mmol/L Final   03/21/2018 24.5 22.0 - 29.0 mmol/L  Final   03/21/2018 25.7 22.0 - 29.0 mmol/L Final   03/21/2018 27.1 22.0 - 29.0 mmol/L Final      BUN BUN   Date Value Ref Range Status   03/23/2018 17 6 - 20 mg/dL Final   03/22/2018 12 6 - 20 mg/dL Final   03/21/2018 11 6 - 20 mg/dL Final   03/21/2018 12 6 - 20 mg/dL Final   03/21/2018 12 6 - 20 mg/dL Final      Creatinine Creatinine   Date Value Ref Range Status   03/23/2018 0.63 (L) 0.76 - 1.27 mg/dL Final   03/22/2018 0.57 (L) 0.76 - 1.27 mg/dL Final   03/21/2018 0.67 (L) 0.76 - 1.27 mg/dL Final   03/21/2018 0.78 0.76 - 1.27 mg/dL Final   03/21/2018 0.60 (L) 0.76 - 1.27 mg/dL Final      Calcium Calcium   Date Value Ref Range Status   03/23/2018 8.9 8.6 - 10.5 mg/dL Final   03/22/2018 8.6 8.6 - 10.5 mg/dL Final   03/21/2018 9.0 8.6 - 10.5 mg/dL Final   03/21/2018 9.1 8.6 - 10.5 mg/dL Final   03/21/2018 8.7 8.6 - 10.5 mg/dL Final      Magnesium Magnesium   Date Value Ref Range Status   03/22/2018 2.4 1.6 - 2.6 mg/dL Final   03/21/2018 2.5 1.6 - 2.6 mg/dL Final   03/21/2018 2.8 (H) 1.6 - 2.6 mg/dL Final            amiodarone 200 mg Oral Q12H   aspirin 81 mg Oral Daily   atorvastatin 20 mg Oral Nightly   chlorhexidine 15 mL Mouth/Throat Q12H   enoxaparin 40 mg Subcutaneous Daily   insulin aspart 0-12 Units Subcutaneous 4x Daily AC & at Bedtime   insulin aspart 2 Units Subcutaneous TID With Meals   insulin detemir 12 Units Subcutaneous QAM   insulin detemir 7 Units Subcutaneous Nightly   metoprolol tartrate 12.5 mg Oral Q12H   mupirocin  Each Nare BID   pantoprazole 40 mg Oral QAM   sennosides-docusate sodium 2 tablet Oral Nightly       nitroglycerin 5-200 mcg/min    sodium chloride 30 mL/hr Last Rate: 30 mL/hr (03/21/18 1700)   sodium chloride 30 mL/hr Last Rate: 30 mL/hr (03/21/18 1645)           Patient Active Problem List   Diagnosis Code   • Diabetic ketoacidosis without coma associated with type 1 diabetes mellitus E10.10   • Type 1 diabetes mellitus E10.9   • Tobacco abuse Z72.0   • Alcohol abuse F10.10   •  Insurance coverage problems Z59.8   • Bilateral carpal tunnel syndrome G56.03   • Microalbuminuria R80.9   • Nonrheumatic aortic valve stenosis I35.0       Assessment & Plan       -Severe aortic stenosis- s/p AVR-tissue POD#2- PAGNI  -Severe concentric hypertrophy  -Left ventricular diastolic dysfunction   -preserved EF  -DM type 1 - endocrine following  -DKA w/o coma- resolved  -Tobacco abuse  -ETOH  -Poor Compliance  -Poor nutrition  -Post op anemia expected ABL, 7.8/24.4 today     EKG NSR    Mobilize, encourage IS.  Discontinue chest tubes.  Making great progress.      Mirian France, APRN  03/23/18  9:30 AM

## 2018-03-23 NOTE — PLAN OF CARE
Problem: Patient Care Overview (Adult)  Goal: Plan of Care Review   03/23/18 0510   Coping/Psychosocial Response Interventions   Plan Of Care Reviewed With patient   Patient Care Overview   Progress improving   Outcome Evaluation   Outcome Summary/Follow up Plan Pt rested well overnight, reports adequate pain relief with oral pain meds. Enc to cough and deep breath. F/C removed, tolerated well. RIJ Central line removed, cath tip intact. Clear dressing applied. Sinus ryth on tele, Pacer connected. Will monitor.

## 2018-03-23 NOTE — PROGRESS NOTES
"  ENDOCRINE    Subjective   AND PLANS  Juan Payne is a 57 y.o. male.     Follow-up diabetes.      No nausea or vomiting.  Appetite good.  Fasting glucose 140.  Preop Levemir has been restarted at 12 units every morning and 7 units every evening.  Increase NovoLog to preop dose of 4 units with each meal plus sliding scale.    Patient has history of alcoholism before this admission.  He did not develop optimal withdrawal during this admission.  He is more anemic after surgery.  Start iron supplements.  Start multivitamins.      Objective   /76 (BP Location: Left arm, Patient Position: Lying)   Pulse 79   Temp 98.5 °F (36.9 °C) (Oral)   Resp 16   Ht 172.7 cm (68\")   Wt 66.2 kg (146 lb)   SpO2 97%   BMI 22.20 kg/m²   Physical Exam    Awake, alert, not in distress.     regular heart rate and rhythm.  No rales or wheezes.  Abdomen soft, nontender.  Extremities warm.  No cyanosis or pedal edema.    Lab Results (last 24 hours)     Procedure Component Value Units Date/Time    POC Glucose Once [[id number]]  (Abnormal) Collected:  03/23/18 1645    Specimen:  Blood Updated:  03/23/18 1646     Glucose 267 (H) mg/dL     Narrative:       Meter: [id number] : [id number] Direct Access Software FAUIZA    POC Glucose Once [[id number]]  (Abnormal) Collected:  03/23/18 1038    Specimen:  Blood Updated:  03/23/18 1419     Glucose 258 (H) mg/dL     Narrative:       Meter: [id number] : [id number] Direct Access Software FAUZIA    Tissue Pathology Exam [[id number]] Collected:  03/21/18 1456    Specimen:  Tissue from Aortic valve Updated:  03/23/18 1344     Case Report --     Surgical Pathology Report                         Case: [id number]                                  Authorizing Provider:  Cecil Dougherty MD        Collected:           03/21/2018 02:56 PM          Ordering Location:     [facility]  Received:            03/21/2018 11:16 PM                                 MAIN OR                                                " "                      Pathologist:           Joes Jordan MD                                                        Specimen:    Aortic valve, AORTIC VALVE                                                                  Final Diagnosis --     1. AORTIC VALVE LEAFLETS, RESECTION SPECIMEN:           SEVERE CALCIFIC ATHEROSCLEROSIS WITH FOCAL METAPLASTIC BONE FORMATION.           FOCAL MINIMAL CHRONIC INFLAMMATION.    [facility]/    CPT CODES:  1. 70681, 38990             Gross Description --     1.  Received in formalin labeled \"aortic valve\" are 2 tan diffusely calcified semi-lunar fragments of tissue measuring 2.5 x 1.5 x 1.2 cm in aggregate.  Representative section of each of the tissue fragments are submitted in a single block labeled 1A after decalcification.  CC/USO/LANCE/brb        Microscopic Description --     Performed, incorporated in diagnosis.          Embedded Images --    POC Glucose Once [[id number]]  (Abnormal) Collected:  03/23/18 0552    Specimen:  Blood Updated:  03/23/18 0553     Glucose 140 (H) mg/dL     Narrative:       Meter: [id number] : [id number] [facility]    Basic Metabolic Panel [[id number]]  (Abnormal) Collected:  03/23/18 0434    Specimen:  Blood Updated:  03/23/18 0538     Glucose 138 (H) mg/dL      BUN 17 mg/dL      Creatinine 0.63 (L) mg/dL      Sodium 135 (L) mmol/L      Potassium 4.3 mmol/L      Chloride 97 (L) mmol/L      CO2 26.9 mmol/L      Calcium 8.9 mg/dL      eGFR Non African Amer 130 mL/min/1.73      BUN/Creatinine Ratio 27.0 (H)     Anion Gap 11.1 mmol/L     Narrative:       GFR Normal >60  Chronic Kidney Disease <60  Kidney Failure <15    CBC (No Diff) [[id number]]  (Abnormal) Collected:  03/23/18 0434    Specimen:  Blood Updated:  03/23/18 0519     WBC 9.64 10*3/mm3      RBC 2.19 (L) 10*6/mm3      Hemoglobin 7.8 (L) g/dL      Hematocrit 24.4 (L) %      .4 (H) fL      MCH 35.6 (H) pg      MCHC 32.0 (L) g/dL      RDW 13.7 %      RDW-SD 55.0 (H) fl      MPV " 9.6 fL      Platelets 222 10*3/mm3     POC Glucose Once [[id number]]  (Abnormal) Collected:  03/22/18 2039    Specimen:  Blood Updated:  03/22/18 2040     Glucose 379 (H) mg/dL     Narrative:       Meter: [id number] : [id number] Jerry DÍAZ            Principal Problem:    Nonrheumatic aortic valve stenosis  Active Problems:    Diabetic ketoacidosis without coma associated with type 1 diabetes mellitus    Type 1 diabetes mellitus    Tobacco abuse    Alcohol abuse    Insurance coverage problems    Bilateral carpal tunnel syndrome    Microalbuminuria    Insulin therapy as discussed above.  Continue Lipitor.  Restart vitamin supplements  Start iron supplements

## 2018-03-24 ENCOUNTER — APPOINTMENT (OUTPATIENT)
Dept: GENERAL RADIOLOGY | Facility: HOSPITAL | Age: 58
End: 2018-03-24

## 2018-03-24 LAB
ABO + RH BLD: NORMAL
ABO + RH BLD: NORMAL
ANION GAP SERPL CALCULATED.3IONS-SCNC: 11.3 MMOL/L
BH BB BLOOD EXPIRATION DATE: NORMAL
BH BB BLOOD EXPIRATION DATE: NORMAL
BH BB BLOOD TYPE BARCODE: 5100
BH BB BLOOD TYPE BARCODE: 5100
BH BB DISPENSE STATUS: NORMAL
BH BB DISPENSE STATUS: NORMAL
BH BB PRODUCT CODE: NORMAL
BH BB PRODUCT CODE: NORMAL
BH BB UNIT NUMBER: NORMAL
BH BB UNIT NUMBER: NORMAL
BUN BLD-MCNC: 12 MG/DL (ref 6–20)
BUN/CREAT SERPL: 15.6 (ref 7–25)
CALCIUM SPEC-SCNC: 9.3 MG/DL (ref 8.6–10.5)
CHLORIDE SERPL-SCNC: 102 MMOL/L (ref 98–107)
CO2 SERPL-SCNC: 26.7 MMOL/L (ref 22–29)
CREAT BLD-MCNC: 0.77 MG/DL (ref 0.76–1.27)
DEPRECATED RDW RBC AUTO: 71.6 FL (ref 37–54)
ERYTHROCYTE [DISTWIDTH] IN BLOOD BY AUTOMATED COUNT: 18.9 % (ref 11.5–14.5)
GFR SERPL CREATININE-BSD FRML MDRD: 103 ML/MIN/1.73
GLUCOSE BLD-MCNC: 293 MG/DL (ref 65–99)
GLUCOSE BLDC GLUCOMTR-MCNC: 116 MG/DL (ref 70–130)
GLUCOSE BLDC GLUCOMTR-MCNC: 163 MG/DL (ref 70–130)
GLUCOSE BLDC GLUCOMTR-MCNC: 189 MG/DL (ref 70–130)
GLUCOSE BLDC GLUCOMTR-MCNC: 293 MG/DL (ref 70–130)
HCT VFR BLD AUTO: 28.4 % (ref 40.4–52.2)
HGB BLD-MCNC: 9 G/DL (ref 13.7–17.6)
MCH RBC QN AUTO: 33.3 PG (ref 27–32.7)
MCHC RBC AUTO-ENTMCNC: 31.7 G/DL (ref 32.6–36.4)
MCV RBC AUTO: 105.2 FL (ref 79.8–96.2)
PLATELET # BLD AUTO: 217 10*3/MM3 (ref 140–500)
PMV BLD AUTO: 9.7 FL (ref 6–12)
POTASSIUM BLD-SCNC: 4.1 MMOL/L (ref 3.5–5.2)
RBC # BLD AUTO: 2.7 10*6/MM3 (ref 4.6–6)
SODIUM BLD-SCNC: 140 MMOL/L (ref 136–145)
UNIT  ABO: NORMAL
UNIT  ABO: NORMAL
UNIT  RH: NORMAL
UNIT  RH: NORMAL
WBC NRBC COR # BLD: 8.39 10*3/MM3 (ref 4.5–10.7)

## 2018-03-24 PROCEDURE — 99232 SBSQ HOSP IP/OBS MODERATE 35: CPT | Performed by: INTERNAL MEDICINE

## 2018-03-24 PROCEDURE — 71046 X-RAY EXAM CHEST 2 VIEWS: CPT

## 2018-03-24 PROCEDURE — 25010000002 ENOXAPARIN PER 10 MG: Performed by: THORACIC SURGERY (CARDIOTHORACIC VASCULAR SURGERY)

## 2018-03-24 PROCEDURE — 97110 THERAPEUTIC EXERCISES: CPT

## 2018-03-24 PROCEDURE — 71045 X-RAY EXAM CHEST 1 VIEW: CPT

## 2018-03-24 PROCEDURE — 63710000001 INSULIN ASPART PER 5 UNITS: Performed by: INTERNAL MEDICINE

## 2018-03-24 PROCEDURE — 99024 POSTOP FOLLOW-UP VISIT: CPT | Performed by: THORACIC SURGERY (CARDIOTHORACIC VASCULAR SURGERY)

## 2018-03-24 PROCEDURE — 82962 GLUCOSE BLOOD TEST: CPT

## 2018-03-24 PROCEDURE — 85027 COMPLETE CBC AUTOMATED: CPT | Performed by: THORACIC SURGERY (CARDIOTHORACIC VASCULAR SURGERY)

## 2018-03-24 PROCEDURE — 80048 BASIC METABOLIC PNL TOTAL CA: CPT | Performed by: THORACIC SURGERY (CARDIOTHORACIC VASCULAR SURGERY)

## 2018-03-24 PROCEDURE — 63710000001 INSULIN DETEMER PER 5 UNITS: Performed by: INTERNAL MEDICINE

## 2018-03-24 RX ADMIN — HYDROCODONE BITARTRATE AND ACETAMINOPHEN 2 TABLET: 5; 325 TABLET ORAL at 04:18

## 2018-03-24 RX ADMIN — Medication 150 MG: at 09:42

## 2018-03-24 RX ADMIN — INSULIN ASPART 4 UNITS: 100 INJECTION, SOLUTION INTRAVENOUS; SUBCUTANEOUS at 12:49

## 2018-03-24 RX ADMIN — HYDROCODONE BITARTRATE AND ACETAMINOPHEN 2 TABLET: 5; 325 TABLET ORAL at 10:19

## 2018-03-24 RX ADMIN — HYDROCODONE BITARTRATE AND ACETAMINOPHEN 2 TABLET: 5; 325 TABLET ORAL at 15:49

## 2018-03-24 RX ADMIN — METOPROLOL TARTRATE 25 MG: 25 TABLET ORAL at 21:51

## 2018-03-24 RX ADMIN — PANTOPRAZOLE SODIUM 40 MG: 40 TABLET, DELAYED RELEASE ORAL at 06:37

## 2018-03-24 RX ADMIN — ENOXAPARIN SODIUM 40 MG: 40 INJECTION SUBCUTANEOUS at 18:08

## 2018-03-24 RX ADMIN — INSULIN DETEMIR 12 UNITS: 100 INJECTION, SOLUTION SUBCUTANEOUS at 06:43

## 2018-03-24 RX ADMIN — MUPIROCIN: 20 OINTMENT TOPICAL at 09:42

## 2018-03-24 RX ADMIN — INSULIN ASPART 4 UNITS: 100 INJECTION, SOLUTION INTRAVENOUS; SUBCUTANEOUS at 06:39

## 2018-03-24 RX ADMIN — Medication 1 TABLET: at 09:42

## 2018-03-24 RX ADMIN — POTASSIUM CHLORIDE 20 MEQ: 750 CAPSULE, EXTENDED RELEASE ORAL at 09:42

## 2018-03-24 RX ADMIN — INSULIN ASPART 3 UNITS: 100 INJECTION, SOLUTION INTRAVENOUS; SUBCUTANEOUS at 06:38

## 2018-03-24 RX ADMIN — INSULIN ASPART 5 UNITS: 100 INJECTION, SOLUTION INTRAVENOUS; SUBCUTANEOUS at 18:08

## 2018-03-24 RX ADMIN — FUROSEMIDE 40 MG: 40 TABLET ORAL at 09:00

## 2018-03-24 RX ADMIN — AMIODARONE HYDROCHLORIDE 200 MG: 200 TABLET ORAL at 21:52

## 2018-03-24 RX ADMIN — ATORVASTATIN CALCIUM 20 MG: 20 TABLET, FILM COATED ORAL at 21:52

## 2018-03-24 RX ADMIN — HYDROCODONE BITARTRATE AND ACETAMINOPHEN 2 TABLET: 5; 325 TABLET ORAL at 21:52

## 2018-03-24 RX ADMIN — ASPIRIN 81 MG: 81 TABLET ORAL at 09:42

## 2018-03-24 RX ADMIN — CHLORHEXIDINE GLUCONATE 15 ML: 1.2 RINSE ORAL at 06:38

## 2018-03-24 RX ADMIN — INSULIN ASPART 1 UNITS: 100 INJECTION, SOLUTION INTRAVENOUS; SUBCUTANEOUS at 13:01

## 2018-03-24 RX ADMIN — METOPROLOL TARTRATE 25 MG: 25 TABLET ORAL at 09:42

## 2018-03-24 RX ADMIN — DOCUSATE SODIUM -SENNOSIDES 2 TABLET: 50; 8.6 TABLET, COATED ORAL at 21:51

## 2018-03-24 RX ADMIN — AMIODARONE HYDROCHLORIDE 200 MG: 200 TABLET ORAL at 09:42

## 2018-03-24 NOTE — PLAN OF CARE
Problem: Patient Care Overview (Adult)  Goal: Plan of Care Review  Outcome: Ongoing (interventions implemented as appropriate)   03/24/18 1050   Coping/Psychosocial Response Interventions   Plan Of Care Reviewed With patient   Patient Care Overview   Progress improving   Outcome Evaluation   Outcome Summary/Follow up Plan Pt doing well, able to significantly increase ambulation distance today. No loss of balance noted . Likely ready to attempt stairs next session.

## 2018-03-24 NOTE — PROGRESS NOTES
57 y.o.   LOS: 16 days   Patient Care Team:  No Known Provider as PCP - General    Chief Complaint:  Hyperglycemia    Chief Complaint   Patient presents with   • Fatigue   • Vomiting   • Nausea       Subjective  C/o pain at the surgical site. Eating well, BG decently controlled.       Interval History:    Review of Systems:   Review of Systems   Constitutional: Positive for activity change, appetite change and fatigue.   Gastrointestinal: Negative for vomiting.   Endocrine: Positive for polyuria. Negative for polydipsia.   Musculoskeletal: Positive for myalgias. Negative for back pain.   Neurological: Positive for weakness and numbness.     Objective     Vital Signs   Temp:  [97.9 °F (36.6 °C)-99.8 °F (37.7 °C)] 97.9 °F (36.6 °C)  Heart Rate:  [63-81] 63  Resp:  [16-20] 16  BP: (122-148)/(68-76) 122/68    Physical Exam:  Gen exam - alert and oriented x 3   HEENT - Acanthosis nigricans. Thyroid palpable.   Resp - Clear to auscultation.   CVS - S1,S2 heard and no murmurs.   Abd - Non tender, BS heard.   Ext - No edema  .  Physical ExamResults Review:     I reviewed the patient's new clinical results.      Glucose   Date/Time Value Ref Range Status   03/24/2018 0330 293 (H) 65 - 99 mg/dL Final   03/23/2018 0434 138 (H) 65 - 99 mg/dL Final   03/22/2018 0305 107 (H) 65 - 99 mg/dL Final   03/21/2018 2115 204 (H) 65 - 99 mg/dL Final   03/21/2018 1710 101 (H) 65 - 99 mg/dL Final     Lab Results (last 72 hours)     Procedure Component Value Units Date/Time    POC Glucose Once [[id number]]  (Abnormal) Collected:  03/24/18 1035    Specimen:  Blood Updated:  03/24/18 1036     Glucose 189 (H) mg/dL     Narrative:       Meter: [id number] : [id number] Declan CAGE    POC Glucose Once [[id number]]  (Abnormal) Collected:  03/24/18 0537    Specimen:  Blood Updated:  03/24/18 0538     Glucose 293 (H) mg/dL     Narrative:       Meter: [id number] : [id number] Hiram WALDRONA    Basic Metabolic Panel [[id number]]  (Abnormal)  Collected:  03/24/18 0330    Specimen:  Blood Updated:  03/24/18 0506     Glucose 293 (H) mg/dL      BUN 12 mg/dL      Creatinine 0.77 mg/dL      Sodium 140 mmol/L      Potassium 4.1 mmol/L      Chloride 102 mmol/L      CO2 26.7 mmol/L      Calcium 9.3 mg/dL      eGFR Non African Amer 103 mL/min/1.73      BUN/Creatinine Ratio 15.6     Anion Gap 11.3 mmol/L     Narrative:       GFR Normal >60  Chronic Kidney Disease <60  Kidney Failure <15    CBC (No Diff) [[id number]]  (Abnormal) Collected:  03/24/18 0330    Specimen:  Blood Updated:  03/24/18 0413     WBC 8.39 10*3/mm3      RBC 2.70 (L) 10*6/mm3      Hemoglobin 9.0 (L) g/dL      Hematocrit 28.4 (L) %      .2 (H) fL      MCH 33.3 (H) pg      MCHC 31.7 (L) g/dL      RDW 18.9 (H) %      RDW-SD 71.6 (H) fl      MPV 9.7 fL      Platelets 217 10*3/mm3     POC Glucose Once [[id number]]  (Abnormal) Collected:  03/23/18 2006    Specimen:  Blood Updated:  03/23/18 2008     Glucose 295 (H) mg/dL     Narrative:       Meter: [id number] : [id number] [place] Maya CNA    POC Glucose Once [[id number]]  (Abnormal) Collected:  03/23/18 1645    Specimen:  Blood Updated:  03/23/18 1646     Glucose 267 (H) mg/dL     Narrative:       Meter: [id number] : [id number] [employer] FAUZIA    POC Glucose Once [[id number]]  (Abnormal) Collected:  03/23/18 1038    Specimen:  Blood Updated:  03/23/18 1419     Glucose 258 (H) mg/dL     Narrative:       Meter: [id number] : [id number] [employer] FAUZIA    Tissue Pathology Exam [[id number]] Collected:  03/21/18 1456    Specimen:  Tissue from Aortic valve Updated:  03/23/18 1344     Case Report --     Surgical Pathology Report                         Case: [id number]                                  Authorizing Provider:  Cecil Dougherty MD        Collected:           03/21/2018 02:56 PM          Ordering Location:     [facility]  Received:            03/21/2018 11:16 PM                                 MAIN OR         "                                                              Pathologist:           Jose Jordan MD                                                        Specimen:    Aortic valve, AORTIC VALVE                                                                  Final Diagnosis --     1. AORTIC VALVE LEAFLETS, RESECTION SPECIMEN:           SEVERE CALCIFIC ATHEROSCLEROSIS WITH FOCAL METAPLASTIC BONE FORMATION.           FOCAL MINIMAL CHRONIC INFLAMMATION.    [facility]/    CPT CODES:  1. 37086, 81782             Gross Description --     1.  Received in formalin labeled \"aortic valve\" are 2 tan diffusely calcified semi-lunar fragments of tissue measuring 2.5 x 1.5 x 1.2 cm in aggregate.  Representative section of each of the tissue fragments are submitted in a single block labeled 1A after decalcification.  CC/USO/LANCE/brb        Microscopic Description --     Performed, incorporated in diagnosis.          Embedded Images --    POC Glucose Once [[id number]]  (Abnormal) Collected:  03/23/18 0552    Specimen:  Blood Updated:  03/23/18 0553     Glucose 140 (H) mg/dL     Narrative:       Meter: [id number] : [id number] [facility]    Basic Metabolic Panel [[id number]]  (Abnormal) Collected:  03/23/18 0434    Specimen:  Blood Updated:  03/23/18 0538     Glucose 138 (H) mg/dL      BUN 17 mg/dL      Creatinine 0.63 (L) mg/dL      Sodium 135 (L) mmol/L      Potassium 4.3 mmol/L      Chloride 97 (L) mmol/L      CO2 26.9 mmol/L      Calcium 8.9 mg/dL      eGFR Non African Amer 130 mL/min/1.73      BUN/Creatinine Ratio 27.0 (H)     Anion Gap 11.1 mmol/L     Narrative:       GFR Normal >60  Chronic Kidney Disease <60  Kidney Failure <15    CBC (No Diff) [[id number]]  (Abnormal) Collected:  03/23/18 0434    Specimen:  Blood Updated:  03/23/18 0519     WBC 9.64 10*3/mm3      RBC 2.19 (L) 10*6/mm3      Hemoglobin 7.8 (L) g/dL      Hematocrit 24.4 (L) %      .4 (H) fL      MCH 35.6 (H) pg      MCHC 32.0 (L) g/dL      RDW " 13.7 %      RDW-SD 55.0 (H) fl      MPV 9.6 fL      Platelets 222 10*3/mm3     POC Glucose Once [[id number]]  (Abnormal) Collected:  03/22/18 2039    Specimen:  Blood Updated:  03/22/18 2040     Glucose 379 (H) mg/dL     Narrative:       Meter: [id number] : [id number] Jerry Brooke NA    POC Glucose Once [[id number]]  (Abnormal) Collected:  03/22/18 1615    Specimen:  Blood Updated:  03/22/18 1617     Glucose 298 (H) mg/dL     Narrative:       Meter: [id number] : [id number] Jerry Brooke NA    POC Glucose Once [[id number]]  (Abnormal) Collected:  03/22/18 1104    Specimen:  Blood Updated:  03/22/18 1106     Glucose 359 (H) mg/dL     Narrative:       Meter: [id number] : [id number] Nabila Reddy RN    POC Glucose Once [[id number]]  (Abnormal) Collected:  03/22/18 0744    Specimen:  Blood Updated:  03/22/18 0746     Glucose 232 (H) mg/dL     Narrative:       Meter: [id number] : [id number] Fred Ramirez RN    POC Glucose Once [[id number]]  (Abnormal) Collected:  03/22/18 0536    Specimen:  Blood Updated:  03/22/18 0537     Glucose 229 (H) mg/dL     Narrative:       Meter: [id number] : [id number] Haylie Stringer RN    Renal Function Panel [[id number]]  (Abnormal) Collected:  03/22/18 0305    Specimen:  Blood Updated:  03/22/18 0345     Glucose 107 (H) mg/dL      BUN 12 mg/dL      Creatinine 0.57 (L) mg/dL      Sodium 140 mmol/L      Potassium 4.3 mmol/L      Chloride 100 mmol/L      CO2 25.1 mmol/L      Calcium 8.6 mg/dL      Albumin 4.10 g/dL      Phosphorus 4.1 mg/dL      Anion Gap 14.9 mmol/L      BUN/Creatinine Ratio 21.1     eGFR Non African Amer 146 mL/min/1.73     Magnesium [[id number]]  (Normal) Collected:  03/22/18 0305    Specimen:  Blood Updated:  03/22/18 0345     Magnesium 2.4 mg/dL     CBC & Differential [[id number]] Collected:  03/22/18 0305    Specimen:  Blood Updated:  03/22/18 0337    Narrative:       The following orders were created for panel order CBC & Differential.  Procedure                                Abnormality         Status                     ---------                               -----------         ------                     Scan Slide[[id number]]                                                                  CBC Auto Differential[[id number]]        Abnormal            Final result                 Please view results for these tests on the individual orders.    CBC Auto Differential [[id number]]  (Abnormal) Collected:  03/22/18 0305    Specimen:  Blood Updated:  03/22/18 0337     WBC 13.55 (H) 10*3/mm3      RBC 2.25 (L) 10*6/mm3      Hemoglobin 8.1 (L) g/dL      Hematocrit 25.2 (L) %      .0 (H) fL      MCH 36.0 (H) pg      MCHC 32.1 (L) g/dL      RDW 13.2 %      RDW-SD 54.0 fl      MPV 9.5 fL      Platelets 218 10*3/mm3      Neutrophil % 90.6 (H) %      Lymphocyte % 3.3 (L) %      Monocyte % 5.4 %      Eosinophil % 0.1 (L) %      Basophil % 0.4 %      Immature Grans % 0.2 %      Neutrophils, Absolute 12.28 (H) 10*3/mm3      Lymphocytes, Absolute 0.45 (L) 10*3/mm3      Monocytes, Absolute 0.73 10*3/mm3      Eosinophils, Absolute 0.01 10*3/mm3      Basophils, Absolute 0.05 10*3/mm3      Immature Grans, Absolute 0.03 10*3/mm3     Protime-INR [[id number]]  (Normal) Collected:  03/22/18 0305    Specimen:  Blood Updated:  03/22/18 0332     Protime 14.1 Seconds      INR 1.10    POC Glucose Once [[id number]]  (Normal) Collected:  03/22/18 0259    Specimen:  Blood Updated:  03/22/18 0300     Glucose 108 mg/dL     Narrative:       Meter: [id number] : [id number] Haylie Stringer RN    POC Glucose Once [[id number]]  (Normal) Collected:  03/22/18 0219    Specimen:  Blood Updated:  03/22/18 0221     Glucose 91 mg/dL     Narrative:       Meter: [id number] : [id number] Stringer RN    POC Glucose Once [[id number]]  (Abnormal) Collected:  03/22/18 0201    Specimen:  Blood Updated:  03/22/18 0202     Glucose 52 (L) mg/dL     Narrative:       Meter: [id number] : [id number] Haylie Stringer RN     POC Glucose Once [[id number]]  (Abnormal) Collected:  03/22/18 0112    Specimen:  Blood Updated:  03/22/18 0114     Glucose 67 (L) mg/dL     Narrative:       Meter: [id number] : [id number] Haylie Stringer RN    POC Glucose Once [[id number]]  (Normal) Collected:  03/21/18 2340    Specimen:  Blood Updated:  03/21/18 2341     Glucose 128 mg/dL     Narrative:       Meter: [id number] : [id number] Haylie Stringer RN    POC Glucose Once [[id number]]  (Abnormal) Collected:  03/21/18 2211    Specimen:  Blood Updated:  03/21/18 2220     Glucose 181 (H) mg/dL     Narrative:       Meter: [id number] : [id number] Thais Leigh RN    Renal Function Panel [[id number]]  (Abnormal) Collected:  03/21/18 2115    Specimen:  Blood Updated:  03/21/18 2151     Glucose 204 (H) mg/dL      BUN 11 mg/dL      Creatinine 0.67 (L) mg/dL      Sodium 141 mmol/L      Potassium 4.7 mmol/L      Chloride 102 mmol/L      CO2 24.5 mmol/L      Calcium 9.0 mg/dL      Albumin 4.40 g/dL      Phosphorus 3.9 mg/dL      Anion Gap 14.5 mmol/L      BUN/Creatinine Ratio 16.4     eGFR Non African Amer 121 mL/min/1.73     Magnesium [[id number]]  (Normal) Collected:  03/21/18 2115    Specimen:  Blood Updated:  03/21/18 2148     Magnesium 2.5 mg/dL     CBC (No Diff) [[id number]]  (Abnormal) Collected:  03/21/18 2115    Specimen:  Blood Updated:  03/21/18 2136     WBC 17.18 (H) 10*3/mm3      RBC 2.37 (L) 10*6/mm3      Hemoglobin 8.6 (L) g/dL      Hematocrit 26.5 (L) %      .8 (H) fL      MCH 36.3 (H) pg      MCHC 32.5 (L) g/dL      RDW 13.3 %      RDW-SD 53.7 fl      MPV 9.1 fL      Platelets 202 10*3/mm3     POC Glucose Once [[id number]]  (Abnormal) Collected:  03/21/18 2113    Specimen:  Blood Updated:  03/21/18 2116     Glucose 196 (H) mg/dL     Narrative:       Meter: [id number] : [id number] Thais Leigh RN    POC Glucose Once [[id number]]  (Normal) Collected:  03/21/18 1950    Specimen:  Blood Updated:  03/21/18 1959     Glucose 114 mg/dL     Narrative:        Meter: [id number] : [id number] Thais Leigh RN    Blood Gas, Arterial [[id number]]  (Abnormal) Collected:  03/21/18 1950    Specimen:  Arterial Blood Updated:  03/21/18 1952     Site Arterial Line     Howard's Test N/A     pH, Arterial 7.360 pH units      pCO2, Arterial 47.0 (H) mm Hg      pO2, Arterial 105.9 (H) mm Hg      HCO3, Arterial 26.6 mmol/L      Base Excess, Arterial 0.8 mmol/L      O2 Saturation Calculated 97.8 %      A-a Gradiant 0.4 mmHg      Barometric Pressure for Blood Gas 752.4 mmHg      Modality Adult Vent     FIO2 40 %      Ventilator Mode PS     Set Tidal Volume 621     Rate 14 Breaths/minute      PEEP 5     PSV 6 cmH2O     Narrative:       sat 100 Meter: [id number] : [id number] Fareed Cisneros    POC Glucose Once [[id number]]  (Normal) Collected:  03/21/18 1912    Specimen:  Blood Updated:  03/21/18 1922     Glucose 95 mg/dL     Narrative:       Meter: [id number] : [id number] Thais Leigh RN    POC Glucose Once [[id number]]  (Normal) Collected:  03/21/18 1842    Specimen:  Blood Updated:  03/21/18 1852     Glucose 116 mg/dL     Narrative:       Meter: [id number] : [id number] Thais Leigh RN    POC Glucose Once [[id number]]  (Abnormal) Collected:  03/21/18 1824    Specimen:  Blood Updated:  03/21/18 1835     Glucose 47 (C) mg/dL     Narrative:       Meter: [id number] : [id number] Thais Leigh RN    Magnesium [[id number]]  (Abnormal) Collected:  03/21/18 1710    Specimen:  Blood Updated:  03/21/18 1746     Magnesium 2.8 (H) mg/dL     Renal Function Panel [[id number]]  (Abnormal) Collected:  03/21/18 1710    Specimen:  Blood Updated:  03/21/18 1742     Glucose 101 (H) mg/dL      BUN 12 mg/dL      Creatinine 0.78 mg/dL      Sodium 140 mmol/L      Potassium 3.3 (L) mmol/L      Chloride 102 mmol/L      CO2 25.7 mmol/L      Calcium 9.1 mg/dL      Albumin 3.70 g/dL      Phosphorus 3.1 mg/dL      Anion Gap 12.3 mmol/L      BUN/Creatinine Ratio 15.4     eGFR Non African Amer 102 mL/min/1.73      Calcium, Ionized [[id number]]  (Normal) Collected:  03/21/18 1710    Specimen:  Blood Updated:  03/21/18 1736     Ionized Calcium 1.31 mmol/L      Ionized Calcium 5.2 mg/dL     Protime-INR [[id number]]  (Abnormal) Collected:  03/21/18 1710    Specimen:  Blood Updated:  03/21/18 1731     Protime 14.6 (H) Seconds      INR 1.16 (H)    aPTT [[id number]]  (Abnormal) Collected:  03/21/18 1710    Specimen:  Blood Updated:  03/21/18 1731     PTT 36.1 (H) seconds     Fibrinogen [[id number]]  (Abnormal) Collected:  03/21/18 1710    Specimen:  Blood Updated:  03/21/18 1731     Fibrinogen 492 (H) mg/dL     CBC & Differential [[id number]] Collected:  03/21/18 1710    Specimen:  Blood Updated:  03/21/18 1720    Narrative:       The following orders were created for panel order CBC & Differential.  Procedure                               Abnormality         Status                     ---------                               -----------         ------                     Scan Slide[[id number]]                                                                  CBC Auto Differential[[id number]]        Abnormal            Final result                 Please view results for these tests on the individual orders.    CBC Auto Differential [[id number]]  (Abnormal) Collected:  03/21/18 1710    Specimen:  Blood Updated:  03/21/18 1720     WBC 15.86 (H) 10*3/mm3      RBC 2.31 (L) 10*6/mm3      Hemoglobin 8.3 (L) g/dL      Hematocrit 25.6 (L) %      .8 (H) fL      MCH 35.9 (H) pg      MCHC 32.4 (L) g/dL      RDW 13.2 %      RDW-SD 52.9 fl      MPV 9.0 fL      Platelets 183 10*3/mm3      Neutrophil % 83.5 (H) %      Lymphocyte % 10.0 (L) %      Monocyte % 5.0 %      Eosinophil % 0.6 %      Basophil % 0.3 %      Immature Grans % 0.6 (H) %      Neutrophils, Absolute 13.25 (H) 10*3/mm3      Lymphocytes, Absolute 1.58 10*3/mm3      Monocytes, Absolute 0.80 10*3/mm3      Eosinophils, Absolute 0.10 10*3/mm3      Basophils, Absolute 0.04 10*3/mm3       Immature Grans, Absolute 0.09 (H) 10*3/mm3     Blood Gas, Arterial [[id number]]  (Abnormal) Collected:  03/21/18 1714    Specimen:  Arterial Blood Updated:  03/21/18 1716     Site Arterial Line     Howard's Test N/A     pH, Arterial 7.368 pH units      pCO2, Arterial 50.4 (H) mm Hg      pO2, Arterial 414.9 (H) mm Hg      HCO3, Arterial 29.0 (H) mmol/L      Base Excess, Arterial 3.2 (H) mmol/L      O2 Saturation Calculated 100.0 (H) %      A-a Gradiant 0.6 mmHg      Barometric Pressure for Blood Gas 751.4 mmHg      Modality Adult Vent     FIO2 100 %      Ventilator Mode VC     Set Tidal Volume 600     Set Mech Resp Rate 14     Rate 14 Breaths/minute      PEEP 5    Narrative:       Meter: [id number] : [id number] Kenya Cash    POC Glucose Once [[id number]]  (Normal) Collected:  03/21/18 1703    Specimen:  Blood Updated:  03/21/18 1711     Glucose 96 mg/dL     Narrative:       Meter: [id number] : [id number] Thais Leigh RN        Imaging Results (last 72 hours)     Procedure Component Value Units Date/Time    XR Chest 1 View [[id number]] Collected:  03/24/18 1431     Updated:  03/24/18 1557    Narrative:       ONE VIEW PORTABLE CHEST AT 1358 HOURS     HISTORY: Recent cardiac surgery. Chest tube removal.     FINDINGS: The right-sided chest tube has been removed and there is no  evidence of pneumothorax. The lungs are clear except for some minimal  vague residual atelectasis at the right base. The heart remains slightly  enlarged.     This report was finalized on 3/24/2018 3:54 PM by Dr. Ganesh Braun MD.       XR Chest 1 View [[id number]] Collected:  03/24/18 1154     Updated:  03/24/18 1158    Narrative:       PORTABLE CHEST SINGLE VIEW  3/24/2018 at 10:12     HISTORY: 57-year-old male postop median sternotomy with right chest tube  and mediastinal drainage catheter presents for evaluate of progressive  shortness of breath     COMPARISON: 3/24/2018 at 08:45     FINDINGS:  1. No significant interval change, no new  abnormality, focal airspace  process nor pneumothorax     This report was finalized on 3/24/2018 11:55 AM by Dr. Zeb Davis MD.       XR Chest PA & Lateral [[id number]] Collected:  03/24/18 0856     Updated:  03/24/18 0900    Narrative:       TWO-VIEW CHEST     HISTORY: 57-year-old male postop cardiac valve replacement     COMPARISON: 3/23/2018     FINDINGS:  1. Right chest tube and nasogastric drainage catheter show no change.  2. Cardiac enlargement mild chronic pulmonary change.  3. Relatively stable right basal atelectasis, left lung is clear of an  active process.  4. No new abnormality.     This report was finalized on 3/24/2018 8:57 AM by Dr. Zeb Davis MD.       XR Chest 1 View [[id number]] Collected:  03/23/18 0451     Updated:  03/23/18 0500    Narrative:       PORTABLE CHEST X-RAY     CLINICAL HISTORY: Post-Op Heart Surgery; E10.10-Type 1 diabetes mellitus  with ketoacidosis without coma; Z74.09-Other reduced mobility;  I35.0-Nonrheumatic aortic (valve) stenosis     COMPARISON: March 22, 2018.     FINDINGS: Portable AP view of the chest was obtained with overlying  monitor leads in place. Emmet-Jose M catheter has been removed. Introducer  remains. Other life support lines are unchanged. No pneumothorax. Lungs  well inflated. Right infrahilar atelectasis continues to improve. Left  lung is clear. Stable cardiomegaly. No edema or significant pleural  fluid.             Impression:       Interval Emmet-Jose M catheter removal with improving right  infrahilar atelectasis        This report was finalized on 3/23/2018 4:57 AM by Andres Yanes MD.       XR Chest 1 View [[id number]] Collected:  03/22/18 0709     Updated:  03/23/18 0458    Narrative:       PORTABLE CHEST X-RAY     CLINICAL HISTORY: Post-Op Heart Surgery; E10.10-Type 1 diabetes mellitus  with ketoacidosis without coma; Z74.09-Other reduced mobility;  I35.0-Nonrheumatic aortic (valve) stenosis.     COMPARISON: 03/21/2018.     FINDINGS: Portable AP  view of the chest was obtained with overlying  monitor leads in place. ET tube and feeding tube have been removed. The  other life support lines are unchanged. No pneumothorax. Lungs are  fairly well inflated. There is some mild right infrahilar atelectasis.  Right perihilar atelectasis has nearly resolved. Left lung is clear.  Mild cardiomegaly is stable. No edema or significant pleural fluid.             Impression:       Interval extubation with improving right-sided atelectasis.        This report was finalized on 3/23/2018 4:55 AM by Andres Yanes MD.       XR Chest Post CVA Port [[id number]] Collected:  03/21/18 1741     Updated:  03/21/18 1746    Narrative:       ONE VIEW PORTABLE CHEST AT 5:15 PM     HISTORY: Recent cardiac surgery.     The patient has had recent cardiac surgery with a chest tube forming a  loop in the right chest and ending near the right base. There is no  evidence of pneumothorax. There is some minimal scattered atelectasis at  the right base.     There is mild cardiomegaly unchanged from the preoperative exam. A PA  line ends near the junction of the left and right main pulmonary  arteries, a feeding tube ends in the upper stomach, and ET tube ends 6.3  cm above the aidee.     This report was finalized on 3/21/2018 5:43 PM by Dr. Ganesh Braun MD.             Medication Review: done      Current Facility-Administered Medications:   •  acetaminophen (TYLENOL) suppository 650 mg, 650 mg, Rectal, Q4H PRN, Cecil Dougherty MD  •  acetaminophen (TYLENOL) tablet 650 mg, 650 mg, Oral, Q4H PRN, Cecil Dougherty MD, 650 mg at 03/23/18 2120  •  amiodarone (PACERONE) tablet 200 mg, 200 mg, Oral, Q12H, CODY Em, 200 mg at 03/24/18 0942  •  aspirin EC tablet 81 mg, 81 mg, Oral, Daily, Cecil Dougherty MD, 81 mg at 03/24/18 0942  •  atorvastatin (LIPITOR) tablet 20 mg, 20 mg, Oral, Nightly, Salvador Fernandez MD, 20 mg at 03/23/18 2110  •  bisacodyl (DULCOLAX) EC tablet 10 mg, 10 mg,  Oral, Daily PRN, Cecil Dougherty MD  •  bisacodyl (DULCOLAX) suppository 10 mg, 10 mg, Rectal, Daily PRN, Cecil Dougherty MD  •  chlorhexidine (PERIDEX) 0.12 % solution 15 mL, 15 mL, Mouth/Throat, Q12H, Cecil Dougherty MD, 15 mL at 03/24/18 0638  •  cyclobenzaprine (FLEXERIL) tablet 5 mg, 5 mg, Oral, Q8H PRN, Mirian Perez, APRN, 5 mg at 03/23/18 1112  •  enoxaparin (LOVENOX) syringe 40 mg, 40 mg, Subcutaneous, Daily, Cecil Dougherty MD, 40 mg at 03/23/18 1729  •  furosemide (LASIX) tablet 40 mg, 40 mg, Oral, Daily, Chiara Person APRN, 40 mg at 03/24/18 0900  •  HYDROcodone-acetaminophen (NORCO) 5-325 MG per tablet 2 tablet, 2 tablet, Oral, Q4H PRN, Cecil Dougherty MD, 2 tablet at 03/24/18 1549  •  insulin aspart (novoLOG) injection 0-12 Units, 0-12 Units, Subcutaneous, 4x Daily AC & at Bedtime, Salvador Fernandez MD, 1 Units at 03/24/18 1301  •  insulin aspart (novoLOG) injection 5 Units, 5 Units, Subcutaneous, TID With Meals, Hammad Roman MD  •  insulin detemir (LEVEMIR) injection 12 Units, 12 Units, Subcutaneous, QAM, Salvador Fernandez MD, 12 Units at 03/24/18 0643  •  insulin detemir (LEVEMIR) injection 8 Units, 8 Units, Subcutaneous, Nightly, Hammad Roman MD  •  iron polysaccharides (NIFEREX) capsule 150 mg, 150 mg, Oral, Daily, Salvador Fernandez MD, 150 mg at 03/24/18 0942  •  magnesium hydroxide (MILK OF MAGNESIA) suspension 2400 mg/10mL 10 mL, 10 mL, Oral, Daily PRN, Cecil Dougherty MD  •  Magnesium Sulfate 2 gram Bolus, followed by 8 gram infusion (total Mg dose 10 grams)- Mg less than or equal to 1mg/dL, 2 g, Intravenous, PRN **OR** Magnesium Sulfate 6 gram Infusion (2 gm x 3) -Mg 1.1 -1.5 mg/dL, 2 g, Intravenous, PRN **OR** magnesium sulfate 4 gram infusion- Mg 1.6-1.9 mg/dL, 4 g, Intravenous, PRN, Cecil Dougherty MD  •  metoprolol tartrate (LOPRESSOR) tablet 25 mg, 25 mg, Oral, Q12H, Carri Menchaca MD, 25 mg at 03/24/18 0942  •  morphine injection 1 mg, 1 mg, Intravenous, Q4H PRN  **AND** naloxone (NARCAN) injection 0.4 mg, 0.4 mg, Intravenous, Q5 Min PRN, Cecil Dougherty MD  •  morphine injection 4 mg, 4 mg, Intravenous, Q30 Min PRN, Cecil Dougherty MD, 4 mg at 03/21/18 2219  •  multivitamin (THERAGRAN) tablet 1 tablet, 1 tablet, Oral, Daily, Salvador Fernandez MD, 1 tablet at 03/24/18 0942  •  mupirocin (BACTROBAN) 2 % nasal ointment, , Each Nare, BID, Cecil Dougherty MD  •  nitroglycerin 50 mg/250 mL (0.2 mg/mL) infusion, 5-200 mcg/min, Intravenous, Continuous PRN, Cecil Dougherty MD  •  ondansetron (ZOFRAN) injection 4 mg, 4 mg, Intravenous, Q6H PRN, Cecil Dougherty MD  •  oxyCODONE (ROXICODONE) immediate release tablet 10 mg, 10 mg, Oral, Q4H PRN, Cecil Dougherty MD, 10 mg at 03/23/18 2120  •  pantoprazole (PROTONIX) EC tablet 40 mg, 40 mg, Oral, QAM, Cecil Dougherty MD, 40 mg at 03/24/18 0637  •  potassium chloride (MICRO-K) CR capsule 40 mEq, 40 mEq, Oral, PRN **OR** potassium chloride (KLOR-CON) packet 40 mEq, 40 mEq, Oral, PRN, Cecil Dougherty MD  •  potassium chloride (MICRO-K) CR capsule 20 mEq, 20 mEq, Oral, Daily, Chiara Person, APRN, 20 mEq at 03/24/18 0942  •  potassium chloride 10 mEq in 100 mL IVPB, 10 mEq, Intravenous, Q1H PRN **OR** potassium chloride 10 mEq in 100 mL IVPB, 10 mEq, Intravenous, Q1H PRN, Cecil Dougherty MD  •  potassium chloride 20 mEq in 50 mL IVPB, 20 mEq, Intravenous, Q1H PRN, Cecil Dougherty MD  •  potassium chloride 20 mEq in 50 mL IVPB, 20 mEq, Intravenous, Q1H PRN, Cecil Dougherty MD  •  promethazine (PHENERGAN) tablet 12.5 mg, 12.5 mg, Oral, Q6H PRN **OR** promethazine (PHENERGAN) injection 12.5 mg, 12.5 mg, Intravenous, Q6H PRN, Cecil Dougherty MD  •  sennosides-docusate sodium (SENOKOT-S) 8.6-50 MG tablet 2 tablet, 2 tablet, Oral, Nightly, Cecil Dougherty MD, 2 tablet at 03/23/18 2110  •  sodium chloride 0.9 % flush 30 mL, 30 mL, Intravenous, Once PRN, Cecil Dougherty MD  •  sodium chloride 0.9 % infusion, 30 mL/hr, Intravenous,  "Continuous, Cecil Dougherty MD, Last Rate: 30 mL/hr at 03/21/18 1700, 30 mL/hr at 03/21/18 1700  •  sodium chloride 0.9 % infusion, 30 mL/hr, Intravenous, Continuous PRN, Cecil Dougherty MD, Last Rate: 30 mL/hr at 03/21/18 1645, 30 mL/hr at 03/21/18 1645    Assessment/Plan     Active Hospital Problems (** Indicates Principal Problem)    Diagnosis Date Noted   • **Nonrheumatic aortic valve stenosis [I35.0] 03/08/2018   • Microalbuminuria [R80.9] 03/12/2018   • Tobacco abuse [Z72.0] 03/09/2018   • Alcohol abuse [F10.10] 03/09/2018   • Insurance coverage problems [Z59.8] 03/09/2018   • Bilateral carpal tunnel syndrome [G56.03] 03/09/2018   • Diabetic ketoacidosis without coma associated with type 1 diabetes mellitus [E10.10] 03/08/2018   • Type 1 diabetes mellitus [E10.9] 03/08/2018      Resolved Hospital Problems    Diagnosis Date Noted Date Resolved   No resolved problems to display.     Type 1 dm   Will change levemir to 12 units Q am and 8 units Q pm  Will increase novolog to 5 units tid ac  Will cover with novolog ssi tid ac and hs.     HLP  Continue lipitor 20 mg po daily.       Hammad Roman MD.  03/24/18  4:13 PM      EMR Dragon / transcription disclaimer:    \"Dictated utilizing Dragon dictation\".   "

## 2018-03-24 NOTE — PROGRESS NOTES
[facility]     LOS: 16 days     Subjective:  S/p AVR, minimally invasive \"J sternotomy\" AVR with a # 21 mm Magna pericardial prosthesis  Getting PRBC  Off oxygen    Objective:    Vital Signs:  Temp:  [97.9 °F (36.6 °C)-99.8 °F (37.7 °C)] 97.9 °F (36.6 °C)  Heart Rate:  [63-81] 63  Resp:  [16-20] 16  BP: (122-148)/(68-76) 122/68    SpO2:  [92 %-97 %] 95 %  on   ;   Device (Oxygen Therapy): room air  Body mass index is 21.83 kg/m².    Physical Exam   Constitutional: He appears well-developed and well-nourished.   HENT:   Head: Normocephalic and atraumatic.   Eyes: EOM are normal.   Neck: Normal range of motion.   Cardiovascular: Normal rate and regular rhythm.    Pulmonary/Chest: Effort normal and breath sounds normal.   Abdominal: Soft. Bowel sounds are normal.   Neurological: He is alert.   Skin: Skin is warm.       Results Review:    Glucose   Date Value Ref Range Status   03/24/2018 293 (H) 65 - 99 mg/dL Final   03/23/2018 138 (H) 65 - 99 mg/dL Final   03/22/2018 107 (H) 65 - 99 mg/dL Final   03/21/2018 204 (H) 65 - 99 mg/dL Final   03/21/2018 101 (H) 65 - 99 mg/dL Final       Results from last 7 days  Lab Units 03/24/18  0330   WBC 10*3/mm3 8.39   HEMOGLOBIN g/dL 9.0*   HEMATOCRIT % 28.4*   PLATELETS 10*3/mm3 217       Results from last 7 days  Lab Units 03/24/18  0330   SODIUM mmol/L 140   POTASSIUM mmol/L 4.1   CHLORIDE mmol/L 102   CO2 mmol/L 26.7   BUN mg/dL 12   CREATININE mg/dL 0.77   CALCIUM mg/dL 9.3   GLUCOSE mg/dL 293*       Results from last 7 days  Lab Units 03/22/18  0305 03/21/18  1710   INR  1.10 1.16*   APTT seconds  --  36.1*       Results from last 7 days  Lab Units 03/22/18  0305   MAGNESIUM mg/dL 2.4         Cultures:  Blood Culture   Date Value Ref Range Status   03/10/2018 No growth at 4 days  Preliminary   03/10/2018 No growth at 4 days  Preliminary       I have reviewed daily medications and changes in CPOE    Scheduled meds    amiodarone 200 mg Oral Q12H "   aspirin 81 mg Oral Daily   atorvastatin 20 mg Oral Nightly   chlorhexidine 15 mL Mouth/Throat Q12H   enoxaparin 40 mg Subcutaneous Daily   furosemide 40 mg Oral Daily   insulin aspart 0-12 Units Subcutaneous 4x Daily AC & at Bedtime   insulin aspart 5 Units Subcutaneous TID With Meals   insulin detemir 12 Units Subcutaneous QAM   insulin detemir 8 Units Subcutaneous Nightly   iron polysaccharides 150 mg Oral Daily   metoprolol tartrate 25 mg Oral Q12H   multivitamin 1 tablet Oral Daily   mupirocin  Each Nare BID   pantoprazole 40 mg Oral QAM   potassium chloride 20 mEq Oral Daily   sennosides-docusate sodium 2 tablet Oral Nightly         nitroglycerin 5-200 mcg/min    sodium chloride 30 mL/hr Last Rate: 30 mL/hr (03/21/18 1700)   sodium chloride 30 mL/hr Last Rate: 30 mL/hr (03/21/18 1645)         Principal Problem:    Nonrheumatic aortic valve stenosis  Active Problems:    Diabetic ketoacidosis without coma associated with type 1 diabetes mellitus    Type 1 diabetes mellitus    Tobacco abuse    Alcohol abuse    Insurance coverage problems    Bilateral carpal tunnel syndrome    Microalbuminuria      Assessment/Plan:    S/p AVR      Diabetic ketoacidosis without coma associated with type 1 diabetes mellitus-status, resolved    Type 1 diabetes mellitus- adjusted by endocrinology, elevated yesterday but now improved      Tobacco abuse      Alcohol abuse      Bilateral carpal tunnel syndrome      Microalbuminuria    Plan:  Chest tubes out today  Home soon    Saeed Fair MD  03/24/18  1:09 PM

## 2018-03-24 NOTE — PLAN OF CARE
Problem: Patient Care Overview (Adult)  Goal: Plan of Care Review   03/24/18 0604   Coping/Psychosocial Response Interventions   Plan Of Care Reviewed With patient   Patient Care Overview   Progress improving   Outcome Evaluation   Outcome Summary/Follow up Plan Pt rested well this shift. Denies shortness of breath. Enc to cough and deep breath. Pacer settings AAI 80/10/0.5. Chest tube to -20cm LWS. Will monitor.

## 2018-03-24 NOTE — THERAPY TREATMENT NOTE
Acute Care - Physical Therapy Treatment Note  [facility]     Patient Name: Juan Payne  : 1960  MRN: [id number]  Today's Date: 3/24/2018  Onset of Illness/Injury or Date of Surgery: 18  Date of Referral to PT: 18  Referring Physician: Ladonna    Admit Date: 3/8/2018    Visit Dx:    ICD-10-CM ICD-9-CM   1. Diabetic ketoacidosis without coma associated with type 1 diabetes mellitus E10.10 250.11   2. Impaired functional mobility and activity tolerance Z74.09 V49.89   3. Nonrheumatic aortic valve stenosis I35.0 424.1     Patient Active Problem List   Diagnosis   • Diabetic ketoacidosis without coma associated with type 1 diabetes mellitus   • Type 1 diabetes mellitus   • Tobacco abuse   • Alcohol abuse   • Insurance coverage problems   • Bilateral carpal tunnel syndrome   • Microalbuminuria   • Nonrheumatic aortic valve stenosis       Therapy Treatment    Therapy Treatment / Health Promotion    Treatment Time/Intention  Discipline: physical therapist (18 1035 : Geraldine Moreno PT)  Document Type: therapy note (daily note) (18 1035 : Geraldine Moreno PT)  Subjective Information: no complaints (18 1035 : Geraldine Moreno PT)  Mode of Treatment: physical therapy (18 1035 : Geraldine Moreno PT)  Patient/Family Observations: supine in bed (18 1035 : Geraldine Moreno PT)  Patient Effort: excellent (18 1035 : Geraldine Moreno PT)  Treatment Considerations/Comments: external pacemaker (18 1035 : Geraldine Moreno PT)    Vitals/Pain/Safety  Pain Scale: Numbers Pre/Post-Treatment  Pain Scale: Numbers, Pretreatment: 3/10 (18 1035 : Geraldine Moreno PT)  Pain Scale: Numbers, Post-Treatment: 4/10 (18 1035 : Geraldine Moreno PT)  Pain Location: chest (18 1035 : Geraldine Moreno PT)  Pain Scale: Word Pre/Post-Treatment  Pain Location: chest (18 1035 : Geraldine C Moreno, PT)  Pain Scale: FACES Pre/Post-Treatment  Pain Location:  chest (03/24/18 1035 : Geraldine Moreno, PT)  Positioning and Restraints  Pre-Treatment Position: in bed (03/24/18 1035 : Geraldine Moreno, PT)  Post Treatment Position: bed (03/24/18 1035 : Geraldine Moreno, PT)  In Bed: notified nsg, supine, call light within reach, encouraged to call for assist (03/24/18 1035 : Geraldine Moreno, PT)    Mobility,ADL,Motor, Modality  Bed Mobility Assessment/Treatment  Supine-Sit S[place]ent (Bed Mobility): supervision (03/24/18 1035 : Geraldine Moreno, PT)  Sit-Supine [place] (Bed Mobility): supervision (03/24/18 1035 : Geraldine Moreno, PT)  Sit-Stand Transfer  Sit-Stand [place] (Transfers): stand by assist (03/24/18 1035 : Geraldine Moreno PT)  Stand-Sit Transfer  Stand-Sit [place]ent (Transfers): stand by assist (03/24/18 1035 : Geraldine Moreno PT)  Gait/Stairs Assessment/Training  S[place]ent Level (Gait): verbal cues, stand by assist (03/24/18 1035 : Geraldine Moreno, PT)  Distance in Feet (Gait): 360 (03/24/18 1035 : Geraldine Moreno, PT)  Pattern (Gait): step-through (03/24/18 1035 : Geraldine Moreno PT)                 ROM/MMT             Sensory, Edema, Orthotics          Cognition, Communication, Swallow  Cognitive Assessment/Intervention- PT/OT  Personal Safety Interventions: fall prevention program maintained, nonskid shoes/slippers when out of bed, supervised activity (03/24/18 1035 : Geraldine Moreno, PT)    Outcome Summary               PT Rehab Goals     Row Name 03/22/18 0917 03/12/18 3628          Transfer Goal 1 (PT)    Activity/Assistive Device (Transfer Goal 1, PT)  -- sit-to-stand/stand-to-sit  -PC     [place]ent Level/Cues Needed (Transfer Goal 1, PT)  -- supervision required  -PC     Time Frame (Transfer Goal 1, PT)  -- 1 week  -PC        Gait Training Goal 1 (PT)    Activity/Assistive Device (Gait Training Goal 1, PT)  -- gait (walking locomotion)  -PC     [place]ent Level (Gait Training Goal 1, PT)  -- supervision required   -PC     Distance (Gait Goal 1, PT)  -- 300 ft  -PC     Time Frame (Gait Training Goal 1, PT)  -- 1 week  -PC        Problem Specific Goal 1 (PT)    Problem Specific Goal 1 (PT) Cardiovascular Level V  -PC (r) RE (t) PC (c)  --     Time Frame (Problem Specific Goal 1, PT) 1 week  -PC (r) RE (t) PC (c)  --       User Key  (r) = Recorded By, (t) = Taken By, (c) = Cosigned By    Initials Name Provider Type    PC Renetta Morris, PT Physical Therapist    NIC Isaac, PT Student PT Student          Physical Therapy Education     Title: PT OT SLP Therapies (Done)     Topic: Physical Therapy (Done)     Point: Mobility training (Done)    Learning Progress Summary     Learner Status Readiness Method Response Comment Documented by    Patient Done Acceptance E VU  EJ 03/24/18 1049     Done Acceptance E,D VU,DU,NR  RE 03/23/18 0959     Done Acceptance E,D VU,DU,NR  RE 03/22/18 0928     Done Acceptance E,TB VU,DU  CW 03/13/18 1501     Done Acceptance E,D VU,DU,NR  PC 03/12/18 1435          Point: Home exercise program (Done)    Learning Progress Summary     Learner Status Readiness Method Response Comment Documented by    Patient Done Acceptance E VU  EJ 03/24/18 1049     Done Acceptance E,D VU,DU,NR  RE 03/23/18 0959     Done Acceptance E,D VU,DU,NR  RE 03/22/18 0928     Done Acceptance E,TB VU,DU  CW 03/13/18 1501     Done Acceptance E,D VU,DU,NR  PC 03/12/18 1435          Point: Body mechanics (Done)    Learning Progress Summary     Learner Status Readiness Method Response Comment Documented by    Patient Done Acceptance E VU  EJ 03/24/18 1049     Done Acceptance E,D VU,DU,NR  RE 03/23/18 0959     Done Acceptance E,D VU,DU,NR  RE 03/22/18 0928     Done Acceptance E,TB VU,DU  CW 03/13/18 1501     Done Acceptance E,D VU,DU,NR  PC 03/12/18 1435          Point: Precautions (Done)    Learning Progress Summary     Learner Status Readiness Method Response Comment Documented by    Patient Done Acceptance E VU  EJ 03/24/18 1049      Done Acceptance E,D LAURY LEWIS,NR  RE 03/23/18 0959     Done Acceptance E,D LAURY LEWIS,NR  RE 03/22/18 0928     Done Acceptance E,TB LAURY LEWIS   03/13/18 1501     Done Acceptance E,D LAURY LEWIS,NR  PC 03/12/18 1435                      User Key     Initials Effective Dates Name Provider Type Discipline    PC 12/01/15 -  Renetta Morris, PT Physical Therapist PT    EJ 04/21/17 -  Geraldine Moreno, PT Physical Therapist PT    CW 03/07/18 -  Donnie Crump, PTA Physical Therapy Assistant PT    RE 02/05/18 -  Rickey Isaac, PT Student PT Student PT                    PT Recommendation and Plan                Outcome Measures     Row Name 03/24/18 1000 03/23/18 1003 03/22/18 0930       How much help from another person do you currently need...    Turning from your back to your side while in flat bed without using bedrails? 4  -EJ 3  -PC (r) RE (t) PC (c) 3  -PC (r) RE (t) PC (c)    Moving from lying on back to sitting on the side of a flat bed without bedrails? 4  -EJ 3  -PC (r) RE (t) PC (c) 2  -PC (r) RE (t) PC (c)    Moving to and from a bed to a chair (including a wheelchair)? 3  -EJ 3  -PC (r) RE (t) PC (c) 3  -PC (r) RE (t) PC (c)    Standing up from a chair using your arms (e.g., wheelchair, bedside chair)? 3  -EJ 3  -PC (r) RE (t) PC (c) 3  -PC (r) RE (t) PC (c)    Climbing 3-5 steps with a railing? 3  -EJ 2  -PC (r) RE (t) PC (c) 2  -PC (r) RE (t) PC (c)    To walk in hospital room? 3  -EJ 3  -PC (r) RE (t) PC (c) 3  -PC (r) RE (t) PC (c)    AM-PAC 6 Clicks Score 20  -EJ 17  -PC (r) RE (t) 16  -PC (r) RE (t)       Functional Assessment    Outcome Measure Options AM-PAC 6 Clicks Basic Mobility (PT)  -EJ AM-PAC 6 Clicks Basic Mobility (PT)  -PC (r) RE (t) PC (c) AM-PAC 6 Clicks Basic Mobility (PT)  -PC (r) RE (t) PC (c)      User Key  (r) = Recorded By, (t) = Taken By, (c) = Cosigned By    Initials Name Provider Type    PC Renetta Morris, PT Physical Therapist    EJ Geraldine Moreno, PT Physical Therapist    NIC Lang  Marlin, PT Student PT Student           Time Calculation:         PT Charges     Row Name 03/24/18 1051             Time Calculation    Start Time 1035  -EJ      Stop Time 1050  -EJ      Time Calculation (min) 15 min  -EJ      PT Received On 03/24/18  -EJ      PT - Next Appointment 03/25/18  -EJ        User Key  (r) = Recorded By, (t) = Taken By, (c) = Cosigned By    Initials Name Provider Type     Geraldine Moreno, PT Physical Therapist          Therapy Charges for Today     Code Description Service Date Service Provider Modifiers Qty    56571104035 HC PT THER PROC EA 15 MIN 3/24/2018 Geraldine Moreno, PT GP 1    32828290345 HC PT THER PROC EA 15 MIN 3/24/2018 Geraldine Moreno, PT GP 1          PT G-Codes  Outcome Measure Options: AM-PAC 6 Clicks Basic Mobility (PT)    Geraldine Moreno, PT  3/24/2018

## 2018-03-24 NOTE — PROGRESS NOTES
LOS: 16 days   Patient Care Team:  No Known Provider as PCP - General    Chief Complaint:  F/u AVR     Interval History:     Chest feels better, breathing better. No tachy, nausea or dizziness.      Objective   Vital Signs  Temp:  [98.5 °F (36.9 °C)-99.8 °F (37.7 °C)] 98.6 °F (37 °C)  Heart Rate:  [79-81] 81  Resp:  [16-20] 20  BP: (113-148)/(61-76) 138/75    Intake/Output Summary (Last 24 hours) at 03/24/18 0816  Last data filed at 03/24/18 0700   Gross per 24 hour   Intake             1562 ml   Output             2040 ml   Net             -478 ml       Comfortable NAD  Neck supple, no JVD or thyromegaly appreciated  S1/S2 RRR, no m/r/g  Lungs CTA B, normal effort  Abdomen S/NT/ND (+) BS, no HSM appreciated  Extremities warm, no clubbing, cyanosis, or edema  No visible or palpable skin lesions  A/Ox4, mood and affect appropriate    Results Review:        Results from last 7 days  Lab Units 03/24/18  0330 03/23/18  0434 03/22/18  0305   SODIUM mmol/L 140 135* 140   POTASSIUM mmol/L 4.1 4.3 4.3   CHLORIDE mmol/L 102 97* 100   CO2 mmol/L 26.7 26.9 25.1   BUN mg/dL 12 17 12   CREATININE mg/dL 0.77 0.63* 0.57*   GLUCOSE mg/dL 293* 138* 107*   CALCIUM mg/dL 9.3 8.9 8.6           Results from last 7 days  Lab Units 03/24/18  0330 03/23/18  0434 03/22/18  0305   WBC 10*3/mm3 8.39 9.64 13.55*   HEMOGLOBIN g/dL 9.0* 7.8* 8.1*   HEMATOCRIT % 28.4* 24.4* 25.2*   PLATELETS 10*3/mm3 217 222 218       Results from last 7 days  Lab Units 03/22/18  0305 03/21/18  1710 03/21/18  1545   INR  1.10 1.16* 1.3*   APTT seconds  --  36.1*  --            Results from last 7 days  Lab Units 03/22/18  0305   MAGNESIUM mg/dL 2.4           I reviewed the patient's new clinical results.  I personally viewed and interpreted the patient's EKG/Telemetry data        Medication Review:     amiodarone 200 mg Oral Q12H   aspirin 81 mg Oral Daily   atorvastatin 20 mg Oral Nightly   chlorhexidine 15 mL Mouth/Throat Q12H   enoxaparin 40 mg Subcutaneous  Daily   furosemide 40 mg Oral Daily   insulin aspart 0-12 Units Subcutaneous 4x Daily AC & at Bedtime   insulin aspart 4 Units Subcutaneous TID With Meals   insulin detemir 12 Units Subcutaneous QAM   insulin detemir 7 Units Subcutaneous Nightly   iron polysaccharides 150 mg Oral Daily   metoprolol tartrate 12.5 mg Oral Q12H   multivitamin 1 tablet Oral Daily   mupirocin  Each Nare BID   pantoprazole 40 mg Oral QAM   potassium chloride 20 mEq Oral Daily   sennosides-docusate sodium 2 tablet Oral Nightly         nitroglycerin 5-200 mcg/min    sodium chloride 30 mL/hr Last Rate: 30 mL/hr (03/21/18 1700)   sodium chloride 30 mL/hr Last Rate: 30 mL/hr (03/21/18 1645)       Assessment/Plan     Principal Problem:    Nonrheumatic aortic valve stenosis  Active Problems:    Diabetic ketoacidosis without coma associated with type 1 diabetes mellitus    Type 1 diabetes mellitus    Tobacco abuse    Alcohol abuse    Insurance coverage problems    Bilateral carpal tunnel syndrome    Microalbuminuria    1.  Aortic stenosis.  Postoperative day 2 aortic valve replacement with a 21 mm magna pericardial prosthesis.  2.  Diabetes.  3.  Tobacco use  4.  Alcohol use  5.  Prophylactic amiodarone   6.  Postoperative anemia.  s/p pRBC 3/23/18, stable today.     Increase metoprolol today. BP better, decrease pacing rate to 70. Will watch.     Carri Menchaca MD  03/24/18  8:16 AM

## 2018-03-24 NOTE — PROGRESS NOTES
LOS: 16 days   Patient Care Team:  No Known Provider as PCP - General    Chief Complaint: post op    Subjective:  Symptoms:  No shortness of breath.    Diet:  Adequate intake.    Activity level: Normal.    Pain:  He complains of pain that is mild.  Pain is well controlled.          Vital Signs  Temp:  [98.5 °F (36.9 °C)-99.8 °F (37.7 °C)] 98.6 °F (37 °C)  Heart Rate:  [79-81] 81  Resp:  [16-20] 20  BP: (113-148)/(61-76) 138/75  Body mass index is 21.83 kg/m².    Intake/Output Summary (Last 24 hours) at 03/24/18 0907  Last data filed at 03/24/18 0851   Gross per 24 hour   Intake             1562 ml   Output             2240 ml   Net             -678 ml     I/O this shift:  In: -   Out: 200 [Urine:200]    Chest tube drainage last 8 hours 130    1    03/22/18  2301 03/23/18  0500 03/24/18  0500   Weight: 66.2 kg (146 lb) 66.2 kg (146 lb) 65.1 kg (143 lb 9.6 oz)         Objective    Results Review:        WBC WBC   Date Value Ref Range Status   03/24/2018 8.39 4.50 - 10.70 10*3/mm3 Final   03/23/2018 9.64 4.50 - 10.70 10*3/mm3 Final   03/22/2018 13.55 (H) 4.50 - 10.70 10*3/mm3 Final   03/21/2018 17.18 (H) 4.50 - 10.70 10*3/mm3 Final   03/21/2018 15.86 (H) 4.50 - 10.70 10*3/mm3 Final      HGB Hemoglobin   Date Value Ref Range Status   03/24/2018 9.0 (L) 13.7 - 17.6 g/dL Final   03/23/2018 7.8 (L) 13.7 - 17.6 g/dL Final   03/22/2018 8.1 (L) 13.7 - 17.6 g/dL Final   03/21/2018 8.6 (L) 13.7 - 17.6 g/dL Final   03/21/2018 8.3 (L) 13.7 - 17.6 g/dL Final   03/21/2018 8.2 (L) 12.0 - 17.0 g/dL Final   03/21/2018 8.8 (L) 12.0 - 17.0 g/dL Final   03/21/2018 8.8 (L) 12.0 - 17.0 g/dL Final   03/21/2018 8.8 (L) 12.0 - 17.0 g/dL Final   03/21/2018 8.8 (L) 12.0 - 17.0 g/dL Final   03/21/2018 8.8 (L) 12.0 - 17.0 g/dL Final      HCT Hematocrit   Date Value Ref Range Status   03/24/2018 28.4 (L) 40.4 - 52.2 % Final   03/23/2018 24.4 (L) 40.4 - 52.2 % Final   03/22/2018 25.2 (L) 40.4 - 52.2 % Final   03/21/2018 26.5 (L) 40.4 - 52.2 %  Final   03/21/2018 25.6 (L) 40.4 - 52.2 % Final   03/21/2018 24 (L) 38 - 51 % Final   03/21/2018 26 (L) 38 - 51 % Final   03/21/2018 26 (L) 38 - 51 % Final   03/21/2018 26 (L) 38 - 51 % Final   03/21/2018 26 (L) 38 - 51 % Final   03/21/2018 26 (L) 38 - 51 % Final      Platelets Platelets   Date Value Ref Range Status   03/24/2018 217 140 - 500 10*3/mm3 Final   03/23/2018 222 140 - 500 10*3/mm3 Final   03/22/2018 218 140 - 500 10*3/mm3 Final   03/21/2018 202 140 - 500 10*3/mm3 Final   03/21/2018 183 140 - 500 10*3/mm3 Final        PT/INR:    Protime   Date Value Ref Range Status   03/22/2018 14.1 11.7 - 14.2 Seconds Final   03/21/2018 14.6 (H) 11.7 - 14.2 Seconds Final   03/21/2018 15.0 12.8 - 15.2 seconds Final     Comment:     Serial Number: [id number]:  [id number]   /  INR   Date Value Ref Range Status   03/22/2018 1.10 0.90 - 1.10 Final   03/21/2018 1.16 (H) 0.90 - 1.10 Final   03/21/2018 1.3 (H) 0.8 - 1.2 Final       Sodium Sodium   Date Value Ref Range Status   03/24/2018 140 136 - 145 mmol/L Final   03/23/2018 135 (L) 136 - 145 mmol/L Final   03/22/2018 140 136 - 145 mmol/L Final   03/21/2018 141 136 - 145 mmol/L Final   03/21/2018 140 136 - 145 mmol/L Final      Potassium Potassium   Date Value Ref Range Status   03/24/2018 4.1 3.5 - 5.2 mmol/L Final   03/23/2018 4.3 3.5 - 5.2 mmol/L Final   03/22/2018 4.3 3.5 - 5.2 mmol/L Final   03/21/2018 4.7 3.5 - 5.2 mmol/L Final   03/21/2018 3.3 (L) 3.5 - 5.2 mmol/L Final      Chloride Chloride   Date Value Ref Range Status   03/24/2018 102 98 - 107 mmol/L Final   03/23/2018 97 (L) 98 - 107 mmol/L Final   03/22/2018 100 98 - 107 mmol/L Final   03/21/2018 102 98 - 107 mmol/L Final   03/21/2018 102 98 - 107 mmol/L Final      Bicarbonate CO2   Date Value Ref Range Status   03/24/2018 26.7 22.0 - 29.0 mmol/L Final   03/23/2018 26.9 22.0 - 29.0 mmol/L Final   03/22/2018 25.1 22.0 - 29.0 mmol/L Final   03/21/2018 24.5 22.0 - 29.0 mmol/L Final   03/21/2018 25.7 22.0 - 29.0  mmol/L Final      BUN BUN   Date Value Ref Range Status   03/24/2018 12 6 - 20 mg/dL Final   03/23/2018 17 6 - 20 mg/dL Final   03/22/2018 12 6 - 20 mg/dL Final   03/21/2018 11 6 - 20 mg/dL Final   03/21/2018 12 6 - 20 mg/dL Final      Creatinine Creatinine   Date Value Ref Range Status   03/24/2018 0.77 0.76 - 1.27 mg/dL Final   03/23/2018 0.63 (L) 0.76 - 1.27 mg/dL Final   03/22/2018 0.57 (L) 0.76 - 1.27 mg/dL Final   03/21/2018 0.67 (L) 0.76 - 1.27 mg/dL Final   03/21/2018 0.78 0.76 - 1.27 mg/dL Final      Calcium Calcium   Date Value Ref Range Status   03/24/2018 9.3 8.6 - 10.5 mg/dL Final   03/23/2018 8.9 8.6 - 10.5 mg/dL Final   03/22/2018 8.6 8.6 - 10.5 mg/dL Final   03/21/2018 9.0 8.6 - 10.5 mg/dL Final   03/21/2018 9.1 8.6 - 10.5 mg/dL Final      Magnesium Magnesium   Date Value Ref Range Status   03/22/2018 2.4 1.6 - 2.6 mg/dL Final   03/21/2018 2.5 1.6 - 2.6 mg/dL Final   03/21/2018 2.8 (H) 1.6 - 2.6 mg/dL Final            amiodarone 200 mg Oral Q12H   aspirin 81 mg Oral Daily   atorvastatin 20 mg Oral Nightly   chlorhexidine 15 mL Mouth/Throat Q12H   enoxaparin 40 mg Subcutaneous Daily   furosemide 40 mg Oral Daily   insulin aspart 0-12 Units Subcutaneous 4x Daily AC & at Bedtime   insulin aspart 4 Units Subcutaneous TID With Meals   insulin detemir 12 Units Subcutaneous QAM   insulin detemir 7 Units Subcutaneous Nightly   iron polysaccharides 150 mg Oral Daily   metoprolol tartrate 25 mg Oral Q12H   multivitamin 1 tablet Oral Daily   mupirocin  Each Nare BID   pantoprazole 40 mg Oral QAM   potassium chloride 20 mEq Oral Daily   sennosides-docusate sodium 2 tablet Oral Nightly       nitroglycerin 5-200 mcg/min    sodium chloride 30 mL/hr Last Rate: 30 mL/hr (03/21/18 1700)   sodium chloride 30 mL/hr Last Rate: 30 mL/hr (03/21/18 1645)           Patient Active Problem List   Diagnosis Code   • Diabetic ketoacidosis without coma associated with type 1 diabetes mellitus E10.10   • Type 1 diabetes mellitus  E10.9   • Tobacco abuse Z72.0   • Alcohol abuse F10.10   • Insurance coverage problems Z59.8   • Bilateral carpal tunnel syndrome G56.03   • Microalbuminuria R80.9   • Nonrheumatic aortic valve stenosis I35.0       Assessment & Plan    -Severe aortic stenosis- s/p AVR-tissue POD#2- PAGNI  -Severe concentric hypertrophy  -Left ventricular diastolic dysfunction   -preserved EF  -DM type 1 - endocrine following  -DKA w/o coma- resolved  -Tobacco abuse  -ETOH  -Poor Compliance  -Poor nutrition  -Post op anemia expected ABL, transfused 1 PRBC yesterday, stable      EKG NSR     Mobilize, encourage IS.  Discontinue chest tubes. Isolate wires.  Making great progress.  I anticipate home with home health tomorrow.    CODY Juarez  03/24/18  9:07 AM

## 2018-03-25 LAB
ANION GAP SERPL CALCULATED.3IONS-SCNC: 8.7 MMOL/L
BUN BLD-MCNC: 10 MG/DL (ref 6–20)
BUN/CREAT SERPL: 14.9 (ref 7–25)
CALCIUM SPEC-SCNC: 9.1 MG/DL (ref 8.6–10.5)
CHLORIDE SERPL-SCNC: 100 MMOL/L (ref 98–107)
CO2 SERPL-SCNC: 30.3 MMOL/L (ref 22–29)
CREAT BLD-MCNC: 0.67 MG/DL (ref 0.76–1.27)
DEPRECATED RDW RBC AUTO: 68.2 FL (ref 37–54)
ERYTHROCYTE [DISTWIDTH] IN BLOOD BY AUTOMATED COUNT: 17.8 % (ref 11.5–14.5)
GFR SERPL CREATININE-BSD FRML MDRD: 121 ML/MIN/1.73
GLUCOSE BLD-MCNC: 151 MG/DL (ref 65–99)
GLUCOSE BLDC GLUCOMTR-MCNC: 238 MG/DL (ref 70–130)
GLUCOSE BLDC GLUCOMTR-MCNC: 239 MG/DL (ref 70–130)
GLUCOSE BLDC GLUCOMTR-MCNC: 281 MG/DL (ref 70–130)
GLUCOSE BLDC GLUCOMTR-MCNC: 306 MG/DL (ref 70–130)
HCT VFR BLD AUTO: 27.8 % (ref 40.4–52.2)
HGB BLD-MCNC: 8.7 G/DL (ref 13.7–17.6)
MCH RBC QN AUTO: 33.1 PG (ref 27–32.7)
MCHC RBC AUTO-ENTMCNC: 31.3 G/DL (ref 32.6–36.4)
MCV RBC AUTO: 105.7 FL (ref 79.8–96.2)
PLATELET # BLD AUTO: 225 10*3/MM3 (ref 140–500)
PMV BLD AUTO: 9.6 FL (ref 6–12)
POTASSIUM BLD-SCNC: 4.5 MMOL/L (ref 3.5–5.2)
RBC # BLD AUTO: 2.63 10*6/MM3 (ref 4.6–6)
SODIUM BLD-SCNC: 139 MMOL/L (ref 136–145)
WBC NRBC COR # BLD: 8.24 10*3/MM3 (ref 4.5–10.7)

## 2018-03-25 PROCEDURE — 63710000001 INSULIN ASPART PER 5 UNITS: Performed by: INTERNAL MEDICINE

## 2018-03-25 PROCEDURE — 94762 N-INVAS EAR/PLS OXIMTRY CONT: CPT

## 2018-03-25 PROCEDURE — 82962 GLUCOSE BLOOD TEST: CPT

## 2018-03-25 PROCEDURE — 85027 COMPLETE CBC AUTOMATED: CPT | Performed by: THORACIC SURGERY (CARDIOTHORACIC VASCULAR SURGERY)

## 2018-03-25 PROCEDURE — 99232 SBSQ HOSP IP/OBS MODERATE 35: CPT | Performed by: INTERNAL MEDICINE

## 2018-03-25 PROCEDURE — 25010000002 ENOXAPARIN PER 10 MG: Performed by: THORACIC SURGERY (CARDIOTHORACIC VASCULAR SURGERY)

## 2018-03-25 PROCEDURE — 80048 BASIC METABOLIC PNL TOTAL CA: CPT | Performed by: THORACIC SURGERY (CARDIOTHORACIC VASCULAR SURGERY)

## 2018-03-25 RX ORDER — HYDROCODONE BITARTRATE AND ACETAMINOPHEN 5; 325 MG/1; MG/1
1 TABLET ORAL EVERY 4 HOURS PRN
Qty: 60 TABLET | Refills: 0 | Status: SHIPPED | OUTPATIENT
Start: 2018-03-25 | End: 2018-03-26

## 2018-03-25 RX ADMIN — INSULIN ASPART 3 UNITS: 100 INJECTION, SOLUTION INTRAVENOUS; SUBCUTANEOUS at 08:24

## 2018-03-25 RX ADMIN — INSULIN ASPART 6 UNITS: 100 INJECTION, SOLUTION INTRAVENOUS; SUBCUTANEOUS at 17:04

## 2018-03-25 RX ADMIN — ENOXAPARIN SODIUM 40 MG: 40 INJECTION SUBCUTANEOUS at 17:05

## 2018-03-25 RX ADMIN — HYDROCODONE BITARTRATE AND ACETAMINOPHEN 2 TABLET: 5; 325 TABLET ORAL at 10:07

## 2018-03-25 RX ADMIN — INSULIN ASPART 3 UNITS: 100 INJECTION, SOLUTION INTRAVENOUS; SUBCUTANEOUS at 20:33

## 2018-03-25 RX ADMIN — FUROSEMIDE 40 MG: 40 TABLET ORAL at 08:27

## 2018-03-25 RX ADMIN — ASPIRIN 81 MG: 81 TABLET ORAL at 08:27

## 2018-03-25 RX ADMIN — DOCUSATE SODIUM -SENNOSIDES 2 TABLET: 50; 8.6 TABLET, COATED ORAL at 20:33

## 2018-03-25 RX ADMIN — HYDROCODONE BITARTRATE AND ACETAMINOPHEN 2 TABLET: 5; 325 TABLET ORAL at 14:40

## 2018-03-25 RX ADMIN — Medication 1 TABLET: at 08:27

## 2018-03-25 RX ADMIN — MUPIROCIN: 20 OINTMENT TOPICAL at 08:24

## 2018-03-25 RX ADMIN — METOPROLOL TARTRATE 12.5 MG: 25 TABLET ORAL at 20:34

## 2018-03-25 RX ADMIN — PANTOPRAZOLE SODIUM 40 MG: 40 TABLET, DELAYED RELEASE ORAL at 06:04

## 2018-03-25 RX ADMIN — Medication 150 MG: at 08:27

## 2018-03-25 RX ADMIN — INSULIN ASPART 4 UNITS: 100 INJECTION, SOLUTION INTRAVENOUS; SUBCUTANEOUS at 17:05

## 2018-03-25 RX ADMIN — AMIODARONE HYDROCHLORIDE 200 MG: 200 TABLET ORAL at 08:23

## 2018-03-25 RX ADMIN — MAGNESIUM HYDROXIDE 10 ML: 2400 SUSPENSION ORAL at 08:24

## 2018-03-25 RX ADMIN — INSULIN ASPART 5 UNITS: 100 INJECTION, SOLUTION INTRAVENOUS; SUBCUTANEOUS at 11:30

## 2018-03-25 RX ADMIN — ATORVASTATIN CALCIUM 20 MG: 20 TABLET, FILM COATED ORAL at 20:33

## 2018-03-25 RX ADMIN — HYDROCODONE BITARTRATE AND ACETAMINOPHEN 2 TABLET: 5; 325 TABLET ORAL at 06:04

## 2018-03-25 RX ADMIN — HYDROCODONE BITARTRATE AND ACETAMINOPHEN 2 TABLET: 5; 325 TABLET ORAL at 20:37

## 2018-03-25 RX ADMIN — INSULIN DETEMIR 12 UNITS: 100 INJECTION, SOLUTION SUBCUTANEOUS at 06:03

## 2018-03-25 RX ADMIN — HYDROCODONE BITARTRATE AND ACETAMINOPHEN 2 TABLET: 5; 325 TABLET ORAL at 02:08

## 2018-03-25 RX ADMIN — INSULIN ASPART 5 UNITS: 100 INJECTION, SOLUTION INTRAVENOUS; SUBCUTANEOUS at 08:24

## 2018-03-25 RX ADMIN — AMIODARONE HYDROCHLORIDE 200 MG: 200 TABLET ORAL at 20:33

## 2018-03-25 RX ADMIN — INSULIN ASPART 3 UNITS: 100 INJECTION, SOLUTION INTRAVENOUS; SUBCUTANEOUS at 11:30

## 2018-03-25 NOTE — PROGRESS NOTES
[facility]     LOS: 17 days     Subjective:  S/p AVR, minimally invasive \"J sternotomy\" AVR with a # 21 mm Magna pericardial prosthesis  Chest tube out  Pacer wires out  He is walking by himself    Objective:    Vital Signs:  Temp:  [98 °F (36.7 °C)-99 °F (37.2 °C)] 98 °F (36.7 °C)  Heart Rate:  [52-63] 54  Resp:  [16] 16  BP: (103-137)/(57-79) 109/57    SpO2:  [91 %-98 %] 95 %  on  Flow (L/min):  [2] 2;   Device (Oxygen Therapy): room air  Body mass index is 22.31 kg/m².    Physical Exam   Constitutional: He appears well-developed and well-nourished.   HENT:   Head: Normocephalic and atraumatic.   Eyes: EOM are normal.   Neck: Normal range of motion.   Cardiovascular: Normal rate and regular rhythm.    Pulmonary/Chest: Effort normal and breath sounds normal.   Abdominal: Soft. Bowel sounds are normal.   Neurological: He is alert.   Skin: Skin is warm.       Results Review:    Glucose   Date Value Ref Range Status   03/25/2018 151 (H) 65 - 99 mg/dL Final   03/24/2018 293 (H) 65 - 99 mg/dL Final   03/23/2018 138 (H) 65 - 99 mg/dL Final       Results from last 7 days  Lab Units 03/25/18  0355   WBC 10*3/mm3 8.24   HEMOGLOBIN g/dL 8.7*   HEMATOCRIT % 27.8*   PLATELETS 10*3/mm3 225       Results from last 7 days  Lab Units 03/25/18  0355   SODIUM mmol/L 139   POTASSIUM mmol/L 4.5   CHLORIDE mmol/L 100   CO2 mmol/L 30.3*   BUN mg/dL 10   CREATININE mg/dL 0.67*   CALCIUM mg/dL 9.1   GLUCOSE mg/dL 151*       Results from last 7 days  Lab Units 03/22/18  0305 03/21/18  1710   INR  1.10 1.16*   APTT seconds  --  36.1*       Results from last 7 days  Lab Units 03/22/18  0305   MAGNESIUM mg/dL 2.4         Cultures:  Blood Culture   Date Value Ref Range Status   03/10/2018 No growth at 4 days  Preliminary   03/10/2018 No growth at 4 days  Preliminary       I have reviewed daily medications and changes in CPOE    Scheduled meds    amiodarone 200 mg Oral Q12H   aspirin 81 mg Oral Daily   atorvastatin " 20 mg Oral Nightly   enoxaparin 40 mg Subcutaneous Daily   furosemide 40 mg Oral Daily   insulin aspart 0-12 Units Subcutaneous 4x Daily AC & at Bedtime   insulin aspart 6 Units Subcutaneous TID With Meals   insulin detemir 12 Units Subcutaneous QAM   insulin detemir 8 Units Subcutaneous Nightly   iron polysaccharides 150 mg Oral Daily   metoprolol tartrate 12.5 mg Oral Q12H   multivitamin 1 tablet Oral Daily   mupirocin  Each Nare BID   pantoprazole 40 mg Oral QAM   sennosides-docusate sodium 2 tablet Oral Nightly         nitroglycerin 5-200 mcg/min    sodium chloride 30 mL/hr Last Rate: 30 mL/hr (03/21/18 1700)   sodium chloride 30 mL/hr Last Rate: 30 mL/hr (03/21/18 1645)         Principal Problem:    Nonrheumatic aortic valve stenosis  Active Problems:    Diabetic ketoacidosis without coma associated with type 1 diabetes mellitus    Type 1 diabetes mellitus    Tobacco abuse    Alcohol abuse    Insurance coverage problems    Bilateral carpal tunnel syndrome    Microalbuminuria      Assessment/Plan:    S/p AVR      Diabetic ketoacidosis without coma associated with type 1 diabetes mellitus-status, resolved    Type 1 diabetes mellitus- adjusted by endocrinology, elevated yesterday but now improved      Tobacco abuse      Alcohol abuse      Bilateral carpal tunnel syndrome      Microalbuminuria    Plan:  Home on Monday    Saeed Fair MD  03/25/18  1:19 PM

## 2018-03-25 NOTE — PLAN OF CARE
Problem: Fall Risk (Adult)  Goal: Identify Related Risk Factors and Signs and Symptoms  Outcome: Ongoing (interventions implemented as appropriate)    Goal: Absence of Fall  Outcome: Ongoing (interventions implemented as appropriate)   03/25/18 1650   Fall Risk (Adult)   Absence of Fall making progress toward outcome       Problem: Patient Care Overview  Goal: Plan of Care Review  Outcome: Ongoing (interventions implemented as appropriate)   03/25/18 1650   Coping/Psychosocial   Plan of Care Reviewed With patient   OTHER   Outcome Summary Patient doing well. Able to ambulate with minimal assistance. Patient had BM today. Betablocker held today for low heart rate. On room air during the day. Over night ox planned for tonight.    Plan of Care Review   Progress improving     Goal: Individualization and Mutuality  Outcome: Ongoing (interventions implemented as appropriate)    Goal: Discharge Needs Assessment  Outcome: Ongoing (interventions implemented as appropriate)    Goal: Interprofessional Rounds/Family Conf  Outcome: Ongoing (interventions implemented as appropriate)      Problem: Diabetes, Type 1 (Adult)  Goal: Signs and Symptoms of Listed Potential Problems Will be Absent, Minimized or Managed (Diabetes, Type 1)  Outcome: Ongoing (interventions implemented as appropriate)   03/25/18 1650   Goal/Outcome Evaluation   Problems Assessed (Type 1 Diabetes) all   Problems Present (Type 1 Diabetes) none

## 2018-03-25 NOTE — PROGRESS NOTES
" LOS: 17 days   Patient Care Team:  No Known Provider as PCP - General    Chief Complaint: post op    Subjective  No complaints    Vital Signs  Temp:  [97.9 °F (36.6 °C)-99 °F (37.2 °C)] 98.4 °F (36.9 °C)  Heart Rate:  [56-63] 62  Resp:  [16] 16  BP: (103-122)/(59-72) 118/63  Body mass index is 22.31 kg/m².    Intake/Output Summary (Last 24 hours) at 03/25/18 0914  Last data filed at 03/25/18 0849   Gross per 24 hour   Intake              600 ml   Output              200 ml   Net              400 ml     I/O this shift:  In: 240 [P.O.:240]  Out: -         1    03/23/18  0500 03/24/18  0500 03/25/18  0632   Weight: 66.2 kg (146 lb) 65.1 kg (143 lb 9.6 oz) 66.5 kg (146 lb 11.2 oz)         Objective:  General Appearance:  In no acute distress.    Vital signs: (most recent): Blood pressure 118/63, pulse 62, temperature 98.4 °F (36.9 °C), temperature source Oral, resp. rate 16, height 172.7 cm (68\"), weight 66.5 kg (146 lb 11.2 oz), SpO2 91 %.  Vital signs are normal.    Output: Producing urine and no stool output.    Abdomen: Abdomen is soft.  Bowel sounds are normal.               Results Review:        WBC WBC   Date Value Ref Range Status   03/25/2018 8.24 4.50 - 10.70 10*3/mm3 Final   03/24/2018 8.39 4.50 - 10.70 10*3/mm3 Final   03/23/2018 9.64 4.50 - 10.70 10*3/mm3 Final      HGB Hemoglobin   Date Value Ref Range Status   03/25/2018 8.7 (L) 13.7 - 17.6 g/dL Final   03/24/2018 9.0 (L) 13.7 - 17.6 g/dL Final   03/23/2018 7.8 (L) 13.7 - 17.6 g/dL Final      HCT Hematocrit   Date Value Ref Range Status   03/25/2018 27.8 (L) 40.4 - 52.2 % Final   03/24/2018 28.4 (L) 40.4 - 52.2 % Final   03/23/2018 24.4 (L) 40.4 - 52.2 % Final      Platelets Platelets   Date Value Ref Range Status   03/25/2018 225 140 - 500 10*3/mm3 Final   03/24/2018 217 140 - 500 10*3/mm3 Final   03/23/2018 222 140 - 500 10*3/mm3 Final        PT/INR:  No results found for: PROTIME/No results found for: INR    Sodium Sodium   Date Value Ref Range " Status   03/25/2018 139 136 - 145 mmol/L Final   03/24/2018 140 136 - 145 mmol/L Final   03/23/2018 135 (L) 136 - 145 mmol/L Final      Potassium Potassium   Date Value Ref Range Status   03/25/2018 4.5 3.5 - 5.2 mmol/L Final   03/24/2018 4.1 3.5 - 5.2 mmol/L Final   03/23/2018 4.3 3.5 - 5.2 mmol/L Final      Chloride Chloride   Date Value Ref Range Status   03/25/2018 100 98 - 107 mmol/L Final   03/24/2018 102 98 - 107 mmol/L Final   03/23/2018 97 (L) 98 - 107 mmol/L Final      Bicarbonate CO2   Date Value Ref Range Status   03/25/2018 30.3 (H) 22.0 - 29.0 mmol/L Final   03/24/2018 26.7 22.0 - 29.0 mmol/L Final   03/23/2018 26.9 22.0 - 29.0 mmol/L Final      BUN BUN   Date Value Ref Range Status   03/25/2018 10 6 - 20 mg/dL Final   03/24/2018 12 6 - 20 mg/dL Final   03/23/2018 17 6 - 20 mg/dL Final      Creatinine Creatinine   Date Value Ref Range Status   03/25/2018 0.67 (L) 0.76 - 1.27 mg/dL Final   03/24/2018 0.77 0.76 - 1.27 mg/dL Final   03/23/2018 0.63 (L) 0.76 - 1.27 mg/dL Final      Calcium Calcium   Date Value Ref Range Status   03/25/2018 9.1 8.6 - 10.5 mg/dL Final   03/24/2018 9.3 8.6 - 10.5 mg/dL Final   03/23/2018 8.9 8.6 - 10.5 mg/dL Final      Magnesium No results found for: MG         amiodarone 200 mg Oral Q12H   aspirin 81 mg Oral Daily   atorvastatin 20 mg Oral Nightly   enoxaparin 40 mg Subcutaneous Daily   furosemide 40 mg Oral Daily   insulin aspart 0-12 Units Subcutaneous 4x Daily AC & at Bedtime   insulin aspart 5 Units Subcutaneous TID With Meals   insulin detemir 12 Units Subcutaneous QAM   insulin detemir 8 Units Subcutaneous Nightly   iron polysaccharides 150 mg Oral Daily   metoprolol tartrate 12.5 mg Oral Q12H   multivitamin 1 tablet Oral Daily   mupirocin  Each Nare BID   pantoprazole 40 mg Oral QAM   sennosides-docusate sodium 2 tablet Oral Nightly       nitroglycerin 5-200 mcg/min    sodium chloride 30 mL/hr Last Rate: 30 mL/hr (03/21/18 1700)   sodium chloride 30 mL/hr Last Rate:  30 mL/hr (03/21/18 1645)           Patient Active Problem List   Diagnosis Code   • Diabetic ketoacidosis without coma associated with type 1 diabetes mellitus E10.10   • Type 1 diabetes mellitus E10.9   • Tobacco abuse Z72.0   • Alcohol abuse F10.10   • Insurance coverage problems Z59.8   • Bilateral carpal tunnel syndrome G56.03   • Microalbuminuria R80.9   • Nonrheumatic aortic valve stenosis I35.0       Assessment & Plan       -Severe aortic stenosis- s/p AVR-tissue POD#4- PAGNI  -Severe concentric hypertrophy  -Left ventricular diastolic dysfunction   -preserved EF  -DM type 1 - endocrine following  -DKA w/o coma- resolved  -Tobacco abuse  -ETOH  -Poor Compliance  -Poor nutrition  -Post op anemia expected ABL, transfused 1 PRBC yesterday, stable         Mobilize, encourage IS.  D/c wires.  +flatus no BM, will order suppository.  Making great progress.  I anticipate home with home health tomorrow.      CODY Juarez  03/25/18  9:14 AM

## 2018-03-25 NOTE — PROGRESS NOTES
57 y.o.   LOS: 17 days   Patient Care Team:  No Known Provider as PCP - General    Chief Complaint:  Hyperglycemia    Chief Complaint   Patient presents with   • Fatigue   • Vomiting   • Nausea       Subjective  no major overnight events.  No complaints of chest pain, shortness of breath.  He is eating well.  Blood sugars have significantly improved but still continues to have blood sugars between 150-200 mg/dL.    Interval History:    Review of Systems:   Review of Systems   Constitutional: Positive for appetite change and fatigue. Negative for activity change.   Respiratory: Negative for shortness of breath.    Cardiovascular: Positive for chest pain.   Gastrointestinal: Negative for vomiting.   Endocrine: Negative for polydipsia and polyuria.   Musculoskeletal: Positive for myalgias. Negative for back pain.   Neurological: Positive for weakness and numbness.     Objective     Vital Signs   Temp:  [98 °F (36.7 °C)-99 °F (37.2 °C)] 98 °F (36.7 °C)  Heart Rate:  [52-63] 54  Resp:  [16] 16  BP: (103-137)/(57-79) 109/57    Physical Exam:  Gen exam - alert and oriented x 3  HEENT - Acanthosis nigricans. Thyroid palpable.   Resp - Clear to auscultation.   CVS - S1,S2 heard and no murmurs.   Abd - Non tender, BS heard.   Ext - No edema   .  Physical ExamResults Review:     I reviewed the patient's new clinical results.      Glucose   Date/Time Value Ref Range Status   03/25/2018 0355 151 (H) 65 - 99 mg/dL Final   03/24/2018 0330 293 (H) 65 - 99 mg/dL Final   03/23/2018 0434 138 (H) 65 - 99 mg/dL Final     Lab Results (last 72 hours)     Procedure Component Value Units Date/Time    POC Glucose Once [[id number]]  (Abnormal) Collected:  03/24/18 1035    Specimen:  Blood Updated:  03/24/18 1036     Glucose 189 (H) mg/dL     Narrative:       Meter: [id number] : [id number] Declan CAGE    POC Glucose Once [[id number]]  (Abnormal) Collected:  03/24/18 0537    Specimen:  Blood Updated:  03/24/18 0538     Glucose 293 (H)  mg/dL     Narrative:       Meter: [id number] : [id number] pyco CNA    Basic Metabolic Panel [[id number]]  (Abnormal) Collected:  03/24/18 0330    Specimen:  Blood Updated:  03/24/18 0506     Glucose 293 (H) mg/dL      BUN 12 mg/dL      Creatinine 0.77 mg/dL      Sodium 140 mmol/L      Potassium 4.1 mmol/L      Chloride 102 mmol/L      CO2 26.7 mmol/L      Calcium 9.3 mg/dL      eGFR Non African Amer 103 mL/min/1.73      BUN/Creatinine Ratio 15.6     Anion Gap 11.3 mmol/L     Narrative:       GFR Normal >60  Chronic Kidney Disease <60  Kidney Failure <15    CBC (No Diff) [[id number]]  (Abnormal) Collected:  03/24/18 0330    Specimen:  Blood Updated:  03/24/18 0413     WBC 8.39 10*3/mm3      RBC 2.70 (L) 10*6/mm3      Hemoglobin 9.0 (L) g/dL      Hematocrit 28.4 (L) %      .2 (H) fL      MCH 33.3 (H) pg      MCHC 31.7 (L) g/dL      RDW 18.9 (H) %      RDW-SD 71.6 (H) fl      MPV 9.7 fL      Platelets 217 10*3/mm3     POC Glucose Once [[id number]]  (Abnormal) Collected:  03/23/18 2006    Specimen:  Blood Updated:  03/23/18 2008     Glucose 295 (H) mg/dL     Narrative:       Meter: [id number] : [id number] pyco CNA    POC Glucose Once [[id number]]  (Abnormal) Collected:  03/23/18 1645    Specimen:  Blood Updated:  03/23/18 1646     Glucose 267 (H) mg/dL     Narrative:       Meter: [id number] : [id number] [employer] FAUZIA    POC Glucose Once [[id number]]  (Abnormal) Collected:  03/23/18 1038    Specimen:  Blood Updated:  03/23/18 1419     Glucose 258 (H) mg/dL     Narrative:       Meter: [id number] : [id number] [employer] FAUZIA    Tissue Pathology Exam [[id number]] Collected:  03/21/18 1456    Specimen:  Tissue from Aortic valve Updated:  03/23/18 1344     Case Report --     Surgical Pathology Report                         Case: [id number]                                  Authorizing Provider:  Cecil Dougherty MD        Collected:           03/21/2018 02:56 PM         "  Ordering Location:     [facility]  Received:            03/21/2018 11:16 PM                                 MAIN OR                                                                      Pathologist:           Jose Jordan MD                                                        Specimen:    Aortic valve, AORTIC VALVE                                                                  Final Diagnosis --     1. AORTIC VALVE LEAFLETS, RESECTION SPECIMEN:           SEVERE CALCIFIC ATHEROSCLEROSIS WITH FOCAL METAPLASTIC BONE FORMATION.           FOCAL MINIMAL CHRONIC INFLAMMATION.    [facility]/    CPT CODES:  1. 63929, 46710             Gross Description --     1.  Received in formalin labeled \"aortic valve\" are 2 tan diffusely calcified semi-lunar fragments of tissue measuring 2.5 x 1.5 x 1.2 cm in aggregate.  Representative section of each of the tissue fragments are submitted in a single block labeled 1A after decalcification.  CC/USO/LANCE/brb        Microscopic Description --     Performed, incorporated in diagnosis.          Embedded Images --    POC Glucose Once [[id number]]  (Abnormal) Collected:  03/23/18 0552    Specimen:  Blood Updated:  03/23/18 0553     Glucose 140 (H) mg/dL     Narrative:       Meter: [id number] : [id number] [facility]    Basic Metabolic Panel [[id number]]  (Abnormal) Collected:  03/23/18 0434    Specimen:  Blood Updated:  03/23/18 0538     Glucose 138 (H) mg/dL      BUN 17 mg/dL      Creatinine 0.63 (L) mg/dL      Sodium 135 (L) mmol/L      Potassium 4.3 mmol/L      Chloride 97 (L) mmol/L      CO2 26.9 mmol/L      Calcium 8.9 mg/dL      eGFR Non African Amer 130 mL/min/1.73      BUN/Creatinine Ratio 27.0 (H)     Anion Gap 11.1 mmol/L     Narrative:       GFR Normal >60  Chronic Kidney Disease <60  Kidney Failure <15    CBC (No Diff) [[id number]]  (Abnormal) Collected:  03/23/18 0434    Specimen:  Blood Updated:  03/23/18 0519     WBC 9.64 10*3/mm3      RBC 2.19 (L) " 10*6/mm3      Hemoglobin 7.8 (L) g/dL      Hematocrit 24.4 (L) %      .4 (H) fL      MCH 35.6 (H) pg      MCHC 32.0 (L) g/dL      RDW 13.7 %      RDW-SD 55.0 (H) fl      MPV 9.6 fL      Platelets 222 10*3/mm3     POC Glucose Once [[id number]]  (Abnormal) Collected:  03/22/18 2039    Specimen:  Blood Updated:  03/22/18 2040     Glucose 379 (H) mg/dL     Narrative:       Meter: [id number] : [id number] Jerry Brooke NA    POC Glucose Once [[id number]]  (Abnormal) Collected:  03/22/18 1615    Specimen:  Blood Updated:  03/22/18 1617     Glucose 298 (H) mg/dL     Narrative:       Meter: [id number] : [id number] Jerry Brooke NA    POC Glucose Once [[id number]]  (Abnormal) Collected:  03/22/18 1104    Specimen:  Blood Updated:  03/22/18 1106     Glucose 359 (H) mg/dL     Narrative:       Meter: [id number] : [id number] Nabila Reddy RN    POC Glucose Once [[id number]]  (Abnormal) Collected:  03/22/18 0744    Specimen:  Blood Updated:  03/22/18 0746     Glucose 232 (H) mg/dL     Narrative:       Meter: [id number] : [id number] Fred Ramirez RN    POC Glucose Once [[id number]]  (Abnormal) Collected:  03/22/18 0536    Specimen:  Blood Updated:  03/22/18 0537     Glucose 229 (H) mg/dL     Narrative:       Meter: [id number] : [id number] Haylie Stringer RN    Renal Function Panel [[id number]]  (Abnormal) Collected:  03/22/18 0305    Specimen:  Blood Updated:  03/22/18 0345     Glucose 107 (H) mg/dL      BUN 12 mg/dL      Creatinine 0.57 (L) mg/dL      Sodium 140 mmol/L      Potassium 4.3 mmol/L      Chloride 100 mmol/L      CO2 25.1 mmol/L      Calcium 8.6 mg/dL      Albumin 4.10 g/dL      Phosphorus 4.1 mg/dL      Anion Gap 14.9 mmol/L      BUN/Creatinine Ratio 21.1     eGFR Non African Amer 146 mL/min/1.73     Magnesium [[id number]]  (Normal) Collected:  03/22/18 0305    Specimen:  Blood Updated:  03/22/18 0345     Magnesium 2.4 mg/dL     CBC & Differential [[id number]] Collected:  03/22/18 0305    Specimen:  Blood  Updated:  03/22/18 0337    Narrative:       The following orders were created for panel order CBC & Differential.  Procedure                               Abnormality         Status                     ---------                               -----------         ------                     Scan Slide[[id number]]                                                                  CBC Auto Differential[[id number]]        Abnormal            Final result                 Please view results for these tests on the individual orders.    CBC Auto Differential [[id number]]  (Abnormal) Collected:  03/22/18 0305    Specimen:  Blood Updated:  03/22/18 0337     WBC 13.55 (H) 10*3/mm3      RBC 2.25 (L) 10*6/mm3      Hemoglobin 8.1 (L) g/dL      Hematocrit 25.2 (L) %      .0 (H) fL      MCH 36.0 (H) pg      MCHC 32.1 (L) g/dL      RDW 13.2 %      RDW-SD 54.0 fl      MPV 9.5 fL      Platelets 218 10*3/mm3      Neutrophil % 90.6 (H) %      Lymphocyte % 3.3 (L) %      Monocyte % 5.4 %      Eosinophil % 0.1 (L) %      Basophil % 0.4 %      Immature Grans % 0.2 %      Neutrophils, Absolute 12.28 (H) 10*3/mm3      Lymphocytes, Absolute 0.45 (L) 10*3/mm3      Monocytes, Absolute 0.73 10*3/mm3      Eosinophils, Absolute 0.01 10*3/mm3      Basophils, Absolute 0.05 10*3/mm3      Immature Grans, Absolute 0.03 10*3/mm3     Protime-INR [[id number]]  (Normal) Collected:  03/22/18 0305    Specimen:  Blood Updated:  03/22/18 0332     Protime 14.1 Seconds      INR 1.10    POC Glucose Once [[id number]]  (Normal) Collected:  03/22/18 0259    Specimen:  Blood Updated:  03/22/18 0300     Glucose 108 mg/dL     Narrative:       Meter: [id number] : [id number] Haylie Stringer RN    POC Glucose Once [[id number]]  (Normal) Collected:  03/22/18 0219    Specimen:  Blood Updated:  03/22/18 0221     Glucose 91 mg/dL     Narrative:       Meter: [id number] : [id number] Stringer RN    POC Glucose Once [[id number]]  (Abnormal) Collected:  03/22/18 0201     Specimen:  Blood Updated:  03/22/18 0202     Glucose 52 (L) mg/dL     Narrative:       Meter: [id number] : [id number] Haylie Stringer RN    POC Glucose Once [[id number]]  (Abnormal) Collected:  03/22/18 0112    Specimen:  Blood Updated:  03/22/18 0114     Glucose 67 (L) mg/dL     Narrative:       Meter: [id number] : [id number] Haylie Stringer RN    POC Glucose Once [[id number]]  (Normal) Collected:  03/21/18 2340    Specimen:  Blood Updated:  03/21/18 2341     Glucose 128 mg/dL     Narrative:       Meter: [id number] : [id number] Haylie Stringer RN    POC Glucose Once [[id number]]  (Abnormal) Collected:  03/21/18 2211    Specimen:  Blood Updated:  03/21/18 2220     Glucose 181 (H) mg/dL     Narrative:       Meter: [id number] : [id number] Thais Leigh RN    Renal Function Panel [[id number]]  (Abnormal) Collected:  03/21/18 2115    Specimen:  Blood Updated:  03/21/18 2151     Glucose 204 (H) mg/dL      BUN 11 mg/dL      Creatinine 0.67 (L) mg/dL      Sodium 141 mmol/L      Potassium 4.7 mmol/L      Chloride 102 mmol/L      CO2 24.5 mmol/L      Calcium 9.0 mg/dL      Albumin 4.40 g/dL      Phosphorus 3.9 mg/dL      Anion Gap 14.5 mmol/L      BUN/Creatinine Ratio 16.4     eGFR Non African Amer 121 mL/min/1.73     Magnesium [[id number]]  (Normal) Collected:  03/21/18 2115    Specimen:  Blood Updated:  03/21/18 2148     Magnesium 2.5 mg/dL     CBC (No Diff) [[id number]]  (Abnormal) Collected:  03/21/18 2115    Specimen:  Blood Updated:  03/21/18 2136     WBC 17.18 (H) 10*3/mm3      RBC 2.37 (L) 10*6/mm3      Hemoglobin 8.6 (L) g/dL      Hematocrit 26.5 (L) %      .8 (H) fL      MCH 36.3 (H) pg      MCHC 32.5 (L) g/dL      RDW 13.3 %      RDW-SD 53.7 fl      MPV 9.1 fL      Platelets 202 10*3/mm3     POC Glucose Once [[id number]]  (Abnormal) Collected:  03/21/18 2113    Specimen:  Blood Updated:  03/21/18 2116     Glucose 196 (H) mg/dL     Narrative:       Meter: [id number] : [id number] Thais Leigh RN    POC  Glucose Once [[id number]]  (Normal) Collected:  03/21/18 1950    Specimen:  Blood Updated:  03/21/18 1959     Glucose 114 mg/dL     Narrative:       Meter: [id number] : [id number] Thais Leigh RN    Blood Gas, Arterial [[id number]]  (Abnormal) Collected:  03/21/18 1950    Specimen:  Arterial Blood Updated:  03/21/18 1952     Site Arterial Line     Howard's Test N/A     pH, Arterial 7.360 pH units      pCO2, Arterial 47.0 (H) mm Hg      pO2, Arterial 105.9 (H) mm Hg      HCO3, Arterial 26.6 mmol/L      Base Excess, Arterial 0.8 mmol/L      O2 Saturation Calculated 97.8 %      A-a Gradiant 0.4 mmHg      Barometric Pressure for Blood Gas 752.4 mmHg      Modality Adult Vent     FIO2 40 %      Ventilator Mode PS     Set Tidal Volume 621     Rate 14 Breaths/minute      PEEP 5     PSV 6 cmH2O     Narrative:       sat 100 Meter: [id number] : [id number] Fareed DePaul    POC Glucose Once [[id number]]  (Normal) Collected:  03/21/18 1912    Specimen:  Blood Updated:  03/21/18 1922     Glucose 95 mg/dL     Narrative:       Meter: [id number] : [id number] Thais Leigh RN    POC Glucose Once [[id number]]  (Normal) Collected:  03/21/18 1842    Specimen:  Blood Updated:  03/21/18 1852     Glucose 116 mg/dL     Narrative:       Meter: [id number] : [id number] Thais Leigh RN    POC Glucose Once [[id number]]  (Abnormal) Collected:  03/21/18 1824    Specimen:  Blood Updated:  03/21/18 1835     Glucose 47 (C) mg/dL     Narrative:       Meter: [id number] : [id number] Thais Leigh RN    Magnesium [[id number]]  (Abnormal) Collected:  03/21/18 1710    Specimen:  Blood Updated:  03/21/18 1746     Magnesium 2.8 (H) mg/dL     Renal Function Panel [[id number]]  (Abnormal) Collected:  03/21/18 1710    Specimen:  Blood Updated:  03/21/18 1742     Glucose 101 (H) mg/dL      BUN 12 mg/dL      Creatinine 0.78 mg/dL      Sodium 140 mmol/L      Potassium 3.3 (L) mmol/L      Chloride 102 mmol/L      CO2 25.7 mmol/L      Calcium 9.1 mg/dL       Albumin 3.70 g/dL      Phosphorus 3.1 mg/dL      Anion Gap 12.3 mmol/L      BUN/Creatinine Ratio 15.4     eGFR Non African Amer 102 mL/min/1.73     Calcium, Ionized [[id number]]  (Normal) Collected:  03/21/18 1710    Specimen:  Blood Updated:  03/21/18 1736     Ionized Calcium 1.31 mmol/L      Ionized Calcium 5.2 mg/dL     Protime-INR [[id number]]  (Abnormal) Collected:  03/21/18 1710    Specimen:  Blood Updated:  03/21/18 1731     Protime 14.6 (H) Seconds      INR 1.16 (H)    aPTT [[id number]]  (Abnormal) Collected:  03/21/18 1710    Specimen:  Blood Updated:  03/21/18 1731     PTT 36.1 (H) seconds     Fibrinogen [[id number]]  (Abnormal) Collected:  03/21/18 1710    Specimen:  Blood Updated:  03/21/18 1731     Fibrinogen 492 (H) mg/dL     CBC & Differential [[id number]] Collected:  03/21/18 1710    Specimen:  Blood Updated:  03/21/18 1720    Narrative:       The following orders were created for panel order CBC & Differential.  Procedure                               Abnormality         Status                     ---------                               -----------         ------                     Scan Slide[[id number]]                                                                  CBC Auto Differential[[id number]]        Abnormal            Final result                 Please view results for these tests on the individual orders.    CBC Auto Differential [[id number]]  (Abnormal) Collected:  03/21/18 1710    Specimen:  Blood Updated:  03/21/18 1720     WBC 15.86 (H) 10*3/mm3      RBC 2.31 (L) 10*6/mm3      Hemoglobin 8.3 (L) g/dL      Hematocrit 25.6 (L) %      .8 (H) fL      MCH 35.9 (H) pg      MCHC 32.4 (L) g/dL      RDW 13.2 %      RDW-SD 52.9 fl      MPV 9.0 fL      Platelets 183 10*3/mm3      Neutrophil % 83.5 (H) %      Lymphocyte % 10.0 (L) %      Monocyte % 5.0 %      Eosinophil % 0.6 %      Basophil % 0.3 %      Immature Grans % 0.6 (H) %      Neutrophils, Absolute 13.25 (H) 10*3/mm3      Lymphocytes,  Absolute 1.58 10*3/mm3      Monocytes, Absolute 0.80 10*3/mm3      Eosinophils, Absolute 0.10 10*3/mm3      Basophils, Absolute 0.04 10*3/mm3      Immature Grans, Absolute 0.09 (H) 10*3/mm3     Blood Gas, Arterial [[id number]]  (Abnormal) Collected:  03/21/18 1714    Specimen:  Arterial Blood Updated:  03/21/18 1716     Site Arterial Line     Howard's Test N/A     pH, Arterial 7.368 pH units      pCO2, Arterial 50.4 (H) mm Hg      pO2, Arterial 414.9 (H) mm Hg      HCO3, Arterial 29.0 (H) mmol/L      Base Excess, Arterial 3.2 (H) mmol/L      O2 Saturation Calculated 100.0 (H) %      A-a Gradiant 0.6 mmHg      Barometric Pressure for Blood Gas 751.4 mmHg      Modality Adult Vent     FIO2 100 %      Ventilator Mode VC     Set Tidal Volume 600     Set Mech Resp Rate 14     Rate 14 Breaths/minute      PEEP 5    Narrative:       Meter: [id number] : [id number] Kenya Shailesh    POC Glucose Once [[id number]]  (Normal) Collected:  03/21/18 1703    Specimen:  Blood Updated:  03/21/18 1711     Glucose 96 mg/dL     Narrative:       Meter: [id number] : [id number] Thais Leigh RN        Imaging Results (last 72 hours)     Procedure Component Value Units Date/Time    XR Chest 1 View [[id number]] Collected:  03/24/18 1431     Updated:  03/24/18 1557    Narrative:       ONE VIEW PORTABLE CHEST AT 1358 HOURS     HISTORY: Recent cardiac surgery. Chest tube removal.     FINDINGS: The right-sided chest tube has been removed and there is no  evidence of pneumothorax. The lungs are clear except for some minimal  vague residual atelectasis at the right base. The heart remains slightly  enlarged.     This report was finalized on 3/24/2018 3:54 PM by Dr. Ganesh Braun MD.       XR Chest 1 View [[id number]] Collected:  03/24/18 1154     Updated:  03/24/18 1158    Narrative:       PORTABLE CHEST SINGLE VIEW  3/24/2018 at 10:12     HISTORY: 57-year-old male postop median sternotomy with right chest tube  and mediastinal drainage catheter  presents for evaluate of progressive  shortness of breath     COMPARISON: 3/24/2018 at 08:45     FINDINGS:  1. No significant interval change, no new abnormality, focal airspace  process nor pneumothorax     This report was finalized on 3/24/2018 11:55 AM by Dr. Zeb Davis MD.       XR Chest PA & Lateral [[id number]] Collected:  03/24/18 0856     Updated:  03/24/18 0900    Narrative:       TWO-VIEW CHEST     HISTORY: 57-year-old male postop cardiac valve replacement     COMPARISON: 3/23/2018     FINDINGS:  1. Right chest tube and nasogastric drainage catheter show no change.  2. Cardiac enlargement mild chronic pulmonary change.  3. Relatively stable right basal atelectasis, left lung is clear of an  active process.  4. No new abnormality.     This report was finalized on 3/24/2018 8:57 AM by Dr. Zeb Davis MD.       XR Chest 1 View [[id number]] Collected:  03/23/18 0451     Updated:  03/23/18 0500    Narrative:       PORTABLE CHEST X-RAY     CLINICAL HISTORY: Post-Op Heart Surgery; E10.10-Type 1 diabetes mellitus  with ketoacidosis without coma; Z74.09-Other reduced mobility;  I35.0-Nonrheumatic aortic (valve) stenosis     COMPARISON: March 22, 2018.     FINDINGS: Portable AP view of the chest was obtained with overlying  monitor leads in place. [place]-Jose M catheter has been removed. Introducer  remains. Other life support lines are unchanged. No pneumothorax. Lungs  well inflated. Right infrahilar atelectasis continues to improve. Left  lung is clear. Stable cardiomegaly. No edema or significant pleural  fluid.             Impression:       Interval [place]-Jose M catheter removal with improving right  infrahilar atelectasis        This report was finalized on 3/23/2018 4:57 AM by Andres Yanes MD.       XR Chest 1 View [[id number]] Collected:  03/22/18 0709     Updated:  03/23/18 0458    Narrative:       PORTABLE CHEST X-RAY     CLINICAL HISTORY: Post-Op Heart Surgery; E10.10-Type 1 diabetes mellitus  with  ketoacidosis without coma; Z74.09-Other reduced mobility;  I35.0-Nonrheumatic aortic (valve) stenosis.     COMPARISON: 03/21/2018.     FINDINGS: Portable AP view of the chest was obtained with overlying  monitor leads in place. ET tube and feeding tube have been removed. The  other life support lines are unchanged. No pneumothorax. Lungs are  fairly well inflated. There is some mild right infrahilar atelectasis.  Right perihilar atelectasis has nearly resolved. Left lung is clear.  Mild cardiomegaly is stable. No edema or significant pleural fluid.             Impression:       Interval extubation with improving right-sided atelectasis.        This report was finalized on 3/23/2018 4:55 AM by Andres Yanes MD.       XR Chest Post CVA Port [[id number]] Collected:  03/21/18 1741     Updated:  03/21/18 1746    Narrative:       ONE VIEW PORTABLE CHEST AT 5:15 PM     HISTORY: Recent cardiac surgery.     The patient has had recent cardiac surgery with a chest tube forming a  loop in the right chest and ending near the right base. There is no  evidence of pneumothorax. There is some minimal scattered atelectasis at  the right base.     There is mild cardiomegaly unchanged from the preoperative exam. A PA  line ends near the junction of the left and right main pulmonary  arteries, a feeding tube ends in the upper stomach, and ET tube ends 6.3  cm above the aidee.     This report was finalized on 3/21/2018 5:43 PM by Dr. Ganesh Braun MD.             Medication Review: done      Current Facility-Administered Medications:   •  acetaminophen (TYLENOL) suppository 650 mg, 650 mg, Rectal, Q4H PRN, Cecil Dougherty MD  •  acetaminophen (TYLENOL) tablet 650 mg, 650 mg, Oral, Q4H PRN, Cecil Dougherty MD, 650 mg at 03/23/18 2120  •  amiodarone (PACERONE) tablet 200 mg, 200 mg, Oral, Q12H, CODY Em, 200 mg at 03/25/18 0823  •  aspirin EC tablet 81 mg, 81 mg, Oral, Daily, Cecil Dougherty MD, 81 mg at 03/25/18 0827  •   atorvastatin (LIPITOR) tablet 20 mg, 20 mg, Oral, Nightly, Salvador Fernandez MD, 20 mg at 03/24/18 2152  •  bisacodyl (DULCOLAX) EC tablet 10 mg, 10 mg, Oral, Daily PRN, Cecil Dougherty MD  •  bisacodyl (DULCOLAX) suppository 10 mg, 10 mg, Rectal, Daily PRN, Cecil Dougherty MD  •  cyclobenzaprine (FLEXERIL) tablet 5 mg, 5 mg, Oral, Q8H PRN, Mirian Perez, APRN, 5 mg at 03/23/18 1112  •  enoxaparin (LOVENOX) syringe 40 mg, 40 mg, Subcutaneous, Daily, Cecil Dougherty MD, 40 mg at 03/24/18 1808  •  furosemide (LASIX) tablet 40 mg, 40 mg, Oral, Daily, Chiara Person APRN, 40 mg at 03/25/18 0827  •  HYDROcodone-acetaminophen (NORCO) 5-325 MG per tablet 2 tablet, 2 tablet, Oral, Q4H PRN, Cecil Dougherty MD, 2 tablet at 03/25/18 1007  •  insulin aspart (novoLOG) injection 0-12 Units, 0-12 Units, Subcutaneous, 4x Daily AC & at Bedtime, Salvador Fernandez MD, 3 Units at 03/25/18 1130  •  insulin aspart (novoLOG) injection 6 Units, 6 Units, Subcutaneous, TID With Meals, Hammad Roman MD  •  insulin detemir (LEVEMIR) injection 12 Units, 12 Units, Subcutaneous, QAM, Salvador Fernandez MD, 12 Units at 03/25/18 0603  •  insulin detemir (LEVEMIR) injection 8 Units, 8 Units, Subcutaneous, Nightly, Hammad Roman MD, 8 Units at 03/24/18 2145  •  iron polysaccharides (NIFEREX) capsule 150 mg, 150 mg, Oral, Daily, Salvador Fernandez MD, 150 mg at 03/25/18 0827  •  magnesium hydroxide (MILK OF MAGNESIA) suspension 2400 mg/10mL 10 mL, 10 mL, Oral, Daily PRN, Cecil Dougherty MD, 10 mL at 03/25/18 0824  •  Magnesium Sulfate 2 gram Bolus, followed by 8 gram infusion (total Mg dose 10 grams)- Mg less than or equal to 1mg/dL, 2 g, Intravenous, PRN **OR** Magnesium Sulfate 6 gram Infusion (2 gm x 3) -Mg 1.1 -1.5 mg/dL, 2 g, Intravenous, PRN **OR** magnesium sulfate 4 gram infusion- Mg 1.6-1.9 mg/dL, 4 g, Intravenous, PRN, Cecil Dougherty MD  •  metoprolol tartrate (LOPRESSOR) tablet 12.5 mg, 12.5 mg, Oral, Q12H, Mirian Perez,  APRN  •  morphine injection 1 mg, 1 mg, Intravenous, Q4H PRN **AND** naloxone (NARCAN) injection 0.4 mg, 0.4 mg, Intravenous, Q5 Min PRN, Cecil Dougherty MD  •  morphine injection 4 mg, 4 mg, Intravenous, Q30 Min PRN, Cecil Dougherty MD, 4 mg at 03/21/18 2219  •  multivitamin (THERAGRAN) tablet 1 tablet, 1 tablet, Oral, Daily, Salvador Fernandez MD, 1 tablet at 03/25/18 0827  •  mupirocin (BACTROBAN) 2 % nasal ointment, , Each Nare, BID, Cecil Dougherty MD  •  nitroglycerin 50 mg/250 mL (0.2 mg/mL) infusion, 5-200 mcg/min, Intravenous, Continuous PRN, Cecil Dougherty MD  •  ondansetron (ZOFRAN) injection 4 mg, 4 mg, Intravenous, Q6H PRN, Cecil Dougherty MD  •  oxyCODONE (ROXICODONE) immediate release tablet 10 mg, 10 mg, Oral, Q4H PRN, Cecil Dougherty MD, 10 mg at 03/23/18 2120  •  pantoprazole (PROTONIX) EC tablet 40 mg, 40 mg, Oral, QAM, Cecil Dougherty MD, 40 mg at 03/25/18 0604  •  potassium chloride (MICRO-K) CR capsule 40 mEq, 40 mEq, Oral, PRN **OR** potassium chloride (KLOR-CON) packet 40 mEq, 40 mEq, Oral, PRN, Cecil Dougherty MD  •  potassium chloride 10 mEq in 100 mL IVPB, 10 mEq, Intravenous, Q1H PRN **OR** potassium chloride 10 mEq in 100 mL IVPB, 10 mEq, Intravenous, Q1H PRN, Cecil Dougherty MD  •  potassium chloride 20 mEq in 50 mL IVPB, 20 mEq, Intravenous, Q1H PRN, Cecil Dougherty MD  •  potassium chloride 20 mEq in 50 mL IVPB, 20 mEq, Intravenous, Q1H PRN, Cecil Dougherty MD  •  promethazine (PHENERGAN) tablet 12.5 mg, 12.5 mg, Oral, Q6H PRN **OR** promethazine (PHENERGAN) injection 12.5 mg, 12.5 mg, Intravenous, Q6H PRN, Cecil Dougherty MD  •  sennosides-docusate sodium (SENOKOT-S) 8.6-50 MG tablet 2 tablet, 2 tablet, Oral, Nightly, Cecil Dougherty MD, 2 tablet at 03/24/18 2151  •  sodium chloride 0.9 % flush 30 mL, 30 mL, Intravenous, Once PRN, Cecil Dougherty MD  •  sodium chloride 0.9 % infusion, 30 mL/hr, Intravenous, Continuous, Cecil Dougherty MD, Last Rate: 30 mL/hr at  "03/21/18 1700, 30 mL/hr at 03/21/18 1700  •  sodium chloride 0.9 % infusion, 30 mL/hr, Intravenous, Continuous PRN, Cecil Dougherty MD, Last Rate: 30 mL/hr at 03/21/18 1645, 30 mL/hr at 03/21/18 1645    Assessment/Plan     Active Hospital Problems (** Indicates Principal Problem)    Diagnosis Date Noted   • **Nonrheumatic aortic valve stenosis [I35.0] 03/08/2018   • Microalbuminuria [R80.9] 03/12/2018   • Tobacco abuse [Z72.0] 03/09/2018   • Alcohol abuse [F10.10] 03/09/2018   • Insurance coverage problems [Z59.8] 03/09/2018   • Bilateral carpal tunnel syndrome [G56.03] 03/09/2018   • Diabetic ketoacidosis without coma associated with type 1 diabetes mellitus [E10.10] 03/08/2018   • Type 1 diabetes mellitus [E10.9] 03/08/2018      Resolved Hospital Problems    Diagnosis Date Noted Date Resolved   No resolved problems to display.     Type 1 dm   Increase levemir to 15 units in the morning and 8 units in the evening.  Increase NovoLog to 6 units with each meal  Continue to cover with NovoLog sliding scale 3 times a day before meals and at bedtime.    HLP  Continue lipitor 20 mg po daily.     I am covering the pt over the week end and pt will be seen by   on Monday.         Hammad Roman MD.  03/25/18  4:13 PM      EMR Dragon / transcription disclaimer:    \"Dictated utilizing Dragon dictation\".   "

## 2018-03-25 NOTE — PROGRESS NOTES
LOS: 17 days   Patient Care Team:  No Known Provider as PCP - General    Chief Complaint:  F/u AVR     Interval History:    Mild chest pain with moving.  No nausea, dizziness, heart racing.  His breathing is good.  He feels well.    Objective   Vital Signs  Temp:  [97.9 °F (36.6 °C)-99 °F (37.2 °C)] 98.6 °F (37 °C)  Heart Rate:  [56-79] 62  Resp:  [16-18] 16  BP: (103-123)/(59-76) 106/72    Intake/Output Summary (Last 24 hours) at 03/25/18 0639  Last data filed at 03/24/18 1320   Gross per 24 hour   Intake              860 ml   Output              400 ml   Net              460 ml       Comfortable NAD  Neck supple, no JVD or thyromegaly appreciated  S1/S2 RRR, 3/6 ,sounds like rub/ no gallop  Lungs decreased throughout, normal effort  Abdomen S/NT/ND (+) BS, no HSM appreciated  Extremities warm, no clubbing, cyanosis, or edema  No visible or palpable skin lesions  A/Ox4, mood and affect appropriate    Results Review:        Results from last 7 days  Lab Units 03/25/18  0355 03/24/18  0330 03/23/18  0434   SODIUM mmol/L 139 140 135*   POTASSIUM mmol/L 4.5 4.1 4.3   CHLORIDE mmol/L 100 102 97*   CO2 mmol/L 30.3* 26.7 26.9   BUN mg/dL 10 12 17   CREATININE mg/dL 0.67* 0.77 0.63*   GLUCOSE mg/dL 151* 293* 138*   CALCIUM mg/dL 9.1 9.3 8.9           Results from last 7 days  Lab Units 03/25/18  0355 03/24/18  0330 03/23/18  0434   WBC 10*3/mm3 8.24 8.39 9.64   HEMOGLOBIN g/dL 8.7* 9.0* 7.8*   HEMATOCRIT % 27.8* 28.4* 24.4*   PLATELETS 10*3/mm3 225 217 222       Results from last 7 days  Lab Units 03/22/18  0305 03/21/18  1710 03/21/18  1545   INR  1.10 1.16* 1.3*   APTT seconds  --  36.1*  --            Results from last 7 days  Lab Units 03/22/18  0305   MAGNESIUM mg/dL 2.4           I reviewed the patient's new clinical results.  I personally viewed and interpreted the patient's EKG/Telemetry data        Medication Review:     amiodarone 200 mg Oral Q12H   aspirin 81 mg Oral Daily   atorvastatin 20 mg Oral Nightly    enoxaparin 40 mg Subcutaneous Daily   furosemide 40 mg Oral Daily   insulin aspart 0-12 Units Subcutaneous 4x Daily AC & at Bedtime   insulin aspart 5 Units Subcutaneous TID With Meals   insulin detemir 12 Units Subcutaneous QAM   insulin detemir 8 Units Subcutaneous Nightly   iron polysaccharides 150 mg Oral Daily   metoprolol tartrate 25 mg Oral Q12H   multivitamin 1 tablet Oral Daily   mupirocin  Each Nare BID   pantoprazole 40 mg Oral QAM   potassium chloride 20 mEq Oral Daily   sennosides-docusate sodium 2 tablet Oral Nightly         nitroglycerin 5-200 mcg/min    sodium chloride 30 mL/hr Last Rate: 30 mL/hr (03/21/18 1700)   sodium chloride 30 mL/hr Last Rate: 30 mL/hr (03/21/18 1645)       Assessment/Plan     Principal Problem:    Nonrheumatic aortic valve stenosis  Active Problems:    Diabetic ketoacidosis without coma associated with type 1 diabetes mellitus    Type 1 diabetes mellitus    Tobacco abuse    Alcohol abuse    Insurance coverage problems    Bilateral carpal tunnel syndrome    Microalbuminuria    1.  Aortic stenosis.  Postoperative day 2 aortic valve replacement with a 21 mm magna pericardial prosthesis.  2.  Diabetes.  3.  Tobacco use  4.  Alcohol use  5.  Prophylactic amiodarone   6.  Postoperative anemia.  s/p pRBC 3/23/18, stable today.     Doing well, maybe home tomorrow.    Carri Menchaca MD  03/25/18  6:39 AM

## 2018-03-26 VITALS
RESPIRATION RATE: 18 BRPM | BODY MASS INDEX: 21.79 KG/M2 | OXYGEN SATURATION: 99 % | HEIGHT: 68 IN | TEMPERATURE: 98.8 F | WEIGHT: 143.8 LBS | SYSTOLIC BLOOD PRESSURE: 107 MMHG | HEART RATE: 52 BPM | DIASTOLIC BLOOD PRESSURE: 61 MMHG

## 2018-03-26 LAB
ANION GAP SERPL CALCULATED.3IONS-SCNC: 8 MMOL/L
BUN BLD-MCNC: 12 MG/DL (ref 6–20)
BUN/CREAT SERPL: 18.2 (ref 7–25)
CALCIUM SPEC-SCNC: 9.2 MG/DL (ref 8.6–10.5)
CHLORIDE SERPL-SCNC: 100 MMOL/L (ref 98–107)
CO2 SERPL-SCNC: 31 MMOL/L (ref 22–29)
CREAT BLD-MCNC: 0.66 MG/DL (ref 0.76–1.27)
GFR SERPL CREATININE-BSD FRML MDRD: 124 ML/MIN/1.73
GLUCOSE BLD-MCNC: 214 MG/DL (ref 65–99)
GLUCOSE BLDC GLUCOMTR-MCNC: 161 MG/DL (ref 70–130)
GLUCOSE BLDC GLUCOMTR-MCNC: 254 MG/DL (ref 70–130)
POTASSIUM BLD-SCNC: 4.3 MMOL/L (ref 3.5–5.2)
SODIUM BLD-SCNC: 139 MMOL/L (ref 136–145)

## 2018-03-26 PROCEDURE — 63710000001 INSULIN ASPART PER 5 UNITS: Performed by: INTERNAL MEDICINE

## 2018-03-26 PROCEDURE — 99232 SBSQ HOSP IP/OBS MODERATE 35: CPT | Performed by: NURSE PRACTITIONER

## 2018-03-26 PROCEDURE — 99024 POSTOP FOLLOW-UP VISIT: CPT | Performed by: NURSE PRACTITIONER

## 2018-03-26 PROCEDURE — 99024 POSTOP FOLLOW-UP VISIT: CPT | Performed by: THORACIC SURGERY (CARDIOTHORACIC VASCULAR SURGERY)

## 2018-03-26 PROCEDURE — 80048 BASIC METABOLIC PNL TOTAL CA: CPT | Performed by: THORACIC SURGERY (CARDIOTHORACIC VASCULAR SURGERY)

## 2018-03-26 PROCEDURE — 93010 ELECTROCARDIOGRAM REPORT: CPT | Performed by: INTERNAL MEDICINE

## 2018-03-26 PROCEDURE — 93005 ELECTROCARDIOGRAM TRACING: CPT | Performed by: NURSE PRACTITIONER

## 2018-03-26 PROCEDURE — 82962 GLUCOSE BLOOD TEST: CPT

## 2018-03-26 PROCEDURE — 97110 THERAPEUTIC EXERCISES: CPT

## 2018-03-26 RX ORDER — ASPIRIN 81 MG/1
81 TABLET ORAL DAILY
Qty: 100 TABLET | Refills: 1 | Status: SHIPPED | OUTPATIENT
Start: 2018-03-27

## 2018-03-26 RX ORDER — ATORVASTATIN CALCIUM 20 MG/1
20 TABLET, FILM COATED ORAL NIGHTLY
Qty: 30 TABLET | Refills: 1 | Status: SHIPPED | OUTPATIENT
Start: 2018-03-26

## 2018-03-26 RX ORDER — AMIODARONE HYDROCHLORIDE 200 MG/1
200 TABLET ORAL EVERY 12 HOURS SCHEDULED
Qty: 30 TABLET | Refills: 1 | Status: SHIPPED | OUTPATIENT
Start: 2018-03-26 | End: 2018-03-26 | Stop reason: HOSPADM

## 2018-03-26 RX ORDER — IRON POLYSACCHARIDE COMPLEX 150 MG
150 CAPSULE ORAL DAILY
Qty: 30 CAPSULE | Refills: 1 | Status: SHIPPED | OUTPATIENT
Start: 2018-03-27

## 2018-03-26 RX ORDER — FUROSEMIDE 40 MG/1
40 TABLET ORAL DAILY
Qty: 30 TABLET | Refills: 1 | Status: SHIPPED | OUTPATIENT
Start: 2018-03-27

## 2018-03-26 RX ORDER — HYDROCODONE BITARTRATE AND ACETAMINOPHEN 5; 325 MG/1; MG/1
1 TABLET ORAL EVERY 4 HOURS PRN
Qty: 20 TABLET | Refills: 0 | Status: SHIPPED | OUTPATIENT
Start: 2018-03-26 | End: 2018-04-05

## 2018-03-26 RX ADMIN — Medication 150 MG: at 08:48

## 2018-03-26 RX ADMIN — PANTOPRAZOLE SODIUM 40 MG: 40 TABLET, DELAYED RELEASE ORAL at 07:04

## 2018-03-26 RX ADMIN — ASPIRIN 81 MG: 81 TABLET ORAL at 08:48

## 2018-03-26 RX ADMIN — INSULIN ASPART 1 UNITS: 100 INJECTION, SOLUTION INTRAVENOUS; SUBCUTANEOUS at 13:16

## 2018-03-26 RX ADMIN — MUPIROCIN: 20 OINTMENT TOPICAL at 08:50

## 2018-03-26 RX ADMIN — HYDROCODONE BITARTRATE AND ACETAMINOPHEN 2 TABLET: 5; 325 TABLET ORAL at 08:48

## 2018-03-26 RX ADMIN — METOPROLOL TARTRATE 12.5 MG: 25 TABLET ORAL at 08:48

## 2018-03-26 RX ADMIN — INSULIN ASPART 2 UNITS: 100 INJECTION, SOLUTION INTRAVENOUS; SUBCUTANEOUS at 07:04

## 2018-03-26 RX ADMIN — OXYCODONE HYDROCHLORIDE 10 MG: 5 TABLET ORAL at 13:22

## 2018-03-26 RX ADMIN — FUROSEMIDE 40 MG: 40 TABLET ORAL at 08:47

## 2018-03-26 RX ADMIN — AMIODARONE HYDROCHLORIDE 200 MG: 200 TABLET ORAL at 08:48

## 2018-03-26 RX ADMIN — INSULIN ASPART 6 UNITS: 100 INJECTION, SOLUTION INTRAVENOUS; SUBCUTANEOUS at 13:15

## 2018-03-26 RX ADMIN — Medication 1 TABLET: at 08:48

## 2018-03-26 RX ADMIN — HYDROCODONE BITARTRATE AND ACETAMINOPHEN 2 TABLET: 5; 325 TABLET ORAL at 04:00

## 2018-03-26 RX ADMIN — INSULIN ASPART 6 UNITS: 100 INJECTION, SOLUTION INTRAVENOUS; SUBCUTANEOUS at 08:49

## 2018-03-26 NOTE — PLAN OF CARE
Problem: Patient Care Overview (Adult)  Goal: Plan of Care Review  Outcome: Ongoing (interventions implemented as appropriate)   03/26/18 0120   Coping/Psychosocial Response Interventions   Plan Of Care Reviewed With patient   Patient Care Overview   Progress improving   Outcome Evaluation   Outcome Summary/Follow up Plan VSS, pt with minimal c/o pain; treated with Lortab with + results. Overnight oximetry completed. Plan should be for pt to d/c home today.

## 2018-03-26 NOTE — PLAN OF CARE
Problem: Patient Care Overview  Goal: Plan of Care Review  Outcome: Ongoing (interventions implemented as appropriate)   03/26/18 7512   Coping/Psychosocial   Plan of Care Reviewed With patient   OTHER   Outcome Summary pt is walking independently in [place], able to ascend/descend stairs without difficulty, pt is ready for d/c, pt did require verbal cues for limiting use of UEs , overall pt has progressed well after his surgery, plans are for d/c home today   Plan of Care Review   Progress improving

## 2018-03-26 NOTE — PROGRESS NOTES
"Continued Stay Note  [facility]     Patient Name: Juan Payne  MRN: [id number]  Today's Date: 3/26/2018    Admit Date: 3/8/2018          Discharge Plan     Row Name 03/26/18 1127       Plan    Plan Comments [facility] was asked by Dr Fair to see Pt regarding his ability to afford his insulin.  CCP s/w Pt and he explained again that his [employer] expires March 31, 2018.  Pt states he will be unable to work for \"several months.\"  Pt states he will not be able to afford his medicines.  [facility] had already ask Karolyn with MED [facility]IST to see Pt on Friday and Pt is NOW [facility] Medicaid pending.  [facility] asked Pt if he would be agreeable to going to [facility] for followup since he does not have a PCP plus he will need assistance with cost of his medicines.  Pt was agreeable.  CCP called [facility] [address] [phone] and s/w [facility] ext [phone].  Pt scheduled appointment is with CODY Andrade on Wednesday March 28, 2018 @ 11:00 AM.  [facility] gave Pt and his s.o an appt slip with all appt and contact information on it.  RN is going to send Pt home with the Levimir Pen he has been using in the hospital to get him through until Wednesday.  Pt will be followed by [facility] upon d/c.   The diabetes educator has been consulted to see Pt prior to discharge.  MAGDI ESTEBAN RN/DIONNE              Discharge Codes    No documentation.       Expected Discharge Date and Time     Expected Discharge Date Expected Discharge Time    Mar 26, 2018             Leonor Esteban RN    "

## 2018-03-26 NOTE — PROGRESS NOTES
" LOS: 18 days   Patient Care Team:  No Known Provider as PCP - General    Chief Complaint: post op fu    Subjective:  Symptoms:  No shortness of breath or chest pain.    Diet:  Adequate intake.  No nausea or vomiting.    Activity level: Returning to normal.    Pain:  He reports no pain.          Vital Signs  Temp:  [97.9 °F (36.6 °C)-98.2 °F (36.8 °C)] 98.2 °F (36.8 °C)  Heart Rate:  [52-61] 52  Resp:  [16-18] 18  BP: (109-137)/(57-79) 113/65  Body mass index is 21.86 kg/m².    Intake/Output Summary (Last 24 hours) at 03/26/18 0856  Last data filed at 03/26/18 0822   Gross per 24 hour   Intake              720 ml   Output                0 ml   Net              720 ml     I/O this shift:  In: 180 [P.O.:180]  Out: -       1    03/24/18  0500 03/25/18  0632 03/26/18  0539   Weight: 65.1 kg (143 lb 9.6 oz) 66.5 kg (146 lb 11.2 oz) 65.2 kg (143 lb 12.8 oz)         Objective:  General Appearance:  Comfortable.    Vital signs: (most recent): Blood pressure 113/65, pulse 52, temperature 98.2 °F (36.8 °C), temperature source Oral, resp. rate 18, height 172.7 cm (68\"), weight 65.2 kg (143 lb 12.8 oz), SpO2 96 %.    Output: Producing urine and producing stool.    Lungs:  Normal effort and normal respiratory rate.  Breath sounds clear to auscultation.  No rales, decreased breath sounds, wheezes or rhonchi.    Heart: Bradycardia.  Regular rhythm.  Positive for murmur (III/VI best heard at right and left sternal border, 2nd IC space).    Abdomen: Abdomen is soft and non-distended.    Extremities: There is no dependent edema.    Neurological: Patient is alert and oriented to person, place and time.    Skin:  Warm and dry.  (Sternal incision well approximated without erythema, edema, or drainage.  Sternum stable to palpation)            Results Review:        WBC WBC   Date Value Ref Range Status   03/25/2018 8.24 4.50 - 10.70 10*3/mm3 Final   03/24/2018 8.39 4.50 - 10.70 10*3/mm3 Final      HGB Hemoglobin   Date Value Ref Range " Status   03/25/2018 8.7 (L) 13.7 - 17.6 g/dL Final   03/24/2018 9.0 (L) 13.7 - 17.6 g/dL Final      HCT Hematocrit   Date Value Ref Range Status   03/25/2018 27.8 (L) 40.4 - 52.2 % Final   03/24/2018 28.4 (L) 40.4 - 52.2 % Final      Platelets Platelets   Date Value Ref Range Status   03/25/2018 225 140 - 500 10*3/mm3 Final   03/24/2018 217 140 - 500 10*3/mm3 Final        PT/INR:  No results found for: PROTIME/No results found for: INR    Sodium Sodium   Date Value Ref Range Status   03/26/2018 139 136 - 145 mmol/L Final   03/25/2018 139 136 - 145 mmol/L Final   03/24/2018 140 136 - 145 mmol/L Final      Potassium Potassium   Date Value Ref Range Status   03/26/2018 4.3 3.5 - 5.2 mmol/L Final   03/25/2018 4.5 3.5 - 5.2 mmol/L Final   03/24/2018 4.1 3.5 - 5.2 mmol/L Final      Chloride Chloride   Date Value Ref Range Status   03/26/2018 100 98 - 107 mmol/L Final   03/25/2018 100 98 - 107 mmol/L Final   03/24/2018 102 98 - 107 mmol/L Final      Bicarbonate CO2   Date Value Ref Range Status   03/26/2018 31.0 (H) 22.0 - 29.0 mmol/L Final   03/25/2018 30.3 (H) 22.0 - 29.0 mmol/L Final   03/24/2018 26.7 22.0 - 29.0 mmol/L Final      BUN BUN   Date Value Ref Range Status   03/26/2018 12 6 - 20 mg/dL Final   03/25/2018 10 6 - 20 mg/dL Final   03/24/2018 12 6 - 20 mg/dL Final      Creatinine Creatinine   Date Value Ref Range Status   03/26/2018 0.66 (L) 0.76 - 1.27 mg/dL Final   03/25/2018 0.67 (L) 0.76 - 1.27 mg/dL Final   03/24/2018 0.77 0.76 - 1.27 mg/dL Final      Calcium Calcium   Date Value Ref Range Status   03/26/2018 9.2 8.6 - 10.5 mg/dL Final   03/25/2018 9.1 8.6 - 10.5 mg/dL Final   03/24/2018 9.3 8.6 - 10.5 mg/dL Final      Magnesium No results found for: MG         amiodarone 200 mg Oral Q12H   aspirin 81 mg Oral Daily   atorvastatin 20 mg Oral Nightly   enoxaparin 40 mg Subcutaneous Daily   furosemide 40 mg Oral Daily   insulin aspart 0-12 Units Subcutaneous 4x Daily AC & at Bedtime   insulin aspart 6 Units  Subcutaneous TID With Meals   insulin detemir 15 Units Subcutaneous QAM   insulin detemir 8 Units Subcutaneous Nightly   iron polysaccharides 150 mg Oral Daily   metoprolol tartrate 12.5 mg Oral Q12H   multivitamin 1 tablet Oral Daily   mupirocin  Each Nare BID   pantoprazole 40 mg Oral QAM   sennosides-docusate sodium 2 tablet Oral Nightly       nitroglycerin 5-200 mcg/min    sodium chloride 30 mL/hr Last Rate: 30 mL/hr (03/21/18 1700)   sodium chloride 30 mL/hr Last Rate: 30 mL/hr (03/21/18 1645)           Patient Active Problem List   Diagnosis Code   • Diabetic ketoacidosis without coma associated with type 1 diabetes mellitus E10.10   • Type 1 diabetes mellitus E10.9   • Tobacco abuse Z72.0   • Alcohol abuse F10.10   • Insurance coverage problems Z59.8   • Bilateral carpal tunnel syndrome G56.03   • Microalbuminuria R80.9   • Nonrheumatic aortic valve stenosis I35.0       Assessment & Plan    -Severe aortic stenosis- s/p mini sternotomy, AVR-tissue POD#5- ABBYNI  -Severe concentric hypertrophy  -Left ventricular diastolic dysfunction   -preserved EF  -DM type 1 - endocrine following  -DKA w/o coma- resolved  -Tobacco abuse  -ETOH  -Poor Compliance  -Poor nutrition  -Post op anemia expected ABL, s/p transfusion 3/23, stable Hb    Less than 2 minutes desat on overnight oximetry, no home O2 needed.  Ok for home with home health from CTS standpoint.  Reinstructed regarding sternal precautions, pain rx on chart.  Follow up with Dr. Ladonna ROSS in 4-6 weeks as scheduled.      CODY Garcia  03/26/18  8:56 AM

## 2018-03-26 NOTE — THERAPY DISCHARGE NOTE
Acute Care - Physical Therapy Treatment Note/Discharge  [facility]     Patient Name: Juan Payne  : 1960  MRN: [id number]  Today's Date: 3/26/2018  Onset of Illness/Injury or Date of Surgery: 18  Date of Referral to PT: 18  Referring Physician: Ladonna    Admit Date: 3/8/2018    Visit Dx:    ICD-10-CM ICD-9-CM   1. Diabetic ketoacidosis without coma associated with type 1 diabetes mellitus E10.10 250.11   2. Impaired functional mobility and activity tolerance Z74.09 V49.89   3. Nonrheumatic aortic valve stenosis I35.0 424.1     Patient Active Problem List   Diagnosis   • Diabetic ketoacidosis without coma associated with type 1 diabetes mellitus   • Type 1 diabetes mellitus   • Tobacco abuse   • Alcohol abuse   • Insurance coverage problems   • Bilateral carpal tunnel syndrome   • Microalbuminuria   • Nonrheumatic aortic valve stenosis       Physical Therapy Education     Title: PT OT SLP Therapies (Done)     Topic: Physical Therapy (Done)     Point: Mobility training (Done)    Learning Progress Summary     Learner Status Readiness Method Response Comment Documented by    Patient Done Acceptance E,D VU,DU  PC 18 0916     Done Acceptance E VU  EJ 18 1049     Done Acceptance E,D VU,DU,NR  RE 18 0959     Done Acceptance E,D VU,DU,NR  RE 18 0928     Done Acceptance E,TB VULAURY  CW 18 1501     Done Acceptance E,D VU,DU,NR  PC 18 1435          Point: Home exercise program (Done)    Learning Progress Summary     Learner Status Readiness Method Response Comment Documented by    Patient Done Acceptance E,D VU,DU  PC 18 0916     Done Acceptance E VU  EJ 18 1049     Done Acceptance E,D VU,DU,NR  RE 18 0959     Done Acceptance E,D VU,DU,NR  RE 18 0928     Done Acceptance E,TB VULAURY  CW 18 1501     Done Acceptance E,D VU,DU,NR  PC 18 1435          Point: Body mechanics (Done)    Learning Progress Summary     Learner Status Readiness Method  Response Comment Documented by    Patient Done Acceptance E,D VU,DU  PC 03/26/18 0916     Done Acceptance E VU  EJ 03/24/18 1049     Done Acceptance E,D VU,DU,NR  RE 03/23/18 0959     Done Acceptance E,D VU,DU,NR  RE 03/22/18 0928     Done Acceptance E,TB VU,DU  CW 03/13/18 1501     Done Acceptance E,D VU,DU,NR  PC 03/12/18 1435          Point: Precautions (Done)    Learning Progress Summary     Learner Status Readiness Method Response Comment Documented by    Patient Done Acceptance E,D VU,DU  PC 03/26/18 0916     Done Acceptance E VU  EJ 03/24/18 1049     Done Acceptance E,D VU,DU,NR  RE 03/23/18 0959     Done Acceptance E,D VU,DU,NR  RE 03/22/18 0928     Done Acceptance E,TB VU,DU  CW 03/13/18 1501     Done Acceptance E,D VU,DU,NR  PC 03/12/18 1435                      User Key     Initials Effective Dates Name Provider Type Discipline    PC 12/01/15 -  Renetta Morris, PT Physical Therapist PT     04/21/17 -  Geraldine Moreno, PT Physical Therapist PT    CW 03/07/18 -  Donnie Crump, PTA Physical Therapy Assistant PT    RE 02/05/18 -  Rickey Isaac, PT Student PT Student PT                    PT Rehab Goals     Row Name 03/22/18 0917             Problem Specific Goal 1 (PT)    Problem Specific Goal 1 (PT) Cardiovascular Level V  -PC (r) RE (t) PC (c)      Time Frame (Problem Specific Goal 1, PT) 1 week  -PC (r) RE (t) PC (c)        User Key  (r) = Recorded By, (t) = Taken By, (c) = Cosigned By    Initials Name Provider Type    PC Renetta Morris, PT Physical Therapist    RE Rickey Isaac, PT Student PT Student        Therapy Treatment    Therapy Treatment / Health Promotion    Treatment Time/Intention  Discipline: physical therapist (03/26/18 0910 : Renetta Morris, PT)  Document Type: discharge evaluation/summary, therapy note (daily note) (03/26/18 0910 : Renetta Morris, PT)  Subjective Information: no complaints (03/26/18 0910 : Renetta Morris, PT)  Mode of Treatment: physical therapy (03/26/18  0910 : Renetta Morris PT)  Patient Effort: good (03/26/18 0910 : Renetta Morris PT)  Plan of Care Review  Plan of Care Reviewed With: patient (03/26/18 0917 : Renetta Morris PT)    Vitals/Pain/Safety  Positioning and Restraints  Pre-Treatment Position: in bed (03/26/18 0910 : Renetta Morris PT)  Post Treatment Position: chair (03/26/18 0910 : Renetta Morris PT)  In Chair: sitting, call light within reach, encouraged to call for assist (03/26/18 0910 : Renetta Morris PT)    Mobility,ADL,Motor, Modality  Bed Mobility Assessment/Treatment  Supine-Sit [place] (Bed Mobility): independent (03/26/18 0910 : Renetta Morris PT)  Comment (Bed Mobility): verbal cues for limiting use of B UE (03/26/18 0910 : Renetta Morris PT)  Sit-Stand Transfer  Sit-Stand [place] (Transfers): independent (03/26/18 0910 : Renetta Morris PT)  Stand-Sit Transfer  Stand-Sit [place] (Transfers): independent (03/26/18 0910 : Renetta Morris PT)  Gait/Stairs Assessment/Training  [place] Level (Gait): independent (03/26/18 0910 : Renetta Morris PT)  Distance in Feet (Gait): 300 (03/26/18 0910 : Renetta Morris PT)  [place] Level (Stairs): contact guard (03/26/18 0910 : Renetta Morris PT)  Handrail Location (Stairs): right side (ascending) (03/26/18 0910 : Renetta Morris PT)  Number of Steps (Stairs): 8 (03/26/18 0910 : Renetta Morris PT)  Ascending Technique (Stairs): step-over-step (03/26/18 0910 : Renetta Morris PT)  Descending Technique (Stairs): step-to-step (03/26/18 0910 : Renetta Morris PT)     Motor Skills Assessment/Interventions  Additional Documentation:  (pt level V cardiac protocol) (03/26/18 0910 : Renetta G [place], PT)           ROM/MMT             Sensory, Edema, Orthotics          Cognition, Communication, Swallow  Cognitive Assessment/Intervention- PT/OT  Orientation Status (Cognition): oriented x 4 (03/26/18 0910 : Renetta Morris, PT)  Follows Commands (Cognition): WNL (03/26/18 0910 : Renetta LOPEZ  Arturo, PT)  Personal Safety Interventions: fall prevention program maintained (03/26/18 0910 : Renetta Morris, PT)    Outcome Summary       PT Recommendation and Plan  Anticipated Discharge Disposition (PT): home or self care  Planned Therapy Interventions (PT Eval): gait training, balance training, transfer training  Therapy Frequency (PT Clinical Impression): daily  Outcome Summary/Treatment Plan (PT)  Anticipated Discharge Disposition (PT): home or self care  Plan of Care Reviewed With: patient  Progress: improving  Outcome Summary: pt is walking independently in [place], able to ascend/descend stairs without difficulty, pt is ready for d/c, pt did require verbal cues for limiting use of UEs , overall pt has progressed well after his surgery, plans are for d/c home today          Outcome Measures     Row Name 03/26/18 0900 03/24/18 1000 03/23/18 1003       How much help from another person do you currently need...    Turning from your back to your side while in flat bed without using bedrails? 4  -PC 4  -EJ 3  -PC (r) RE (t) PC (c)    Moving from lying on back to sitting on the side of a flat bed without bedrails? 4  -PC 4  -EJ 3  -PC (r) RE (t) PC (c)    Moving to and from a bed to a chair (including a wheelchair)? 4  -PC 3  -EJ 3  -PC (r) RE (t) PC (c)    Standing up from a chair using your arms (e.g., wheelchair, bedside chair)? 4  -PC 3  -EJ 3  -PC (r) RE (t) PC (c)    Climbing 3-5 steps with a railing? 3  -PC 3  -EJ 2  -PC (r) RE (t) PC (c)    To walk in hospital room? 4  -PC 3  -EJ 3  -PC (r) RE (t) PC (c)    AM-PAC 6 Clicks Score 23  -PC 20  -EJ 17  -PC (r) RE (t)       Functional Assessment    Outcome Measure Options  -- AM-PAC 6 Clicks Basic Mobility (PT)  -EJ AM-PAC 6 Clicks Basic Mobility (PT)  -PC (r) RE (t) PC (c)      User Key  (r) = Recorded By, (t) = Taken By, (c) = Cosigned By    Initials Name Provider Type    PC Renetta Morris, PT Physical Therapist    EJ Geraldine Moreno, PT Physical Therapist     NIC Isaac, PT Student PT Student           Time Calculation:         PT Charges     Row Name 03/26/18 0920             Time Calculation    Start Time 0902  -PC      Stop Time 0915  -PC      Time Calculation (min) 13 min  -PC      PT Received On 03/26/18  -PC        User Key  (r) = Recorded By, (t) = Taken By, (c) = Cosigned By    Initials Name Provider Type    PC Renetta Morris, PT Physical Therapist          Therapy Charges for Today     Code Description Service Date Service Provider Modifiers Qty    16786707724 HC PT THER PROC EA 15 MIN 3/26/2018 Renetta Morris, PT GP 1          PT G-Codes  Outcome Measure Options: AM-PAC 6 Clicks Basic Mobility (PT)    PT Discharge Summary  Anticipated Discharge Disposition (PT): home or self care  Reason for Discharge: All goals achieved    Renetta Morris, PT  3/26/2018

## 2018-03-26 NOTE — PROGRESS NOTES
LOS: 18 days   Patient Care Team:  No Known Provider as PCP - General    Chief Complaint:  F/u AVR     Interval History:  Sleeping but arouses easily. No complaints.     Objective   Vital Signs  Temp:  [97.9 °F (36.6 °C)-98.8 °F (37.1 °C)] 98.8 °F (37.1 °C)  Heart Rate:  [52-61] 52  Resp:  [16-18] 18  BP: (107-136)/(57-75) 107/61    Intake/Output Summary (Last 24 hours) at 03/26/18 1217  Last data filed at 03/26/18 0822   Gross per 24 hour   Intake              720 ml   Output                0 ml   Net              720 ml       Comfortable NAD  Neck supple, no JVD or thyromegaly appreciated  S1/S2 RRR, 3/6 ,sounds like rub/ no gallop  Lungs decreased throughout, normal effort  Abdomen S/NT/ND (+) BS, no HSM appreciated  Extremities warm, no clubbing, cyanosis, or edema  Sternotomy well approximated  A/Ox4, mood and affect appropriate    Results Review:        Results from last 7 days  Lab Units 03/26/18  0352 03/25/18  0355 03/24/18  0330   SODIUM mmol/L 139 139 140   POTASSIUM mmol/L 4.3 4.5 4.1   CHLORIDE mmol/L 100 100 102   CO2 mmol/L 31.0* 30.3* 26.7   BUN mg/dL 12 10 12   CREATININE mg/dL 0.66* 0.67* 0.77   GLUCOSE mg/dL 214* 151* 293*   CALCIUM mg/dL 9.2 9.1 9.3           Results from last 7 days  Lab Units 03/25/18  0355 03/24/18  0330 03/23/18  0434   WBC 10*3/mm3 8.24 8.39 9.64   HEMOGLOBIN g/dL 8.7* 9.0* 7.8*   HEMATOCRIT % 27.8* 28.4* 24.4*   PLATELETS 10*3/mm3 225 217 222       Results from last 7 days  Lab Units 03/22/18  0305 03/21/18  1710 03/21/18  1545   INR  1.10 1.16* 1.3*   APTT seconds  --  36.1*  --            Results from last 7 days  Lab Units 03/22/18  0305   MAGNESIUM mg/dL 2.4           I reviewed the patient's new clinical results.  I personally viewed and interpreted the patient's EKG/Telemetry data        Medication Review:     amiodarone 200 mg Oral Q12H   aspirin 81 mg Oral Daily   atorvastatin 20 mg Oral Nightly   enoxaparin 40 mg Subcutaneous Daily   furosemide 40 mg Oral Daily    insulin aspart 0-12 Units Subcutaneous 4x Daily AC & at Bedtime   insulin aspart 6 Units Subcutaneous TID With Meals   insulin detemir 15 Units Subcutaneous QAM   insulin detemir 8 Units Subcutaneous Nightly   iron polysaccharides 150 mg Oral Daily   metoprolol tartrate 12.5 mg Oral Q12H   multivitamin 1 tablet Oral Daily   mupirocin  Each Nare BID   pantoprazole 40 mg Oral QAM   sennosides-docusate sodium 2 tablet Oral Nightly         nitroglycerin 5-200 mcg/min    sodium chloride 30 mL/hr Last Rate: 30 mL/hr (03/21/18 1700)   sodium chloride 30 mL/hr Last Rate: 30 mL/hr (03/21/18 1645)       Assessment/Plan     Principal Problem:    Nonrheumatic aortic valve stenosis  Active Problems:    Diabetic ketoacidosis without coma associated with type 1 diabetes mellitus    Type 1 diabetes mellitus    Tobacco abuse    Alcohol abuse    Insurance coverage problems    Bilateral carpal tunnel syndrome    Microalbuminuria    1.  Aortic stenosis - s/p aortic valve replacement with a 21 mm magna pericardial prosthesis 3/21/18  2.  Diabetes - type I - endocrine following  3.  Tobacco use  4.  Alcohol use  5.  Prophylactic amiodarone - no AF noted. Would stop this at discharge  6.  Postoperative anemia.  s/p pRBC 3/23/18, stable today.     He should follow up with CODY Rausch in one week and Dr. Menchaca in one month.     CODY Osborn  03/26/18  12:17 PM

## 2018-03-26 NOTE — NURSING NOTE
"Diabetes Education  Assessment/Teaching    Patient Name:  Juan Payne  YOB: 1960  MRN: [id number]  Admit Date:  3/8/2018      Assessment Date:  3/26/2018  Flowsheet Row Most Recent Value   General Information    Referral From: -- start DM ed needs-assessment with pt at bedside.    Height 172.7 cm (68\")   Height Method Stated   Weight 65.2 kg (143 lb 12.8 oz)   What type of diabetes do you have? Type 1   Length of Diabetes Diagnosis -- [pt reports >20 yr hx DM.]   Do you test your blood sugar at home? yes   Education Preferences   Barriers to Learning Pt presents with somewhat flat affect.    Assessment Topics   Taking Medication - Assessment Needs education   Problem Solving - Assessment Needs education   Healthy Coping - Assessment Competent [pt has supportive aunt at bedside. ]          Flowsheet Row Most Recent Value   DM Education Needs   Meter Has own   Problem Solving Sick days [Advise pt of the need for long-acting insulin even when ill.]   Healthy Coping Appropriate   Discharge Plan Home   Motivation Engaged   Teaching Method Explanation   Patient Response Needs reinforcement [will plan to f/u when significant other present here. ]              Electronically signed by:  Kaleigh Chew, RN, BSN, CDE   03/26/18 12:12 PM  "

## 2018-03-26 NOTE — NURSING NOTE
"Diabetes Education  Assessment/Teaching    Patient Name:  Juan Payne  YOB: 1960  MRN: [id number]  Admit Date:  3/8/2018      Assessment Date:  3/26/2018  Flowsheet Row Most Recent Value   General Information    Referral From: Nursing staff   Height 172.7 cm (68\")   Height Method Stated   Weight 65.2 kg (143 lb 12.8 oz)   What type of diabetes do you have? Type 1   Length of Diabetes Diagnosis -- [pt reports >20 yr hx DM.]   Do you test your blood sugar at home? yes   Who performs the test? pt   Have you had low blood sugar? (<70mg/dl) yes   How often do you have low blood sugar? frequently   Education Preferences   Barriers to Learning -- no barriers observed this afternoon.    Assessment Topics   Taking Medication - Assessment -- Review: insulin orders for dc with pt and his significant other.    Problem Solving - Assessment -- Competent for hypoglycemia tx.    Healthy Coping - Assessment Competent [supportive significant other and aunt at bedside. ]   Monitoring - Assessment Competent [pt reports checking BG at home ac meals. ]   DM Goals   Taking Medication - Goal Today   Contact Plan Follow-up medical care- pt's significant other reports pt having endo f/u as outpt. Urge pt to arrange f/u outpt for 1-2 weeks and bring record of BGs.           Flowsheet Row Most Recent Value   DM Education Needs   Meter Has own   Frequency of Testing AC/HS   Medication Insulin, Administration, Pen Review rx'd Novolog/Levemir/Lantus doses for dc, and that pt likely to need increased insulin doses as his oral intake will likely be more upon dc. Pt uses syringe. Instruct pt on Levemir/pen use and provide some sample Linda pen needles to facilitate tx plan at dc.    Problem Solving Hypoglycemia -pt's s.o. reports having glucose tabs on hand at home.    Reducing Risks -- [advise pt, s.o. of importance of BG control for healing. ]   Healthy Coping Appropriate   Discharge Plan Home [plan at this time is home w/home health " follow up. ] Follow up with endo outpt.    Motivation Engaged   Teaching Method Discussion, Handouts   Patient Response Verbalized understanding           Electronically signed by:  Kaleigh Chew, RN, BSN, CDE   03/26/18 4:08 PM

## 2018-03-26 NOTE — DISCHARGE SUMMARY
"       Name: Juan Payne  Age: 57 y.o.  Sex: male  :  1960  MRN: [id number]         Primary Care Physician: No Known Provider      Date of Admission:  3/8/2018  Date of Discharge:  3/26/2018      CHIEF COMPLAINT     Fatigue; Vomiting; and Nausea         DISCHARGE DIAGNOSIS  Active Hospital Problems (** Indicates Principal Problem)    Diagnosis Date Noted   • **Nonrheumatic aortic valve stenosis [I35.0] 2018   • Diabetic ketoacidosis without coma associated with type 1 diabetes mellitus [E10.10] 2018     Priority: High   • Type 1 diabetes mellitus [E10.9] 2018     Priority: Medium   • Microalbuminuria [R80.9] 2018   • Tobacco abuse [Z72.0] 2018   • Alcohol abuse [F10.10] 2018   • Insurance coverage problems [Z59.8] 2018   • Bilateral carpal tunnel syndrome [G56.03] 2018      Resolved Hospital Problems    Diagnosis Date Noted Date Resolved   No resolved problems to display.       SECONDARY DIAGNOSES  Past Medical History:   Diagnosis Date   • Diabetes mellitus        CONSULTS   Consulting Physician(s)     Provider Relationship Specialty    Salvador Fernandez MD Consulting Physician Endocrinology    Carri Menchaca MD Consulting Physician Cardiology    Cecil Dougherty MD Consulting Physician Cardiothoracic Surgery            PROCEDURES PERFORMED  ICU admission for DKA  Minimally invasive \"J sternotomy\" AVR with a # 21 mm Magna pericardial prosthesis  Cardiac catheterization  Echocardiogram  Carotid Doppler      HOSPITAL COURSE  This is a 57-year-old gentleman was admitted on the  with a history of fatigue, nausea vomiting.  He has a history of diabetes mellitus type 1.  He ran out of his long-acting insulin 11 days prior to this admission due to lack of insurance coverage for his insulin and went into DKA.  He was admitted to critical care unit where he was treated for DKA with insulin drip and endocrinology consultation was obtained.  " During his workup because of the murmur he was found to have severe aortic stenosis on the echocardiogram and underwent cardiac catheterization.  Because of his severe aortic stenosis he underwent AVR with a 21 mm magna pericardial prosthesis.  Postoperatively patient has done very well and his chest tubes has been removed.  He also has a history of tobacco abuse.  Patient is ambulating without oxygen.  He also underwent nighttime oximetry which showed only 2 minutes of desaturation which did not indicate that the patient needs home oxygen    The patient is going to be discharged with home health and also prescriptions been sent to his pharmacy for insulin and amiodarone along with metoprolol and Lasix.  The case management and the  has applied for Medicaid insurance as the patient has lost job while he was in the hospital.  He is Medicaid is pending.      PHYSICAL EXAM  Temp:  [97.9 °F (36.6 °C)-98.2 °F (36.8 °C)] 98.2 °F (36.8 °C)  Heart Rate:  [52-61] 52  Resp:  [16-18] 18  BP: (109-136)/(57-75) 113/65  Body mass index is 21.86 kg/m².  Physical Exam  HEENT: Unremarkable, pupils are round equal and reacting to light   NECK: No lymphadenopathy, throat is clear,   RESPRATORY SYSTEM: Breath sounds are equal on both sides and are normal, no wheezes or crackles  CARDIOVASULAR SYSTEM: Heart rate is regular without murmur  ABDOMEN: Soft, no ascites, no hepatosplenomegaly.  EXTREMITIES: No cyanosis, clubbing or edema    CONDITION ON DISCHARGE  Stable.      DISCHARGE DISPOSITION   Home      ALLERGIES  No Known Allergies    RECENT LABS    Results from last 7 days  Lab Units 03/25/18  0355 03/24/18  0330 03/23/18  0434   WBC 10*3/mm3 8.24 8.39 9.64   HEMOGLOBIN g/dL 8.7* 9.0* 7.8*   HEMATOCRIT % 27.8* 28.4* 24.4*   PLATELETS 10*3/mm3 225 217 222       Results from last 7 days  Lab Units 03/26/18  0352 03/25/18  0355 03/24/18  0330   SODIUM mmol/L 139 139 140   POTASSIUM mmol/L 4.3 4.5 4.1   CHLORIDE mmol/L 100  100 102   CO2 mmol/L 31.0* 30.3* 26.7   BUN mg/dL 12 10 12   CREATININE mg/dL 0.66* 0.67* 0.77   GLUCOSE mg/dL 214* 151* 293*   CALCIUM mg/dL 9.2 9.1 9.3       Results from last 7 days  Lab Units 03/22/18  0305 03/21/18  1710 03/21/18  1545   INR  1.10 1.16* 1.3*       DIET;  Diet Order   Procedures   • Diet Regular; Consistent Carbohydrate       DISCHARGE MEDICATIONS     Your medication list      START taking these medications      Instructions Last Dose Given Next Dose Due   amiodarone 200 MG tablet  Commonly known as:  PACERONE      Take 1 tablet by mouth Every 12 (Twelve) Hours.       aspirin 81 MG EC tablet  Start taking on:  3/27/2018      Take 1 tablet by mouth Daily.       atorvastatin 20 MG tablet  Commonly known as:  LIPITOR      Take 1 tablet by mouth Every Night.       furosemide 40 MG tablet  Commonly known as:  LASIX  Start taking on:  3/27/2018      Take 1 tablet by mouth Daily.       HYDROcodone-acetaminophen 5-325 MG per tablet  Commonly known as:  NORCO      Take 1 tablet by mouth Every 4 (Four) Hours As Needed for Moderate Pain  for up to 10 days.       insulin aspart 100 UNIT/ML injection  Commonly known as:  novoLOG      Inject 6 Units under the skin 3 (Three) Times a Day With Meals.       insulin aspart 100 UNIT/ML injection  Commonly known as:  novoLOG      1 unit for blood sugar 121 to 150 2 units for -200 3 units for -250 4 units for -300 5 units for  -350       insulin detemir 100 UNIT/ML injection  Commonly known as:  LEVEMIR      15 units SQ in AM and 8 units SQ at night       iron polysaccharides 150 MG capsule  Commonly known as:  NIFEREX  Start taking on:  3/27/2018      Take 1 capsule by mouth Daily.       metoprolol tartrate 25 MG tablet  Commonly known as:  LOPRESSOR      Take 0.5 tablets by mouth Every 12 (Twelve) Hours.          STOP taking these medications    insulin glargine 100 UNIT/ML injection  Commonly known as:  LANTUS        insulin lispro 100  UNIT/ML injection  Commonly known as:  humaLOG        insulin regular 100 UNIT/ML injection  Commonly known as:  humuLIN R,novoLIN R              Where to Get Your Medications      These medications were sent to [facility]/pharmacy #[id number] - [place], [place] - [address] [place] [address] RD. AT [facility] - [phone]  - [phone] FX  [address] [place] [address] [address] [facility] [address]    Phone:  [phone]    amiodarone 200 MG tablet   aspirin 81 MG EC tablet   atorvastatin 20 MG tablet   furosemide 40 MG tablet   insulin aspart 100 UNIT/ML injection   insulin aspart 100 UNIT/ML injection   insulin detemir 100 UNIT/ML injection   iron polysaccharides 150 MG capsule   metoprolol tartrate 25 MG tablet     You can get these medications from any pharmacy    Bring a paper prescription for each of these medications   HYDROcodone-acetaminophen 5-325 MG per tablet          Activity Instructions     Discharge Activity Restrictions       1) No driving for 2 weeks and no longer taking narcotics, ride in the back seat for 2 weeks.   2) May shower, no tub bathing, no immersion in pools or hot tubs   3) Do not lift / push / pull more then 10 lbs.  4) Walk at least 10 minutes at least 3 times daily      Future Appointments  Date Time Provider Department Center   5/3/2018 1:00 PM CODY Garcia [facility] None     Additional Instructions for the Follow-ups that You Need to Schedule     Call MD With Problems / Concerns    As directed      Instructions:  Call office at [phone] for any drainage, increased redness, or fever over 100.5    Order Comments:  Instructions:  Call office at [phone] for any drainage, increased redness, or fever over 100.5          Discharge Follow-up with Specified Provider: Dr. Ladonna ROSS    As directed      To:  Dr. Ladonna ROSS    Follow Up Details:  May 3 at 1:00pm, bring all current medications to appointment         Referral to Home Health    As directed      Face to Face Visit Date:  3/26/2018     Follow-up Provider for Plan of Care?:  I will be treating the patient on an ongoing basis.  Please send me the Plan of Care for signature.    Follow-up Provider:  RHINA DOUGHERTY [[id number]]    Reason/Clinical Findings:  post op open heart    Describe mobility limitations that make leaving home difficult:  weakness    Nursing/Therapeutic Services Requested:  Skilled Nursing    Skilled nursing orders:  Post CABG care (sternal precaution education)    Frequency:  1 Week 1            Contact information for follow-up providers     Raul Buenrostro MD Follow up in 1 month(s).    Specialty:  Cardiology  Contact information:  [address] [facility] [address]  [place] [address]  [phone]             Salvador Fernandez MD Follow up in 1 month(s).    Specialty:  Endocrinology  Contact information:  [address] [facility] [address]  [place] [address]  [phone]             Rhina Dougheryt MD Follow up in 3 week(s).    Specialty:  Cardiothoracic Surgery  Contact information:  [address]  [place] [address]  [phone]                   Contact information for after-discharge care     Home Medical Care     [facility] [place] Follow up.    Specialty:  Home Health Services  Contact information:  [address]  [facility] [address] [phone]                             TEST  RESULTS PENDING AT DISCHARGE  None     CODE STATUS  Full Code        Saeed Fair MD  [facility]  03/26/18      Time: greater than 35 minutes.

## 2018-03-27 NOTE — PROGRESS NOTES
Case Management Discharge Note    Pt d/c home with spouse transporting 3/26/28.  [facility] is scheduled to follow Pt at home.  Pt has no PCP.  Pt agreeable to CCP setting him up a f/u appt with [facility].  Pt and s.o given detailed f/u instructions to be at [facility] to see CODY Andrade on Wednesday March 28th at 11:00 AM.  CCP faxed Pt H&P, d/c summary and op note to PD at fx [phone] per their request.      Destination     No service coordination in this encounter.      Durable Medical Equipment     No service coordination in this encounter.      Dialysis/Infusion     No service coordination in this encounter.      Home Medical Care - Selection Complete     Service Request Status Selected Specialties Address Phone Number Fax Number    [facility] [address] [address] [phone]      Social Care     No service coordination in this encounter.        Other:  (private auto)    Final Discharge Disposition Code: 06 - home with home health care (3/26/18)

## 2018-03-27 NOTE — PAYOR COMM NOTE
"Tiffanie Gutierrez (57 y.o. Male)                ATTENTION;   NADINE , DISCHARGE SUMMARY FOR YOUR REVIEW, AURELIO RAO [facility] UR DEPT               [phone]       Date of Birth Social Security Number Address Home Phone MRN    1960  [id number] [address]  [id number]    Rastafarian Marital Status          None Single       Admission Date Admission Type Admitting Provider Attending Provider Department, Room/Bed    3/8/18 Emergency Mahamed Tolbert MD  [facility] CARDIOVASC UNIT, 2224/1    Discharge Date Discharge Disposition Discharge Destination        3/26/2018 Home or Self Care              Attending Provider:  (none)   Allergies:  No Known Allergies    Isolation:  None   Infection:  None   Code Status:  Prior    Ht:  172.7 cm (68\")   Wt:  65.2 kg (143 lb 12.8 oz)    Admission Cmt:  None   Principal Problem:  Nonrheumatic aortic valve stenosis [I35.0] More...                 Active Insurance as of 3/8/2018     Primary Coverage     Payor Plan Insurance Group Employer/Plan Group    [facility] BLUE [facility] BLUE [facility] PPO [id number]     Payor Plan Address Payor Plan Phone Number Effective From Effective To    [address] [phone] 1/1/2018     [place] [address]       Subscriber Name Subscriber Birth Date Member ID       TIFFANIE GUTIERREZ 1960 [id number]           Secondary Coverage     Payor Plan Insurance Group Employer/Plan Group    [place]      Payor Plan Address Payor Plan Phone Number Effective From Effective To      3/8/2018     Subscriber Name Subscriber Birth Date Member ID       TIFFANIE GUTIERREZ 1960 [id number]                 Emergency Contacts      (Rel.) Home Phone Work Phone Mobile Phone    Aurelia Briones (Significant Other) [phone] -- [phone]    Leonor Galindo (Daughter) [phone] -- [phone]               Discharge Summary      Saeed Fair MD at 3/26/2018 11:06 AM      " "           Name: Juan Payne  Age: 57 y.o.  Sex: male  :  1960  MRN: [id number]         Primary Care Physician: No Known Provider      Date of Admission:  3/8/2018  Date of Discharge:  3/26/2018      CHIEF COMPLAINT     Fatigue; Vomiting; and Nausea         DISCHARGE DIAGNOSIS  Active Hospital Problems (** Indicates Principal Problem)    Diagnosis Date Noted   • **Nonrheumatic aortic valve stenosis [I35.0] 2018   • Diabetic ketoacidosis without coma associated with type 1 diabetes mellitus [E10.10] 2018     Priority: High   • Type 1 diabetes mellitus [E10.9] 2018     Priority: Medium   • Microalbuminuria [R80.9] 2018   • Tobacco abuse [Z72.0] 2018   • Alcohol abuse [F10.10] 2018   • Insurance coverage problems [Z59.8] 2018   • Bilateral carpal tunnel syndrome [G56.03] 2018      Resolved Hospital Problems    Diagnosis Date Noted Date Resolved   No resolved problems to display.       SECONDARY DIAGNOSES  Past Medical History:   Diagnosis Date   • Diabetes mellitus        CONSULTS   Consulting Physician(s)     Provider Relationship Specialty    Salvador Fernandez MD Consulting Physician Endocrinology    Carri Menchaca MD Consulting Physician Cardiology    Cecil Dougherty MD Consulting Physician Cardiothoracic Surgery            PROCEDURES PERFORMED  ICU admission for DKA  Minimally invasive \"J sternotomy\" AVR with a # 21 mm Magna pericardial prosthesis  Cardiac catheterization  Echocardiogram  Carotid Doppler      HOSPITAL COURSE  This is a 57-year-old gentleman was admitted on the  with a history of fatigue, nausea vomiting.  He has a history of diabetes mellitus type 1.  He ran out of his long-acting insulin 11 days prior to this admission due to lack of insurance coverage for his insulin and went into DKA.  He was admitted to critical care unit where he was treated for DKA with insulin drip and endocrinology consultation was obtained.  " During his workup because of the murmur he was found to have severe aortic stenosis on the echocardiogram and underwent cardiac catheterization.  Because of his severe aortic stenosis he underwent AVR with a 21 mm magna pericardial prosthesis.  Postoperatively patient has done very well and his chest tubes has been removed.  He also has a history of tobacco abuse.  Patient is ambulating without oxygen.  He also underwent nighttime oximetry which showed only 2 minutes of desaturation which did not indicate that the patient needs home oxygen    The patient is going to be discharged with home health and also prescriptions been sent to his pharmacy for insulin and amiodarone along with metoprolol and Lasix.  The case management and the  has applied for Medicaid insurance as the patient has lost job while he was in the hospital.  He is Medicaid is pending.      PHYSICAL EXAM  Temp:  [97.9 °F (36.6 °C)-98.2 °F (36.8 °C)] 98.2 °F (36.8 °C)  Heart Rate:  [52-61] 52  Resp:  [16-18] 18  BP: (109-136)/(57-75) 113/65  Body mass index is 21.86 kg/m².  Physical Exam  HEENT: Unremarkable, pupils are round equal and reacting to light   NECK: No lymphadenopathy, throat is clear,   RESPRATORY SYSTEM: Breath sounds are equal on both sides and are normal, no wheezes or crackles  CARDIOVASULAR SYSTEM: Heart rate is regular without murmur  ABDOMEN: Soft, no ascites, no hepatosplenomegaly.  EXTREMITIES: No cyanosis, clubbing or edema    CONDITION ON DISCHARGE  Stable.      DISCHARGE DISPOSITION   Home      ALLERGIES  No Known Allergies    RECENT LABS    Results from last 7 days  Lab Units 03/25/18  0355 03/24/18  0330 03/23/18  0434   WBC 10*3/mm3 8.24 8.39 9.64   HEMOGLOBIN g/dL 8.7* 9.0* 7.8*   HEMATOCRIT % 27.8* 28.4* 24.4*   PLATELETS 10*3/mm3 225 217 222       Results from last 7 days  Lab Units 03/26/18  0352 03/25/18  0355 03/24/18  0330   SODIUM mmol/L 139 139 140   POTASSIUM mmol/L 4.3 4.5 4.1   CHLORIDE mmol/L 100  100 102   CO2 mmol/L 31.0* 30.3* 26.7   BUN mg/dL 12 10 12   CREATININE mg/dL 0.66* 0.67* 0.77   GLUCOSE mg/dL 214* 151* 293*   CALCIUM mg/dL 9.2 9.1 9.3       Results from last 7 days  Lab Units 03/22/18  0305 03/21/18  1710 03/21/18  1545   INR  1.10 1.16* 1.3*       DIET;  Diet Order   Procedures   • Diet Regular; Consistent Carbohydrate       DISCHARGE MEDICATIONS     Your medication list      START taking these medications      Instructions Last Dose Given Next Dose Due   amiodarone 200 MG tablet  Commonly known as:  PACERONE      Take 1 tablet by mouth Every 12 (Twelve) Hours.       aspirin 81 MG EC tablet  Start taking on:  3/27/2018      Take 1 tablet by mouth Daily.       atorvastatin 20 MG tablet  Commonly known as:  LIPITOR      Take 1 tablet by mouth Every Night.       furosemide 40 MG tablet  Commonly known as:  LASIX  Start taking on:  3/27/2018      Take 1 tablet by mouth Daily.       HYDROcodone-acetaminophen 5-325 MG per tablet  Commonly known as:  NORCO      Take 1 tablet by mouth Every 4 (Four) Hours As Needed for Moderate Pain  for up to 10 days.       insulin aspart 100 UNIT/ML injection  Commonly known as:  novoLOG      Inject 6 Units under the skin 3 (Three) Times a Day With Meals.       insulin aspart 100 UNIT/ML injection  Commonly known as:  novoLOG      1 unit for blood sugar 121 to 150 2 units for -200 3 units for -250 4 units for -300 5 units for  -350       insulin detemir 100 UNIT/ML injection  Commonly known as:  LEVEMIR      15 units SQ in AM and 8 units SQ at night       iron polysaccharides 150 MG capsule  Commonly known as:  NIFEREX  Start taking on:  3/27/2018      Take 1 capsule by mouth Daily.       metoprolol tartrate 25 MG tablet  Commonly known as:  LOPRESSOR      Take 0.5 tablets by mouth Every 12 (Twelve) Hours.          STOP taking these medications    insulin glargine 100 UNIT/ML injection  Commonly known as:  LANTUS        insulin lispro 100  UNIT/ML injection  Commonly known as:  humaLOG        insulin regular 100 UNIT/ML injection  Commonly known as:  humuLIN R,novoLIN R              Where to Get Your Medications      These medications were sent to [id number] - [place], [place] - [address] [place] [address] RD. AT [facility] - [phone]  - [phone] FX  [address] [place] [address] [address] [facility] [address]    Phone:  [phone]    amiodarone 200 MG tablet   aspirin 81 MG EC tablet   atorvastatin 20 MG tablet   furosemide 40 MG tablet   insulin aspart 100 UNIT/ML injection   insulin aspart 100 UNIT/ML injection   insulin detemir 100 UNIT/ML injection   iron polysaccharides 150 MG capsule   metoprolol tartrate 25 MG tablet     You can get these medications from any pharmacy    Bring a paper prescription for each of these medications   HYDROcodone-acetaminophen 5-325 MG per tablet          Activity Instructions     Discharge Activity Restrictions       1) No driving for 2 weeks and no longer taking narcotics, ride in the back seat for 2 weeks.   2) May shower, no tub bathing, no immersion in pools or hot tubs   3) Do not lift / push / pull more then 10 lbs.  4) Walk at least 10 minutes at least 3 times daily      Future Appointments  Date Time Provider Department Center   5/3/2018 1:00 PM CODY Garcia [facility] None     Additional Instructions for the Follow-ups that You Need to Schedule     Call MD With Problems / Concerns    As directed      Instructions:  Call office at [phone] for any drainage, increased redness, or fever over 100.5    Order Comments:  Instructions:  Call office at [phone] for any drainage, increased redness, or fever over 100.5          Discharge Follow-up with Specified Provider: Dr. Ladonna ROSS    As directed      To:  Dr. Ladonna ROSS    Follow Up Details:  May 3 at 1:00pm, bring all current medications to appointment         Referral to Home Health    As directed      Face to Face Visit Date:  3/26/2018     Follow-up Provider for Plan of Care?:  I will be treating the patient on an ongoing basis.  Please send me the Plan of Care for signature.    Follow-up Provider:  RHINA DOUGHERTY [[id number]]    Reason/Clinical Findings:  post op open heart    Describe mobility limitations that make leaving home difficult:  weakness    Nursing/Therapeutic Services Requested:  Skilled Nursing    Skilled nursing orders:  Post CABG care (sternal precaution education)    Frequency:  1 Week 1            Contact information for follow-up providers     Raul Buenrostro MD Follow up in 1 month(s).    Specialty:  Cardiology  Contact information:  [address]  [place] [address]  [phone]             Salvador Fernandez MD Follow up in 1 month(s).    Specialty:  Endocrinology  Contact information:  [address] [facility] [address]  [place] [address]  [phone]             Rhina Dougherty MD Follow up in 3 week(s).    Specialty:  Cardiothoracic Surgery  Contact information:  [address] [facility] [address]  [place] [address]  [phone]                   Contact information for after-discharge care     Home Medical Care     [facility] Follow up.    Specialty:  Home Health Services  Contact information:  [address]  [facility] [address] [phone]                             TEST  RESULTS PENDING AT DISCHARGE  None     CODE STATUS  Full Code        Saeed Fair MD  [facility]  03/26/18      Time: greater than 35 minutes.    Electronically signed by Saeed Fair MD at 3/26/2018 11:18 AM

## 2018-03-29 ENCOUNTER — TELEPHONE (OUTPATIENT)
Dept: CARDIAC SURGERY | Facility: CLINIC | Age: 58
End: 2018-03-29

## 2018-03-29 NOTE — TELEPHONE ENCOUNTER
Corry alfaro [facility] nurse called to report that patient was having a slight amount of serosanguinous drainage from his chest tube site. He will clean it twice daily with anti-bacterial soap and paint it with betadine. Patient denies temp or chills . He has had a slight amount of weight gain and will continue to monitor . I reviewed his discharge summary and he is taking lasix. Potassium level was normal when he was discharged. I spoke with Mirian ROSS who ordered 20MEQ potassium daily. I let the patient know this and called the order in to his pharmacy [facility] [phone]. Patient will call back with any further questions or concerns

## 2018-04-03 ENCOUNTER — TELEPHONE (OUTPATIENT)
Dept: CARDIOLOGY | Facility: CLINIC | Age: 58
End: 2018-04-03

## 2018-04-03 NOTE — TELEPHONE ENCOUNTER
Patient was d/c on 3/26 and has had a 5lb weight gain with bilateral LE swelling.  He is currently taking lasix 40mg QD.    Per verbal from Dr. Buenrostro, patient should double up on lasix until swelling/weight returns to normal.    Left voicemail informing Young.

## 2018-04-06 ENCOUNTER — TELEPHONE (OUTPATIENT)
Dept: CARDIOLOGY | Facility: CLINIC | Age: 58
End: 2018-04-06

## 2018-04-06 RX ORDER — LOSARTAN POTASSIUM 50 MG/1
50 TABLET ORAL DAILY
Qty: 90 TABLET | Refills: 3 | Status: SHIPPED | OUTPATIENT
Start: 2018-04-06 | End: 2018-04-06 | Stop reason: SDUPTHER

## 2018-04-06 RX ORDER — LOSARTAN POTASSIUM 50 MG/1
50 TABLET ORAL DAILY
Qty: 90 TABLET | Refills: 3 | Status: SHIPPED | OUTPATIENT
Start: 2018-04-06

## 2018-04-06 RX ORDER — LOSARTAN POTASSIUM 50 MG/1
50 TABLET ORAL DAILY
Qty: 90 TABLET | Refills: 3 | Status: SHIPPED | OUTPATIENT
Start: 2018-04-06 | End: 2018-04-06

## 2018-04-06 NOTE — TELEPHONE ENCOUNTER
04/06/18  4:13 PM  Juan Payne  1960    [place] [phone]     [facility] [phone]    Mr. Payne is a patient seen by Dr. Menchaca in [facility] in March. He had AVR with Dr. Dougherty on 3/21 and was discharged with Home Health.     Beti has been seeing Mr. Payne since 4/3/18. Today, /78 with HR 57. Beti states this is what his BP/ HR have been running all week.     He is taking 40mg lasix daily and 12.5mg metoprolol BID.    Does he need medication adjustment?     Siomara GUZMAN RN

## 2018-04-06 NOTE — TELEPHONE ENCOUNTER
04/06/18  4:49 PM  Called to Mr. Payne - left message and spoke with Beti with HH. She states Mr. Payne is using [facility] at [facility]. The losartan has been sent there. I also remind him and Beti that he needs to schedule follow-up appts - tmm.

## 2018-04-07 ENCOUNTER — CLINICAL SUPPORT (OUTPATIENT)
Dept: CARDIAC SURGERY | Facility: CLINIC | Age: 58
End: 2018-04-07

## 2018-04-07 DIAGNOSIS — IMO0002 UNCONTROLLED TYPE 1 DIABETES MELLITUS WITH COMPLICATION: ICD-10-CM

## 2018-04-07 DIAGNOSIS — Z48.812 AFTERCARE FOLLOWING SURGERY OF THE CIRCULATORY SYSTEM: Primary | ICD-10-CM

## 2018-04-07 DIAGNOSIS — I50.9 HEART FAILURE, UNSPECIFIED HEART FAILURE CHRONICITY, UNSPECIFIED HEART FAILURE TYPE: ICD-10-CM

## 2018-04-07 DIAGNOSIS — F10.10 ALCOHOL ABUSE: ICD-10-CM

## 2018-04-07 PROCEDURE — 99024 POSTOP FOLLOW-UP VISIT: CPT | Performed by: THORACIC SURGERY (CARDIOTHORACIC VASCULAR SURGERY)

## 2018-04-12 ENCOUNTER — TELEPHONE (OUTPATIENT)
Dept: CARDIAC REHAB | Facility: HOSPITAL | Age: 58
End: 2018-04-12

## 2018-04-12 NOTE — TELEPHONE ENCOUNTER
Patient's significant other called today to report that the home health nurse will be discharging the patient next Monday, April 16th, and she wants to get him signed up for his first cardiac rehab visit.  I actually met this patient in the hospital POD # 1 on 3/22/18 and we discussed that because he lives in [place] [place] [facility] his closest options for cardiac rehab are [facility] or [facility].  I discussed this with his significant other and she agrees that the patient will more likely attend regularly if it is closer to their home.   I provided contact phone numbers for both programs and encouraged her to call to set up his first appointment ASAP.  I also made sure she understood to call me back if she has any trouble getting him in a program or if she has any additional questions.

## 2018-05-03 ENCOUNTER — OFFICE VISIT (OUTPATIENT)
Dept: CARDIAC SURGERY | Facility: CLINIC | Age: 58
End: 2018-05-03

## 2018-05-03 VITALS
RESPIRATION RATE: 20 BRPM | BODY MASS INDEX: 22.25 KG/M2 | HEIGHT: 68 IN | WEIGHT: 146.8 LBS | OXYGEN SATURATION: 100 % | HEART RATE: 73 BPM | SYSTOLIC BLOOD PRESSURE: 129 MMHG | TEMPERATURE: 97.8 F | DIASTOLIC BLOOD PRESSURE: 74 MMHG

## 2018-05-03 DIAGNOSIS — Z95.2 S/P AVR (AORTIC VALVE REPLACEMENT): Primary | ICD-10-CM

## 2018-05-03 PROCEDURE — 99024 POSTOP FOLLOW-UP VISIT: CPT | Performed by: NURSE PRACTITIONER

## 2018-05-03 NOTE — PROGRESS NOTES
"CARDIOVASCULAR SURGERY FOLLOW-UP PROGRESS NOTE  Chief Complaint: post op fu        HPI:   Dear  No Known Provider and colleagues:    It was nice to see Juan Payne in follow up 6 weeks after surgery.  As you know, he is a 57 y.o. male with aortic stenosis who underwent minimally invasive \"J sternotomy\" AVR with a # 21 mm Magna pericardial prosthesis with Dr. Dougherty on 3/21/18.  He was originally admitted in [facility] due to running out of insulin after recently becoming unemployed.  He did well postoperatively and continues to do well, although he states that he has been following with his endocrinologist and is still struggling with his diabetes management. He comes in today complaining of some residual soreness.  His activity level has been fair and he has started cardiac rehabilitation.  From a surgical standpoint, the sternal incision is well approximated without erythema, edema, or drainage.  The sternum is stable to palpation, and the patient denies any popping or clicking with deep inspiration or coughing.      Physical Exam:         /74 (BP Location: Left arm, Patient Position: Sitting)   Pulse 73   Temp 97.8 °F (36.6 °C) (Oral)   Resp 20   Ht 172.7 cm (68\")   Wt 66.6 kg (146 lb 12.8 oz)   SpO2 100%   BMI 22.32 kg/m²   Heart:  regular rate and rhythm, S1, S2 normal, III/VI systolic murmur, click, rub or gallop  Lungs:  clear to auscultation bilaterally  Extremities:  no edema  Incision(s):  mid chest healing well, sternum stable    Assessment/Plan:     S/P AVR. Overall, he is doing well.    No significant post-op complications    Follow-up as scheduled with cardiology  Follow-up with CT surgery prn    Return to clinic should he experience any signs and symptoms of infection or sternal instability    No restrictions of activity.      Thank you for allowing me to participate in the care of your patient.    Regards,  CODY Garcia      "

## 2018-12-08 NOTE — PROGRESS NOTES
"    [facility]     LOS: 15 days     Subjective:  S/p AVR, minimally invasive \"J sternotomy\" AVR with a # 21 mm Magna pericardial prosthesis  Getting PRBC  Off oxygen    Objective:    Vital Signs:  Temp:  [98.6 °F (37 °C)-100 °F (37.8 °C)] 98.6 °F (37 °C)  Heart Rate:  [56-79] 79  Resp:  [16-20] 16  BP: ()/(55-76) 138/76    SpO2:  [96 %-100 %] 97 %  on  Flow (L/min):  [1] 1;   Device (Oxygen Therapy): room air  Body mass index is 22.2 kg/m².    Physical Exam   Constitutional: He appears well-developed and well-nourished.   HENT:   Head: Normocephalic and atraumatic.   Eyes: EOM are normal.   Neck: Normal range of motion.   Cardiovascular: Normal rate and regular rhythm.    Pulmonary/Chest: Effort normal and breath sounds normal.   Abdominal: Soft. Bowel sounds are normal.   Neurological: He is alert.   Skin: Skin is warm.       Results Review:    Glucose   Date Value Ref Range Status   03/23/2018 138 (H) 65 - 99 mg/dL Final   03/22/2018 107 (H) 65 - 99 mg/dL Final   03/21/2018 204 (H) 65 - 99 mg/dL Final   03/21/2018 101 (H) 65 - 99 mg/dL Final   03/21/2018 234 (H) 65 - 99 mg/dL Final       Results from last 7 days  Lab Units 03/23/18  0434   WBC 10*3/mm3 9.64   HEMOGLOBIN g/dL 7.8*   HEMATOCRIT % 24.4*   PLATELETS 10*3/mm3 222       Results from last 7 days  Lab Units 03/23/18  0434   SODIUM mmol/L 135*   POTASSIUM mmol/L 4.3   CHLORIDE mmol/L 97*   CO2 mmol/L 26.9   BUN mg/dL 17   CREATININE mg/dL 0.63*   CALCIUM mg/dL 8.9   GLUCOSE mg/dL 138*       Results from last 7 days  Lab Units 03/22/18  0305 03/21/18  1710   INR  1.10 1.16*   APTT seconds  --  36.1*       Results from last 7 days  Lab Units 03/22/18  0305   MAGNESIUM mg/dL 2.4         Cultures:  Blood Culture   Date Value Ref Range Status   03/10/2018 No growth at 4 days  Preliminary   03/10/2018 No growth at 4 days  Preliminary       I have reviewed daily medications and changes in CPOE    Scheduled meds    amiodarone 200 mg " Oral Q12H   aspirin 81 mg Oral Daily   atorvastatin 20 mg Oral Nightly   chlorhexidine 15 mL Mouth/Throat Q12H   enoxaparin 40 mg Subcutaneous Daily   insulin aspart 0-12 Units Subcutaneous 4x Daily AC & at Bedtime   insulin aspart 2 Units Subcutaneous TID With Meals   insulin detemir 12 Units Subcutaneous QAM   insulin detemir 7 Units Subcutaneous Nightly   metoprolol tartrate 12.5 mg Oral Q12H   mupirocin  Each Nare BID   pantoprazole 40 mg Oral QAM   sennosides-docusate sodium 2 tablet Oral Nightly         nitroglycerin 5-200 mcg/min    sodium chloride 30 mL/hr Last Rate: 30 mL/hr (03/21/18 1700)   sodium chloride 30 mL/hr Last Rate: 30 mL/hr (03/21/18 1645)         Principal Problem:    Nonrheumatic aortic valve stenosis  Active Problems:    Diabetic ketoacidosis without coma associated with type 1 diabetes mellitus    Type 1 diabetes mellitus    Tobacco abuse    Alcohol abuse    Insurance coverage problems    Bilateral carpal tunnel syndrome    Microalbuminuria      Assessment/Plan:    S/p AVR      Diabetic ketoacidosis without coma associated with type 1 diabetes mellitus-status, resolved    Type 1 diabetes mellitus- adjusted by endocrinology, elevated yesterday but now improved      Tobacco abuse      Alcohol abuse      Bilateral carpal tunnel syndrome      Microalbuminuria    Plan:  Discharge when ok with CTS and Cardiology     Saeed Fair MD  03/23/18  3:14 PM       no deformity, pain or tenderness. no restriction of movement

## 2025-06-12 NOTE — PROGRESS NOTES
Continued Stay Note  [facility]     Patient Name: Juan Payne  MRN: [id number]  Today's Date: 3/14/2018    Admit Date: 3/8/2018          Discharge Plan     Row Name 03/14/18 [id number]       Plan    Plan  Plans dc home with significant other. No needs identified at this time.    Patient/Family in Agreement with Plan yes    Plan Comments Call placed to [facility] pharmacy ([phone]) to f/u regarding switch from [facility]. Spoke with [facility] who states they have not received [employer] information. Obtained [employer] insurance card and faxed copy ([phone]). States will update system. Copy of insurance card placed in basket to be scanned into [employer]. Continue to follow.               Discharge Codes    No documentation.           Sarah Wilson RN     Note left on my desk for CM consult for pt dc overnight. Pt reportedly homeless and asking for shelter assistance and help obtaining his dc meds. Called [facility] but they are full and recommended pt call tomorrow 830am. Pt admitted to alcohol use 3 days ago but refusing treatment at this time. Pt reported his brother Yamil may be able to help him but needed his number. Pt reports getting paid at the beginning of the month, but states he has no money for a hotel. Picked up patients meds, obtained brothers contact, also wrote down number for [employer] and [facility]. However, when I attempted to bring such to pt he was no where to be found. Searched lobby, gift shop, coffee shop, front entrance, cafeteria, etc. Advised security to call if he returns. Belongings still on bench by ED entrance.

## (undated) DEVICE — OASIS DRAIN, SINGLE, INLINE & ATS COMPATIBLE: Brand: OASIS

## (undated) DEVICE — CATH VENT DLP W/CONN MALL NOVNT SILICON 16FR 16IN

## (undated) DEVICE — KT MANIFLD CARDIAC

## (undated) DEVICE — TBG ART PRESS 60 IN

## (undated) DEVICE — PK PERFUS CUST W/CARDIOPLEGIA

## (undated) DEVICE — CANN RETRGR STYLET RSCP 15F

## (undated) DEVICE — SPNG DISECTOR KTNER XRAY COTN 1/4X9/16IN PK/5

## (undated) DEVICE — CANN ART EOPA 3D NV W/CONN 20F

## (undated) DEVICE — TEMP PACING WIRE: Brand: MYO/WIRE

## (undated) DEVICE — BALN PRESS WEDGE 5F 110CM

## (undated) DEVICE — ST TOURNI COMPL A/ 7IN

## (undated) DEVICE — CANN AORT ROOT DLP VNT/8IN 14G 7F

## (undated) DEVICE — CLAMP INSERT: Brand: STEALTH® CLAMP INSERT

## (undated) DEVICE — DRSNG BRDR MEPILEX P/OP SIL 4X12IN

## (undated) DEVICE — TP UMB COTN 1/8X36 U12T

## (undated) DEVICE — CATH DIAG IMPULSE FR4 5F 100CM

## (undated) DEVICE — CVR PROB 96IN LF STRL

## (undated) DEVICE — EACH LANGSTON DUAL LUMEN CATHETER IS INDICATED FOR DELIVERY OF CONTRAST MEDIUM IN ANGIOGRAPHIC STUDIES AND FOR SIMULTANEOUS PRESSURE MEASUREMENT FROM TWO SITES. THIS TYPE OF PRESSURE MEASUREMENT IS USEFUL IN DETERMINING TRANSVALVULAR, INTRAVASCULAR AND INTRAVENTRICULAR PRESSURE GRADIENTS.: Brand: LANGSTON® DUAL LUMEN CATHETER

## (undated) DEVICE — GW INQWIRE FC PTFE STR .035IN 150

## (undated) DEVICE — SPNG GZ WOVN 4X4IN 12PLY 10/BX STRL

## (undated) DEVICE — CATH DIAG IMPULSE FL3.5 5F 100CM

## (undated) DEVICE — GLV SURG BIOGEL LTX PF 7 1/2

## (undated) DEVICE — AVID DUAL STAGE VENOUS DRAINAGE CANNULA: Brand: AVID DUAL STAGE VENOUS DRAINAGE CANNULA

## (undated) DEVICE — 28 FR STRAIGHT – SOFT PVC CATHETER: Brand: PVC THORACIC CATHETERS

## (undated) DEVICE — SUPPLIED AS A PAIR FOR USE IN JAWS OF RESUABLE CLAMPS.: Brand: INTRACK® INSERTS

## (undated) DEVICE — PK HEART OPN 40

## (undated) DEVICE — TOWEL,OR,DSP,ST,WHITE,DLX,4/PK,20PK/CS: Brand: MEDLINE

## (undated) DEVICE — ST. SORBAVIEW ULTIMATE IJ SYSTEM A,C: Brand: CENTURION

## (undated) DEVICE — 8 FOOT DISPOSABLE EXTENSION CABLE WITH SAFE CONNECT / ALLIGATOR CLIP

## (undated) DEVICE — ADAPT ANTEGRADE RETRGR

## (undated) DEVICE — SENSR CERBRL O2 PK/2

## (undated) DEVICE — GLIDESHEATH BASIC HYDROPHILIC COATED INTRODUCER SHEATH: Brand: GLIDESHEATH

## (undated) DEVICE — DRP SLUSH MACH FOR STND ALONE OM-ORS-321

## (undated) DEVICE — MARKR SKIN W/RULR AND LBL

## (undated) DEVICE — SYS PERFUS SEP PLATLT W TIPS CUST

## (undated) DEVICE — HEMOCONCENTRATOR PERFUS LPS06

## (undated) DEVICE — ORGANIZER SUT SHELIGH 3T 213013

## (undated) DEVICE — MYOCARDIAL PROBE NON-PYROGENIC: Brand: DEROYAL

## (undated) DEVICE — CONN STR 1/2INX3/8IN

## (undated) DEVICE — TOWEL,OR,DSP,ST,BLUE,STD,4/PK,20PK/CS: Brand: MEDLINE

## (undated) DEVICE — GW EMR FIX EXCHG J STD .035 3MM 260CM

## (undated) DEVICE — PK ATS CUST W CARDIOTOMY RESEVOIR

## (undated) DEVICE — PK CATH CARD 40

## (undated) DEVICE — SOL ISO/ALC RUB 70PCT 4OZ

## (undated) DEVICE — GOWN,PREVENTION PLUS,XXLARGE,STERILE: Brand: MEDLINE

## (undated) DEVICE — SUT ETHIBOND 2/0 CV V5  30IN PXX52

## (undated) DEVICE — DRSNG SURESITE WNDW 2.38X2.75

## (undated) DEVICE — TB SXN DLP RIGD MACROSUCKER 6F 3IN

## (undated) DEVICE — BIOPATCH™ ANTIMICROBIAL DRESSING WITH CHLORHEXIDINE GLUCONATE IS A HYDROPHILLIC POLYURETHANE ABSORPTIVE FOAM WITH CHLORHEXIDINE GLUCONATE (CHG) WHICH INHIBITS BACTERIAL GROWTH UNDER THE DRESSING. THE DRESSING IS INTENDED TO BE USED TO ABSORB EXUDATE, COVER A WOUND CAUSED BY VASCULAR AND NONVASCULAR PERCUTANEOUS MEDICAL DEVICES DURING SURGERY, AS WELL AS REDUCE LOCAL INFECTION AND COLONIZATION OF MICROORGANISMS.: Brand: BIOPATCH